# Patient Record
Sex: MALE | Race: BLACK OR AFRICAN AMERICAN | NOT HISPANIC OR LATINO | Employment: OTHER | URBAN - METROPOLITAN AREA
[De-identification: names, ages, dates, MRNs, and addresses within clinical notes are randomized per-mention and may not be internally consistent; named-entity substitution may affect disease eponyms.]

---

## 2017-05-26 ENCOUNTER — HOSPITAL ENCOUNTER (OUTPATIENT)
Dept: PREADMISSION TESTING | Facility: HOSPITAL | Age: 52
Discharge: HOME | End: 2017-05-26
Attending: SURGERY
Payer: COMMERCIAL

## 2017-05-26 VITALS
HEART RATE: 71 BPM | HEIGHT: 67 IN | RESPIRATION RATE: 16 BRPM | WEIGHT: 237 LBS | BODY MASS INDEX: 37.2 KG/M2 | SYSTOLIC BLOOD PRESSURE: 131 MMHG | OXYGEN SATURATION: 94 % | TEMPERATURE: 98 F | DIASTOLIC BLOOD PRESSURE: 74 MMHG

## 2017-05-26 DIAGNOSIS — R59.0 INGUINAL LYMPHADENOPATHY: ICD-10-CM

## 2017-05-26 DIAGNOSIS — Z01.818 PRE-OP TESTING: Primary | ICD-10-CM

## 2017-05-26 LAB
ANION GAP SERPL CALC-SCNC: 8 MMOL/L
BASOPHILS # BLD AUTO: 0.04 K/UL
BASOPHILS NFR BLD: 0.6 %
BUN SERPL-MCNC: 32 MG/DL
CALCIUM SERPL-MCNC: 10.2 MG/DL
CHLORIDE SERPL-SCNC: 101 MMOL/L
CO2 SERPL-SCNC: 29 MMOL/L
CREAT SERPL-MCNC: 2 MG/DL
DIFFERENTIAL METHOD: ABNORMAL
EOSINOPHIL # BLD AUTO: 0.2 K/UL
EOSINOPHIL NFR BLD: 3.1 %
ERYTHROCYTE [DISTWIDTH] IN BLOOD BY AUTOMATED COUNT: 12.1 %
EST. GFR  (AFRICAN AMERICAN): 43 ML/MIN/1.73 M^2
EST. GFR  (NON AFRICAN AMERICAN): 37 ML/MIN/1.73 M^2
GLUCOSE SERPL-MCNC: 261 MG/DL
HCT VFR BLD AUTO: 44.2 %
HGB BLD-MCNC: 15.2 G/DL
LYMPHOCYTES # BLD AUTO: 2.8 K/UL
LYMPHOCYTES NFR BLD: 39.9 %
MCH RBC QN AUTO: 32.1 PG
MCHC RBC AUTO-ENTMCNC: 34.4 %
MCV RBC AUTO: 93 FL
MONOCYTES # BLD AUTO: 0.6 K/UL
MONOCYTES NFR BLD: 9 %
NEUTROPHILS # BLD AUTO: 3.3 K/UL
NEUTROPHILS NFR BLD: 47.1 %
PLATELET # BLD AUTO: 214 K/UL
PMV BLD AUTO: 11.3 FL
POTASSIUM SERPL-SCNC: 4.5 MMOL/L
RBC # BLD AUTO: 4.73 M/UL
SODIUM SERPL-SCNC: 138 MMOL/L
WBC # BLD AUTO: 7.09 K/UL

## 2017-05-26 PROCEDURE — 80048 BASIC METABOLIC PNL TOTAL CA: CPT

## 2017-05-26 PROCEDURE — 93005 ELECTROCARDIOGRAM TRACING: CPT

## 2017-05-26 PROCEDURE — 85025 COMPLETE CBC W/AUTO DIFF WBC: CPT

## 2017-05-26 NOTE — DISCHARGE INSTRUCTIONS
Your surgery is scheduled for___6/2/2017______________.    Call [phone] between 2 pm and 5 pm __6/1/2017__________ to find out your arrival time for the day of surgery.    Report to SAME DAY SURGERY UNIT at _______am on the 2nd floor of the hospital.  Use the front entrance of the hospital before 6 am.  If you need wheelchair assistance, call [phone] from your cell phone,  or call 0 from the courtesy phone in the hospital lobby.    Important instructions:   Do not eat or drink after 12 midnight, including water.  It is okay to brush your teeth.  Do not have gum, candy or mints.     Take only these medications with a small swallow of water on the morning of your surgery.___albuterol, amiodarone, amlodipine, isosorbide, metoprolol__________        Return to the hospital lab on __________for additional blood test.       Please shower the night before and the morning of your surgery.       Use Hibiclens soap to your surgery site if instructed by your pre op nurse.   If your surgery is on your abdomen, be sure to wash your naval.  Be sure to rinse off Hibiclens after it is on your skin for several minutes.  Do not use Hibiclens on your face or genitals.      You may wear deodorant only.      Do not wear powder, body lotion or cologne.     Do not wear any jewelry or have any metal on your body.     Please bring any documents given to you by your doctor.     If you are going home on the same day of surgery, you must have arrangements for a ride home.  You will not be able to drive home if you were given anesthesia or sedation.     Do not take any diabetic medication on the morning of surgery unless instructed to do so by your doctor or pre op nurse.     Stop taking Aspirin, Ibuprofen, Motrin and Aleve at least 3-5 days before your surgery. You may use Tylenol.     Stop taking fish oil and vitamin E for least 5 days before surgery.     Wear loose fitting clothes allowing for bandages.     Please leave  money and valuables home.       You may bring your cell phone.     Call the doctor if fever or illness should occur before your surgery.    Call [phone] to contact us here at [facility] if needed.

## 2017-06-01 ENCOUNTER — ANESTHESIA EVENT (OUTPATIENT)
Dept: SURGERY | Facility: HOSPITAL | Age: 52
End: 2017-06-01
Payer: COMMERCIAL

## 2017-06-02 ENCOUNTER — ANESTHESIA (OUTPATIENT)
Dept: SURGERY | Facility: HOSPITAL | Age: 52
End: 2017-06-02
Payer: COMMERCIAL

## 2017-06-02 ENCOUNTER — HOSPITAL ENCOUNTER (OUTPATIENT)
Facility: HOSPITAL | Age: 52
Discharge: HOME | End: 2017-06-02
Attending: SURGERY | Admitting: SURGERY
Payer: COMMERCIAL

## 2017-06-02 ENCOUNTER — SURGERY (OUTPATIENT)
Age: 52
End: 2017-06-02

## 2017-06-02 VITALS
WEIGHT: 237 LBS | OXYGEN SATURATION: 98 % | SYSTOLIC BLOOD PRESSURE: 110 MMHG | HEIGHT: 67 IN | BODY MASS INDEX: 37.2 KG/M2 | HEART RATE: 74 BPM | DIASTOLIC BLOOD PRESSURE: 65 MMHG | TEMPERATURE: 97 F | RESPIRATION RATE: 20 BRPM

## 2017-06-02 DIAGNOSIS — R59.0 INGUINAL LYMPHADENOPATHY: ICD-10-CM

## 2017-06-02 LAB
POCT GLUCOSE: 202 MG/DL (ref 70–110)
POCT GLUCOSE: 220 MG/DL (ref 70–110)

## 2017-06-02 PROCEDURE — 88305 TISSUE EXAM BY PATHOLOGIST: CPT | Mod: 26,,, | Performed by: PATHOLOGY

## 2017-06-02 PROCEDURE — 63600175 PHARM REV CODE 636 W HCPCS: Performed by: ANESTHESIOLOGY

## 2017-06-02 PROCEDURE — 25000003 PHARM REV CODE 250: Performed by: ANESTHESIOLOGY

## 2017-06-02 PROCEDURE — 25000003 PHARM REV CODE 250: Performed by: SURGERY

## 2017-06-02 PROCEDURE — 36000706: Performed by: SURGERY

## 2017-06-02 PROCEDURE — 88185 FLOWCYTOMETRY/TC ADD-ON: CPT | Performed by: PATHOLOGY

## 2017-06-02 PROCEDURE — 37000008 HC ANESTHESIA 1ST 15 MINUTES: Performed by: SURGERY

## 2017-06-02 PROCEDURE — 01610 ANES ALL PX NRV MUSC SHO&AX: CPT | Performed by: SURGERY

## 2017-06-02 PROCEDURE — 88364 INSITU HYBRIDIZATION (FISH): CPT | Mod: 26,,, | Performed by: PATHOLOGY

## 2017-06-02 PROCEDURE — 88305 TISSUE EXAM BY PATHOLOGIST: CPT | Performed by: PATHOLOGY

## 2017-06-02 PROCEDURE — D9220A PRA ANESTHESIA: Mod: ANES,,, | Performed by: ANESTHESIOLOGY

## 2017-06-02 PROCEDURE — 88341 IMHCHEM/IMCYTCHM EA ADD ANTB: CPT | Mod: 26,59,, | Performed by: PATHOLOGY

## 2017-06-02 PROCEDURE — 71000015 HC POSTOP RECOV 1ST HR: Performed by: SURGERY

## 2017-06-02 PROCEDURE — 63600175 PHARM REV CODE 636 W HCPCS: Performed by: SURGERY

## 2017-06-02 PROCEDURE — 63600175 PHARM REV CODE 636 W HCPCS

## 2017-06-02 PROCEDURE — D9220A PRA ANESTHESIA: Mod: CRNA,,, | Performed by: NURSE ANESTHETIST, CERTIFIED REGISTERED

## 2017-06-02 PROCEDURE — 88365 INSITU HYBRIDIZATION (FISH): CPT | Mod: 26,,, | Performed by: PATHOLOGY

## 2017-06-02 PROCEDURE — 71000016 HC POSTOP RECOV ADDL HR: Performed by: SURGERY

## 2017-06-02 PROCEDURE — 63600175 PHARM REV CODE 636 W HCPCS: Performed by: NURSE ANESTHETIST, CERTIFIED REGISTERED

## 2017-06-02 PROCEDURE — 88189 FLOWCYTOMETRY/READ 16 & >: CPT | Mod: ,,, | Performed by: PATHOLOGY

## 2017-06-02 PROCEDURE — 71000033 HC RECOVERY, INTIAL HOUR: Performed by: SURGERY

## 2017-06-02 PROCEDURE — 36000707: Performed by: SURGERY

## 2017-06-02 PROCEDURE — 37000009 HC ANESTHESIA EA ADD 15 MINS: Performed by: SURGERY

## 2017-06-02 PROCEDURE — 25000003 PHARM REV CODE 250: Performed by: NURSE ANESTHETIST, CERTIFIED REGISTERED

## 2017-06-02 PROCEDURE — 88184 FLOWCYTOMETRY/ TC 1 MARKER: CPT | Performed by: PATHOLOGY

## 2017-06-02 PROCEDURE — 88342 IMHCHEM/IMCYTCHM 1ST ANTB: CPT | Mod: 26,59,, | Performed by: PATHOLOGY

## 2017-06-02 PROCEDURE — 88365 INSITU HYBRIDIZATION (FISH): CPT | Performed by: PATHOLOGY

## 2017-06-02 RX ORDER — BUPIVACAINE HYDROCHLORIDE 2.5 MG/ML
INJECTION, SOLUTION EPIDURAL; INFILTRATION; INTRACAUDAL
Status: DISCONTINUED | OUTPATIENT
Start: 2017-06-02 | End: 2017-06-02 | Stop reason: HOSPADM

## 2017-06-02 RX ORDER — SODIUM CHLORIDE 0.9 % (FLUSH) 0.9 %
3 SYRINGE (ML) INJECTION
Status: DISCONTINUED | OUTPATIENT
Start: 2017-06-02 | End: 2017-06-02 | Stop reason: HOSPADM

## 2017-06-02 RX ORDER — LIDOCAINE HCL/PF 100 MG/5ML
SYRINGE (ML) INTRAVENOUS
Status: DISCONTINUED | OUTPATIENT
Start: 2017-06-02 | End: 2017-06-02

## 2017-06-02 RX ORDER — HYDROCODONE BITARTRATE AND ACETAMINOPHEN 5; 325 MG/1; MG/1
1 TABLET ORAL EVERY 4 HOURS PRN
Qty: 30 TABLET | Refills: 0 | Status: SHIPPED | OUTPATIENT
Start: 2017-06-02 | End: 2017-06-26

## 2017-06-02 RX ORDER — HYDROMORPHONE HYDROCHLORIDE 2 MG/ML
0.2 INJECTION, SOLUTION INTRAMUSCULAR; INTRAVENOUS; SUBCUTANEOUS EVERY 5 MIN PRN
Status: DISCONTINUED | OUTPATIENT
Start: 2017-06-02 | End: 2017-06-02 | Stop reason: HOSPADM

## 2017-06-02 RX ORDER — CEFAZOLIN SODIUM 2 G/50ML
2 SOLUTION INTRAVENOUS
Status: COMPLETED | OUTPATIENT
Start: 2017-06-02 | End: 2017-06-02

## 2017-06-02 RX ORDER — SODIUM CHLORIDE 0.9 % (FLUSH) 0.9 %
3 SYRINGE (ML) INJECTION EVERY 8 HOURS
Status: DISCONTINUED | OUTPATIENT
Start: 2017-06-02 | End: 2017-06-02 | Stop reason: HOSPADM

## 2017-06-02 RX ORDER — ACETAMINOPHEN 10 MG/ML
1000 INJECTION, SOLUTION INTRAVENOUS ONCE
Status: COMPLETED | OUTPATIENT
Start: 2017-06-02 | End: 2017-06-02

## 2017-06-02 RX ORDER — LIDOCAINE HYDROCHLORIDE 10 MG/ML
1 INJECTION, SOLUTION EPIDURAL; INFILTRATION; INTRACAUDAL; PERINEURAL ONCE AS NEEDED
Status: DISCONTINUED | OUTPATIENT
Start: 2017-06-02 | End: 2017-06-02 | Stop reason: HOSPADM

## 2017-06-02 RX ORDER — FENTANYL CITRATE 50 UG/ML
INJECTION, SOLUTION INTRAMUSCULAR; INTRAVENOUS
Status: DISCONTINUED | OUTPATIENT
Start: 2017-06-02 | End: 2017-06-02

## 2017-06-02 RX ORDER — SODIUM CHLORIDE 9 MG/ML
INJECTION, SOLUTION INTRAVENOUS CONTINUOUS
Status: DISCONTINUED | OUTPATIENT
Start: 2017-06-02 | End: 2017-06-02 | Stop reason: HOSPADM

## 2017-06-02 RX ORDER — HYDROCODONE BITARTRATE AND ACETAMINOPHEN 5; 325 MG/1; MG/1
1 TABLET ORAL EVERY 4 HOURS PRN
Status: DISCONTINUED | OUTPATIENT
Start: 2017-06-02 | End: 2017-06-02 | Stop reason: HOSPADM

## 2017-06-02 RX ORDER — ETOMIDATE 2 MG/ML
INJECTION INTRAVENOUS
Status: DISCONTINUED | OUTPATIENT
Start: 2017-06-02 | End: 2017-06-02

## 2017-06-02 RX ORDER — FAMOTIDINE 20 MG/50ML
INJECTION, SOLUTION INTRAVENOUS
Status: DISCONTINUED
Start: 2017-06-02 | End: 2017-06-02 | Stop reason: HOSPADM

## 2017-06-02 RX ORDER — MIDAZOLAM HYDROCHLORIDE 1 MG/ML
INJECTION INTRAMUSCULAR; INTRAVENOUS
Status: DISCONTINUED
Start: 2017-06-02 | End: 2017-06-02 | Stop reason: HOSPADM

## 2017-06-02 RX ORDER — LIDOCAINE HYDROCHLORIDE 10 MG/ML
INJECTION, SOLUTION EPIDURAL; INFILTRATION; INTRACAUDAL; PERINEURAL
Status: DISCONTINUED | OUTPATIENT
Start: 2017-06-02 | End: 2017-06-02 | Stop reason: HOSPADM

## 2017-06-02 RX ORDER — MIDAZOLAM HYDROCHLORIDE 1 MG/ML
INJECTION, SOLUTION INTRAMUSCULAR; INTRAVENOUS
Status: DISCONTINUED | OUTPATIENT
Start: 2017-06-02 | End: 2017-06-02

## 2017-06-02 RX ORDER — FENTANYL CITRATE 50 UG/ML
25 INJECTION, SOLUTION INTRAMUSCULAR; INTRAVENOUS EVERY 5 MIN PRN
Status: DISCONTINUED | OUTPATIENT
Start: 2017-06-02 | End: 2017-06-02 | Stop reason: HOSPADM

## 2017-06-02 RX ORDER — CEFAZOLIN SODIUM 2 G/50ML
SOLUTION INTRAVENOUS
Status: DISCONTINUED
Start: 2017-06-02 | End: 2017-06-02 | Stop reason: HOSPADM

## 2017-06-02 RX ORDER — SODIUM CHLORIDE, SODIUM LACTATE, POTASSIUM CHLORIDE, CALCIUM CHLORIDE 600; 310; 30; 20 MG/100ML; MG/100ML; MG/100ML; MG/100ML
INJECTION, SOLUTION INTRAVENOUS CONTINUOUS
Status: DISCONTINUED | OUTPATIENT
Start: 2017-06-02 | End: 2017-06-02 | Stop reason: HOSPADM

## 2017-06-02 RX ORDER — MORPHINE SULFATE 10 MG/ML
3 INJECTION INTRAMUSCULAR; INTRAVENOUS; SUBCUTANEOUS
Status: DISCONTINUED | OUTPATIENT
Start: 2017-06-02 | End: 2017-06-02 | Stop reason: HOSPADM

## 2017-06-02 RX ORDER — ACETAMINOPHEN 10 MG/ML
INJECTION, SOLUTION INTRAVENOUS
Status: COMPLETED
Start: 2017-06-02 | End: 2017-06-02

## 2017-06-02 RX ADMIN — ETOMIDATE 2 MG: 2 INJECTION, SOLUTION INTRAVENOUS at 11:06

## 2017-06-02 RX ADMIN — HYDROMORPHONE HYDROCHLORIDE 0.2 MG: 2 INJECTION INTRAMUSCULAR; INTRAVENOUS; SUBCUTANEOUS at 12:06

## 2017-06-02 RX ADMIN — ETOMIDATE 6 MG: 2 INJECTION, SOLUTION INTRAVENOUS at 11:06

## 2017-06-02 RX ADMIN — MIDAZOLAM HYDROCHLORIDE 1 MG: 1 INJECTION, SOLUTION INTRAMUSCULAR; INTRAVENOUS at 11:06

## 2017-06-02 RX ADMIN — ETOMIDATE 4 MG: 2 INJECTION, SOLUTION INTRAVENOUS at 11:06

## 2017-06-02 RX ADMIN — CEFAZOLIN SODIUM 2 G: 2 SOLUTION INTRAVENOUS at 11:06

## 2017-06-02 RX ADMIN — ACETAMINOPHEN 1000 MG: 10 INJECTION, SOLUTION INTRAVENOUS at 12:06

## 2017-06-02 RX ADMIN — LIDOCAINE HYDROCHLORIDE 80 MG: 20 INJECTION, SOLUTION INTRAVENOUS at 11:06

## 2017-06-02 RX ADMIN — FENTANYL CITRATE 25 MCG: 50 INJECTION INTRAMUSCULAR; INTRAVENOUS at 11:06

## 2017-06-02 RX ADMIN — HYDROCODONE BITARTRATE AND ACETAMINOPHEN 1 TABLET: 5; 325 TABLET ORAL at 01:06

## 2017-06-02 RX ADMIN — SODIUM CHLORIDE, SODIUM LACTATE, POTASSIUM CHLORIDE, AND CALCIUM CHLORIDE: .6; .31; .03; .02 INJECTION, SOLUTION INTRAVENOUS at 10:06

## 2017-06-02 RX ADMIN — BUPIVACAINE HYDROCHLORIDE 30 ML: 2.5 INJECTION, SOLUTION EPIDURAL; INFILTRATION; INTRACAUDAL; PERINEURAL at 11:06

## 2017-06-02 RX ADMIN — LIDOCAINE HYDROCHLORIDE 30 ML: 10 INJECTION, SOLUTION EPIDURAL; INFILTRATION; INTRACAUDAL; PERINEURAL at 11:06

## 2017-06-02 NOTE — TRANSFER OF CARE
"Anesthesia Transfer of Care Note    Patient: Lianet Mcgraw    Procedure(s) Performed: Procedure(s) (LRB):  EXCISION-BIOPSY-LYMPH NODE-INGUINAL (Left)    Patient location: PACU    Anesthesia Type: general    Transport from OR: Transported from OR on room air with adequate spontaneous ventilation    Post pain: adequate analgesia    Post assessment: no apparent anesthetic complications    Post vital signs: stable    Level of consciousness: awake, alert and oriented    Nausea/Vomiting: no nausea/vomiting    Complications: none    Transfer of care protocol was followed      Last vitals:   Visit Vitals  /85 (BP Location: Right arm, Patient Position: Lying, BP Method: Automatic)   Pulse 76   Temp 36.8 °C (98.2 °F) (Tympanic)   Resp 12   Ht 5' 7" (1.702 m)   Wt 107.5 kg (237 lb)   SpO2 98%   BMI 37.12 kg/m²     "

## 2017-06-02 NOTE — OP NOTE
DATE OF PROCEDURE:  06/02/2017    PREOPERATIVE DIAGNOSIS:  Left inguinal adenopathy.    POSTOPERATIVE DIAGNOSIS:  Left inguinal adenopathy.    PROCEDURE PERFORMED:  Excision of left inguinal lymph node.    SURGEON:  Brenden Bains M.D.    ANESTHESIA:  General.    ESTIMATED BLOOD LOSS:  Minimal.    DESCRIPTION OF OPERATION:  The patient was brought to the Operating Room, placed   on operating table in supine position and under adequate anesthesia, prepped   and draped around his left groin in usual sterile fashion.  Incision was made   overlying this area, deepened to expose inguinal lymph node.  Circumferential   dissection removed and sent to Pathology for analysis.  Hemostasis obtained with   electrocautery.  The wound was then closed in layers with absorbable suture.    Some peroneal tape was used.  The patient was awakened and transported to   Recovery Room in satisfactory condition.      KARLI  dd: 06/02/2017 12:13:24 (CDT)  td: 06/02/2017 12:50:55 (CDT)  Doc ID   #[id number]  Job ID #[id number]    CC:

## 2017-06-02 NOTE — DISCHARGE INSTRUCTIONS
DRESSING:   You have Dermaflex  . covering your incision.  It is a clear liquid that dries like super glue.  Do not try to remove it.  It will flake off over time.  You may wash it with soap and water in the shower.  Do not apply any creams or lotion on it.    BATHING:    You may shower, but no tub baths until you see the doctor.    ACTIVITY LEVEL: If you have received sedation or an anesthetic, you may feel sleepy for several hours. Rest until you are more awake. Gradually resume your normal activities. No driving or alcoholic beverages for 24 hours.     No driving, alcoholic beverages or signing legal documents for next 24 hours or while taking pain medication    DIET: You may resume your home diet. If nausea is present, increase your diet gradually with fluids and bland foods.    Medications: Pain medication should be taken only if needed and as directed. If antibiotics are prescribed, the medication should be taken until completed. You will be given an updated list of you medications.    LAST DOSE OF NORCO 1:07 PM  SEEP BREATHE AND COUGH FREQUENTLY.    CALL THE DOCTOR:    For any obvious bleeding (some dried blood over the incision is normal).                          Redness, swelling, foul smell around incision or fever over     101.                    Shortness of breath.   Persistent pain or nausea not relieved by medication.  Fall Prevention  Millions of people fall every year and injure themselves. You may have had anesthesia or sedation which may increase your risk of falling. You may have health issues that put you at an increased risk of falling.   Here are ways to reduce your risk of falling.  ·   · Make your home safe by keeping walkways clear of objects you may trip over.  · Use non-slip pads under rugs. Do not use area rugs or small throw rugs.  · Use non-slip mats in bathtubs and showers.  · Install handrails and lights on staircases.  · Do not walk in poorly lit areas.  · Do not stand on chairs  or wobbly ladders.  · Use caution when reaching overhead or looking upward. This position can cause a loss of balance.  · Be sure your shoes fit properly, have non-slip bottoms and are in good condition.   · Wear shoes both inside and out. Avoid going barefoot or wearing slippers.  · Be cautious when going up and down stairs, curbs, and when walking on uneven sidewalks.  · If your balance is poor, consider using a cane or walker.  · If your fall was related to alcohol use, stop or limit alcohol intake.   · If your fall was related to use of sleeping medicines, talk to your doctor about this. You may need to reduce your dosage at bedtime if you awaken during the night to go to the bathroom.    · To reduce the need for nighttime bathroom trips:  ¨ Avoid drinking fluids for several hours before going to bed  ¨ Empty your bladder before going to bed  ¨ Men can keep a urinal at the bedside  · Stay as active as you can. Balance, flexibility, strength, and endurance all come from exercise. They all play a role in preventing falls. Ask your healthcare provider which types of activity are right for you.  · Get your vision checked on a regular basis.  · If you have pets, know where they are before you stand up or walk so you don't trip over them.  Use night lights

## 2017-06-02 NOTE — H&P
Patient ID: Lianet Mcgraw is a 52 y.o. male.     Chief Complaint: Other (lymphadenopathy)     HPI 51 yo male with inguinal lymphadenopathy in the Left  Review of Systems   Constitutional: Negative.    HENT: Negative.    Eyes: Negative.    Respiratory: Negative.    Cardiovascular: Negative.    Gastrointestinal: Negative.    Endocrine: Negative.    Musculoskeletal: Negative.    Skin: Negative.    Allergic/Immunologic: Negative.    Neurological: Negative.    Hematological: Negative.    Psychiatric/Behavioral: Negative.    All other systems reviewed and are negative.      Objective:      Physical Exam   Constitutional: He is oriented to person, place, and time. He appears well-developed and well-nourished.   HENT:   Head: Normocephalic and atraumatic.   Right Ear: External ear normal.   Left Ear: External ear normal.   Nose: Nose normal.   Mouth/Throat: Oropharynx is clear and moist.   Eyes: Conjunctivae and EOM are normal. Pupils are equal, round, and reactive to light.   Neck: Normal range of motion. Neck supple.   Cardiovascular: Normal rate, regular rhythm, normal heart sounds and intact distal pulses.    Pulmonary/Chest: Effort normal and breath sounds normal.   Abdominal: Soft. Bowel sounds are normal.   Genitourinary:         Musculoskeletal: Normal range of motion.   Lymphadenopathy: Inguinal adenopathy noted on the left side.   Neurological: He is alert and oriented to person, place, and time. He has normal reflexes.   Skin: Skin is warm and dry.   Psychiatric: He has a normal mood and affect. His behavior is normal. Thought content normal.   Vitals reviewed.      Assessment:       1. Inguinal lymphadenopathy        Plan:       I will take him to the OR for excision

## 2017-06-02 NOTE — ANESTHESIA PREPROCEDURE EVALUATION
06/02/2017  Lianet Mcgraw is a 52 y.o., male.    Anesthesia Evaluation    I have reviewed the Patient Summary Reports.    I have reviewed the Nursing Notes.      Review of Systems  Cardiovascular:   Exercise tolerance: good Pacemaker Hypertension Past MI CAD  Dysrhythmias (s/p ablation)  hyperlipidemia Pt and daughter deny sob/cp with exertion, though it is unlikely the pt sustains 4 METS in his usual activities   Pulmonary:   COPD (breathing at baseline today), moderate Sleep Apnea    Renal/:   Chronic Renal Disease, CRI BPH    Hepatic/GI:   GERD    Musculoskeletal:   Arthritis     Neurological:   Denies CVA. Denies Seizures.    Endocrine:   Diabetes, type 2 Denies Hypothyroidism. Denies Hyperthyroidism.        Physical Exam  General:  Obesity    Airway/Jaw/Neck:   M3  Partial bridge  Adequate mouth opening  Short thick neck somewhat limits neck ROM          Mental Status:  Mental Status Findings: Normal        Anesthesia Plan  Type of Anesthesia, risks & benefits discussed:  Anesthesia Type:  general  Patient's Preference:   Intra-op Monitoring Plan:   Intra-op Monitoring Plan Comments:   Post Op Pain Control Plan:   Post Op Pain Control Plan Comments:   Induction:   IV  Beta Blocker:  Patient is not currently on a Beta-Blocker (No further documentation required).       Informed Consent: Patient understands risks and agrees with Anesthesia plan.  Questions answered. Anesthesia consent signed with patient.  ASA Score: 3     Day of Surgery Review of History & Physical:    H&P update referred to the surgeon.     Anesthesia Plan Notes: Npo  Low risk procedure that is somewhat time urgent as Dr. Bains wants to rule out cancer in lymph node. Pt has   Seen his PCP who declared pt to be moderate risk and he was seen at his heart clinic yday and device interogated.  Pt and daughter deny any new symptoms of  sob/cp        Ready For Surgery From Anesthesia Perspective.

## 2017-06-05 LAB
FLOW CYTOMETRY ANTIBODIES ANALYZED - LYMPH NODE: NORMAL
FLOW CYTOMETRY COMMENT - LYMPH NODE: NORMAL
FLOW CYTOMETRY INTERPRETATION - LYMPH NODE: NORMAL

## 2017-06-06 NOTE — ANESTHESIA POSTPROCEDURE EVALUATION
"Anesthesia Post Evaluation    Patient: Lianet Mcgraw    Procedure(s) Performed: Procedure(s) (LRB):  EXCISION-BIOPSY-LYMPH NODE-INGUINAL (Left)    Final Anesthesia Type: general  Patient location during evaluation: PACU  Patient participation: Yes- Able to Participate  Level of consciousness: awake and alert and oriented  Post-procedure vital signs: reviewed and stable  Pain management: adequate  Airway patency: patent  PONV status at discharge: No PONV  Anesthetic complications: no      Cardiovascular status: blood pressure returned to baseline and hemodynamically stable  Respiratory status: unassisted and room air  Hydration status: euvolemic  Follow-up not needed.        Visit Vitals  /65   Pulse 74   Temp 36.3 °C (97.4 °F) (Oral)   Resp 20   Ht 5' 7" (1.702 m)   Wt 107.5 kg (237 lb)   SpO2 98%   BMI 37.12 kg/m²       Pain/Laurie Score: No Data Recorded      "

## 2017-06-10 ENCOUNTER — HOSPITAL ENCOUNTER (EMERGENCY)
Facility: OTHER | Age: 52
Discharge: HOME | End: 2017-06-10
Attending: EMERGENCY MEDICINE
Payer: COMMERCIAL

## 2017-06-10 VITALS
HEIGHT: 67 IN | SYSTOLIC BLOOD PRESSURE: 132 MMHG | OXYGEN SATURATION: 100 % | HEART RATE: 75 BPM | WEIGHT: 236 LBS | TEMPERATURE: 98 F | RESPIRATION RATE: 18 BRPM | DIASTOLIC BLOOD PRESSURE: 77 MMHG | BODY MASS INDEX: 37.04 KG/M2

## 2017-06-10 DIAGNOSIS — T81.30XA WOUND DEHISCENCE: ICD-10-CM

## 2017-06-10 DIAGNOSIS — G89.18 POST-OPERATIVE PAIN: Primary | ICD-10-CM

## 2017-06-10 LAB
ALBUMIN SERPL-MCNC: 4.3 G/DL (ref 3.3–5.5)
ALP SERPL-CCNC: 80 U/L (ref 42–141)
BILIRUB SERPL-MCNC: 0.9 MG/DL (ref 0.2–1.6)
BUN SERPL-MCNC: 26 MG/DL (ref 7–22)
CALCIUM SERPL-MCNC: 10.2 MG/DL (ref 8–10.3)
CHLORIDE SERPL-SCNC: 100 MMOL/L (ref 98–108)
CREAT SERPL-MCNC: 1.6 MG/DL (ref 0.6–1.2)
GLUCOSE SERPL-MCNC: 261 MG/DL (ref 73–118)
HCO3 UR-SCNC: 27.1 MMOL/L (ref 24–28)
LDH SERPL L TO P-CCNC: 1.65 MMOL/L (ref 0.5–2.2)
PCO2 BLDA: 41.2 MMHG (ref 35–45)
PH SMN: 7.43 [PH] (ref 7.35–7.45)
PO2 BLDA: 54 MMHG (ref 40–60)
POC ALT (SGPT): 30 U/L (ref 10–47)
POC AST (SGOT): 31 U/L (ref 11–38)
POC BE: 3 MMOL/L
POC SATURATED O2: 88 % (ref 95–100)
POC TCO2: 27 MMOL/L (ref 18–33)
POC TCO2: 28 MMOL/L (ref 24–29)
POCT GLUCOSE: 324 MG/DL (ref 70–110)
POTASSIUM BLD-SCNC: 4.5 MMOL/L (ref 3.6–5.1)
PROTEIN, POC: 7.9 G/DL (ref 6.4–8.1)
SAMPLE: ABNORMAL
SODIUM BLD-SCNC: 133 MMOL/L (ref 128–145)

## 2017-06-10 PROCEDURE — 85025 COMPLETE CBC W/AUTO DIFF WBC: CPT

## 2017-06-10 PROCEDURE — 63600175 PHARM REV CODE 636 W HCPCS: Performed by: EMERGENCY MEDICINE

## 2017-06-10 PROCEDURE — 82947 ASSAY GLUCOSE BLOOD QUANT: CPT

## 2017-06-10 PROCEDURE — 80053 COMPREHEN METABOLIC PANEL: CPT

## 2017-06-10 PROCEDURE — 99283 EMERGENCY DEPT VISIT LOW MDM: CPT | Mod: 25

## 2017-06-10 PROCEDURE — 96372 THER/PROPH/DIAG INJ SC/IM: CPT

## 2017-06-10 PROCEDURE — 83605 ASSAY OF LACTIC ACID: CPT

## 2017-06-10 RX ORDER — HYDROMORPHONE HYDROCHLORIDE 1 MG/ML
1 INJECTION, SOLUTION INTRAMUSCULAR; INTRAVENOUS; SUBCUTANEOUS
Status: DISCONTINUED | OUTPATIENT
Start: 2017-06-10 | End: 2017-06-10

## 2017-06-10 RX ORDER — HYDROMORPHONE HYDROCHLORIDE 1 MG/ML
INJECTION, SOLUTION INTRAMUSCULAR; INTRAVENOUS; SUBCUTANEOUS
Status: DISCONTINUED
Start: 2017-06-10 | End: 2017-06-11 | Stop reason: HOSPADM

## 2017-06-10 RX ORDER — HYDROMORPHONE HYDROCHLORIDE 1 MG/ML
1 INJECTION, SOLUTION INTRAMUSCULAR; INTRAVENOUS; SUBCUTANEOUS
Status: COMPLETED | OUTPATIENT
Start: 2017-06-10 | End: 2017-06-10

## 2017-06-10 RX ADMIN — HYDROMORPHONE HYDROCHLORIDE 1 MG: 1 INJECTION, SOLUTION INTRAMUSCULAR; INTRAVENOUS; SUBCUTANEOUS at 10:06

## 2017-06-11 NOTE — ED PROVIDER NOTES
Encounter Date: 6/10/2017       History     Chief Complaint   Patient presents with    Wound Dehiscence     Pt presents to ER with c/o having a procedure done a week ago for a lynph node removal and now the area is swollen, red, draining and opening to left groin.     Review of patient's allergies indicates:   Allergen Reactions    Darvocet a500 [propoxyphene n-acetaminophen] Itching     52 y.o. Male with past medical history of left inguinal LAD sp LN excision by Dr. Bains on 6/2/17 present to ED c/o pain and swelling to left groin. He states he ran out of pain medications and cannot get a refill due to prior prescription for same medication is currently active until  Amanda 15.  Patient states he also noticed clear fluid draining from wound since yesterday.                     Past Medical History:   Diagnosis Date    COPD (chronic obstructive pulmonary disease)     Coronary artery disease     Diabetes mellitus type II     DJD (degenerative joint disease)     Gout     Hypertension     Kidney stone     MI (myocardial infarction) 2010    Obesity     JOSE (obstructive sleep apnea)      Past Surgical History:   Procedure Laterality Date    CARDIAC DEFIBRILLATOR PLACEMENT      FOOT SURGERY      right foot surgery     kidney stones      lithotripsy    Surgery for bulging disc at C7       Family History   Problem Relation Age of Onset    Diabetes Mother     Hypertension Mother     Heart disease Father     Diabetes Brother     Hypertension Brother     Asthma Daughter     Cancer Maternal Uncle      prostate    Cancer Cousin     Kidney disease Cousin      Social History   Substance Use Topics    Smoking status: Never Smoker    Smokeless tobacco: Not on file    Alcohol use No     Review of Systems   Constitutional: Negative for chills and fever.   HENT: Negative.    Eyes: Negative.    Respiratory: Negative for cough, shortness of breath and stridor.    Cardiovascular: Positive for leg swelling  (left proximal, anterior inguinal area). Negative for chest pain and palpitations.   Gastrointestinal: Positive for constipation. Negative for diarrhea, nausea and vomiting.   Genitourinary: Negative for decreased urine volume, dysuria and hematuria.   Musculoskeletal: Negative.    Skin: Negative.    Neurological: Negative for dizziness and headaches.   Psychiatric/Behavioral: Negative.    All other systems reviewed and are negative.      Physical Exam     Initial Vitals [06/10/17 2012]   BP Pulse Resp Temp SpO2   138/70 78 18 98.1 °F (36.7 °C) --     Physical Exam    Nursing note and vitals reviewed.  Constitutional: He appears well-developed and well-nourished. He is not diaphoretic. No distress.   HENT:   Head: Normocephalic and atraumatic.   Mouth/Throat: Oropharynx is clear and moist. No oropharyngeal exudate.   Eyes: EOM are normal. Pupils are equal, round, and reactive to light. No scleral icterus.   Neck: Normal range of motion. Neck supple.   Cardiovascular: Normal rate, regular rhythm and intact distal pulses.   No murmur heard.  Pulmonary/Chest: Breath sounds normal. No stridor. No respiratory distress. He has no wheezes. He has no rhonchi. He exhibits no tenderness.   Abdominal: Soft. Bowel sounds are normal. There is no tenderness.   Musculoskeletal: Normal range of motion. He exhibits no edema.   Neurological: He is alert and oriented to person, place, and time. He has normal strength.   Skin: Skin is warm. No pallor.        Psychiatric: He has a normal mood and affect.             ED Course   Procedures  Labs Reviewed   POCT GLUCOSE - Abnormal; Notable for the following:        Result Value    POCT Glucose 324 (*)     All other components within normal limits   POCT CMP - Abnormal; Notable for the following:     POC BUN 26 (*)     POC Creatinine 1.6 (*)     POC Glucose 261 (*)     All other components within normal limits   ISTAT PROCEDURE - Abnormal; Notable for the following:     POC SATURATED O2 88  (*)     All other components within normal limits   POCT GLUCOSE MONITORING CONTINUOUS   POCT CMP             Medical Decision Making:   Clinical Tests:   Lab Tests: Ordered and Reviewed       <> Summary of Lab: White count 8 H&H 15.7 and 46.0 platelets 160 MCV 91.6 RDW 12.1  ED Management:  Creatinine of 1.6 is consistent with baseline.                   ED Course   Comment By Time   Transfer center contact to discuss case with Dr. Bains.  Carlo Mejia MD 06/10 0546   Discussed case with Dr. Kelley, on call for Dr. Bains. He recommends outpatient follow up in clinic Tuesday with him or Wednesday with Dr. Bains.  Carlo Mejia MD 06/10 4811     Labs Reviewed  Admission on 06/10/2017, Discharged on 06/10/2017   Component Date Value Ref Range Status    POCT Glucose 06/10/2017 324* 70 - 110 mg/dL Final    Albumin, POC 06/10/2017 4.3  3.3 - 5.5 g/dL Final    Alkaline Phosphatase, POC 06/10/2017 80  42 - 141 U/L Final    ALT (SGPT), POC 06/10/2017 30  10 - 47 U/L Final    AST (SGOT), POC 06/10/2017 31  11 - 38 U/L Final    POC BUN 06/10/2017 26* 7 - 22 mg/dL Final    Calcium, POC 06/10/2017 10.2  8.0 - 10.3 mg/dL Final    POC Chloride 06/10/2017 100  98 - 108 mmol/L Final    POC Creatinine 06/10/2017 1.6* 0.6 - 1.2 mg/dL Final    POC Glucose 06/10/2017 261* 73 - 118 mg/dL Final    POC Potassium 06/10/2017 4.5  3.6 - 5.1 mmol/L Final    POC Sodium 06/10/2017 133  128 - 145 mmol/L Final    Bilirubin 06/10/2017 0.9  0.2 - 1.6 mg/dL Final    POC TCO2 06/10/2017 27  18 - 33 mmol/L Final    Protein 06/10/2017 7.9  6.4 - 8.1 g/dL Final    POC PH 06/10/2017 7.426  7.35 - 7.45 Final    POC PCO2 06/10/2017 41.2  35 - 45 mmHg Final    POC PO2 06/10/2017 54  40 - 60 mmHg Final    POC HCO3 06/10/2017 27.1  24 - 28 mmol/L Final    POC BE 06/10/2017 3  -2 to 2 mmol/L Final    POC SATURATED O2 06/10/2017 88* 95 - 100 % Final    POC Lactate 06/10/2017 1.65  0.5 - 2.2 mmol/L Final    POC TCO2 06/10/2017  28  24 - 29 mmol/L Final    Sample 06/10/2017 VENOUS   Final        Imaging Reviewed    Imaging Results    None         Medications given in ED    Medications   hydromorphone (PF) injection 1 mg (1 mg Intramuscular Given 6/10/17 5318)         Clinical Impression:   The primary encounter diagnosis was Post-operative pain. Diagnoses of Seroma and Wound dehiscence were also pertinent to this visit.          Carlo Mejia MD  06/14/17 4082

## 2017-11-11 ENCOUNTER — HOSPITAL ENCOUNTER (EMERGENCY)
Facility: OTHER | Age: 52
Discharge: HOME | End: 2017-11-11
Attending: EMERGENCY MEDICINE
Payer: COMMERCIAL

## 2017-11-11 VITALS
DIASTOLIC BLOOD PRESSURE: 65 MMHG | BODY MASS INDEX: 36.57 KG/M2 | OXYGEN SATURATION: 98 % | HEART RATE: 52 BPM | SYSTOLIC BLOOD PRESSURE: 135 MMHG | WEIGHT: 233 LBS | HEIGHT: 67 IN | TEMPERATURE: 98 F | RESPIRATION RATE: 16 BRPM

## 2017-11-11 DIAGNOSIS — J40 BRONCHITIS: Primary | ICD-10-CM

## 2017-11-11 DIAGNOSIS — R05.9 COUGH: ICD-10-CM

## 2017-11-11 LAB
ALBUMIN SERPL-MCNC: 3.9 G/DL (ref 3.3–5.5)
ALP SERPL-CCNC: 65 U/L (ref 42–141)
BILIRUB SERPL-MCNC: 0.6 MG/DL (ref 0.2–1.6)
BILIRUBIN, POC UA: NEGATIVE
BLOOD, POC UA: NEGATIVE
BUN SERPL-MCNC: 18 MG/DL (ref 7–22)
CALCIUM SERPL-MCNC: 9.7 MG/DL (ref 8–10.3)
CHLORIDE SERPL-SCNC: 104 MMOL/L (ref 98–108)
CLARITY, POC UA: CLEAR
COLOR, POC UA: YELLOW
CREAT SERPL-MCNC: 1.5 MG/DL (ref 0.6–1.2)
CTP QC/QA: YES
CTP QC/QA: YES
FLUAV AG NPH QL: NEGATIVE
FLUBV AG NPH QL: NEGATIVE
GLUCOSE SERPL-MCNC: 157 MG/DL (ref 73–118)
GLUCOSE, POC UA: ABNORMAL
HCO3 UR-SCNC: 29.8 MMOL/L (ref 24–28)
KETONES, POC UA: NEGATIVE
LDH SERPL L TO P-CCNC: 1.22 MMOL/L (ref 0.5–2.2)
LEUKOCYTE EST, POC UA: NEGATIVE
NITRITE, POC UA: NEGATIVE
PCO2 BLDA: 45.1 MMHG (ref 35–45)
PH SMN: 7.43 [PH] (ref 7.35–7.45)
PH UR STRIP: 6.5 [PH]
PO2 BLDA: 52 MMHG (ref 40–60)
POC ALT (SGPT): 28 U/L (ref 10–47)
POC AST (SGOT): 40 U/L (ref 11–38)
POC B-TYPE NATRIURETIC PEPTIDE: 15.8 PG/ML (ref 0–100)
POC BE: 5 MMOL/L
POC CARDIAC TROPONIN I: 0 NG/ML
POC PTINR: 1.1 (ref 0.9–1.2)
POC PTWBT: 12.6 SEC (ref 9.7–14.3)
POC SATURATED O2: 87 % (ref 95–100)
POC TCO2: 25 MMOL/L (ref 18–33)
POC TCO2: 31 MMOL/L (ref 24–29)
POCT GLUCOSE: 156 MG/DL (ref 70–110)
POTASSIUM BLD-SCNC: 4.3 MMOL/L (ref 3.6–5.1)
PROTEIN, POC UA: NEGATIVE
PROTEIN, POC: 7.4 G/DL (ref 6.4–8.1)
S PYO RRNA THROAT QL PROBE: NEGATIVE
SAMPLE: ABNORMAL
SAMPLE: NORMAL
SAMPLE: NORMAL
SODIUM BLD-SCNC: 144 MMOL/L (ref 128–145)
SPECIFIC GRAVITY, POC UA: 1.01
UROBILINOGEN, POC UA: 4 E.U./DL

## 2017-11-11 PROCEDURE — 96361 HYDRATE IV INFUSION ADD-ON: CPT

## 2017-11-11 PROCEDURE — 93010 ELECTROCARDIOGRAM REPORT: CPT | Mod: ,,, | Performed by: INTERNAL MEDICINE

## 2017-11-11 PROCEDURE — 80053 COMPREHEN METABOLIC PANEL: CPT

## 2017-11-11 PROCEDURE — 87804 INFLUENZA ASSAY W/OPTIC: CPT

## 2017-11-11 PROCEDURE — 85025 COMPLETE CBC W/AUTO DIFF WBC: CPT

## 2017-11-11 PROCEDURE — 99284 EMERGENCY DEPT VISIT MOD MDM: CPT | Mod: 25

## 2017-11-11 PROCEDURE — 82803 BLOOD GASES ANY COMBINATION: CPT

## 2017-11-11 PROCEDURE — 87040 BLOOD CULTURE FOR BACTERIA: CPT | Mod: 59

## 2017-11-11 PROCEDURE — 93005 ELECTROCARDIOGRAM TRACING: CPT

## 2017-11-11 PROCEDURE — 94640 AIRWAY INHALATION TREATMENT: CPT

## 2017-11-11 PROCEDURE — 85610 PROTHROMBIN TIME: CPT

## 2017-11-11 PROCEDURE — 63600175 PHARM REV CODE 636 W HCPCS: Performed by: EMERGENCY MEDICINE

## 2017-11-11 PROCEDURE — 25000003 PHARM REV CODE 250: Performed by: EMERGENCY MEDICINE

## 2017-11-11 PROCEDURE — 84484 ASSAY OF TROPONIN QUANT: CPT

## 2017-11-11 PROCEDURE — 96365 THER/PROPH/DIAG IV INF INIT: CPT

## 2017-11-11 PROCEDURE — 81003 URINALYSIS AUTO W/O SCOPE: CPT

## 2017-11-11 PROCEDURE — 83880 ASSAY OF NATRIURETIC PEPTIDE: CPT

## 2017-11-11 PROCEDURE — 87880 STREP A ASSAY W/OPTIC: CPT

## 2017-11-11 PROCEDURE — 25000242 PHARM REV CODE 250 ALT 637 W/ HCPCS: Performed by: EMERGENCY MEDICINE

## 2017-11-11 RX ORDER — ASPIRIN 325 MG
325 TABLET ORAL
Status: COMPLETED | OUTPATIENT
Start: 2017-11-11 | End: 2017-11-11

## 2017-11-11 RX ORDER — ALBUTEROL SULFATE 90 UG/1
1-2 AEROSOL, METERED RESPIRATORY (INHALATION) EVERY 6 HOURS PRN
Qty: 1 INHALER | Refills: 0 | Status: SHIPPED | OUTPATIENT
Start: 2017-11-11 | End: 2018-11-11

## 2017-11-11 RX ORDER — AZITHROMYCIN 250 MG/1
TABLET, FILM COATED ORAL
Qty: 6 TABLET | Refills: 0 | Status: SHIPPED | OUTPATIENT
Start: 2017-11-11 | End: 2017-11-16

## 2017-11-11 RX ORDER — IPRATROPIUM BROMIDE AND ALBUTEROL SULFATE 2.5; .5 MG/3ML; MG/3ML
3 SOLUTION RESPIRATORY (INHALATION) ONCE
Status: COMPLETED | OUTPATIENT
Start: 2017-11-11 | End: 2017-11-11

## 2017-11-11 RX ORDER — PROMETHAZINE HYDROCHLORIDE AND DEXTROMETHORPHAN HYDROBROMIDE 6.25; 15 MG/5ML; MG/5ML
5 SYRUP ORAL 3 TIMES DAILY PRN
Qty: 100 ML | Refills: 0 | Status: SHIPPED | OUTPATIENT
Start: 2017-11-11 | End: 2017-11-21

## 2017-11-11 RX ORDER — IBUPROFEN 600 MG/1
600 TABLET ORAL EVERY 6 HOURS PRN
Qty: 20 TABLET | Refills: 0 | Status: SHIPPED | OUTPATIENT
Start: 2017-11-11 | End: 2018-07-06 | Stop reason: SDUPTHER

## 2017-11-11 RX ADMIN — CEFTRIAXONE 1 G: 1 INJECTION, SOLUTION INTRAVENOUS at 05:11

## 2017-11-11 RX ADMIN — IPRATROPIUM BROMIDE AND ALBUTEROL SULFATE 3 ML: .5; 3 SOLUTION RESPIRATORY (INHALATION) at 05:11

## 2017-11-11 RX ADMIN — SODIUM CHLORIDE 1000 ML: 0.9 INJECTION, SOLUTION INTRAVENOUS at 05:11

## 2017-11-11 RX ADMIN — ASPIRIN 325 MG ORAL TABLET 325 MG: 325 PILL ORAL at 05:11

## 2017-11-11 NOTE — ED PROVIDER NOTES
Encounter Date: 11/11/2017       History     Chief Complaint   Patient presents with    painful cough     cough x 1 week, pain with cough in right side, deneis any fever, states sputum tastes like blood     Lianet Mcgraw is a 52 y.o. male who presents to the Emergency Department with  cough for one week.  Patient presents reports a productive cough, runny nose, and congestion      The history is provided by the patient.     Review of patient's allergies indicates:   Allergen Reactions    Darvocet a500 [propoxyphene n-acetaminophen] Itching     Past Medical History:   Diagnosis Date    COPD (chronic obstructive pulmonary disease)     Coronary artery disease     Diabetes mellitus type II     DJD (degenerative joint disease)     Gout     Hypertension     Kidney stone     MI (myocardial infarction) 2010    Obesity     JOSE (obstructive sleep apnea)      Past Surgical History:   Procedure Laterality Date    CARDIAC DEFIBRILLATOR PLACEMENT      excisional biopsy left inguinal lymph node      FOOT SURGERY      right foot surgery     kidney stones      lithotripsy    Surgery for bulging disc at C7       Family History   Problem Relation Age of Onset    Diabetes Mother     Hypertension Mother     Heart disease Father     Diabetes Brother     Hypertension Brother     Asthma Daughter     Cancer Maternal Uncle      prostate    Cancer Cousin     Kidney disease Cousin      Social History   Substance Use Topics    Smoking status: Never Smoker    Smokeless tobacco: Never Used    Alcohol use No     Review of Systems    Physical Exam     Initial Vitals [11/11/17 1635]   BP Pulse Resp Temp SpO2   131/75 61 17 98.1 °F (36.7 °C) 96 %      MAP       93.67         Physical Exam    ED Course   Procedures  Labs Reviewed   POCT GLUCOSE - Abnormal; Notable for the following:        Result Value    POCT Glucose 156 (*)     All other components within normal limits   ISTAT PROCEDURE - Abnormal; Notable for the  following:     POC PCO2 45.1 (*)     POC HCO3 29.8 (*)     POC SATURATED O2 87 (*)     POC TCO2 31 (*)     All other components within normal limits   POCT CMP - Abnormal; Notable for the following:     AST (SGOT), POC 40 (*)     POC Creatinine 1.5 (*)     POC Glucose 157 (*)     All other components within normal limits   CULTURE, BLOOD   CULTURE, BLOOD   TROPONIN ISTAT   POCT CBC   POCT INFLUENZA A/B   POCT RAPID STREP A   POCT GLUCOSE   POCT CMP   POCT PROTIME-INR   POCT TROPONIN   POCT B-TYPE NATRIURETIC PEPTIDE (BNP)   ISTAT PROCEDURE   POCT B-TYPE NATRIURETIC PEPTIDE (BNP)     BNP is 15 INR is 1.1  Lactic acid is 1.2  PH is 7.429  Troponin is 0.00  CMP sodium 144, potassium 4.3, bicarbonate 25, chloride 104, glucose 157, be UN 18, and creatinine 1.5  CBC White blood cell count 6.3, hemoglobin 15, hematocrit 46, and platelets 200      EKG Readings: (Independently Interpreted)   Initial Reading: No STEMI. Previous EKG: Compared with most recent EKG Previous EKG Date: May 20 6/2/17.  When compared to prior EKG read rate has decreased by 19 bpm today. Rhythm: Sinus Bradycardia. Heart Rate: 51. Axis: Normal. Other Impression: Abnormal EKG, QTC normal at 427, nonspecific ST-T wave abnormality appreciated     Imaging Results          X-Ray Chest PA And Lateral (Final result)  Result time 11/11/17 16:46:19    Final result by Leonardo Pereira DO (11/11/17 16:46:19)                 Impression:        1.  No acute cardiopulmonary process.      Electronically signed by: LEONARDO PEREIRA D.O.  Date:     11/11/17  Time:    16:46              Narrative:    2 view chest    Comparison: 08/05/2016    Findings: The lungs are clear and free of infiltrate.  No pleural effusion or pneumothorax is identified.  The heart is enlarged.  A single lead pacing device is in place.                                                   ED Course        Medical decision making   Chief complaint:  cough for one week.  Patient presents reports a  productive cough, runny nose, and congestion    Differential diagnosis:virial illness, bronchitis, pneumonia, and COPD  Treatment in the ED Physical Exam, ceftriaxone, 1L NS and duoneb  Patient reports feeling better after medication.    Discussed labs, and imaging results.    Fill and take prescriptions as directed.  Return to the ED if symptoms worsen or do not resolve.   Answered questions and discussed discharge plan.    Patient feels much better and is ready for discharge.  Follow up with PCP in 1 days.    Clinical Impression:   The primary encounter diagnosis was Bronchitis. A diagnosis of Cough was also pertinent to this visit.                           Stephanie Calvin DO  11/19/17 5754

## 2017-11-16 LAB
BACTERIA BLD CULT: NORMAL
BACTERIA BLD CULT: NORMAL

## 2018-07-06 ENCOUNTER — HOSPITAL ENCOUNTER (EMERGENCY)
Facility: HOSPITAL | Age: 53
Discharge: HOME | End: 2018-07-06
Attending: INTERNAL MEDICINE
Payer: MEDICARE

## 2018-07-06 VITALS
HEIGHT: 67 IN | WEIGHT: 222 LBS | SYSTOLIC BLOOD PRESSURE: 129 MMHG | OXYGEN SATURATION: 97 % | BODY MASS INDEX: 34.84 KG/M2 | RESPIRATION RATE: 16 BRPM | HEART RATE: 91 BPM | DIASTOLIC BLOOD PRESSURE: 90 MMHG | TEMPERATURE: 98 F

## 2018-07-06 DIAGNOSIS — N48.22 CELLULITIS OF PENIS: Primary | ICD-10-CM

## 2018-07-06 PROCEDURE — 99283 EMERGENCY DEPT VISIT LOW MDM: CPT

## 2018-07-06 PROCEDURE — 25000003 PHARM REV CODE 250: Performed by: INTERNAL MEDICINE

## 2018-07-06 RX ORDER — IBUPROFEN 400 MG/1
800 TABLET ORAL
Status: COMPLETED | OUTPATIENT
Start: 2018-07-06 | End: 2018-07-06

## 2018-07-06 RX ORDER — DOXYCYCLINE HYCLATE 100 MG
100 TABLET ORAL
Status: COMPLETED | OUTPATIENT
Start: 2018-07-06 | End: 2018-07-06

## 2018-07-06 RX ORDER — IBUPROFEN 800 MG/1
800 TABLET ORAL EVERY 8 HOURS PRN
Qty: 30 TABLET | Refills: 0 | Status: SHIPPED | OUTPATIENT
Start: 2018-07-06 | End: 2020-09-30 | Stop reason: CLARIF

## 2018-07-06 RX ORDER — DOXYCYCLINE 100 MG/1
100 CAPSULE ORAL 2 TIMES DAILY
Qty: 20 CAPSULE | Refills: 0 | Status: SHIPPED | OUTPATIENT
Start: 2018-07-06 | End: 2018-07-16

## 2018-07-06 RX ADMIN — DOXYCYCLINE HYCLATE 100 MG: 100 TABLET, COATED ORAL at 01:07

## 2018-07-06 RX ADMIN — IBUPROFEN 800 MG: 400 TABLET ORAL at 01:07

## 2018-07-06 NOTE — ED PROVIDER NOTES
"Encounter Date: 7/6/2018       History     Chief Complaint   Patient presents with    Abscess     pt c/o "abscess to balls" approx 5 days pta, started with bloog tinged drainage tonight and with pain, denies fever.      53-year-old male presents to the emergency department complaining of infection to his "balls" ×5 days.  He denies fevers/chills, nausea and vomiting.  He also denies UTI symptoms and penile discharge.  He states he has squeezed the area but has not been able to express any pus, just scant blood.      The history is provided by the patient. No  was used.     Review of patient's allergies indicates:   Allergen Reactions    Darvocet a500 [propoxyphene n-acetaminophen] Itching     Past Medical History:   Diagnosis Date    COPD (chronic obstructive pulmonary disease)     Coronary artery disease     Diabetes mellitus type II     DJD (degenerative joint disease)     Gout     Hypertension     Kidney stone     MI (myocardial infarction) 2010    Obesity     JOSE (obstructive sleep apnea)      Past Surgical History:   Procedure Laterality Date    CARDIAC DEFIBRILLATOR PLACEMENT      excisional biopsy left inguinal lymph node      FOOT SURGERY      right foot surgery     kidney stones      lithotripsy    Surgery for bulging disc at C7       Family History   Problem Relation Age of Onset    Diabetes Mother     Hypertension Mother     Heart disease Father     Diabetes Brother     Hypertension Brother     Asthma Daughter     Cancer Maternal Uncle         prostate    Cancer Cousin     Kidney disease Cousin      Social History   Substance Use Topics    Smoking status: Never Smoker    Smokeless tobacco: Never Used    Alcohol use No     Review of Systems   Skin: Positive for color change.   All other systems reviewed and are negative.      Physical Exam     Initial Vitals [07/06/18 0103]   BP Pulse Resp Temp SpO2   (!) 129/90 91 16 97.9 °F (36.6 °C) 97 %      MAP       -- " "        Physical Exam    Nursing note and vitals reviewed.  Constitutional: He appears well-developed and well-nourished.   HENT:   Head: Normocephalic and atraumatic.   Eyes: Conjunctivae and EOM are normal.   Neck: Normal range of motion.   Cardiovascular: Normal rate, regular rhythm and normal heart sounds.   Pulmonary/Chest: Breath sounds normal. No respiratory distress. He has no wheezes.   Abdominal: Soft. Bowel sounds are normal. He exhibits no distension.   Musculoskeletal: Normal range of motion.   Neurological: He is alert.   Skin: Skin is warm and dry.   Erythematous, indurated region at the base of the penis on the left.  Approximately 1 cm diameter with tenderness to palpation and no fluctuance   Psychiatric: He has a normal mood and affect.         ED Course   Procedures  Labs Reviewed - No data to display       Imaging Results    None          Medical Decision Making:   Initial Assessment:   53-year-old male presents to the emergency department complaining of infection to his "balls" ×5 days.  He denies fevers/chills, nausea and vomiting.  He also denies UTI symptoms and penile discharge.  He states he has squeezed the area but has not been able to express any pus, just scant blood.  ED Management:  Patient was given instructions for cellulitis of the penis and a prescription for doxycycline and ibuprofen.  First doses were given in the emergency department the patient was advised to follow-up with his primary care physician tomorrow for reevaluation.                      Clinical Impression:   The encounter diagnosis was Cellulitis of penis.      Disposition:   Disposition: Discharged  Condition: Stable                        Alexey Costello MD  07/06/18 0231    "

## 2018-11-10 ENCOUNTER — OFFICE VISIT (OUTPATIENT)
Dept: URGENT CARE | Facility: CLINIC | Age: 53
End: 2018-11-10
Payer: MEDICARE

## 2018-11-10 VITALS
HEART RATE: 68 BPM | OXYGEN SATURATION: 97 % | HEIGHT: 67 IN | DIASTOLIC BLOOD PRESSURE: 74 MMHG | TEMPERATURE: 97 F | SYSTOLIC BLOOD PRESSURE: 150 MMHG | WEIGHT: 222 LBS | BODY MASS INDEX: 34.84 KG/M2

## 2018-11-10 DIAGNOSIS — K04.7 DENTAL ABSCESS: Primary | ICD-10-CM

## 2018-11-10 PROCEDURE — 99214 OFFICE O/P EST MOD 30 MIN: CPT | Mod: S$GLB,,, | Performed by: NURSE PRACTITIONER

## 2018-11-10 RX ORDER — TRAMADOL HYDROCHLORIDE 50 MG/1
50 TABLET ORAL EVERY 6 HOURS PRN
Qty: 12 TABLET | Refills: 0 | Status: SHIPPED | OUTPATIENT
Start: 2018-11-10 | End: 2019-11-10

## 2018-11-10 RX ORDER — CLINDAMYCIN HYDROCHLORIDE 300 MG/1
300 CAPSULE ORAL 3 TIMES DAILY
Qty: 30 CAPSULE | Refills: 0 | Status: SHIPPED | OUTPATIENT
Start: 2018-11-10 | End: 2018-11-20

## 2018-11-10 NOTE — PATIENT INSTRUCTIONS
"  Dental Abscess    An abscess is a pocket of pus at the tip of a tooth root in your jaw bone. It is caused by an infection at the root of the tooth. It can cause pain and swelling of the gum, cheek, or jaw. Pain may spread from the tooth to your ear or the area of your jaw on the same side. If the abscess isnt treated, it appears as a bubble or swelling on the gum near the tooth. The pressure that builds in this swelling is the source of the pain. More serious infections cause your face to swell.  An abscess can be caused by a crack in the tooth, a cavity, a gum infection, or a combination of these. Once the pulp of the tooth is exposed, bacteria can spread down the roots to the tip. If the bacteria are not stopped, they can damage the bone and soft tissue, and an abscess can form.  Home care  Follow these guidelines when caring for yourself at home:  · Avoid hot and cold foods and drinks. Your tooth may be sensitive to changes in temperature. Dont chew on the side of the infected tooth.  · If your tooth is chipped or cracked, or if there is a large open cavity, put oil of cloves directly on the tooth to relieve pain. You can buy oil of cloves at drugstores. Some pharmacies carry an over-the-counter "toothache kit." This contains a paste that you can put on the exposed tooth to make it less sensitive.  · Put a cold pack on your jaw over the sore area to help reduce pain.  · You may use over-the-counter medicine to ease pain, unless another medicine was prescribed. If you have chronic liver or kidney disease, talk with your healthcare provider before using acetaminophen or ibuprofen. Also talk with your provider if youve had a stomach ulcer or GI bleeding.  · An antibiotic will be prescribed. Take it until finished, even if you are feeling better after a few days.  Follow-up care  Follow up with your dentist or an oral surgeon, or as advised. Once an infection occurs in a tooth, it will continue to be a problem " until the infection is drained. This is done through surgery or a root canal. Or you may need to have your tooth pulled.  Call 911  Call 911 if any of these occur:  · Unusual drowsiness  · Headache or stiff neck  · Weakness or fainting  · Difficulty swallowing, breathing, or opening your mouth  · Swollen eyelids  When to seek medical advice  Call your healthcare provider right away if any of these occur:  · Your face becomes more swollen or red  · Pain gets worse or spreads to your neck  · Fever of 100.4º F (38.0º C) or higher, or as directed by your healthcare provider  · Pus drains from the tooth  Date Last Reviewed: 10/1/2016  © 6732-1176 [employer]. [address] [place] [address], [place] [address]. All rights reserved. This information is not intended as a substitute for professional medical care. Always follow your healthcare professional's instructions.

## 2018-11-10 NOTE — PROGRESS NOTES
"Subjective:       Patient ID: Lianet Mcgraw is a 53 y.o. male.    Vitals:  height is 5' 7" (1.702 m) and weight is 100.7 kg (222 lb). His temperature is 97.3 °F (36.3 °C). His blood pressure is 150/74 (abnormal) and his pulse is 68. His oxygen saturation is 97%.     Chief Complaint: Dental Pain    Pt reports he has been having right sided dental pain that has radiated to his right ear       Dental Pain    This is a new problem. The current episode started in the past 7 days. The problem has been rapidly worsening. The pain is at a severity of 9/10. The pain is moderate. Pertinent negatives include no fever. Treatments tried: advil  The treatment provided mild relief.     Review of Systems   Constitution: Negative for chills and fever.   HENT: Positive for ear pain. Negative for sore throat.    Eyes: Negative for blurred vision.   Cardiovascular: Negative for chest pain.   Respiratory: Negative for shortness of breath.    Skin: Negative for rash.   Musculoskeletal: Negative for back pain and joint pain.   Gastrointestinal: Negative for abdominal pain, diarrhea, nausea and vomiting.   Neurological: Negative for headaches.   Psychiatric/Behavioral: The patient is not nervous/anxious.        Objective:      Physical Exam   Constitutional: He is oriented to person, place, and time. He appears well-developed and well-nourished. He is cooperative.  Non-toxic appearance. He does not appear ill. No distress.   HENT:   Head: Normocephalic and atraumatic.   Right Ear: Hearing, tympanic membrane and ear canal normal.   Left Ear: Hearing, tympanic membrane and ear canal normal.   Nose: No mucosal edema, rhinorrhea or nasal deformity. No epistaxis. Right sinus exhibits no maxillary sinus tenderness and no frontal sinus tenderness. Left sinus exhibits no maxillary sinus tenderness and no frontal sinus tenderness.   Mouth/Throat: Uvula is midline and mucous membranes are normal. No trismus in the jaw. Normal dentition. Dental " abscesses present. No uvula swelling. No posterior oropharyngeal erythema.       Eyes: Lids are normal. Right eye exhibits no discharge. Left eye exhibits no discharge. No scleral icterus.   Sclera clear bilat   Neck: Trachea normal, normal range of motion, full passive range of motion without pain and phonation normal. Neck supple.   Cardiovascular: Normal rate, regular rhythm, normal heart sounds, intact distal pulses and normal pulses.   Pulmonary/Chest: Effort normal and breath sounds normal. No respiratory distress.   Abdominal: Soft. Normal appearance and bowel sounds are normal. He exhibits no distension, no pulsatile midline mass and no mass. There is no tenderness.   Musculoskeletal: Normal range of motion. He exhibits no edema or deformity.   Neurological: He is alert and oriented to person, place, and time. He exhibits normal muscle tone. Coordination normal.   Skin: Skin is warm, dry and intact. He is not diaphoretic. No pallor.   Psychiatric: He has a normal mood and affect. His speech is normal and behavior is normal. Judgment and thought content normal. Cognition and memory are normal.   Nursing note and vitals reviewed.      Assessment:       1. Dental abscess        Plan:       Patient Instructions     Dental Abscess    An abscess is a pocket of pus at the tip of a tooth root in your jaw bone. It is caused by an infection at the root of the tooth. It can cause pain and swelling of the gum, cheek, or jaw. Pain may spread from the tooth to your ear or the area of your jaw on the same side. If the abscess isnt treated, it appears as a bubble or swelling on the gum near the tooth. The pressure that builds in this swelling is the source of the pain. More serious infections cause your face to swell.  An abscess can be caused by a crack in the tooth, a cavity, a gum infection, or a combination of these. Once the pulp of the tooth is exposed, bacteria can spread down the roots to the tip. If the bacteria  "are not stopped, they can damage the bone and soft tissue, and an abscess can form.  Home care  Follow these guidelines when caring for yourself at home:  · Avoid hot and cold foods and drinks. Your tooth may be sensitive to changes in temperature. Dont chew on the side of the infected tooth.  · If your tooth is chipped or cracked, or if there is a large open cavity, put oil of cloves directly on the tooth to relieve pain. You can buy oil of cloves at drugstores. Some pharmacies carry an over-the-counter "toothache kit." This contains a paste that you can put on the exposed tooth to make it less sensitive.  · Put a cold pack on your jaw over the sore area to help reduce pain.  · You may use over-the-counter medicine to ease pain, unless another medicine was prescribed. If you have chronic liver or kidney disease, talk with your healthcare provider before using acetaminophen or ibuprofen. Also talk with your provider if youve had a stomach ulcer or GI bleeding.  · An antibiotic will be prescribed. Take it until finished, even if you are feeling better after a few days.  Follow-up care  Follow up with your dentist or an oral surgeon, or as advised. Once an infection occurs in a tooth, it will continue to be a problem until the infection is drained. This is done through surgery or a root canal. Or you may need to have your tooth pulled.  Call 911  Call 911 if any of these occur:  · Unusual drowsiness  · Headache or stiff neck  · Weakness or fainting  · Difficulty swallowing, breathing, or opening your mouth  · Swollen eyelids  When to seek medical advice  Call your healthcare provider right away if any of these occur:  · Your face becomes more swollen or red  · Pain gets worse or spreads to your neck  · Fever of 100.4º F (38.0º C) or higher, or as directed by your healthcare provider  · Pus drains from the tooth  Date Last Reviewed: 10/1/2016  © 5453-0778 [employer]. [address] [place] [address], [place], [place] " [address]. All rights reserved. This information is not intended as a substitute for professional medical care. Always follow your healthcare professional's instructions.            Dental abscess    Other orders  -     clindamycin (CLEOCIN) 300 MG capsule; Take 1 capsule (300 mg total) by mouth 3 (three) times daily. for 10 days  Dispense: 30 capsule; Refill: 0  -     traMADol (ULTRAM) 50 mg tablet; Take 1 tablet (50 mg total) by mouth every 6 (six) hours as needed for Pain.  Dispense: 12 tablet; Refill: 0

## 2019-03-09 ENCOUNTER — HOSPITAL ENCOUNTER (EMERGENCY)
Facility: HOSPITAL | Age: 54
Discharge: HOME | End: 2019-03-10
Attending: EMERGENCY MEDICINE
Payer: MEDICAID

## 2019-03-09 DIAGNOSIS — R73.9 HYPERGLYCEMIA WITHOUT KETOSIS: Primary | ICD-10-CM

## 2019-03-09 DIAGNOSIS — R30.9 PAIN WITH URINATION: ICD-10-CM

## 2019-03-09 LAB
ALBUMIN SERPL BCP-MCNC: 3.6 G/DL
ALBUMIN SERPL BCP-MCNC: 4 G/DL
ALP SERPL-CCNC: 100 U/L
ALP SERPL-CCNC: 106 U/L
ALT SERPL W/O P-5'-P-CCNC: 16 U/L
ALT SERPL W/O P-5'-P-CCNC: 19 U/L
ANION GAP SERPL CALC-SCNC: 7 MMOL/L
ANION GAP SERPL CALC-SCNC: 9 MMOL/L
AST SERPL-CCNC: 17 U/L
AST SERPL-CCNC: 18 U/L
BILIRUB SERPL-MCNC: 0.4 MG/DL
BILIRUB SERPL-MCNC: 0.5 MG/DL
BILIRUBIN, POC UA: NEGATIVE
BLOOD, POC UA: NEGATIVE
BUN SERPL-MCNC: 18 MG/DL
BUN SERPL-MCNC: 19 MG/DL
CALCIUM SERPL-MCNC: 9.2 MG/DL
CALCIUM SERPL-MCNC: 9.7 MG/DL
CHLORIDE SERPL-SCNC: 102 MMOL/L
CHLORIDE SERPL-SCNC: 96 MMOL/L
CLARITY, POC UA: CLEAR
CO2 SERPL-SCNC: 24 MMOL/L
CO2 SERPL-SCNC: 24 MMOL/L
COLOR, POC UA: YELLOW
CREAT SERPL-MCNC: 1.4 MG/DL
CREAT SERPL-MCNC: 1.9 MG/DL
EST. GFR  (AFRICAN AMERICAN): 45 ML/MIN/1.73 M^2
EST. GFR  (AFRICAN AMERICAN): >60 ML/MIN/1.73 M^2
EST. GFR  (NON AFRICAN AMERICAN): 39 ML/MIN/1.73 M^2
EST. GFR  (NON AFRICAN AMERICAN): 56 ML/MIN/1.73 M^2
GLUCOSE SERPL-MCNC: 394 MG/DL
GLUCOSE SERPL-MCNC: 636 MG/DL
GLUCOSE SERPL-MCNC: >500 MG/DL (ref 70–110)
GLUCOSE SERPL-MCNC: >500 MG/DL (ref 70–110)
GLUCOSE, POC UA: ABNORMAL
KETONES, POC UA: NEGATIVE
LEUKOCYTE EST, POC UA: NEGATIVE
NITRITE, POC UA: NEGATIVE
PH UR STRIP: 7 [PH]
POC CARDIAC TROPONIN I: 0.01 NG/ML
POCT GLUCOSE: 352 MG/DL (ref 70–110)
POCT GLUCOSE: 364 MG/DL (ref 70–110)
POCT GLUCOSE: 419 MG/DL (ref 70–110)
POTASSIUM SERPL-SCNC: 4.2 MMOL/L
POTASSIUM SERPL-SCNC: 5.2 MMOL/L
PROT SERPL-MCNC: 7.1 G/DL
PROT SERPL-MCNC: 7.8 G/DL
PROTEIN, POC UA: NEGATIVE
SAMPLE: NORMAL
SODIUM SERPL-SCNC: 129 MMOL/L
SODIUM SERPL-SCNC: 133 MMOL/L
SPECIFIC GRAVITY, POC UA: 1.01
UROBILINOGEN, POC UA: 0.2 E.U./DL

## 2019-03-09 PROCEDURE — 96376 TX/PRO/DX INJ SAME DRUG ADON: CPT | Mod: ER

## 2019-03-09 PROCEDURE — 84484 ASSAY OF TROPONIN QUANT: CPT | Mod: ER

## 2019-03-09 PROCEDURE — 25000003 PHARM REV CODE 250: Mod: ER | Performed by: EMERGENCY MEDICINE

## 2019-03-09 PROCEDURE — 63600175 PHARM REV CODE 636 W HCPCS: Mod: ER | Performed by: EMERGENCY MEDICINE

## 2019-03-09 PROCEDURE — 80053 COMPREHEN METABOLIC PANEL: CPT | Mod: ER

## 2019-03-09 PROCEDURE — 96361 HYDRATE IV INFUSION ADD-ON: CPT | Mod: ER

## 2019-03-09 PROCEDURE — 96374 THER/PROPH/DIAG INJ IV PUSH: CPT | Mod: ER

## 2019-03-09 PROCEDURE — 80053 COMPREHEN METABOLIC PANEL: CPT | Mod: 91

## 2019-03-09 PROCEDURE — 81003 URINALYSIS AUTO W/O SCOPE: CPT | Mod: ER

## 2019-03-09 PROCEDURE — 85025 COMPLETE CBC W/AUTO DIFF WBC: CPT | Mod: ER

## 2019-03-09 PROCEDURE — 99284 EMERGENCY DEPT VISIT MOD MDM: CPT | Mod: 25,ER

## 2019-03-09 PROCEDURE — 80053 COMPREHEN METABOLIC PANEL: CPT

## 2019-03-09 RX ORDER — ACETAMINOPHEN 325 MG/1
650 TABLET ORAL
Status: COMPLETED | OUTPATIENT
Start: 2019-03-09 | End: 2019-03-09

## 2019-03-09 RX ORDER — OXYCODONE AND ACETAMINOPHEN 5; 325 MG/1; MG/1
1 TABLET ORAL
Status: COMPLETED | OUTPATIENT
Start: 2019-03-09 | End: 2019-03-09

## 2019-03-09 RX ADMIN — SODIUM CHLORIDE 1000 ML: 0.9 INJECTION, SOLUTION INTRAVENOUS at 06:03

## 2019-03-09 RX ADMIN — INSULIN HUMAN 6 UNITS: 100 INJECTION, SOLUTION PARENTERAL at 07:03

## 2019-03-09 RX ADMIN — OXYCODONE AND ACETAMINOPHEN 1 TABLET: 5; 325 TABLET ORAL at 09:03

## 2019-03-09 RX ADMIN — SODIUM CHLORIDE 1000 ML: 0.9 INJECTION, SOLUTION INTRAVENOUS at 05:03

## 2019-03-09 RX ADMIN — ACETAMINOPHEN 650 MG: 325 TABLET ORAL at 05:03

## 2019-03-09 RX ADMIN — INSULIN HUMAN 5 UNITS: 100 INJECTION, SOLUTION PARENTERAL at 05:03

## 2019-03-09 RX ADMIN — SODIUM CHLORIDE 1000 ML: 0.9 INJECTION, SOLUTION INTRAVENOUS at 08:03

## 2019-03-09 RX ADMIN — INSULIN HUMAN 5 UNITS: 100 INJECTION, SOLUTION PARENTERAL at 06:03

## 2019-03-09 NOTE — ED PROVIDER NOTES
Encounter Date: 3/9/2019    SCRIBE #1 NOTE: I, Stephenie Reynolds, am scribing for, and in the presence of,  Dr. Travis. I have scribed the following portions of the note - Other sections scribed: HPI, ROS, PE.   SCRIBE #2 NOTE: I, Telly Zuniga, am scribing for, and in the presence of,  Dr. Mejia. I have scribed the following portions of the note - Other sections scribed: CBC Results.     History     Chief Complaint   Patient presents with    Groin Pain     reports pain in groin area and dysuria that started yesterday with frequency    Dysuria     CC: Groin pain  54 y.o male with DM and asthma presents with estee pain with urination since yesterday. He also reports weakness and dizziness today. . He denies dysuria, fever, N/V/D, abdominal pain, hematuria, constipation, wheezing, HA, or chest pain. He notes SOB now and he thinks it may be from his asthma. He has not taken anything for his pain. He has had this complaint years ago and cannot recall the cause. Hx of kidney stones and pacemaker. 8/10 pain rating.      The history is provided by the patient.     Review of patient's allergies indicates:   Allergen Reactions    Darvocet a500 [propoxyphene n-acetaminophen] Itching     Past Medical History:   Diagnosis Date    COPD (chronic obstructive pulmonary disease)     Coronary artery disease     Diabetes mellitus type II     DJD (degenerative joint disease)     Gout     Hypertension     Kidney stone     MI (myocardial infarction) 2010    Obesity     JOSE (obstructive sleep apnea)      Past Surgical History:   Procedure Laterality Date    CARDIAC DEFIBRILLATOR PLACEMENT      DISKECTOMY AND FUSION-ANTERIOR CERVICAL (ACDF) N/A 11/23/2015    Performed by Juancarlos Elizalde [facility] at [facility] OR 2ND FLR    EXCISION-BIOPSY-LYMPH NODE-INGUINAL Left 6/2/2017    [facility] OR    excisional biopsy left inguinal lymph node      FOOT SURGERY      right foot surgery     kidney stones       lithotripsy    MYELOGRAM N/A 9/15/2015    Performed by Cris Surgeon at [facility]    Surgery for bulging disc at C7       Family History   Problem Relation Age of Onset    Diabetes Mother     Hypertension Mother     Heart disease Father     Diabetes Brother     Hypertension Brother     Asthma Daughter     Cancer Maternal Uncle         prostate    Cancer Cousin     Kidney disease Cousin      Social History     Tobacco Use    Smoking status: Never Smoker    Smokeless tobacco: Never Used   Substance Use Topics    Alcohol use: No    Drug use: No     Review of Systems   Constitutional: Negative for fever.   Respiratory: Positive for shortness of breath. Negative for wheezing.    Cardiovascular: Negative for chest pain.   Gastrointestinal: Negative for abdominal pain, constipation, diarrhea, nausea and vomiting.   Genitourinary: Negative for decreased urine volume, dysuria, hematuria, penile pain, scrotal swelling and testicular pain.   Musculoskeletal: Negative for back pain.   Neurological: Positive for dizziness and weakness. Negative for headaches.       Physical Exam     Initial Vitals [03/09/19 1644]   BP Pulse Resp Temp SpO2   (!) 143/93 87 18 97.5 °F (36.4 °C) 96 %      MAP       --         Physical Exam    Nursing note and vitals reviewed.  Constitutional: He appears well-developed and well-nourished. He is not diaphoretic. No distress.   HENT:   Head: Normocephalic and atraumatic.   Eyes: EOM are normal.   Neck: Normal range of motion.   Cardiovascular: Normal rate, regular rhythm and normal heart sounds. Exam reveals no gallop and no friction rub.    No murmur heard.  Pulmonary/Chest: Breath sounds normal. No respiratory distress. He has no wheezes. He has no rhonchi. He has no rales.   Abdominal: Soft.   Genitourinary: Testes normal. Right testis shows no mass, no swelling and no tenderness. Left testis shows no mass, no swelling and no tenderness. No penile erythema. No discharge found.    Genitourinary Comments: No swelling, warmth or redness noted, no open lesions   Musculoskeletal: Normal range of motion.   Neurological: He is alert and oriented to person, place, and time. No cranial nerve deficit or sensory deficit.   Skin: Skin is warm and dry. Capillary refill takes less than 2 seconds.   Psychiatric: He has a normal mood and affect. His behavior is normal.         ED Course   Procedures  Labs Reviewed   COMPREHENSIVE METABOLIC PANEL - Abnormal; Notable for the following components:       Result Value    Sodium 129 (*)     Potassium 5.2 (*)     Glucose 636 (*)     Creatinine 1.9 (*)     eGFR if  45 (*)     eGFR if non  39 (*)     All other components within normal limits    Narrative:     Glucose critical result(s) called and verbal readback obtained   from:Mariana Mackay at [facility] , 03/09/2019 19:16   COMPREHENSIVE METABOLIC PANEL - Abnormal; Notable for the following components:    Sodium 133 (*)     Glucose 394 (*)     Anion Gap 7 (*)     eGFR if non  56 (*)     All other components within normal limits   POCT URINALYSIS W/O SCOPE - Abnormal; Notable for the following components:    Glucose, UA 2+ (*)     Bilirubin, UA Negative (*)     Ketones, UA Negative (*)     Blood, UA Negative (*)     Protein, UA Negative (*)     Nitrite, UA Negative (*)     Leukocytes, UA Negative (*)     All other components within normal limits   POCT GLUCOSE - Abnormal; Notable for the following components:    POCT Glucose 419 (*)     All other components within normal limits   POCT GLUCOSE - Abnormal; Notable for the following components:    POCT Glucose 352 (*)     All other components within normal limits   POCT CMP - Abnormal; Notable for the following components:    POC Glucose 369 (*)     All other components within normal limits   POCT GLUCOSE - Abnormal; Notable for the following components:    POCT Glucose 364 (*)     All other components within normal  limits   POCT GLUCOSE - Abnormal; Notable for the following components:    POCT Glucose 356 (*)     All other components within normal limits   POCT GLUCOSE - Abnormal; Notable for the following components:    POCT Glucose 324 (*)     All other components within normal limits   TROPONIN ISTAT   POCT GLUCOSE, HAND-HELD DEVICE   POCT CBC   POCT URINALYSIS W/O SCOPE   POCT CMP   POCT TROPONIN   POCT GLUCOSE MONITORING CONTINUOUS          Imaging Results    None          Medical Decision Making:   Initial Assessment:   54 y.o male presents with groin pain with urination since yesterday. Physical exam findings are significant for mild tenderness to the right groin. No swelling, redness, or warmth to the scrotum or groin. No signs of abscess. Sensation intact. Neurovascularly intact.   Clinical Tests:   Lab Tests: Ordered and Reviewed  The following lab test(s) were unremarkable: CBC       <> Summary of Lab: WBC-6.4  H&H-15.6, 46.4  MCV-89.9  RDW%-12.5  PLT-204  ED Management:  I will order POCT troponin, CBC, CMP, urinalysis, glucose, IV fluids, and insulin.  Patient's blood sugar is greater than 500.  Patient will be treated with insulin and IV fluids.  Urine shows 2+ glucose  Patient's blood sugar is still greater than 500 after 1 L of fluids and 5 units of insulin.  Will repeat this regimen and recheck.  Care will be turned over to Dr. Mejia at 7:00 p.m..            Scribe Attestation:   Scribe #1: I performed the above scribed service and the documentation accurately describes the services I performed. I attest to the accuracy of the note.           I, Dr. Matilde Travis, personally performed the services described in this documentation. All medical record entries made by the scribe were at my direction and in my presence.  I have reviewed the chart and agree that the record reflects my personal performance and is accurate and complete. Matilde Travis MD.  6:42 PM 03/10/2019       ED Course as of Mar 10 1131   Sat  Mar 09, 2019   2129 Disposition delay due to need to send out CMP x 2 to assess response to treatment.   [DL]      ED Course User Index  [DL] Carlo Mejia MD     Labs Reviewed  Admission on 03/09/2019, Discharged on 03/10/2019   Component Date Value Ref Range Status    POC Glucose 03/09/2019 >500  70 - 110 MG/DL Final    POC Cardiac Troponin I 03/09/2019 0.01  <0.09 ng/mL Final    Sample 03/09/2019 UNK   Final    Glucose, UA 03/09/2019 2+*  Final    Bilirubin, UA 03/09/2019 Negative*  Final    Ketones, UA 03/09/2019 Negative*  Final    Spec Grav UA 03/09/2019 1.015   Final    Blood, UA 03/09/2019 Negative*  Final    PH, UA 03/09/2019 7.0   Final    Protein, UA 03/09/2019 Negative*  Final    Urobilinogen, UA 03/09/2019 0.2  E.U./dL Final    Nitrite, UA 03/09/2019 Negative*  Final    Leukocytes, UA 03/09/2019 Negative*  Final    Color, UA 03/09/2019 Yellow   Final    Clarity, UA 03/09/2019 Clear   Final    Sodium 03/09/2019 129* 136 - 145 mmol/L Final    Potassium 03/09/2019 5.2* 3.5 - 5.1 mmol/L Final    Chloride 03/09/2019 96  95 - 110 mmol/L Final    CO2 03/09/2019 24  23 - 29 mmol/L Final    Glucose 03/09/2019 636* 70 - 110 mg/dL Final    Comment: Glucose critical result(s) called and verbal readback obtained   from:Mariana Mackay at [facility] , 03/09/2019 19:16      BUN, Bld 03/09/2019 19  6 - 20 mg/dL Final    Creatinine 03/09/2019 1.9* 0.5 - 1.4 mg/dL Final    Calcium 03/09/2019 9.7  8.7 - 10.5 mg/dL Final    Total Protein 03/09/2019 7.8  6.0 - 8.4 g/dL Final    Albumin 03/09/2019 4.0  3.5 - 5.2 g/dL Final    Total Bilirubin 03/09/2019 0.5  0.1 - 1.0 mg/dL Final    Comment: For infants and newborns, interpretation of results should be based  on gestational age, weight and in agreement with clinical  observations.  Premature Infant recommended reference ranges:  Up to 24 hours.............<8.0 mg/dL  Up to 48 hours............<12.0 mg/dL  3-5 days..................<15.0 mg/dL  6-29  days.................<15.0 mg/dL      Alkaline Phosphatase 03/09/2019 106  55 - 135 U/L Final    AST 03/09/2019 18  10 - 40 U/L Final    ALT 03/09/2019 19  10 - 44 U/L Final    Anion Gap 03/09/2019 9  8 - 16 mmol/L Final    eGFR if African American 03/09/2019 45* >60 mL/min/1.73 m^2 Final    eGFR if non African American 03/09/2019 39* >60 mL/min/1.73 m^2 Final    Comment: Calculation used to obtain the estimated glomerular filtration  rate (eGFR) is the CKD-EPI equation.       POC Glucose 03/09/2019 >500  70 - 110 MG/DL Final    POCT Glucose 03/09/2019 419* 70 - 110 mg/dL Final    POCT Glucose 03/09/2019 352* 70 - 110 mg/dL Final    Sodium 03/09/2019 133* 136 - 145 mmol/L Final    Potassium 03/09/2019 4.2  3.5 - 5.1 mmol/L Final    Chloride 03/09/2019 102  95 - 110 mmol/L Final    CO2 03/09/2019 24  23 - 29 mmol/L Final    Glucose 03/09/2019 394* 70 - 110 mg/dL Final    BUN, Bld 03/09/2019 18  6 - 20 mg/dL Final    Creatinine 03/09/2019 1.4  0.5 - 1.4 mg/dL Final    Calcium 03/09/2019 9.2  8.7 - 10.5 mg/dL Final    Total Protein 03/09/2019 7.1  6.0 - 8.4 g/dL Final    Albumin 03/09/2019 3.6  3.5 - 5.2 g/dL Final    Total Bilirubin 03/09/2019 0.4  0.1 - 1.0 mg/dL Final    Comment: For infants and newborns, interpretation of results should be based  on gestational age, weight and in agreement with clinical  observations.  Premature Infant recommended reference ranges:  Up to 24 hours.............<8.0 mg/dL  Up to 48 hours............<12.0 mg/dL  3-5 days..................<15.0 mg/dL  6-29 days.................<15.0 mg/dL      Alkaline Phosphatase 03/09/2019 100  55 - 135 U/L Final    AST 03/09/2019 17  10 - 40 U/L Final    ALT 03/09/2019 16  10 - 44 U/L Final    Anion Gap 03/09/2019 7* 8 - 16 mmol/L Final    eGFR if African American 03/09/2019 >60  >60 mL/min/1.73 m^2 Final    eGFR if non African American 03/09/2019 56* >60 mL/min/1.73 m^2 Final    Comment: Calculation used to obtain the  estimated glomerular filtration  rate (eGFR) is the CKD-EPI equation.       Albumin, POC 03/09/2019 3.4  3.3 - 5.5 g/dL Final    Alkaline Phosphatase, POC 03/09/2019 94  42 - 141 U/L Final    ALT (SGPT), POC 03/09/2019 19  10 - 47 U/L Final    AST (SGOT), POC 03/09/2019 25  11 - 38 U/L Final    POC BUN 03/09/2019 17  7 - 22 mg/dL Final    Calcium, POC 03/09/2019 9.2  8.0 - 10.3 mg/dL Final    POC Chloride 03/09/2019 101  98 - 108 mmol/L Final    POC Creatinine 03/09/2019 1.1  0.6 - 1.2 mg/dL Final    POC Glucose 03/09/2019 369* 73 - 118 mg/dL Final    POC Potassium 03/09/2019 4.0  3.6 - 5.1 mmol/L Final    POC Sodium 03/09/2019 137  128 - 145 mmol/L Final    Bilirubin 03/09/2019 0.8  0.2 - 1.6 mg/dL Final    POC TCO2 03/09/2019 26  18 - 33 mmol/L Final    Protein 03/09/2019 6.7  6.4 - 8.1 g/dL Final    POCT Glucose 03/09/2019 364* 70 - 110 mg/dL Final    POCT Glucose 03/10/2019 356* 70 - 110 mg/dL Final    POCT Glucose 03/10/2019 324* 70 - 110 mg/dL Final        Imaging Reviewed    Imaging Results    None         Medications given in ED    Medications   sodium chloride 0.9% bolus 1,000 mL (0 mLs Intravenous Stopped 3/9/19 1856)   insulin regular injection 5 Units (5 Units Intravenous Given 3/9/19 1705)   acetaminophen tablet 650 mg (650 mg Oral Given 3/9/19 1733)   insulin regular injection 5 Units (5 Units Intravenous Given 3/9/19 1848)   sodium chloride 0.9% bolus 1,000 mL (0 mLs Intravenous Stopped 3/9/19 2018)   insulin regular injection 6 Units (6 Units Intravenous Given 3/9/19 1943)   sodium chloride 0.9% bolus 1,000 mL (0 mLs Intravenous Stopped 3/9/19 2214)   oxyCODONE-acetaminophen 5-325 mg per tablet 1 tablet (1 tablet Oral Given 3/9/19 2114)   insulin regular injection 8 Units (8 Units Intravenous Given 3/10/19 0032)       Note was created using voice recognition software. Note may have occasional typographical errors that may not have been identified and edited despite good urban initial  review prior to signing.      Pain improved with meds given in ED.  Repeat CMP with mild hyperkalemia, hyponatremia and creatinine of 1.9 which is consistent with baseline values.  Pain improved with meds given.   exam benign.  UA with no signs of infection.  Hyperglycemia resolved with IV insulin.  Patient will follow up with his PCP and with Urology.  Carlo Mejia MD, Emergency Medicine Staff 1:44 AM 3/10/2019      Clinical Impression:     1. Hyperglycemia without ketosis    2. Pain with urination                                 Matilde Travis MD  03/10/19 1139

## 2019-03-10 VITALS
RESPIRATION RATE: 20 BRPM | BODY MASS INDEX: 34.53 KG/M2 | HEIGHT: 67 IN | TEMPERATURE: 98 F | WEIGHT: 220 LBS | SYSTOLIC BLOOD PRESSURE: 134 MMHG | HEART RATE: 69 BPM | DIASTOLIC BLOOD PRESSURE: 70 MMHG | OXYGEN SATURATION: 97 %

## 2019-03-10 LAB
POCT GLUCOSE: 324 MG/DL (ref 70–110)
POCT GLUCOSE: 356 MG/DL (ref 70–110)

## 2019-03-10 PROCEDURE — 63600175 PHARM REV CODE 636 W HCPCS: Mod: ER | Performed by: EMERGENCY MEDICINE

## 2019-03-10 RX ADMIN — INSULIN HUMAN 8 UNITS: 100 INJECTION, SOLUTION PARENTERAL at 12:03

## 2019-03-10 NOTE — ED NOTES
Went to pt's room do accu check. Pt is taking his cardiac monitor off and stating he just wants to go. Explained to him that I needed to check his sugar and I would notify the physician that he is ready to leave. Blood glucose still 324 but pt does not want to stay for further treatment. Family at bedside.

## 2019-03-11 LAB — POCT GLUCOSE: >500 MG/DL (ref 70–110)

## 2020-01-11 ENCOUNTER — OFFICE VISIT (OUTPATIENT)
Dept: URGENT CARE | Facility: CLINIC | Age: 55
End: 2020-01-11

## 2020-01-11 VITALS
OXYGEN SATURATION: 98 % | SYSTOLIC BLOOD PRESSURE: 107 MMHG | DIASTOLIC BLOOD PRESSURE: 69 MMHG | TEMPERATURE: 98 F | HEART RATE: 92 BPM

## 2020-01-11 DIAGNOSIS — J20.9 ACUTE PURULENT BRONCHITIS: ICD-10-CM

## 2020-01-11 DIAGNOSIS — R06.2 WHEEZING: ICD-10-CM

## 2020-01-11 DIAGNOSIS — J01.90 ACUTE SINUSITIS WITH SYMPTOMS GREATER THAN 10 DAYS: Primary | ICD-10-CM

## 2020-01-11 DIAGNOSIS — R05.9 COUGH: ICD-10-CM

## 2020-01-11 LAB
CTP QC/QA: YES
FLUAV AG NPH QL: NEGATIVE
FLUBV AG NPH QL: NEGATIVE

## 2020-01-11 PROCEDURE — 71046 XR CHEST PA AND LATERAL: ICD-10-PCS | Mod: S$GLB,,, | Performed by: RADIOLOGY

## 2020-01-11 PROCEDURE — 94640 PR INHAL RX, AIRWAY OBST/DX SPUTUM INDUCT: ICD-10-PCS | Mod: S$GLB,,, | Performed by: EMERGENCY MEDICINE

## 2020-01-11 PROCEDURE — 87804 INFLUENZA ASSAY W/OPTIC: CPT | Mod: QW,S$GLB,, | Performed by: PHYSICIAN ASSISTANT

## 2020-01-11 PROCEDURE — 71046 X-RAY EXAM CHEST 2 VIEWS: CPT | Mod: S$GLB,,, | Performed by: RADIOLOGY

## 2020-01-11 PROCEDURE — 94640 AIRWAY INHALATION TREATMENT: CPT | Mod: S$GLB,,, | Performed by: EMERGENCY MEDICINE

## 2020-01-11 PROCEDURE — 99214 OFFICE O/P EST MOD 30 MIN: CPT | Mod: 25,S$GLB,, | Performed by: PHYSICIAN ASSISTANT

## 2020-01-11 PROCEDURE — 87804 POCT INFLUENZA A/B: ICD-10-PCS | Mod: QW,S$GLB,, | Performed by: PHYSICIAN ASSISTANT

## 2020-01-11 PROCEDURE — 99214 PR OFFICE/OUTPT VISIT, EST, LEVL IV, 30-39 MIN: ICD-10-PCS | Mod: 25,S$GLB,, | Performed by: PHYSICIAN ASSISTANT

## 2020-01-11 RX ORDER — IPRATROPIUM BROMIDE 0.5 MG/2.5ML
0.5 SOLUTION RESPIRATORY (INHALATION)
Status: COMPLETED | OUTPATIENT
Start: 2020-01-11 | End: 2020-01-11

## 2020-01-11 RX ORDER — PREDNISONE 20 MG/1
TABLET ORAL
Qty: 10 TABLET | Refills: 0 | Status: SHIPPED | OUTPATIENT
Start: 2020-01-11 | End: 2020-09-30 | Stop reason: CLARIF

## 2020-01-11 RX ORDER — DOXYCYCLINE 100 MG/1
100 CAPSULE ORAL 2 TIMES DAILY
Qty: 20 CAPSULE | Refills: 0 | Status: SHIPPED | OUTPATIENT
Start: 2020-01-11 | End: 2020-01-21

## 2020-01-11 RX ORDER — ALBUTEROL SULFATE 0.83 MG/ML
2.5 SOLUTION RESPIRATORY (INHALATION)
Status: COMPLETED | OUTPATIENT
Start: 2020-01-11 | End: 2020-01-11

## 2020-01-11 RX ORDER — ALBUTEROL SULFATE 90 UG/1
2 AEROSOL, METERED RESPIRATORY (INHALATION) EVERY 4 HOURS PRN
Qty: 1 INHALER | Refills: 0 | Status: SHIPPED | OUTPATIENT
Start: 2020-01-11

## 2020-01-11 RX ORDER — BENZONATATE 100 MG/1
100 CAPSULE ORAL EVERY 6 HOURS PRN
Qty: 30 CAPSULE | Refills: 1 | Status: SHIPPED | OUTPATIENT
Start: 2020-01-11 | End: 2020-01-18

## 2020-01-11 RX ORDER — FLUTICASONE PROPIONATE 50 MCG
2 SPRAY, SUSPENSION (ML) NASAL DAILY
Qty: 1 BOTTLE | Refills: 0 | Status: SHIPPED | OUTPATIENT
Start: 2020-01-11

## 2020-01-11 RX ADMIN — ALBUTEROL SULFATE 2.5 MG: 0.83 SOLUTION RESPIRATORY (INHALATION) at 02:01

## 2020-01-11 RX ADMIN — IPRATROPIUM BROMIDE 0.5 MG: 0.5 SOLUTION RESPIRATORY (INHALATION) at 02:01

## 2020-01-11 NOTE — PATIENT INSTRUCTIONS
If your condition worsens or fails to improve we recommend that you receive another evaluation at the ER immediately or contact your PCP to discuss your concerns or return here. You must understand that you've received an urgent care treatment only and that you may be released before all your medical problems are known or treated. You the patient will arrange for followup care as instructed.    Rest and fluids are important  Can use honey with mario to soothe your throat    Take full course of antibiotics as directed.    You received a steroid prescription today -  this can elevate your blood pressure, elevate your blood sugar, water weight gain, nervous energy, redness to the face.    Take inhaler as prescribed and needed for wheezing  Take prescription cough meds (pills) as prescribed as needed for cough.  Take mucinex as directed to help with chest congestion    -  Flonase (fluticasone) is a nasal spray which is available over the counter and may help with symptoms of nasal congestion.     -  Tylenol or ibuprofen can also be used as directed for pain unless you have an allergy to them or medical condition such as stomach ulcers, kidney or liver disease or blood thinners etc for which you should not be taking these type of medications.     Please follow up with your primary care doctor or specialist in the next 48-72hrs as needed and if no improvement.    If you  smoke, please stop smoking.        Acute Bacterial Rhinosinusitis (ABRS)    Acute bacterial rhinosinusitis (ABRS) is an infection of your nasal cavity and sinuses. Its caused by bacteria. Acute means that youve had symptoms for less than 12 weeks.  Understanding your sinuses  The nasal cavity is the large air-filled space behind your nose. The sinuses are a group of spaces formed by the bones of your face. They connect with your nasal cavity. ABRS causes the tissue lining these spaces to become inflamed. Mucus may not drain normally. This leads to  facial pain and other symptoms.  What causes ABRS?  ABRS most often follows an upper respiratory infection caused by a virus. Bacteria then infect the lining of your nasal cavity and sinuses. But you can also get ABRS if you have:  · Nasal allergies  · Long-term nasal swelling and congestion not caused by allergies  · Blockage in the nose  Symptoms of ABRS  The symptoms of ABRS may be different for each person, and can include:  · Nasal congestion  · Runny nose  · Fluid draining from the nose down the throat (postnasal drip)  · Headache  · Cough  · Pain in the sinuses  · Thick, colored fluid from the nose (mucus)  · Fever  Diagnosing ABRS  ABRS may be diagnosed if youve had an upper respiratory infection like a cold and cough for longer than 10 to 14 days. Your health care provider will ask about your symptoms and your medical history. The provider will check your vital signs, including your temperature. Youll have a physical exam. The health care provider will check your ears, nose, and throat. You likely wont need any tests. If ABRS comes back, you may have a culture or other tests.  Treatment for ABRS  Treatment may include:  · Antibiotic medicine. This is for symptoms that last for at least 10 to 14 days.  · Nasal corticosteroid medicine. Drops or spray used in the nose can lessen swelling and congestion.  · Over-the-counter pain medicine. This is to lessen sinus pain and pressure.  · Nasal decongestant medicine. Spray or drops may help to lessen congestion. Do not use them for more than a few days.  · Salt wash (saline irrigation). This can help to loosen mucus.  Possible complications of ABRS  ABRS may come back or become long-term (chronic).  In rare cases, ABRS may cause complications such as:   · Inflamed tissue around the brain and spinal cord (meningitis)  · Inflamed tissue around the eyes (orbital cellulitis)  · Inflamed bones around the sinuses (osteitis)  These problems may need to be treated in a  hospital with intravenous (IV) antibiotic medicine or surgery.  When to call the health care provider  Call your health care provider if you have any of the following:  · Symptoms that dont get better, or get worse  · Symptoms that dont get better after 3 to 5 days on antibiotics  · Trouble seeing  · Swelling around your eyes  · Confusion or trouble staying awake   Date Last Reviewed: 3/3/2015  © 2580-9343 [employer]. [address] [place] [address], [place] [address]. All rights reserved. This information is not intended as a substitute for professional medical care. Always follow your healthcare professional's instructions.        What Is Acute Bronchitis?  Acute bronchitis is when the airways in your lungs (bronchial tubes) become red and swollen (inflamed). It is usually caused by a viral infection. But it can also occur because of a bacteria or allergen. Symptoms include a cough that produces yellow or greenish mucus and can last for days or sometimes weeks.  Inside healthy lungs    Air travels in and out of the lungs through the airways. The linings of these airways produce sticky mucus. This mucus traps particles that enter the lungs. Tiny structures called cilia then sweep the particles out of the airways.     Healthy airway: Airways are normally open. Air moves in and out easily.      Healthy cilia: Tiny, hairlike cilia sweep mucus and particles up and out of the airways.   Lungs with bronchitis  Bronchitis often occurs with a cold or the flu virus. The airways become inflamed (red and swollen). There is a deep hacking cough from the extra mucus. Other symptoms may include:  · Wheezing  · Chest discomfort  · Shortness of breath  · Mild fever  A second infection, this time due to bacteria, may then occur. And airways irritated by allergens or smoke are more likely to get infected.        Inflamed airway: Inflammation and extra mucus narrow the airway, causing shortness of breath.      Impaired cilia:  Extra mucus impairs cilia, causing congestion and wheezing. Smoking makes the problem worse.   Making a diagnosis  A physical exam, health history, and certain tests help your healthcare provider make the diagnosis.  Health history  Your healthcare provider will ask you about your symptoms.  The exam  Your provider listens to your chest for signs of congestion. He or she may also check your ears, nose, and throat.  Possible tests  · A sputum test for bacteria. This requires a sample of mucus from your lungs.  · A nasal or throat swab. This tests to see if you have a bacterial infection.  · A chest X-ray. This is done if your healthcare provider thinks you have pneumonia.  · Tests to check for an underlying condition. Other tests may be done to check for things such as allergies, asthma, or COPD (chronic obstructive pulmonary disease). You may need to see a specialist for more lung function testing.  Treating a cough  The main treatment for bronchitis is easing symptoms. Avoiding smoke, allergens, and other things that trigger coughing can often help. If the infection is bacterial, you may be given antibiotics. During the illness, it's important to get plenty of sleep. To ease symptoms:  · Dont smoke. Also avoid secondhand smoke.  · Use a humidifier. Or try breathing in steam from a hot shower. This may help loosen mucus.  · Drink a lot of water and juice. They can soothe the throat and may help thin mucus.  · Sit up or use extra pillows when in bed. This helps to lessen coughing and congestion.  · Ask your provider about using medicine. Ask about using cough medicine, pain and fever medicine, or a decongestant.  Antibiotics  Most cases of bronchitis are caused by cold or flu viruses. They dont need antibiotics to treat them, even if your mucus is thick and green or yellow. Antibiotics dont treat viral illness and antibiotics have not been shown to have any benefit in cases of acute bronchitis. Taking antibiotics  when they are not needed increases your risk of getting an infection later that is antibiotic-resistant. Antibiotics can also cause severe cases of diarrhea that require other antibiotics to treat.  It is important that you accept your healthcare provider's opinion to not use antibiotics. Your provider will prescribe antibiotics if the infection is caused by bacteria. If they are prescribed:  · Take all of the medicine. Take the medicine until it is used up, even if symptoms have improved. If you dont, the bronchitis may come back.  · Take the medicines as directed. For instance, some medicines should be taken with food.  · Ask about side effects. Ask your provider or pharmacist what side effects are common, and what to do about them.  Follow-up care  You should see your provider again in 2 to 3 weeks. By this time, symptoms should have improved. An infection that lasts longer may mean you have a more serious problem.  Prevention  · Avoid tobacco smoke. If you smoke, quit. Stay away from smoky places. Ask friends and family not to smoke around you, or in your home or car.  · Get checked for allergies.  · Ask your provider about getting a yearly flu shot. Also ask about pneumococcal or pneumonia shots.  · Wash your hands often. This helps reduce the chance of picking up viruses that cause colds and flu.  Call your healthcare provider if:  · Symptoms worsen, or you have new symptoms  · Breathing problems worsen or  become severe  · Symptoms dont get better within a week, or within 3 days of taking antibiotics   Date Last Reviewed: 2/1/2017  © 1177-4343 [employer]. [address] [place] [address], [place] [address]. All rights reserved. This information is not intended as a substitute for professional medical care. Always follow your healthcare professional's instructions.

## 2020-01-11 NOTE — PROGRESS NOTES
Subjective:       Patient ID: Lianet Mcgraw is a 54 y.o. male.    Vitals:  temperature is 97.5 °F (36.4 °C). His blood pressure is 107/69 and his pulse is 92. His oxygen saturation is 98%.     Chief Complaint: Sinus Problem    Pt c/o sinus pain/pressure, sneezing, nasal congestion with drainage, PND, body aches, and cough (sometimes productive) x 2 weeks. States he has been taking benadryl OTC, with no improvements. States he thought sym,ptoms would have resolved by now but states he cannot shake it. States his wife made him come in. Denies fever, chills, headaches, changes in vision, SOB, wheezing., CP.     Sinus Problem   This is a new problem. The current episode started 1 to 4 weeks ago. The problem is unchanged. There has been no fever. His pain is at a severity of 6/10. The pain is moderate. Associated symptoms include congestion, coughing and sinus pressure. Pertinent negatives include no chills, diaphoresis, ear pain, headaches, shortness of breath or sore throat. Treatments tried: benadryl. The treatment provided mild relief.       Constitution: Negative for chills, sweating, fatigue and fever.   HENT: Positive for congestion, postnasal drip, sinus pain and sinus pressure. Negative for ear pain, sore throat and voice change.    Neck: Negative for painful lymph nodes.   Cardiovascular: Negative for chest pain.   Eyes: Negative for eye redness.   Respiratory: Positive for cough and sputum production (sometimes). Negative for chest tightness, bloody sputum, COPD, shortness of breath, stridor, wheezing and asthma.    Gastrointestinal: Negative for abdominal pain, nausea, vomiting, constipation and diarrhea.   Musculoskeletal: Positive for muscle ache.   Skin: Negative for rash.   Allergic/Immunologic: Negative for seasonal allergies and asthma.   Neurological: Negative for dizziness, light-headedness and headaches.   Hematologic/Lymphatic: Negative for swollen lymph nodes.       Objective:      Physical Exam    Constitutional: He is oriented to person, place, and time. He appears well-developed and well-nourished. He is cooperative.  Non-toxic appearance. He does not have a sickly appearance. He does not appear ill. No distress.   Patient is sitting on exam table in no acute distress. Nontoxic appearing.    HENT:   Head: Normocephalic and atraumatic.   Right Ear: Hearing, external ear and ear canal normal. A middle ear effusion is present.   Left Ear: Hearing, external ear and ear canal normal. A middle ear effusion is present.   Nose: Mucosal edema and purulent discharge present. No rhinorrhea or nasal deformity. No epistaxis. Right sinus exhibits maxillary sinus tenderness and frontal sinus tenderness. Left sinus exhibits maxillary sinus tenderness and frontal sinus tenderness.   Mouth/Throat: Uvula is midline and mucous membranes are normal. No trismus in the jaw. Normal dentition. No uvula swelling. Posterior oropharyngeal erythema and cobblestoning present. No oropharyngeal exudate or posterior oropharyngeal edema. No tonsillar exudate.   Eyes: Pupils are equal, round, and reactive to light. Conjunctivae and lids are normal. No scleral icterus.   Neck: Trachea normal, full passive range of motion without pain and phonation normal. Neck supple. No neck rigidity. No edema and no erythema present.   Cardiovascular: Normal rate, regular rhythm, normal heart sounds, intact distal pulses and normal pulses.   Pulmonary/Chest: Effort normal. No respiratory distress. He has no decreased breath sounds. He has wheezes. He has no rhonchi.   On initial assessment, diffuse expiratory wheezing noted to all lung fields. O2 sat 94% on RA. duo neb given in clinic. On reassessment, wheezing significantly improved, O2 sat improved to 98%, and patient reports improvement in ease of breathing after treatment.    Abdominal: Normal appearance.   Musculoskeletal: Normal range of motion. He exhibits no edema or deformity.   Lymphadenopathy:      He has no cervical adenopathy.   Neurological: He is alert and oriented to person, place, and time. He exhibits normal muscle tone. Coordination normal.   Skin: Skin is warm, dry, intact, not diaphoretic and not pale.   Psychiatric: He has a normal mood and affect. His speech is normal and behavior is normal. Judgment and thought content normal. Cognition and memory are normal.   Nursing note and vitals reviewed.        X-ray Chest Pa And Lateral  Result Date: 1/11/2020  EXAMINATION: XR CHEST PA AND LATERAL CLINICAL HISTORY: COUGH; Wheezing TECHNIQUE: PA and lateral views of the chest were performed. COMPARISON: 11/11/2017 FINDINGS: The cardiomediastinal silhouette is prominent, similar to the previous examination noting calcification of the aorta.  Left chest wall pacer noted..  There is no pleural effusion.  The trachea is midline.  The lungs are symmetrically expanded bilaterally with mildly prominent central hilar interstitial attenuation.  No large focal consolidation seen.  There is no pneumothorax.  The osseous structures are remarkable for degenerative change..   1. Central hilar findings may reflect early congestive change, noting hypoventilatory exam.  No large focal consolidation. Electronically signed by: Loc Hartman MD Date:    01/11/2020 Time:    14:15    Results for orders placed or performed in visit on 01/11/20   POCT Influenza A/B   Result Value Ref Range    Rapid Influenza A Ag Negative Negative    Rapid Influenza B Ag Negative Negative     Acceptable Yes      Discussed risks of steroids with patient. Advised on return/follow-up precautions. Advised on ER precautions. Answered all patient questions. Patient verbalized understanding and voiced agreement with current treatment plan.    Assessment:       1. Acute sinusitis with symptoms greater than 10 days    2. Acute purulent bronchitis    3. Wheezing    4. Cough        Plan:         Acute sinusitis with symptoms greater than  10 days  -     doxycycline (VIBRAMYCIN) 100 MG Cap; Take 1 capsule (100 mg total) by mouth 2 (two) times daily. for 10 days  Dispense: 20 capsule; Refill: 0  -     predniSONE (DELTASONE) 20 MG tablet; Take 2 tablets daily for 5 days  Dispense: 10 tablet; Refill: 0  -     fluticasone propionate (FLONASE) 50 mcg/actuation nasal spray; 2 sprays (100 mcg total) by Each Nostril route once daily.  Dispense: 1 Bottle; Refill: 0    Acute purulent bronchitis  -     doxycycline (VIBRAMYCIN) 100 MG Cap; Take 1 capsule (100 mg total) by mouth 2 (two) times daily. for 10 days  Dispense: 20 capsule; Refill: 0  -     predniSONE (DELTASONE) 20 MG tablet; Take 2 tablets daily for 5 days  Dispense: 10 tablet; Refill: 0  -     benzonatate (TESSALON PERLES) 100 MG capsule; Take 1 capsule (100 mg total) by mouth every 6 (six) hours as needed.  Dispense: 30 capsule; Refill: 1  -     albuterol (PROVENTIL/VENTOLIN HFA) 90 mcg/actuation inhaler; Inhale 2 puffs into the lungs every 4 (four) hours as needed for Wheezing or Shortness of Breath. Rescue  Dispense: 1 Inhaler; Refill: 0    Wheezing  -     X-Ray Chest PA And Lateral; Future; Expected date: 01/11/2020  -     albuterol nebulizer solution 2.5 mg  -     ipratropium 0.02 % nebulizer solution 0.5 mg    Cough  -     POCT Influenza A/B      Patient Instructions       If your condition worsens or fails to improve we recommend that you receive another evaluation at the ER immediately or contact your PCP to discuss your concerns or return here. You must understand that you've received an urgent care treatment only and that you may be released before all your medical problems are known or treated. You the patient will arrange for followup care as instructed.    Rest and fluids are important  Can use honey with mario to soothe your throat    Take full course of antibiotics as directed.    You received a steroid prescription today -  this can elevate your blood pressure, elevate your blood  sugar, water weight gain, nervous energy, redness to the face.    Take inhaler as prescribed and needed for wheezing  Take prescription cough meds (pills) as prescribed as needed for cough.  Take mucinex as directed to help with chest congestion    -  Flonase (fluticasone) is a nasal spray which is available over the counter and may help with symptoms of nasal congestion.     -  Tylenol or ibuprofen can also be used as directed for pain unless you have an allergy to them or medical condition such as stomach ulcers, kidney or liver disease or blood thinners etc for which you should not be taking these type of medications.     Please follow up with your primary care doctor or specialist in the next 48-72hrs as needed and if no improvement.    If you  smoke, please stop smoking.        Acute Bacterial Rhinosinusitis (ABRS)    Acute bacterial rhinosinusitis (ABRS) is an infection of your nasal cavity and sinuses. Its caused by bacteria. Acute means that youve had symptoms for less than 12 weeks.  Understanding your sinuses  The nasal cavity is the large air-filled space behind your nose. The sinuses are a group of spaces formed by the bones of your face. They connect with your nasal cavity. ABRS causes the tissue lining these spaces to become inflamed. Mucus may not drain normally. This leads to facial pain and other symptoms.  What causes ABRS?  ABRS most often follows an upper respiratory infection caused by a virus. Bacteria then infect the lining of your nasal cavity and sinuses. But you can also get ABRS if you have:  · Nasal allergies  · Long-term nasal swelling and congestion not caused by allergies  · Blockage in the nose  Symptoms of ABRS  The symptoms of ABRS may be different for each person, and can include:  · Nasal congestion  · Runny nose  · Fluid draining from the nose down the throat (postnasal drip)  · Headache  · Cough  · Pain in the sinuses  · Thick, colored fluid from the nose  (mucus)  · Fever  Diagnosing ABRS  ABRS may be diagnosed if youve had an upper respiratory infection like a cold and cough for longer than 10 to 14 days. Your health care provider will ask about your symptoms and your medical history. The provider will check your vital signs, including your temperature. Youll have a physical exam. The health care provider will check your ears, nose, and throat. You likely wont need any tests. If ABRS comes back, you may have a culture or other tests.  Treatment for ABRS  Treatment may include:  · Antibiotic medicine. This is for symptoms that last for at least 10 to 14 days.  · Nasal corticosteroid medicine. Drops or spray used in the nose can lessen swelling and congestion.  · Over-the-counter pain medicine. This is to lessen sinus pain and pressure.  · Nasal decongestant medicine. Spray or drops may help to lessen congestion. Do not use them for more than a few days.  · Salt wash (saline irrigation). This can help to loosen mucus.  Possible complications of ABRS  ABRS may come back or become long-term (chronic).  In rare cases, ABRS may cause complications such as:   · Inflamed tissue around the brain and spinal cord (meningitis)  · Inflamed tissue around the eyes (orbital cellulitis)  · Inflamed bones around the sinuses (osteitis)  These problems may need to be treated in a hospital with intravenous (IV) antibiotic medicine or surgery.  When to call the health care provider  Call your health care provider if you have any of the following:  · Symptoms that dont get better, or get worse  · Symptoms that dont get better after 3 to 5 days on antibiotics  · Trouble seeing  · Swelling around your eyes  · Confusion or trouble staying awake   Date Last Reviewed: 3/3/2015  © 5287-4908 [employer]. [address] [place] [address], [place] [address]. All rights reserved. This information is not intended as a substitute for professional medical care. Always follow your healthcare  professional's instructions.        What Is Acute Bronchitis?  Acute bronchitis is when the airways in your lungs (bronchial tubes) become red and swollen (inflamed). It is usually caused by a viral infection. But it can also occur because of a bacteria or allergen. Symptoms include a cough that produces yellow or greenish mucus and can last for days or sometimes weeks.  Inside healthy lungs    Air travels in and out of the lungs through the airways. The linings of these airways produce sticky mucus. This mucus traps particles that enter the lungs. Tiny structures called cilia then sweep the particles out of the airways.     Healthy airway: Airways are normally open. Air moves in and out easily.      Healthy cilia: Tiny, hairlike cilia sweep mucus and particles up and out of the airways.   Lungs with bronchitis  Bronchitis often occurs with a cold or the flu virus. The airways become inflamed (red and swollen). There is a deep hacking cough from the extra mucus. Other symptoms may include:  · Wheezing  · Chest discomfort  · Shortness of breath  · Mild fever  A second infection, this time due to bacteria, may then occur. And airways irritated by allergens or smoke are more likely to get infected.        Inflamed airway: Inflammation and extra mucus narrow the airway, causing shortness of breath.      Impaired cilia: Extra mucus impairs cilia, causing congestion and wheezing. Smoking makes the problem worse.   Making a diagnosis  A physical exam, health history, and certain tests help your healthcare provider make the diagnosis.  Health history  Your healthcare provider will ask you about your symptoms.  The exam  Your provider listens to your chest for signs of congestion. He or she may also check your ears, nose, and throat.  Possible tests  · A sputum test for bacteria. This requires a sample of mucus from your lungs.  · A nasal or throat swab. This tests to see if you have a bacterial infection.  · A chest X-ray.  This is done if your healthcare provider thinks you have pneumonia.  · Tests to check for an underlying condition. Other tests may be done to check for things such as allergies, asthma, or COPD (chronic obstructive pulmonary disease). You may need to see a specialist for more lung function testing.  Treating a cough  The main treatment for bronchitis is easing symptoms. Avoiding smoke, allergens, and other things that trigger coughing can often help. If the infection is bacterial, you may be given antibiotics. During the illness, it's important to get plenty of sleep. To ease symptoms:  · Dont smoke. Also avoid secondhand smoke.  · Use a humidifier. Or try breathing in steam from a hot shower. This may help loosen mucus.  · Drink a lot of water and juice. They can soothe the throat and may help thin mucus.  · Sit up or use extra pillows when in bed. This helps to lessen coughing and congestion.  · Ask your provider about using medicine. Ask about using cough medicine, pain and fever medicine, or a decongestant.  Antibiotics  Most cases of bronchitis are caused by cold or flu viruses. They dont need antibiotics to treat them, even if your mucus is thick and green or yellow. Antibiotics dont treat viral illness and antibiotics have not been shown to have any benefit in cases of acute bronchitis. Taking antibiotics when they are not needed increases your risk of getting an infection later that is antibiotic-resistant. Antibiotics can also cause severe cases of diarrhea that require other antibiotics to treat.  It is important that you accept your healthcare provider's opinion to not use antibiotics. Your provider will prescribe antibiotics if the infection is caused by bacteria. If they are prescribed:  · Take all of the medicine. Take the medicine until it is used up, even if symptoms have improved. If you dont, the bronchitis may come back.  · Take the medicines as directed. For instance, some medicines should be  taken with food.  · Ask about side effects. Ask your provider or pharmacist what side effects are common, and what to do about them.  Follow-up care  You should see your provider again in 2 to 3 weeks. By this time, symptoms should have improved. An infection that lasts longer may mean you have a more serious problem.  Prevention  · Avoid tobacco smoke. If you smoke, quit. Stay away from smoky places. Ask friends and family not to smoke around you, or in your home or car.  · Get checked for allergies.  · Ask your provider about getting a yearly flu shot. Also ask about pneumococcal or pneumonia shots.  · Wash your hands often. This helps reduce the chance of picking up viruses that cause colds and flu.  Call your healthcare provider if:  · Symptoms worsen, or you have new symptoms  · Breathing problems worsen or  become severe  · Symptoms dont get better within a week, or within 3 days of taking antibiotics   Date Last Reviewed: 2/1/2017  © 1635-2517 [employer]. [address] [place] [address], [place] [address]. All rights reserved. This information is not intended as a substitute for professional medical care. Always follow your healthcare professional's instructions.

## 2020-02-07 NOTE — BRIEF OP NOTE
[facility]  Brief Operative Note     SUMMARY     Surgery Date: 6/2/2017     Surgeon(s) and Role:     * Brenden Bains MD - Primary    Assisting Surgeon: None    Pre-op Diagnosis:  Inguinal lymphadenopathy [R59.0]    Post-op Diagnosis:  Post-Op Diagnosis Codes:     * Inguinal lymphadenopathy [R59.0]    Procedure(s) (LRB):  EXCISION-BIOPSY-LYMPH NODE-INGUINAL (Left)    Anesthesia: Local MAC    Description of the findings of the procedure: Left inguinal lymph nodes    Findings/Key Components: inguinal lymph node    Estimated Blood Loss: minimal         Specimens:   Specimen (12h ago through future)    None          Discharge Note    SUMMARY     Admit Date: 6/2/2017    Discharge Date and Time:  06/02/2017 12:01 PM    Hospital Course (synopsis of major diagnoses, care, treatment, and services provided during the course of the hospital stay): uneventful postop course     Final Diagnosis: Post-Op Diagnosis Codes:     * Inguinal lymphadenopathy [R59.0]    Disposition: Home or Self Care    Follow Up/Patient Instructions:     Medications:  Reconciled Home Medications:   Current Discharge Medication List      START taking these medications    Details   hydrocodone-acetaminophen 5-325mg (NORCO) 5-325 mg per tablet Take 1 tablet by mouth every 4 (four) hours as needed for Pain.  Qty: 30 tablet, Refills: 0         CONTINUE these medications which have NOT CHANGED    Details   allopurinol (ZYLOPRIM) 100 MG tablet Refills: 5      amiodarone (PACERONE) 400 MG tablet Take 200 mg by mouth every morning.   Refills: 1      amlodipine (NORVASC) 10 MG tablet Take 10 mg by mouth every morning.   Refills: 5      cetirizine (ZYRTEC) 10 MG tablet Take 10 mg by mouth once daily.      CRESTOR 20 mg tablet Take 20 mg by mouth every evening.   Refills: 5      cyclobenzaprine (FLEXERIL) 10 MG tablet Take 10 mg by mouth every evening.   Refills: 2      dicyclomine (BENTYL) 20 mg tablet Take 20 mg by mouth 2 (two) times daily.    Refills: 2      furosemide (LASIX) 20 MG tablet Take 20 mg by mouth daily as needed. Takes every other day  Refills: 5      gabapentin (NEURONTIN) 300 MG capsule Take 300 mg by mouth every evening. Takes  Two 300 mg tabs at night      INVOKANA 300 mg Tab tablet TK 1 T PO QD  Refills: 5      isosorbide mononitrate (IMDUR) 30 MG 24 hr tablet Take 30 mg by mouth every morning.   Refills: 6      metoprolol succinate (TOPROL-XL) 50 MG 24 hr tablet Take 50 mg by mouth every evening.   Refills: 5      omeprazole (PRILOSEC) 20 MG capsule TK 1 C PO QD FOR CONTROL OF STOMACH ACID BEFORE BREAKFAST  Refills: 1      oxycodone-acetaminophen (PERCOCET)  mg per tablet Take 1 tablet by mouth every 4 (four) hours as needed for Pain.  Qty: 60 tablet, Refills: 0      potassium chloride SA (K-DUR,KLOR-CON) 20 MEQ tablet Refills: 5      VGO 30 Kathy USE 1 CARTRIDGE D PLUS 4 CLICKS WITH EACH MEAL  Refills: 4      zolpidem (AMBIEN) 10 mg Tab Take 5 mg by mouth nightly as needed.      albuterol 90 mcg/actuation inhaler Inhale 2 puffs into the lungs every 6 (six) hours as needed for Wheezing or Shortness of Breath.  Qty: 18 g, Refills: 3    Associated Diagnoses: Asthma, intermittent, uncomplicated      fluticasone (FLONASE) 50 mcg/actuation nasal spray 1 spray by Each Nare route once daily.      nitroGLYCERIN (NITROSTAT) 0.4 MG SL tablet Place 0.4 mg under the tongue every 5 (five) minutes as needed for Chest pain.      tamsulosin (FLOMAX) 0.4 mg Cp24 Take 1 capsule (0.4 mg total) by mouth once daily.  Qty: 30 capsule, Refills: 2             Discharge Procedure Orders  Diet general     Activity as tolerated     Shower on day dressing removed (No bath)     Call MD for:  temperature >100.4     Call MD for:  persistent nausea and vomiting     Call MD for:  severe uncontrolled pain     Call MD for:  difficulty breathing, headache or visual disturbances     Call MD for:  redness, tenderness, or signs of infection (pain, swelling, redness,  odor or green/yellow discharge around incision site)     Call MD for:  hives     Remove dressing in 24 hours       Follow-up Information     Brenden Bains MD.    Specialty:  General Surgery  Contact information:  [address] [place] [address]  [id number]  [place] [address] [phone]                  Ketoconazole Pregnancy And Lactation Text: This medication is Pregnancy Category C and it isn't know if it is safe during pregnancy. It is also excreted in breast milk and breast feeding isn't recommended.

## 2020-03-18 ENCOUNTER — OFFICE VISIT (OUTPATIENT)
Dept: URGENT CARE | Facility: CLINIC | Age: 55
End: 2020-03-18
Payer: MEDICARE

## 2020-03-18 VITALS
RESPIRATION RATE: 15 BRPM | HEART RATE: 88 BPM | BODY MASS INDEX: 34.53 KG/M2 | TEMPERATURE: 98 F | OXYGEN SATURATION: 98 % | HEIGHT: 67 IN | DIASTOLIC BLOOD PRESSURE: 63 MMHG | SYSTOLIC BLOOD PRESSURE: 115 MMHG | WEIGHT: 220 LBS

## 2020-03-18 DIAGNOSIS — R09.89 RALES: ICD-10-CM

## 2020-03-18 DIAGNOSIS — J06.9 VIRAL URI: Primary | ICD-10-CM

## 2020-03-18 DIAGNOSIS — R05.9 COUGH: ICD-10-CM

## 2020-03-18 LAB
CTP QC/QA: YES
FLUAV AG NPH QL: NEGATIVE
FLUBV AG NPH QL: NEGATIVE

## 2020-03-18 PROCEDURE — 99214 OFFICE O/P EST MOD 30 MIN: CPT | Mod: 25,S$GLB,, | Performed by: NURSE PRACTITIONER

## 2020-03-18 PROCEDURE — 71046 XR CHEST PA AND LATERAL: ICD-10-PCS | Mod: TIER,S$GLB,, | Performed by: RADIOLOGY

## 2020-03-18 PROCEDURE — 99214 PR OFFICE/OUTPT VISIT, EST, LEVL IV, 30-39 MIN: ICD-10-PCS | Mod: 25,S$GLB,, | Performed by: NURSE PRACTITIONER

## 2020-03-18 PROCEDURE — 87804 POCT INFLUENZA A/B: ICD-10-PCS | Mod: 59,QW,S$GLB, | Performed by: NURSE PRACTITIONER

## 2020-03-18 PROCEDURE — 71046 X-RAY EXAM CHEST 2 VIEWS: CPT | Mod: TIER,S$GLB,, | Performed by: RADIOLOGY

## 2020-03-18 PROCEDURE — 87804 INFLUENZA ASSAY W/OPTIC: CPT | Mod: QW,S$GLB,, | Performed by: NURSE PRACTITIONER

## 2020-03-18 RX ORDER — PROMETHAZINE HYDROCHLORIDE AND DEXTROMETHORPHAN HYDROBROMIDE 6.25; 15 MG/5ML; MG/5ML
5 SYRUP ORAL
Qty: 118 ML | Refills: 0 | Status: ON HOLD | OUTPATIENT
Start: 2020-03-18 | End: 2022-01-26

## 2020-03-18 NOTE — PATIENT INSTRUCTIONS
Viral Upper Respiratory Illness (Adult)  You have a viral upper respiratory illness (URI), which is another term for the common cold. This illness is contagious during the first few days. It is spread through the air by coughing and sneezing. It may also be spread by direct contact (touching the sick person and then touching your own eyes, nose, or mouth). Frequent handwashing will decrease risk of spread. Most viral illnesses go away within 7 to 10 days with rest and simple home remedies. Sometimes the illness may last for several weeks. Antibiotics will not kill a virus, and they are generally not prescribed for this condition.    Home care  · If symptoms are severe, rest at home for the first 2 to 3 days. When you resume activity, don't let yourself get too tired.  · Avoid being exposed to cigarette smoke (yours or others).  · You may use acetaminophen or ibuprofen to control pain and fever, unless another medicine was prescribed. (Note: If you have chronic liver or kidney disease, have ever had a stomach ulcer or gastrointestinal bleeding, or are taking blood-thinning medicines, talk with your healthcare provider before using these medicines.) Aspirin should never be given to anyone under 18 years of age who is ill with a viral infection or fever. It may cause severe liver or brain damage.  · Your appetite may be poor, so a light diet is fine. Avoid dehydration by drinking 6 to 8 glasses of fluids per day (water, soft drinks, juices, tea, or soup). Extra fluids will help loosen secretions in the nose and lungs.  · Over-the-counter cold medicines will not shorten the length of time youre sick, but they may be helpful for the following symptoms: cough, sore throat, and nasal and sinus congestion. (Note: Do not use decongestants if you have high blood pressure.)  Follow-up care  Follow up with your healthcare provider, or as advised.  When to seek medical advice  Call your healthcare provider right away if any  of these occur:  · Cough with lots of colored sputum (mucus)  · Severe headache; face, neck, or ear pain  · Difficulty swallowing due to throat pain  · Fever of 100.4°F (38°C)  Call 911, or get immediate medical care  Call emergency services right away if any of these occur:  · Chest pain, shortness of breath, wheezing, or difficulty breathing  · Coughing up blood  · Inability to swallow due to throat pain  Date Last Reviewed: 9/13/2015  © 7508-2506 [employer]. [address] [place] [address]. All rights reserved. This information is not intended as a substitute for professional medical care. Always follow your healthcare professional's instructions.

## 2020-03-18 NOTE — LETTER
March 18, 2020      [facility] [address], [place] [address]  Phone: [phone]  Fax: [phone]       Patient: Lianet Mcgraw   YOB: 1965  Date of Visit: 03/18/2020    To Whom It May Concern:    Rachel Mcgraw  was at [facility] on 03/18/2020. He may return to work/school on March 23rd with no restrictions. If you have any questions or concerns, or if I can be of further assistance, please do not hesitate to contact me.    Sincerely,      Maile Staton, NP

## 2020-09-15 ENCOUNTER — OFFICE VISIT (OUTPATIENT)
Dept: URGENT CARE | Facility: CLINIC | Age: 55
End: 2020-09-15
Payer: MEDICARE

## 2020-09-15 VITALS
HEART RATE: 83 BPM | DIASTOLIC BLOOD PRESSURE: 80 MMHG | TEMPERATURE: 99 F | SYSTOLIC BLOOD PRESSURE: 120 MMHG | OXYGEN SATURATION: 95 %

## 2020-09-15 DIAGNOSIS — R81 GLYCOSURIA: ICD-10-CM

## 2020-09-15 DIAGNOSIS — R10.2 PELVIC PAIN: ICD-10-CM

## 2020-09-15 DIAGNOSIS — N39.0 COMPLICATED UTI (URINARY TRACT INFECTION): Primary | ICD-10-CM

## 2020-09-15 DIAGNOSIS — R30.9 PAINFUL URINATION: ICD-10-CM

## 2020-09-15 DIAGNOSIS — R10.2 SUPRAPUBIC PAIN, ACUTE: ICD-10-CM

## 2020-09-15 LAB
BILIRUB UR QL STRIP: NEGATIVE
GLUCOSE UR QL STRIP: POSITIVE
KETONES UR QL STRIP: NEGATIVE
LEUKOCYTE ESTERASE UR QL STRIP: NEGATIVE
PH, POC UA: 5.5 (ref 5–8)
POC BLOOD, URINE: NEGATIVE
POC NITRATES, URINE: NEGATIVE
PROT UR QL STRIP: NEGATIVE
SP GR UR STRIP: 1.02 (ref 1–1.03)
UROBILINOGEN UR STRIP-ACNC: ABNORMAL (ref 0.3–2.2)

## 2020-09-15 PROCEDURE — 74019 RADEX ABDOMEN 2 VIEWS: CPT | Mod: S$GLB,,, | Performed by: RADIOLOGY

## 2020-09-15 PROCEDURE — 81003 POCT URINALYSIS, DIPSTICK, AUTOMATED, W/O SCOPE: ICD-10-PCS | Mod: QW,S$GLB,, | Performed by: PHYSICIAN ASSISTANT

## 2020-09-15 PROCEDURE — 99214 PR OFFICE/OUTPT VISIT, EST, LEVL IV, 30-39 MIN: ICD-10-PCS | Mod: 25,S$GLB,, | Performed by: PHYSICIAN ASSISTANT

## 2020-09-15 PROCEDURE — 99214 OFFICE O/P EST MOD 30 MIN: CPT | Mod: 25,S$GLB,, | Performed by: PHYSICIAN ASSISTANT

## 2020-09-15 PROCEDURE — 81003 URINALYSIS AUTO W/O SCOPE: CPT | Mod: QW,S$GLB,, | Performed by: PHYSICIAN ASSISTANT

## 2020-09-15 PROCEDURE — 74019 XR ABDOMEN FLAT AND ERECT: ICD-10-PCS | Mod: S$GLB,,, | Performed by: RADIOLOGY

## 2020-09-15 RX ORDER — METHAZOLAMIDE 25 MG/1
25 TABLET ORAL DAILY
COMMUNITY
End: 2023-04-14

## 2020-09-15 RX ORDER — DEXTROSE 4 G
TABLET,CHEWABLE ORAL
COMMUNITY
Start: 2020-01-31

## 2020-09-15 RX ORDER — SILDENAFIL 100 MG/1
100 TABLET, FILM COATED ORAL
COMMUNITY
End: 2020-09-23 | Stop reason: SINTOL

## 2020-09-15 RX ORDER — ASPIRIN 81 MG/1
81 TABLET ORAL DAILY
COMMUNITY
Start: 2019-11-15

## 2020-09-15 RX ORDER — PEN NEEDLE, DIABETIC 30 GX3/16"
1 NEEDLE, DISPOSABLE MISCELLANEOUS
COMMUNITY
Start: 2020-07-28

## 2020-09-15 RX ORDER — TIMOLOL MALEATE 5 MG/ML
1 SOLUTION/ DROPS OPHTHALMIC
COMMUNITY
Start: 2019-10-25 | End: 2021-02-22 | Stop reason: SDUPTHER

## 2020-09-15 RX ORDER — DULOXETIN HYDROCHLORIDE 60 MG/1
60 CAPSULE, DELAYED RELEASE ORAL DAILY
COMMUNITY
Start: 2020-07-27 | End: 2023-11-15

## 2020-09-15 RX ORDER — SOTALOL HYDROCHLORIDE 80 MG/1
TABLET ORAL
COMMUNITY
Start: 2020-08-13

## 2020-09-15 RX ORDER — LANCETS
EACH MISCELLANEOUS
COMMUNITY
Start: 2020-01-31

## 2020-09-15 RX ORDER — INSULIN GLULISINE 100 [IU]/ML
INJECTION, SOLUTION SUBCUTANEOUS
COMMUNITY
Start: 2019-12-22 | End: 2020-09-30 | Stop reason: CLARIF

## 2020-09-15 RX ORDER — INSULIN GLARGINE 100 [IU]/ML
60 INJECTION, SOLUTION SUBCUTANEOUS DAILY
COMMUNITY
Start: 2019-11-15 | End: 2023-04-14 | Stop reason: SDUPTHER

## 2020-09-15 RX ORDER — ECONAZOLE NITRATE 10 MG/G
CREAM TOPICAL
COMMUNITY
Start: 2020-05-04 | End: 2020-09-30 | Stop reason: CLARIF

## 2020-09-15 RX ORDER — SULFAMETHOXAZOLE AND TRIMETHOPRIM 800; 160 MG/1; MG/1
1 TABLET ORAL 2 TIMES DAILY
Qty: 20 TABLET | Refills: 0 | Status: SHIPPED | OUTPATIENT
Start: 2020-09-15 | End: 2020-09-25

## 2020-09-15 RX ORDER — PREDNISOLONE ACETATE 10 MG/ML
SUSPENSION/ DROPS OPHTHALMIC
COMMUNITY
Start: 2019-12-03 | End: 2021-02-22 | Stop reason: SDUPTHER

## 2020-09-15 RX ORDER — BRIMONIDINE TARTRATE 2 MG/ML
1 SOLUTION/ DROPS OPHTHALMIC
COMMUNITY
Start: 2019-10-25 | End: 2021-02-22 | Stop reason: SDUPTHER

## 2020-09-15 NOTE — PROGRESS NOTES
Subjective:       Patient ID: Se Virgil Mcgraw is a 55 y.o. male.    Vitals:  temperature is 98.6 °F (37 °C). His blood pressure is 120/80 and his pulse is 83. His oxygen saturation is 95%.     Chief Complaint: Abdominal Pain    55-year-old male with a history of previous MI, COPD, coronary artery disease, s/p aicd placement,  diabetes, degenerative joint disease, gout, hypertension, HLD, previous kidney stone, BPH with urinary urge incontinence, GERD, obstructive sleep apnea, and obesity (BMI 34.5) who presents to urgent care clinic for ealuation.  Patient complaining of suprapubic/pelvic pain radiating to his groin and testicle for 3 days.  Pain worsens when she is urinating.  Not taking anything for symptoms.  No other associated symptoms.  No fever or hematuria. Normal daily BM.     Patient denies any paresthesias, nuchal rigidity, vision changes, hearing loss, focal weakness or deficits, or seizure activity.    Patient denies any recent URI symptoms, earache/drainage, headaches, cough, weakness, fever, chills, sweats, body aches, palpitations,  chest pain, shortness of breath, wheezing, leg swelling, dizziness, lightheadedness with position changes, hematuria, rectal bleeding, bladder/bowel incontinence, flank pain, abdominal pain, nausea/vomiting, or diarrhea.           Constitution: Negative for activity change, appetite change, chills, sweating, fatigue, fever and generalized weakness.   HENT: Negative for ear pain, hearing loss, facial swelling, congestion, postnasal drip, sinus pain, sinus pressure, sore throat, trouble swallowing and voice change.    Neck: Negative for neck pain, neck stiffness and painful lymph nodes.   Cardiovascular: Negative for chest pain, leg swelling, palpitations, sob on exertion and passing out.   Eyes: Negative for eye discharge, eye pain, eye redness, photophobia, vision loss, double vision, blurred vision and eyelid swelling.   Respiratory: Negative for chest tightness,  cough, sputum production, COPD, shortness of breath, wheezing and asthma.    Gastrointestinal: Negative for abdominal trauma, abdominal pain, abdominal bloating, nausea, vomiting, diarrhea, bright red blood in stool, dark colored stools, rectal bleeding, heartburn and bowel incontinence.   Genitourinary: Positive for dysuria, frequency, urgency, testicular pain and pelvic pain. Negative for urine decreased, flank pain, bladder incontinence, hematuria, history of kidney stones, genital trauma, penile discharge, penile pain, penile swelling and scrotal swelling.   Musculoskeletal: Negative for trauma, joint pain, joint swelling, abnormal ROM of joint, back pain, muscle cramps and muscle ache.   Skin: Negative for color change, pale, rash and wound.   Allergic/Immunologic: Negative for seasonal allergies, asthma and immunocompromised state.   Neurological: Negative for dizziness, history of vertigo, light-headedness, passing out, facial drooping, speech difficulty, coordination disturbances, loss of balance, headaches, disorientation, altered mental status, loss of consciousness, numbness, tingling and seizures.   Hematologic/Lymphatic: Negative for swollen lymph nodes, easy bruising/bleeding and trouble clotting. Does not bruise/bleed easily.   Psychiatric/Behavioral: Negative for altered mental status and disorientation.       Objective:      Physical Exam   Constitutional: He is oriented to person, place, and time. He appears well-developed. He is cooperative.  Non-toxic appearance. He does not appear ill. No distress.   HENT:   Head: Normocephalic and atraumatic.   Ears:   Right Ear: Hearing, external ear and ear canal normal. No drainage, swelling or tenderness.   Left Ear: Hearing, external ear and ear canal normal. No drainage, swelling or tenderness.   Nose: Nose normal. No purulent discharge. Right sinus exhibits no maxillary sinus tenderness and no frontal sinus tenderness. Left sinus exhibits no maxillary  sinus tenderness and no frontal sinus tenderness.   Mouth/Throat: Uvula is midline, oropharynx is clear and moist and mucous membranes are normal. Mucous membranes are moist. No oral lesions. No trismus in the jaw. No uvula swelling. No posterior oropharyngeal edema. No tonsillar exudate. Oropharynx is clear.   Eyes: Pupils are equal, round, and reactive to light. Conjunctivae, EOM and lids are normal. Right eye exhibits no discharge. Left eye exhibits no discharge. No visual field deficit is present. Right conjunctiva is not injected. Right conjunctiva has no hemorrhage. Left conjunctiva is not injected. Left conjunctiva has no hemorrhage. extraocular movement intactvision grossly intactgaze aligned appropriately  Neck: Normal range of motion and full passive range of motion without pain. Neck supple. No neck rigidity.   Cardiovascular: Normal rate, regular rhythm, normal heart sounds and normal pulses.   Pulmonary/Chest: Effort normal and breath sounds normal. No accessory muscle usage or stridor. No respiratory distress. He has no wheezes. He exhibits no tenderness.   Abdominal: Soft. Normal appearance. He exhibits no distension and no mass. There is no splenomegaly or hepatomegaly. There is abdominal tenderness in the suprapubic area. There is no rebound, no guarding, no tenderness at McBurney's point, negative Uriostegui's sign, no left CVA tenderness, negative Rovsing's sign, negative psoas sign, no right CVA tenderness and negative obturator sign. No hernia.   Genitourinary:    Testes and penis normal.   Cremasteric reflex is present. Right testis shows no mass, no swelling and no tenderness. Left testis shows no mass, no swelling and no tenderness.   Musculoskeletal: Normal range of motion.      Right lower leg: No edema.      Left lower leg: No edema.      Comments: 5/5 strength and ROM in all extremities.  Gait normal.   Neurological: He is alert and oriented to person, place, and time. He has normal motor  skills, normal sensation and intact cranial nerves. He displays no weakness, facial symmetry and normal reflexes. No cranial nerve deficit or sensory deficit. He exhibits normal muscle tone. He has a normal Finger-Nose-Finger Test. He shows no pronator drift. He displays no seizure activity. Gait and coordination normal. Coordination normal. GCS eye subscore is 4. GCS verbal subscore is 5. GCS motor subscore is 6.   Skin: Skin is warm, dry, not diaphoretic and no rash. Capillary refill takes less than 2 seconds. Psychiatric: His speech is normal and behavior is normal. Mood and thought content normal.   Nursing note and vitals reviewed.    Results for orders placed or performed in visit on 09/15/20   POCT Urinalysis, Dipstick, Automated, W/O Scope   Result Value Ref Range    POC Blood, Urine Negative Negative    POC Bilirubin, Urine Negative Negative    POC Urobilinogen, Urine + 0.3 - 2.2    POC Ketones, Urine Negative Negative    POC Protein, Urine Negative Negative    POC Nitrates, Urine Negative Negative    POC Glucose, Urine Positive (A) Negative    pH, UA 5.5 5 - 8    POC Specific Gravity, Urine 1.020 1.003 - 1.029    POC Leukocytes, Urine Negative Negative       CMP 7/13/2020  Glucose - [facility] 65 - 99 mg/dL 261High     Comment:               Fasting reference interval     For someone without known diabetes, a glucose   value >125 mg/dL indicates that they may have   diabetes and this should be confirmed with a   follow-up test.   Urea Nitrogen (BUN) - [facility] 7 - 25 mg/dL 14    Creatinine - [facility] 0.70 - 1.33 mg/dL 1.07    Comment: For patients >49 years of age, the reference limit   for Creatinine is approximately 13% higher for people   identified as -American.   eGFR Non-Afr. American - [facility] > OR = 60 mL/min/1.73m2 77    eGFR African American - [facility] > OR = 60 mL/min/1.73m2 89    BUN/Creatinine Ratio - [facility] 6 - 22 (calc) NOT APPLICABLE    Sodium - [facility] 135 - 146 mmol/L 139    Potassium - [facility] 3.5 -  5.3 mmol/L 3.9    Chloride - [facility] 98 - 110 mmol/L 101    Carbon Dioxide - [facility] 20 - 32 mmol/L 30    Calcium - [facility] 8.6 - 10.3 mg/dL 9.3    Protein, Total - [facility] 6.1 - 8.1 g/dL 6.2    Albumin - [facility] 3.6 - 5.1 g/dL 3.9    Globulin - [facility] 1.9 - 3.7 g/dL (calc) 2.3    Albumin/Globulin Ration - [facility] 1.0 - 2.5 (calc) 1.7    Bilirubin Total-[facility] 0.2 - 1.2 mg/dL 0.6    Alkaline Phosphatase - [facility] 35 - 144 U/L 60    AST - [facility] 10 - 35 U/L 13    ALT - [facility] 9 - 46 U/L 14        Xr Abdomen Flat And Erect Result Date: 9/15/2020  EXAMINATION: XR ABDOMEN FLAT AND ERECT CLINICAL HISTORY: Lower abdominal pain, unspecified TECHNIQUE: Flat and erect AP views of the abdomen were performed. COMPARISON: None FINDINGS: Nonspecific, nonobstructive bowel gas pattern.  Moderate retained stool in the colon.  No definite free intraperitoneal air.  No suspicious calcifications.  Stable calcification right pelvis, likely phlebolith.     Constipation without obstruction. Electronically signed by: Luna Valle Date:    09/15/2020 Time:    16:15        Assessment:       1. Complicated UTI (urinary tract infection)    2. Painful urination    3. Suprapubic pain, acute    4. Pelvic pain    5. Glycosuria          On exam, patient is nontoxic appearing and vitals are stable.  Patient is essentially neurovascularly intact on exam.  No concern for sepsis, pyelonephritis,or urinary obstruction.    urinalysis done with glycosuria d/t invokana use (see above).  Urine culture sent.  We will notify patient of the results in the next 3-5 days; can receive results on [employer]. Xray abd with nonob bowel gas pattern. Diagnostic testing results were independently reviewed and interpreted, which were discussed in depth with patient. Patient was prescribed bactrim course (avoid cipro d/t increase QTC with concomitant use with sotalol) and recommended OTC treatments for their symptoms. If symptoms do not improve/worsens, patient was referred back to PCP or  Urology for continued outpatient workup and management.     Patient was instructed to return for re-evaluation for any worsening or change in current symptoms. Strict ED versus clinic precautions given in depth. Discharge and follow-up instructions given verbally/printed with the patient who expressed understanding and willingness to comply with my recommendations.  Patient verbalized understanding and agreed with the entirety of plan of care.    Note dictated with voice recognition software, please excuse any grammatical errors.    Plan:         Complicated UTI (urinary tract infection)  -     sulfamethoxazole-trimethoprim 800-160mg (BACTRIM DS) 800-160 mg Tab; Take 1 tablet by mouth 2 (two) times daily. for 10 days  Dispense: 20 tablet; Refill: 0  -     Ambulatory referral/consult to Urology    Painful urination  -     POCT Urinalysis, Dipstick, Automated, W/O Scope  -     XR ABDOMEN FLAT AND ERECT; Future; Expected date: 09/15/2020  -     Urine culture  -     sulfamethoxazole-trimethoprim 800-160mg (BACTRIM DS) 800-160 mg Tab; Take 1 tablet by mouth 2 (two) times daily. for 10 days  Dispense: 20 tablet; Refill: 0  -     Ambulatory referral/consult to Urology    Suprapubic pain, acute  -     POCT Urinalysis, Dipstick, Automated, W/O Scope  -     XR ABDOMEN FLAT AND ERECT; Future; Expected date: 09/15/2020  -     Urine culture  -     Ambulatory referral/consult to Urology    Pelvic pain  -     Ambulatory referral/consult to Urology    Glycosuria           Patient Instructions   PLEASE READ YOUR DISCHARGE INSTRUCTIONS ENTIRELY AS IT CONTAINS IMPORTANT INFORMATION.  - complete all prescribed antibiotic  - OTC Tylenol/anti-inflammatory as needed for pain.   - Radiographs and all diagnostic testing reviewed with patient  - if no improvement or worsening symptoms, recommend follow-up with *Urology for further evaluation.  Please call the number below to set up appointment; a referral has been placed.  - If you smoke,  please stop smoking.  - discussed weight loss given obesity  -You must understand that you've received an Urgent Care treatment only and that you may be released before all your medical problems are known or treated. You, the patient, will arrange for follow up care as instructed. Please arrange follow up with your primary medical clinic within 2-5 days if your signs and symptoms have not resolved or worsen.        - Follow up with your PCP or specialty clinic as directed.  You can call [phone] or [phone] to schedule an appointment with the appropriate provider.    - If your condition worsens or fails to improve we recommend that you receive another evaluation at the emergency room immediately or contact your primary medical clinic to discuss your concerns in next 2-5 days.  Strict ER versus clinic precautions given.      RED FLAGS/WARNING SYMPTOMS DISCUSSED WITH PATIENT THAT WOULD WARRANT EMERGENT MEDICAL ATTENTION. Patient aware and verbalized understanding.

## 2020-09-15 NOTE — PATIENT INSTRUCTIONS
PLEASE READ YOUR DISCHARGE INSTRUCTIONS ENTIRELY AS IT CONTAINS IMPORTANT INFORMATION.  - complete all prescribed antibiotic  - OTC Tylenol/anti-inflammatory as needed for pain.   - Radiographs and all diagnostic testing reviewed with patient  - if no improvement or worsening symptoms, recommend follow-up with *Urology for further evaluation.  Please call the number below to set up appointment; a referral has been placed.  - If you smoke, please stop smoking.  - discussed weight loss given obesity  -You must understand that you've received an Urgent Care treatment only and that you may be released before all your medical problems are known or treated. You, the patient, will arrange for follow up care as instructed. Please arrange follow up with your primary medical clinic within 2-5 days if your signs and symptoms have not resolved or worsen.        - Follow up with your PCP or specialty clinic as directed.  You can call [phone] or [phone] to schedule an appointment with the appropriate provider.    - If your condition worsens or fails to improve we recommend that you receive another evaluation at the emergency room immediately or contact your primary medical clinic to discuss your concerns in next 2-5 days.  Strict ER versus clinic precautions given.      RED FLAGS/WARNING SYMPTOMS DISCUSSED WITH PATIENT THAT WOULD WARRANT EMERGENT MEDICAL ATTENTION. Patient aware and verbalized understanding.

## 2020-09-20 LAB
BACTERIA UR CULT: NO GROWTH
BACTERIA UR CULT: NORMAL

## 2020-09-23 ENCOUNTER — TELEPHONE (OUTPATIENT)
Dept: URGENT CARE | Facility: CLINIC | Age: 55
End: 2020-09-23

## 2020-09-23 ENCOUNTER — OFFICE VISIT (OUTPATIENT)
Dept: UROLOGY | Facility: CLINIC | Age: 55
End: 2020-09-23
Payer: MEDICARE

## 2020-09-23 VITALS — WEIGHT: 221.56 LBS | BODY MASS INDEX: 34.78 KG/M2 | HEIGHT: 67 IN

## 2020-09-23 DIAGNOSIS — Z90.79 HISTORY OF TRANSURETHRAL RESECTION OF PROSTATE: ICD-10-CM

## 2020-09-23 DIAGNOSIS — M62.89 PELVIC FLOOR TENSION: ICD-10-CM

## 2020-09-23 DIAGNOSIS — R33.9 RETENTION OF URINE: ICD-10-CM

## 2020-09-23 DIAGNOSIS — E11.69 ERECTILE DYSFUNCTION ASSOCIATED WITH TYPE 2 DIABETES MELLITUS: ICD-10-CM

## 2020-09-23 DIAGNOSIS — Z98.890 HISTORY OF TRANSURETHRAL RESECTION OF PROSTATE: ICD-10-CM

## 2020-09-23 DIAGNOSIS — N39.41 URGE INCONTINENCE OF URINE: Primary | ICD-10-CM

## 2020-09-23 DIAGNOSIS — N52.1 ERECTILE DYSFUNCTION ASSOCIATED WITH TYPE 2 DIABETES MELLITUS: ICD-10-CM

## 2020-09-23 LAB
BILIRUB SERPL-MCNC: ABNORMAL MG/DL
BLOOD URINE, POC: ABNORMAL
COLOR, POC UA: YELLOW
GLUCOSE UR QL STRIP: 1000
KETONES UR QL STRIP: ABNORMAL
LEUKOCYTE ESTERASE URINE, POC: ABNORMAL
NITRITE, POC UA: ABNORMAL
PH, POC UA: 8
POC RESIDUAL URINE VOLUME: 456 ML (ref 0–100)
PROTEIN, POC: ABNORMAL
SPECIFIC GRAVITY, POC UA: 1000
UROBILINOGEN, POC UA: ABNORMAL

## 2020-09-23 PROCEDURE — 81001 POCT URINALYSIS, DIPSTICK OR TABLET REAGENT, AUTOMATED, WITH MICROSCOP: ICD-10-PCS | Mod: S$GLB,,, | Performed by: STUDENT IN AN ORGANIZED HEALTH CARE EDUCATION/TRAINING PROGRAM

## 2020-09-23 PROCEDURE — 51798 US URINE CAPACITY MEASURE: CPT | Mod: S$GLB,,, | Performed by: STUDENT IN AN ORGANIZED HEALTH CARE EDUCATION/TRAINING PROGRAM

## 2020-09-23 PROCEDURE — 99204 PR OFFICE/OUTPT VISIT, NEW, LEVL IV, 45-59 MIN: ICD-10-PCS | Mod: 25,S$GLB,, | Performed by: STUDENT IN AN ORGANIZED HEALTH CARE EDUCATION/TRAINING PROGRAM

## 2020-09-23 PROCEDURE — 81001 URINALYSIS AUTO W/SCOPE: CPT | Mod: S$GLB,,, | Performed by: STUDENT IN AN ORGANIZED HEALTH CARE EDUCATION/TRAINING PROGRAM

## 2020-09-23 PROCEDURE — 99204 OFFICE O/P NEW MOD 45 MIN: CPT | Mod: 25,S$GLB,, | Performed by: STUDENT IN AN ORGANIZED HEALTH CARE EDUCATION/TRAINING PROGRAM

## 2020-09-23 PROCEDURE — 3046F HEMOGLOBIN A1C LEVEL >9.0%: CPT | Mod: CPTII,S$GLB,, | Performed by: STUDENT IN AN ORGANIZED HEALTH CARE EDUCATION/TRAINING PROGRAM

## 2020-09-23 PROCEDURE — 3008F BODY MASS INDEX DOCD: CPT | Mod: CPTII,S$GLB,, | Performed by: STUDENT IN AN ORGANIZED HEALTH CARE EDUCATION/TRAINING PROGRAM

## 2020-09-23 PROCEDURE — 99999 PR PBB SHADOW E&M-EST. PATIENT-LVL IV: CPT | Mod: PBBFAC,,, | Performed by: STUDENT IN AN ORGANIZED HEALTH CARE EDUCATION/TRAINING PROGRAM

## 2020-09-23 PROCEDURE — 3046F PR MOST RECENT HEMOGLOBIN A1C LEVEL > 9.0%: ICD-10-PCS | Mod: CPTII,S$GLB,, | Performed by: STUDENT IN AN ORGANIZED HEALTH CARE EDUCATION/TRAINING PROGRAM

## 2020-09-23 PROCEDURE — 51798 POCT BLADDER SCAN: ICD-10-PCS | Mod: S$GLB,,, | Performed by: STUDENT IN AN ORGANIZED HEALTH CARE EDUCATION/TRAINING PROGRAM

## 2020-09-23 PROCEDURE — 3008F PR BODY MASS INDEX (BMI) DOCUMENTED: ICD-10-PCS | Mod: CPTII,S$GLB,, | Performed by: STUDENT IN AN ORGANIZED HEALTH CARE EDUCATION/TRAINING PROGRAM

## 2020-09-23 PROCEDURE — 99999 PR PBB SHADOW E&M-EST. PATIENT-LVL IV: ICD-10-PCS | Mod: PBBFAC,,, | Performed by: STUDENT IN AN ORGANIZED HEALTH CARE EDUCATION/TRAINING PROGRAM

## 2020-09-23 NOTE — LETTER
September 23, 2020      Varghese Anderson PA-C  [address]  [address]  [place] [address]           [facility]  [address] [place] [address]  [place] [address]  Phone: [phone]  Fax: [phone]          Patient: Se Virgil Mcgraw   MR Number: [id number]   YOB: 1965   Date of Visit: 9/23/2020       Dear Varghese Anderson:    Thank you for referring Se Virgil Mcgraw to me for evaluation. Attached you will find relevant portions of my assessment and plan of care.    If you have questions, please do not hesitate to call me. I look forward to following Se Virgil Mcgraw along with you.    Sincerely,    Monica Lieberman MD    Enclosure  CC:  No Recipients    If you would like to receive this communication electronically, please contact [email or web address] or [phone] to request more information on [employer] access.    For providers and/or their staff who would like to refer a patient to [facility], please contact us through our one-stop-shop provider referral line, [facility], at [phone].    If you feel you have received this communication in error or would no longer like to receive these types of communications, please e-mail [email or web address]

## 2020-09-23 NOTE — PROGRESS NOTES
" Patient ID: Se Virgil Mcgraw is a 55 y.o. male.  Referral: Varghese Anderson MD  Chief Complaint: Groin Pain (new pt/ started that he has been experiencing groin and testicular pain for a few years, the pain is off and on)    HPI  HPI  55-year-old  man with history of an enlarged prostate with lower urinary tract symptoms.  Patient  describes he underwent a procedure to "core out his prostate" because he was backed up."  Patient underwent the procedure 1 year ago at [facility].  Patient reports immediately after the procedure he experienced urinary urge incontinence that has waxed and waned up until 1 week ago when his urgency has worsened. He was prescribed bactrim for concern for possible UTI without improvement of his symptoms 9/15, urine culture with no growth of bacteria. Patient wears about 3 pull ups per day for incontinence. Denies incontinence with stress maneuvers- coughing, laughing, lifting heavy objects.  Patient denies saturating those pull-ups, endorses mild/ moderate saturation.  Patient denies history of urinary tract infections. IPSS score: [id number], mostly dissatisfied. He drinks 3 cans of soda throughout the day.  He drinks 6 bottles of water per day.  He denies constipation.  Patient takes Lasix for volume overload.      Patient denies history of gross hematuria.  Patient reports history of urolithiasis that required laser intervention in 2002.  He has been asymptomatic re: stones since.      Erectile dysfunction  Patient has tried Viagra in the past as well as, intracavernosal injections without significant improvement of erections.   Patient reports groin/inguinal pain while trying to initiate intercourse, located primarily at the area of his inguinal folds. Denies pelvic pain with bumpy card rides, bowel movements, ejaculation.     Patient's last A1C 10.5, 7/2020. Patient denies hx of  DVT, PE, MI, CVA.    Denies use of blood thinners.    ROS  Review of Systems "   Constitutional: Negative for activity change, appetite change, chills, diaphoresis, fatigue and fever.   HENT: Negative for congestion, rhinorrhea and sore throat.    Eyes: Negative for discharge and visual disturbance.   Respiratory: Negative for cough, chest tightness, shortness of breath and wheezing.    Cardiovascular: Negative for chest pain and leg swelling.   Gastrointestinal: Negative for abdominal distention, abdominal pain, blood in stool, constipation, diarrhea, nausea and vomiting.   Genitourinary: Positive for urgency. Negative for dysuria, flank pain, frequency, hematuria, penile swelling and scrotal swelling.   Musculoskeletal: Negative for back pain and gait problem.   Skin: Negative for color change, rash and wound.   Allergic/Immunologic: Negative for immunocompromised state.   Neurological: Negative for light-headedness and headaches.   Psychiatric/Behavioral: Negative for confusion. The patient is not nervous/anxious.          Past Medical History  Active Ambulatory Problems     Diagnosis Date Noted    Diabetes mellitus type 2 in obese 08/30/2015    Essential hypertension 08/30/2015    Brachial neuritis or radiculitis NOS 09/15/2015    Elevated LFTs 11/17/2015    Sleep apnea 11/17/2015    CKD (chronic kidney disease) stage 2, GFR 60-89 ml/min 11/17/2015    History of gout 11/17/2015    HLD (hyperlipidemia) 11/17/2015    GERD (gastroesophageal reflux disease) 11/17/2015    Edema 11/17/2015    AICD (automatic cardioverter/defibrillator) present 11/17/2015    Non-ischemic cardiomyopathy 11/19/2015    Restrictive lung disease 11/20/2015    Cervical disc disorder with radiculopathy 11/23/2015    Acute pancreatitis 08/05/2016    Acute kidney injury 08/06/2016    Inguinal lymphadenopathy 06/02/2017    Cellulitis of penis 07/06/2018     Resolved Ambulatory Problems     Diagnosis Date Noted    Abscess of right leg 09/30/2012    Pain in the chest 08/30/2015     Past Medical History:    Diagnosis Date    COPD (chronic obstructive pulmonary disease)     Coronary artery disease     Diabetes mellitus type II     DJD (degenerative joint disease)     Gout     Hypertension     Kidney stone     MI (myocardial infarction) 2010    Obesity     JOSE (obstructive sleep apnea)          Past Surgical History  Past Surgical History:   Procedure Laterality Date    CARDIAC DEFIBRILLATOR PLACEMENT      excisional biopsy left inguinal lymph node      FOOT SURGERY      right foot surgery     kidney stones      lithotripsy    Surgery for bulging disc at C[facility]         Social History  Relationships   Social connections    Talks on phone: Not on file    Gets together: Not on file    Attends Gnosticism service: Not on file    Active member of club or organization: Not on file    Attends meetings of clubs or organizations: Not on file    Relationship status: Not on file       Medications    Current Outpatient Medications:     albuterol (PROVENTIL/VENTOLIN HFA) 90 mcg/actuation inhaler, Inhale 2 puffs into the lungs every 4 (four) hours as needed for Wheezing or Shortness of Breath. Rescue, Disp: 1 Inhaler, Rfl: 0    allopurinol (ZYLOPRIM) 100 MG tablet, , Disp: , Rfl: 5    amiodarone (PACERONE) 400 MG tablet, Take 200 mg by mouth every morning. , Disp: , Rfl: 1    amlodipine (NORVASC) 10 MG tablet, Take 10 mg by mouth every morning. , Disp: , Rfl: 5    aspirin (ECOTRIN) 81 MG EC tablet, Take 81 mg by mouth., Disp: , Rfl:     blood sugar diagnostic Strp, ACCU CHEK KEVAN PLUS TEST STRIPS, Disp: , Rfl:     blood-glucose meter Misc, ACCU CHEK KEVAN PLUS METER: USE 4X/D, Disp: , Rfl:     brimonidine 0.2% (ALPHAGAN) 0.2 % Drop, Apply 1 drop to eye., Disp: , Rfl:     cetirizine (ZYRTEC) 10 MG tablet, Take 10 mg by mouth once daily., Disp: , Rfl:     ciclopirox (PENLAC) 8 % Soln, , Disp: , Rfl:     CRESTOR 20 mg tablet, Take 20 mg by mouth every evening. , Disp: , Rfl: 5    cyclobenzaprine (FLEXERIL)  10 MG tablet, Take 10 mg by mouth every evening. , Disp: , Rfl: 2    dicyclomine (BENTYL) 20 mg tablet, Take 20 mg by mouth 2 (two) times daily. , Disp: , Rfl: 2    DULoxetine (CYMBALTA) 60 MG capsule, Take 60 mg by mouth., Disp: , Rfl:     econazole nitrate 1 % cream, Apply to feet and groin bid, Disp: , Rfl:     fluticasone (FLONASE) 50 mcg/actuation nasal spray, 1 spray by Each Nare route once daily., Disp: , Rfl:     fluticasone propionate (FLONASE) 50 mcg/actuation nasal spray, 2 sprays (100 mcg total) by Each Nostril route once daily., Disp: 1 Bottle, Rfl: 0    furosemide (LASIX) 20 MG tablet, Take 20 mg by mouth daily as needed. Takes every other day, Disp: , Rfl: 5    gabapentin (NEURONTIN) 300 MG capsule, Take 300 mg by mouth every evening. Takes  Two 300 mg tabs at night, Disp: , Rfl:     ibuprofen (ADVIL,MOTRIN) 800 MG tablet, Take 1 tablet (800 mg total) by mouth every 8 (eight) hours as needed for Pain., Disp: 30 tablet, Rfl: 0    insulin glargine 100 units/mL (3mL) SubQ pen, Inject 30 Units into the skin., Disp: , Rfl:     insulin glulisine U-100 (APIDRA SOLOSTAR U-100 INSULIN) 100 unit/mL InPn pen, INJ 8 UNI SC TID B MEALS, Disp: , Rfl:     INVOKANA 300 mg Tab tablet, TK 1 T PO QD, Disp: , Rfl: 5    isosorbide mononitrate (IMDUR) 30 MG 24 hr tablet, Take 30 mg by mouth every morning. , Disp: , Rfl: 6    lancets Misc, ACCU CHEK SOFTCLIX LANCETS: USE 4X DAILY, Disp: , Rfl:     methazoLAMIDE (NEPTAZANE) 25 mg tablet, Take 25 mg by mouth., Disp: , Rfl:     metoprolol succinate (TOPROL-XL) 50 MG 24 hr tablet, Take 50 mg by mouth every evening. , Disp: , Rfl: 5    nitroGLYCERIN (NITROSTAT) 0.4 MG SL tablet, Place 0.4 mg under the tongue every 5 (five) minutes as needed for Chest pain., Disp: , Rfl:     NOVOLOG 100 unit/mL injection, USE  50 UNITS UNDER THE SKIN QD WITH VGO30, Disp: , Rfl: 6    omeprazole (PRILOSEC) 20 MG capsule, TK 1 C PO QD FOR CONTROL OF STOMACH ACID BEFORE BREAKFAST,  "Disp: , Rfl: 1    oxycodone-acetaminophen (PERCOCET)  mg per tablet, , Disp: , Rfl:     pen needle, diabetic (BD ULTRA-FINE SHORT PEN NEEDLE) 31 gauge x 5/16" Ndle, Inject 1 each into the skin., Disp: , Rfl:     potassium chloride SA (K-DUR,KLOR-CON) 20 MEQ tablet, , Disp: , Rfl: 5    prednisoLONE acetate (PRED FORTE) 1 % DrpS, INT 1 GTT INTO OD D FOR 10 DAYS, Disp: , Rfl:     predniSONE (DELTASONE) 20 MG tablet, Take 2 tablets daily for 5 days, Disp: 10 tablet, Rfl: 0    promethazine (PHENERGAN) 25 MG tablet, TK 1 T PO  Q 4 H PRN NV, Disp: , Rfl: 2    promethazine-dextromethorphan (PROMETHAZINE-DM) 6.25-15 mg/5 mL Syrp, Take 5 mLs by mouth every 4 to 6 hours as needed. 5 mL every 4 to 6 hours; maximum: 30 mL in 24 hours, Disp: 118 mL, Rfl: 0    sildenafiL (VIAGRA) 100 MG tablet, Take 100 mg by mouth., Disp: , Rfl:     sotaloL (BETAPACE) 80 MG tablet, TAKE 1 TABLET BY MOUTH TWICE DAILY, Disp: , Rfl:     spironolactone (ALDACTONE) 25 MG tablet, , Disp: , Rfl:     sulfamethoxazole-trimethoprim 800-160mg (BACTRIM DS) 800-160 mg Tab, , Disp: , Rfl:     sulfamethoxazole-trimethoprim 800-160mg (BACTRIM DS) 800-160 mg Tab, Take 1 tablet by mouth 2 (two) times daily. for 10 days, Disp: 20 tablet, Rfl: 0    timolol maleate 0.5% (TIMOPTIC) 0.5 % Drop, Apply 1 drop to eye., Disp: , Rfl:     VGO 30 Kathy, USE 1 CARTRIDGE D PLUS 4 CLICKS WITH EACH MEAL, Disp: , Rfl: 4    zolpidem (AMBIEN) 10 mg Tab, Take 5 mg by mouth nightly as needed., Disp: , Rfl:     tamsulosin (FLOMAX) 0.4 mg Cp24, Take 1 capsule (0.4 mg total) by mouth once daily., Disp: 30 capsule, Rfl: 2    Current Facility-Administered Medications:     lidocaine (PF) 10 mg/ml (1%) injection 10 mg, 1 mL, Intradermal, Once, Brenden Bains MD    Allergies  Review of patient's allergies indicates:   Allergen Reactions    Propoxyphene     Darvocet a500 [propoxyphene n-acetaminophen] Itching         Objective:      Physical Exam   Constitutional: He " is oriented to person, place, and time. He appears well-developed.  Non-toxic appearance. He does not appear ill. No distress.   HENT:   Head: Normocephalic and atraumatic.   Nose: No rhinorrhea or congestion.   Eyes: Conjunctivae are normal.   Neck: Normal range of motion.   Cardiovascular: Normal rate.    Pulmonary/Chest: Effort normal. No respiratory distress.   Abdominal: Soft. He exhibits no distension and no mass. There is no abdominal tenderness. There is no guarding.   Genitourinary:    Penis normal.   Right testis shows no mass, no swelling and no tenderness. Left testis shows no mass, no swelling and no tenderness. Circumcised.    Genitourinary Comments: YOLANDE  Prostatic fossa, no induration, no masses  Levator tenderness with palpation at 2oclock to 4oclock     Musculoskeletal: No swelling or deformity.   Neurological: He is alert and oriented to person, place, and time. Gait normal.   Skin: Skin is warm and dry. Capillary refill takes less than 2 seconds. No rash noted. He is not diaphoretic.     Psychiatric: His behavior is normal. Judgment normal.           Assessment:       1. Urge incontinence of urine    2. History of transurethral resection of prostate    3. Erectile dysfunction associated with type 2 diabetes mellitus    4. Retention of urine    5. Pelvic floor tension      Patient with history of resection of prostate noted to have urinary retention, elevated hemoglobin A1c of 10.9, ED, pelvic floor tenderness, erectile dysfunction  Plan:           Discussed with patient symptoms of urinary urge incontinence may be related to soda intake.  Discussed increased water intake minimize soda intake.     PVR > 456cc    With PVR demonstrating urinary retention discussed with patient need to intermittent catheterize, patient refused used to deposit discomfort.  Discussed with patient evaluation of his anatomy is important, scheduled diagnostic cystoscopy next Thursday since patient will not tolerate this  procedure in clinic.  At that time also we can evaluate his urethra, sphincter, prostatic fossa, bladder neck, and bladder.   Renal bladder ultrasound ordered due to presence of retention, patient's upper tracts are at risk.  Discussed with patient that uncontrolled diabetes may result in an acontractile bladder, and increase the patient's risk for urinary tract infection, renal disease    Discussed with patient that penile prosthesis surgery has increased risk of infection in patient with uncontrolled diabetes, aggressive control of diabetes lowering his A1c to < 8 will makes him a better surgical candidate, with better surgical outcomes and satisfaction. Discontinued viagra due to presence of nitroglyceride medication on his med list, combination can cause hypotension.    Due to pelvic floor tenderness I believe the patient will derive benefit from pelvic floor physical therapy, explained to patient that tenderness along the levator muscles may be alleviated with physical therapy and may improve his pelvic floor symptoms.

## 2020-09-23 NOTE — H&P (VIEW-ONLY)
" Patient ID: Se Virgil Mcgraw is a 55 y.o. male.  Referral: Varghese Anderson MD  Chief Complaint: Groin Pain (new pt/ started that he has been experiencing groin and testicular pain for a few years, the pain is off and on)    HPI  HPI  55-year-old  man with history of an enlarged prostate with lower urinary tract symptoms.  Patient  describes he underwent a procedure to "core out his prostate" because he was backed up."  Patient underwent the procedure 1 year ago at [facility].  Patient reports immediately after the procedure he experienced urinary urge incontinence that has waxed and waned up until 1 week ago when his urgency has worsened. He was prescribed bactrim for concern for possible UTI without improvement of his symptoms 9/15, urine culture with no growth of bacteria. Patient wears about 3 pull ups per day for incontinence. Denies incontinence with stress maneuvers- coughing, laughing, lifting heavy objects.  Patient denies saturating those pull-ups, endorses mild/ moderate saturation.  Patient denies history of urinary tract infections. IPSS score: [id number], mostly dissatisfied. He drinks 3 cans of soda throughout the day.  He drinks 6 bottles of water per day.  He denies constipation.  Patient takes Lasix for volume overload.      Patient denies history of gross hematuria.  Patient reports history of urolithiasis that required laser intervention in 2002.  He has been asymptomatic re: stones since.      Erectile dysfunction  Patient has tried Viagra in the past as well as, intracavernosal injections without significant improvement of erections.   Patient reports groin/inguinal pain while trying to initiate intercourse, located primarily at the area of his inguinal folds. Denies pelvic pain with bumpy card rides, bowel movements, ejaculation.     Patient's last A1C 10.5, 7/2020. Patient denies hx of  DVT, PE, MI, CVA.    Denies use of blood thinners.    ROS  Review of Systems "   Constitutional: Negative for activity change, appetite change, chills, diaphoresis, fatigue and fever.   HENT: Negative for congestion, rhinorrhea and sore throat.    Eyes: Negative for discharge and visual disturbance.   Respiratory: Negative for cough, chest tightness, shortness of breath and wheezing.    Cardiovascular: Negative for chest pain and leg swelling.   Gastrointestinal: Negative for abdominal distention, abdominal pain, blood in stool, constipation, diarrhea, nausea and vomiting.   Genitourinary: Positive for urgency. Negative for dysuria, flank pain, frequency, hematuria, penile swelling and scrotal swelling.   Musculoskeletal: Negative for back pain and gait problem.   Skin: Negative for color change, rash and wound.   Allergic/Immunologic: Negative for immunocompromised state.   Neurological: Negative for light-headedness and headaches.   Psychiatric/Behavioral: Negative for confusion. The patient is not nervous/anxious.          Past Medical History  Active Ambulatory Problems     Diagnosis Date Noted    Diabetes mellitus type 2 in obese 08/30/2015    Essential hypertension 08/30/2015    Brachial neuritis or radiculitis NOS 09/15/2015    Elevated LFTs 11/17/2015    Sleep apnea 11/17/2015    CKD (chronic kidney disease) stage 2, GFR 60-89 ml/min 11/17/2015    History of gout 11/17/2015    HLD (hyperlipidemia) 11/17/2015    GERD (gastroesophageal reflux disease) 11/17/2015    Edema 11/17/2015    AICD (automatic cardioverter/defibrillator) present 11/17/2015    Non-ischemic cardiomyopathy 11/19/2015    Restrictive lung disease 11/20/2015    Cervical disc disorder with radiculopathy 11/23/2015    Acute pancreatitis 08/05/2016    Acute kidney injury 08/06/2016    Inguinal lymphadenopathy 06/02/2017    Cellulitis of penis 07/06/2018     Resolved Ambulatory Problems     Diagnosis Date Noted    Abscess of right leg 09/30/2012    Pain in the chest 08/30/2015     Past Medical History:    Diagnosis Date    COPD (chronic obstructive pulmonary disease)     Coronary artery disease     Diabetes mellitus type II     DJD (degenerative joint disease)     Gout     Hypertension     Kidney stone     MI (myocardial infarction) 2010    Obesity     JOSE (obstructive sleep apnea)          Past Surgical History  Past Surgical History:   Procedure Laterality Date    CARDIAC DEFIBRILLATOR PLACEMENT      excisional biopsy left inguinal lymph node      FOOT SURGERY      right foot surgery     kidney stones      lithotripsy    Surgery for bulging disc at C7         Social History  Relationships   Social connections    Talks on phone: Not on file    Gets together: Not on file    Attends [employer] service: Not on file    Active member of club or organization: Not on file    Attends meetings of clubs or organizations: Not on file    Relationship status: Not on file       Medications    Current Outpatient Medications:     albuterol (PROVENTIL/VENTOLIN HFA) 90 mcg/actuation inhaler, Inhale 2 puffs into the lungs every 4 (four) hours as needed for Wheezing or Shortness of Breath. Rescue, Disp: 1 Inhaler, Rfl: 0    allopurinol (ZYLOPRIM) 100 MG tablet, , Disp: , Rfl: 5    amiodarone (PACERONE) 400 MG tablet, Take 200 mg by mouth every morning. , Disp: , Rfl: 1    amlodipine (NORVASC) 10 MG tablet, Take 10 mg by mouth every morning. , Disp: , Rfl: 5    aspirin (ECOTRIN) 81 MG EC tablet, Take 81 mg by mouth., Disp: , Rfl:     blood sugar diagnostic Strp, ACCU CHEK KEVAN PLUS TEST STRIPS, Disp: , Rfl:     blood-glucose meter Misc, ACCU CHEK KEVAN PLUS METER: USE 4X/D, Disp: , Rfl:     brimonidine 0.2% (ALPHAGAN) 0.2 % Drop, Apply 1 drop to eye., Disp: , Rfl:     cetirizine (ZYRTEC) 10 MG tablet, Take 10 mg by mouth once daily., Disp: , Rfl:     ciclopirox (PENLAC) 8 % Soln, , Disp: , Rfl:     CRESTOR 20 mg tablet, Take 20 mg by mouth every evening. , Disp: , Rfl: 5    cyclobenzaprine (FLEXERIL)  10 MG tablet, Take 10 mg by mouth every evening. , Disp: , Rfl: 2    dicyclomine (BENTYL) 20 mg tablet, Take 20 mg by mouth 2 (two) times daily. , Disp: , Rfl: 2    DULoxetine (CYMBALTA) 60 MG capsule, Take 60 mg by mouth., Disp: , Rfl:     econazole nitrate 1 % cream, Apply to feet and groin bid, Disp: , Rfl:     fluticasone (FLONASE) 50 mcg/actuation nasal spray, 1 spray by Each Nare route once daily., Disp: , Rfl:     fluticasone propionate (FLONASE) 50 mcg/actuation nasal spray, 2 sprays (100 mcg total) by Each Nostril route once daily., Disp: 1 Bottle, Rfl: 0    furosemide (LASIX) 20 MG tablet, Take 20 mg by mouth daily as needed. Takes every other day, Disp: , Rfl: 5    gabapentin (NEURONTIN) 300 MG capsule, Take 300 mg by mouth every evening. Takes  Two 300 mg tabs at night, Disp: , Rfl:     ibuprofen (ADVIL,MOTRIN) 800 MG tablet, Take 1 tablet (800 mg total) by mouth every 8 (eight) hours as needed for Pain., Disp: 30 tablet, Rfl: 0    insulin glargine 100 units/mL (3mL) SubQ pen, Inject 30 Units into the skin., Disp: , Rfl:     insulin glulisine U-100 (APIDRA SOLOSTAR U-100 INSULIN) 100 unit/mL InPn pen, INJ 8 UNI SC TID B MEALS, Disp: , Rfl:     INVOKANA 300 mg Tab tablet, TK 1 T PO QD, Disp: , Rfl: 5    isosorbide mononitrate (IMDUR) 30 MG 24 hr tablet, Take 30 mg by mouth every morning. , Disp: , Rfl: 6    lancets Misc, ACCU CHEK SOFTCLIX LANCETS: USE 4X DAILY, Disp: , Rfl:     methazoLAMIDE (NEPTAZANE) 25 mg tablet, Take 25 mg by mouth., Disp: , Rfl:     metoprolol succinate (TOPROL-XL) 50 MG 24 hr tablet, Take 50 mg by mouth every evening. , Disp: , Rfl: 5    nitroGLYCERIN (NITROSTAT) 0.4 MG SL tablet, Place 0.4 mg under the tongue every 5 (five) minutes as needed for Chest pain., Disp: , Rfl:     NOVOLOG 100 unit/mL injection, USE  50 UNITS UNDER THE SKIN QD WITH VGO30, Disp: , Rfl: 6    omeprazole (PRILOSEC) 20 MG capsule, TK 1 C PO QD FOR CONTROL OF STOMACH ACID BEFORE BREAKFAST,  "Disp: , Rfl: 1    oxycodone-acetaminophen (PERCOCET)  mg per tablet, , Disp: , Rfl:     pen needle, diabetic (BD ULTRA-FINE SHORT PEN NEEDLE) 31 gauge x 5/16" Ndle, Inject 1 each into the skin., Disp: , Rfl:     potassium chloride SA (K-DUR,KLOR-CON) 20 MEQ tablet, , Disp: , Rfl: 5    prednisoLONE acetate (PRED FORTE) 1 % DrpS, INT 1 GTT INTO OD D FOR 10 DAYS, Disp: , Rfl:     predniSONE (DELTASONE) 20 MG tablet, Take 2 tablets daily for 5 days, Disp: 10 tablet, Rfl: 0    promethazine (PHENERGAN) 25 MG tablet, TK 1 T PO  Q 4 H PRN NV, Disp: , Rfl: 2    promethazine-dextromethorphan (PROMETHAZINE-DM) 6.25-15 mg/5 mL Syrp, Take 5 mLs by mouth every 4 to 6 hours as needed. 5 mL every 4 to 6 hours; maximum: 30 mL in 24 hours, Disp: 118 mL, Rfl: 0    sildenafiL (VIAGRA) 100 MG tablet, Take 100 mg by mouth., Disp: , Rfl:     sotaloL (BETAPACE) 80 MG tablet, TAKE 1 TABLET BY MOUTH TWICE DAILY, Disp: , Rfl:     spironolactone (ALDACTONE) 25 MG tablet, , Disp: , Rfl:     sulfamethoxazole-trimethoprim 800-160mg (BACTRIM DS) 800-160 mg Tab, , Disp: , Rfl:     sulfamethoxazole-trimethoprim 800-160mg (BACTRIM DS) 800-160 mg Tab, Take 1 tablet by mouth 2 (two) times daily. for 10 days, Disp: 20 tablet, Rfl: 0    timolol maleate 0.5% (TIMOPTIC) 0.5 % Drop, Apply 1 drop to eye., Disp: , Rfl:     VGO 30 Kathy, USE 1 CARTRIDGE D PLUS 4 CLICKS WITH EACH MEAL, Disp: , Rfl: 4    zolpidem (AMBIEN) 10 mg Tab, Take 5 mg by mouth nightly as needed., Disp: , Rfl:     tamsulosin (FLOMAX) 0.4 mg Cp24, Take 1 capsule (0.4 mg total) by mouth once daily., Disp: 30 capsule, Rfl: 2    Current Facility-Administered Medications:     lidocaine (PF) 10 mg/ml (1%) injection 10 mg, 1 mL, Intradermal, Once, Brenden Bains MD    Allergies  Review of patient's allergies indicates:   Allergen Reactions    Propoxyphene     Darvocet a500 [propoxyphene n-acetaminophen] Itching         Objective:      Physical Exam   Constitutional: He " is oriented to person, place, and time. He appears well-developed.  Non-toxic appearance. He does not appear ill. No distress.   HENT:   Head: Normocephalic and atraumatic.   Nose: No rhinorrhea or congestion.   Eyes: Conjunctivae are normal.   Neck: Normal range of motion.   Cardiovascular: Normal rate.    Pulmonary/Chest: Effort normal. No respiratory distress.   Abdominal: Soft. He exhibits no distension and no mass. There is no abdominal tenderness. There is no guarding.   Genitourinary:    Penis normal.   Right testis shows no mass, no swelling and no tenderness. Left testis shows no mass, no swelling and no tenderness. Circumcised.    Genitourinary Comments: YOLANDE  Prostatic fossa, no induration, no masses  Levator tenderness with palpation at 2oclock to 4oclock     Musculoskeletal: No swelling or deformity.   Neurological: He is alert and oriented to person, place, and time. Gait normal.   Skin: Skin is warm and dry. Capillary refill takes less than 2 seconds. No rash noted. He is not diaphoretic.     Psychiatric: His behavior is normal. Judgment normal.           Assessment:       1. Urge incontinence of urine    2. History of transurethral resection of prostate    3. Erectile dysfunction associated with type 2 diabetes mellitus    4. Retention of urine    5. Pelvic floor tension      Patient with history of resection of prostate noted to have urinary retention, elevated hemoglobin A1c of 10.9, ED, pelvic floor tenderness, erectile dysfunction  Plan:           Discussed with patient symptoms of urinary urge incontinence may be related to soda intake.  Discussed increased water intake minimize soda intake.     PVR > 456cc    With PVR demonstrating urinary retention discussed with patient need to intermittent catheterize, patient refused used to deposit discomfort.  Discussed with patient evaluation of his anatomy is important, scheduled diagnostic cystoscopy next Thursday since patient will not tolerate this  procedure in clinic.  At that time also we can evaluate his urethra, sphincter, prostatic fossa, bladder neck, and bladder.   Renal bladder ultrasound ordered due to presence of retention, patient's upper tracts are at risk.  Discussed with patient that uncontrolled diabetes may result in an acontractile bladder, and increase the patient's risk for urinary tract infection, renal disease    Discussed with patient that penile prosthesis surgery has increased risk of infection in patient with uncontrolled diabetes, aggressive control of diabetes lowering his A1c to < 8 will makes him a better surgical candidate, with better surgical outcomes and satisfaction. Discontinued viagra due to presence of nitroglyceride medication on his med list, combination can cause hypotension.    Due to pelvic floor tenderness I believe the patient will derive benefit from pelvic floor physical therapy, explained to patient that tenderness along the levator muscles may be alleviated with physical therapy and may improve his pelvic floor symptoms.

## 2020-09-23 NOTE — TELEPHONE ENCOUNTER
----- Message from Varghese Anderson PA-C sent at 9/20/2020  9:05 AM CDT -----  Please call the patient regarding neg ucx result.

## 2020-09-28 ENCOUNTER — TELEPHONE (OUTPATIENT)
Dept: UROLOGY | Facility: CLINIC | Age: 55
End: 2020-09-28

## 2020-09-28 ENCOUNTER — ANESTHESIA EVENT (OUTPATIENT)
Dept: SURGERY | Facility: HOSPITAL | Age: 55
End: 2020-09-28
Payer: MEDICARE

## 2020-09-30 ENCOUNTER — HOSPITAL ENCOUNTER (OUTPATIENT)
Dept: PREADMISSION TESTING | Facility: HOSPITAL | Age: 55
Discharge: HOME | End: 2020-09-30
Attending: STUDENT IN AN ORGANIZED HEALTH CARE EDUCATION/TRAINING PROGRAM
Payer: MEDICARE

## 2020-09-30 VITALS
HEART RATE: 99 BPM | WEIGHT: 210.75 LBS | OXYGEN SATURATION: 98 % | DIASTOLIC BLOOD PRESSURE: 90 MMHG | RESPIRATION RATE: 18 BRPM | HEIGHT: 67 IN | SYSTOLIC BLOOD PRESSURE: 135 MMHG | BODY MASS INDEX: 33.08 KG/M2 | TEMPERATURE: 97 F

## 2020-09-30 DIAGNOSIS — R39.9 LOWER URINARY TRACT SYMPTOMS (LUTS): Primary | ICD-10-CM

## 2020-09-30 DIAGNOSIS — Z01.818 PRE-OP TESTING: Primary | ICD-10-CM

## 2020-09-30 LAB
ANION GAP SERPL CALC-SCNC: 9 MMOL/L (ref 8–16)
BASOPHILS # BLD AUTO: 0.04 K/UL (ref 0–0.2)
BASOPHILS NFR BLD: 0.6 % (ref 0–1.9)
BUN SERPL-MCNC: 26 MG/DL (ref 6–20)
CALCIUM SERPL-MCNC: 10.1 MG/DL (ref 8.7–10.5)
CHLORIDE SERPL-SCNC: 96 MMOL/L (ref 95–110)
CO2 SERPL-SCNC: 29 MMOL/L (ref 23–29)
CREAT SERPL-MCNC: 1.6 MG/DL (ref 0.5–1.4)
DIFFERENTIAL METHOD: ABNORMAL
EOSINOPHIL # BLD AUTO: 0.1 K/UL (ref 0–0.5)
EOSINOPHIL NFR BLD: 1.5 % (ref 0–8)
ERYTHROCYTE [DISTWIDTH] IN BLOOD BY AUTOMATED COUNT: 11.9 % (ref 11.5–14.5)
EST. GFR  (AFRICAN AMERICAN): 55 ML/MIN/1.73 M^2
EST. GFR  (NON AFRICAN AMERICAN): 47 ML/MIN/1.73 M^2
GLUCOSE SERPL-MCNC: 400 MG/DL (ref 70–110)
HCT VFR BLD AUTO: 40 % (ref 40–54)
HGB BLD-MCNC: 13.7 G/DL (ref 14–18)
IMM GRANULOCYTES # BLD AUTO: 0.04 K/UL (ref 0–0.04)
IMM GRANULOCYTES NFR BLD AUTO: 0.6 % (ref 0–0.5)
LYMPHOCYTES # BLD AUTO: 2.4 K/UL (ref 1–4.8)
LYMPHOCYTES NFR BLD: 33.3 % (ref 18–48)
MCH RBC QN AUTO: 32.5 PG (ref 27–31)
MCHC RBC AUTO-ENTMCNC: 34.3 G/DL (ref 32–36)
MCV RBC AUTO: 95 FL (ref 82–98)
MONOCYTES # BLD AUTO: 0.6 K/UL (ref 0.3–1)
MONOCYTES NFR BLD: 7.7 % (ref 4–15)
NEUTROPHILS # BLD AUTO: 4 K/UL (ref 1.8–7.7)
NEUTROPHILS NFR BLD: 56.3 % (ref 38–73)
NRBC BLD-RTO: 0 /100 WBC
PLATELET # BLD AUTO: 221 K/UL (ref 150–350)
PMV BLD AUTO: 11.8 FL (ref 9.2–12.9)
POTASSIUM SERPL-SCNC: 4.6 MMOL/L (ref 3.5–5.1)
RBC # BLD AUTO: 4.21 M/UL (ref 4.6–6.2)
SARS-COV-2 RDRP RESP QL NAA+PROBE: NEGATIVE
SODIUM SERPL-SCNC: 134 MMOL/L (ref 136–145)
WBC # BLD AUTO: 7.15 K/UL (ref 3.9–12.7)

## 2020-09-30 PROCEDURE — 85025 COMPLETE CBC W/AUTO DIFF WBC: CPT

## 2020-09-30 PROCEDURE — 80048 BASIC METABOLIC PNL TOTAL CA: CPT

## 2020-09-30 PROCEDURE — U0002 COVID-19 LAB TEST NON-CDC: HCPCS

## 2020-09-30 NOTE — DISCHARGE INSTRUCTIONS
Your surgery is scheduled for _Thursday Oct. 1, 2020 10:00am__.        Please report to SAME DAY SURGERY UNIT on the 2nd FLOOR at _10:00______ a.m.  Use front door entrance. The doors open at 0530 am.          INSTRUCTIONS IMPORTANT!!!  ¨ Do not eat or drink after 12 midnight-including water. OK to brush teeth, no   gum, candy or mints!    ¨ Take only these medicines with a small swallow of water-morning of surgery.  Take med's checked on MED LIST        __x__  Prep instructions:    SHOWER     __x__  Please shower using Hibiclens soap the night before AND  the morning of  your surgery/procedure. Do not use Hibiclens on your face or genitals               If your surgery is around your belly button (Navel) be sure to wash inside your belly button also. Rinse hibiclens off completely.  __x__  No shaving of procedural area at least 4-5 days before surgery due to   increased risk of skin irritation and/or possible infection.    _x___  No powder, lotions or creams to your body.  _x___  You may wear only deodorant on the day of surgery.  _x___  Please remove all jewelry, including piercings and leave at home.  _x___  No money or valuables needed. Please leave at home.  You may bring your cell phone.  ____  Please bring any documents given by your doctor.  __x__  If going home the same day, arrange for a ride home. You will not be able to drive if Anesthesia was used.  __x__  Wear loose fitting clothing. Allow for dressings, bandages.  __x__  Stop Aspirin, Ibuprofen, Motrin and Aleve at least 3-5 days before  surgery, unless otherwise instructed by your doctor, or the nurse.        x      You MAY use Tylenol/acetaminophen until day of surgery.  __x__  If you take diabetic medication, do not take am of surgery     __x__  Call MD for temperature above 101 degrees.        __x__ Stop taking any Fish Oil supplement or any Vitamins that contain Vitamin  E at least 5 days prior to surgery.          I have read or had read and  explained to me, and understand the above information.  Additional comments or instructions:Please call   [phone] if you have any questions regarding the instructions above.

## 2020-09-30 NOTE — ANESTHESIA PREPROCEDURE EVALUATION
09/30/2020  Se Virgil Mcgraw is a 55 y.o., male scheduled for CYSTOSCOPY on 10/1/2020.        [employer] AICD  Implant date 11/2/2012  Serial # [id number]    Model # [id number]  Implant date 11/2/2012  Serial # [id number]    Model # 8336I15    [employer]'s recommendation for an AICD during a procedure is EKG monitoring. If cautery is used then a magnet is recommended.        Past Medical History:   Diagnosis Date    COPD (chronic obstructive pulmonary disease)     Coronary artery disease     Diabetes mellitus type II     DJD (degenerative joint disease)     Gout     Hypertension     Kidney stone     MI (myocardial infarction) 2010    Obesity     JOSE (obstructive sleep apnea)      Past Surgical History:   Procedure Laterality Date    CARDIAC DEFIBRILLATOR PLACEMENT      excisional biopsy left inguinal lymph node      FOOT SURGERY      right foot surgery     kidney stones      lithotripsy    Surgery for bulging disc at C7         TTE 9/2/2020 (Care Everywhere):  CONCLUSIONS     Mild left ventricular hypertrophy.     Mildly decreased left ventricular systolic function.     Calculated left ventricular ejection fraction by 2D tracking is 40 %.     Grade I diastolic dysfunction (abnormal relaxation filling pattern, LVH), normal to mildly elevated filling pressures.     Catheter/pacemaker wire visualized in the right ventricle.     Normal right ventricular systolic function.     Mild mitral valve regurgitation.     Mild prolapse of the anterior mitral valve leaflet.       Anesthesia Evaluation    I have reviewed the Patient Summary Reports.    I have reviewed the Nursing Notes. I have reviewed the NPO Status.   I have reviewed the Medications.     Review of Systems  Anesthesia Hx:  No problems with previous Anesthesia  Denies Family Hx of Anesthesia complications.   Denies Personal Hx of Anesthesia  complications.   Social:  Non-Smoker    Hematology/Oncology:  Hematology Normal   Oncology Normal     EENT/Dental:EENT/Dental Normal   Cardiovascular:   Hypertension Past MI (x2) CAD    Functional Capacity good / => 4 METS    Pulmonary:   COPD Sleep Apnea, CPAP    Hepatic/GI:   GERD    Musculoskeletal:  Musculoskeletal Normal    Neurological:  Neurology Normal    Endocrine:   Diabetes, type 2    Dermatological:  Skin Normal    Psych:  Psychiatric Normal           Physical Exam  General:  Well nourished    Airway/Jaw/Neck:  Airway Findings: Mouth Opening: Normal General Airway Assessment: Adult  Mallampati: II  TM Distance: Normal, at least 6 cm         Dental:  DENTAL FINDINGS: Normal   Chest/Lungs:  Chest/Lungs Clear    Heart/Vascular:  Heart Findings: Normal Heart murmur: negative       Mental Status:  Mental Status Findings:  Cooperative, Alert and Oriented         Anesthesia Plan  Type of Anesthesia, risks & benefits discussed:  Anesthesia Type:  general  Patient's Preference:   Intra-op Monitoring Plan: standard ASA monitors  Intra-op Monitoring Plan Comments:   Post Op Pain Control Plan:   Post Op Pain Control Plan Comments:   Induction:   IV  Beta Blocker:  Patient is not currently on a Beta-Blocker (No further documentation required).       Informed Consent: Patient understands risks and agrees with Anesthesia plan.  Questions answered. Anesthesia consent signed with patient.  ASA Score: 3     Day of Surgery Review of History & Physical:        Anesthesia Plan Notes: He has a hx of nonsustained V-tach which is managed with sotolol, AICD in situ with most recent interrogation on 8/10/2020 revealed normal device function with 1703 short nonsustained episodes ov V-tach all less than 4 seconds requiring no ICD therapy and 9 SVT episodes. He denies any palpitations, dizziness, presyncope or syncope.  Patient had follow up with Cardiology and has been cleared from a cardiac standpoint with a mild to moderate  increase in risk for cystoscopy. Current EKG is uploaded in media.        Ready For Surgery From Anesthesia Perspective.

## 2020-10-01 ENCOUNTER — ANESTHESIA (OUTPATIENT)
Dept: SURGERY | Facility: HOSPITAL | Age: 55
End: 2020-10-01
Payer: MEDICARE

## 2020-10-01 ENCOUNTER — HOSPITAL ENCOUNTER (OUTPATIENT)
Facility: HOSPITAL | Age: 55
Discharge: HOME | End: 2020-10-01
Attending: STUDENT IN AN ORGANIZED HEALTH CARE EDUCATION/TRAINING PROGRAM | Admitting: STUDENT IN AN ORGANIZED HEALTH CARE EDUCATION/TRAINING PROGRAM
Payer: MEDICARE

## 2020-10-01 VITALS
HEIGHT: 67 IN | SYSTOLIC BLOOD PRESSURE: 134 MMHG | WEIGHT: 210 LBS | RESPIRATION RATE: 17 BRPM | DIASTOLIC BLOOD PRESSURE: 82 MMHG | TEMPERATURE: 98 F | HEART RATE: 74 BPM | BODY MASS INDEX: 32.96 KG/M2 | OXYGEN SATURATION: 100 %

## 2020-10-01 DIAGNOSIS — E11.69 DIABETES MELLITUS TYPE 2 IN OBESE: ICD-10-CM

## 2020-10-01 DIAGNOSIS — E66.9 DIABETES MELLITUS TYPE 2 IN OBESE: ICD-10-CM

## 2020-10-01 DIAGNOSIS — Z01.818 PRE-OP TESTING: ICD-10-CM

## 2020-10-01 DIAGNOSIS — R39.9 LOWER URINARY TRACT SYMPTOMS (LUTS): Primary | ICD-10-CM

## 2020-10-01 LAB
POCT GLUCOSE: 218 MG/DL (ref 70–110)
POCT GLUCOSE: 328 MG/DL (ref 70–110)

## 2020-10-01 PROCEDURE — 52000 CYSTOURETHROSCOPY: CPT | Mod: ,,, | Performed by: STUDENT IN AN ORGANIZED HEALTH CARE EDUCATION/TRAINING PROGRAM

## 2020-10-01 PROCEDURE — 36000707: Performed by: STUDENT IN AN ORGANIZED HEALTH CARE EDUCATION/TRAINING PROGRAM

## 2020-10-01 PROCEDURE — 36000706: Performed by: STUDENT IN AN ORGANIZED HEALTH CARE EDUCATION/TRAINING PROGRAM

## 2020-10-01 PROCEDURE — D9220A PRA ANESTHESIA: ICD-10-PCS | Mod: CRNA,,, | Performed by: NURSE ANESTHETIST, CERTIFIED REGISTERED

## 2020-10-01 PROCEDURE — 63600175 PHARM REV CODE 636 W HCPCS: Performed by: ANESTHESIOLOGY

## 2020-10-01 PROCEDURE — 37000008 HC ANESTHESIA 1ST 15 MINUTES: Performed by: STUDENT IN AN ORGANIZED HEALTH CARE EDUCATION/TRAINING PROGRAM

## 2020-10-01 PROCEDURE — 82962 GLUCOSE BLOOD TEST: CPT | Performed by: STUDENT IN AN ORGANIZED HEALTH CARE EDUCATION/TRAINING PROGRAM

## 2020-10-01 PROCEDURE — 63600175 PHARM REV CODE 636 W HCPCS: Performed by: NURSE ANESTHETIST, CERTIFIED REGISTERED

## 2020-10-01 PROCEDURE — 71000015 HC POSTOP RECOV 1ST HR: Performed by: STUDENT IN AN ORGANIZED HEALTH CARE EDUCATION/TRAINING PROGRAM

## 2020-10-01 PROCEDURE — 25000003 PHARM REV CODE 250: Performed by: STUDENT IN AN ORGANIZED HEALTH CARE EDUCATION/TRAINING PROGRAM

## 2020-10-01 PROCEDURE — D9220A PRA ANESTHESIA: Mod: CRNA,,, | Performed by: NURSE ANESTHETIST, CERTIFIED REGISTERED

## 2020-10-01 PROCEDURE — 37000009 HC ANESTHESIA EA ADD 15 MINS: Performed by: STUDENT IN AN ORGANIZED HEALTH CARE EDUCATION/TRAINING PROGRAM

## 2020-10-01 PROCEDURE — 52000 PR CYSTOURETHROSCOPY: ICD-10-PCS | Mod: ,,, | Performed by: STUDENT IN AN ORGANIZED HEALTH CARE EDUCATION/TRAINING PROGRAM

## 2020-10-01 PROCEDURE — 71000016 HC POSTOP RECOV ADDL HR: Performed by: STUDENT IN AN ORGANIZED HEALTH CARE EDUCATION/TRAINING PROGRAM

## 2020-10-01 PROCEDURE — D9220A PRA ANESTHESIA: ICD-10-PCS | Mod: ANES,,, | Performed by: ANESTHESIOLOGY

## 2020-10-01 PROCEDURE — D9220A PRA ANESTHESIA: Mod: ANES,,, | Performed by: ANESTHESIOLOGY

## 2020-10-01 PROCEDURE — 63600175 PHARM REV CODE 636 W HCPCS: Performed by: STUDENT IN AN ORGANIZED HEALTH CARE EDUCATION/TRAINING PROGRAM

## 2020-10-01 RX ORDER — ONDANSETRON 4 MG/1
8 TABLET, ORALLY DISINTEGRATING ORAL EVERY 8 HOURS PRN
Status: DISCONTINUED | OUTPATIENT
Start: 2020-10-01 | End: 2020-10-01 | Stop reason: HOSPADM

## 2020-10-01 RX ORDER — LIDOCAINE HYDROCHLORIDE 20 MG/ML
JELLY TOPICAL
Status: DISCONTINUED | OUTPATIENT
Start: 2020-10-01 | End: 2020-10-01 | Stop reason: HOSPADM

## 2020-10-01 RX ORDER — MIDAZOLAM HYDROCHLORIDE 1 MG/ML
INJECTION, SOLUTION INTRAMUSCULAR; INTRAVENOUS
Status: DISCONTINUED | OUTPATIENT
Start: 2020-10-01 | End: 2020-10-01

## 2020-10-01 RX ORDER — LIDOCAINE HYDROCHLORIDE 20 MG/ML
INJECTION INTRAVENOUS
Status: DISCONTINUED | OUTPATIENT
Start: 2020-10-01 | End: 2020-10-01

## 2020-10-01 RX ORDER — SODIUM CHLORIDE 0.9 G/100ML
IRRIGANT IRRIGATION
Status: DISCONTINUED | OUTPATIENT
Start: 2020-10-01 | End: 2020-10-01 | Stop reason: HOSPADM

## 2020-10-01 RX ORDER — FENTANYL CITRATE 50 UG/ML
INJECTION, SOLUTION INTRAMUSCULAR; INTRAVENOUS
Status: DISCONTINUED | OUTPATIENT
Start: 2020-10-01 | End: 2020-10-01

## 2020-10-01 RX ORDER — CEFAZOLIN SODIUM 2 G/50ML
2 SOLUTION INTRAVENOUS
Status: DISCONTINUED | OUTPATIENT
Start: 2020-10-01 | End: 2020-10-01 | Stop reason: HOSPADM

## 2020-10-01 RX ORDER — PROPOFOL 10 MG/ML
VIAL (ML) INTRAVENOUS
Status: DISCONTINUED | OUTPATIENT
Start: 2020-10-01 | End: 2020-10-01

## 2020-10-01 RX ORDER — LIDOCAINE HYDROCHLORIDE 10 MG/ML
1 INJECTION, SOLUTION EPIDURAL; INFILTRATION; INTRACAUDAL; PERINEURAL ONCE
Status: DISCONTINUED | OUTPATIENT
Start: 2020-10-01 | End: 2020-10-01 | Stop reason: HOSPADM

## 2020-10-01 RX ORDER — SODIUM CHLORIDE, SODIUM LACTATE, POTASSIUM CHLORIDE, CALCIUM CHLORIDE 600; 310; 30; 20 MG/100ML; MG/100ML; MG/100ML; MG/100ML
INJECTION, SOLUTION INTRAVENOUS CONTINUOUS
Status: DISCONTINUED | OUTPATIENT
Start: 2020-10-01 | End: 2020-10-01 | Stop reason: HOSPADM

## 2020-10-01 RX ADMIN — Medication 100 MG: at 12:10

## 2020-10-01 RX ADMIN — PROPOFOL 20 MG: 10 INJECTION, EMULSION INTRAVENOUS at 12:10

## 2020-10-01 RX ADMIN — INSULIN HUMAN 10 UNITS: 100 INJECTION, SOLUTION PARENTERAL at 11:10

## 2020-10-01 RX ADMIN — PROPOFOL 30 MG: 10 INJECTION, EMULSION INTRAVENOUS at 12:10

## 2020-10-01 RX ADMIN — CEFAZOLIN SODIUM 2 G: 2 SOLUTION INTRAVENOUS at 12:10

## 2020-10-01 RX ADMIN — SODIUM CHLORIDE, SODIUM LACTATE, POTASSIUM CHLORIDE, AND CALCIUM CHLORIDE: .6; .31; .03; .02 INJECTION, SOLUTION INTRAVENOUS at 10:10

## 2020-10-01 RX ADMIN — FENTANYL CITRATE 50 MCG: 50 INJECTION INTRAMUSCULAR; INTRAVENOUS at 12:10

## 2020-10-01 RX ADMIN — FENTANYL CITRATE 25 MCG: 50 INJECTION INTRAMUSCULAR; INTRAVENOUS at 12:10

## 2020-10-01 RX ADMIN — MIDAZOLAM HYDROCHLORIDE 2 MG: 1 INJECTION, SOLUTION INTRAMUSCULAR; INTRAVENOUS at 12:10

## 2020-10-01 NOTE — OP NOTE
Urology Operative Note    10/1/2020     Procedure: Anesthetic diagnostic cystoscopy    Pre Procedure Diagnosis: Benign prostatic hyperplasia with lower urinary tract symptoms, history of transurethral resection of prostate    Post Procedure Diagnosis: Same    Anesthesia: 10 cc 2% lidocaine jelly inserted into the urethra and MAC    FINDINGS: Regrowth of prostatic tissue    Specimen:  none    Procedure details:  After informed consent was obtained the patient was taken to the cystoscopy suite and placed in supine position.  The genitalia was prepped and draped  in the usual sterile fashion.  Patient was positioned in the dorsal lithotomy position. After time out was performed procedure commenced.  A 22 fr cystourethroscope was placed into the urethra and passed into the bladder visualizing the urethra along its entire course. There was no evidence of anterior or posterior urethral stricture. At the level of the prostatic fossa there was asymmetric hypertrophy of benign prostatic tissue. The verumontanum was visualized.  The dome, anterior, posterior and lateral walls of the bladder were examined systematically with a 30 and 70 degree lens.  The ureteral orifices were in their usual position, albeit  close to the bladder neck, the ureters were noted to be effluxing clear yellow urine.   Pictures we taken for patient benefit. At the end of the case 10 cc 2% lidocaine jelly inserted into the urethra    Patient's anesthesia reversed and he was taken to the recovery room post operatively    Complications: None  Blood Loss: < 2 cc

## 2020-10-01 NOTE — DISCHARGE INSTRUCTIONS
ACTIVITY LEVEL: If you have received sedation or an anesthetic, you may feel sleepy for several hours. Rest until you are more awake. Gradually resume your normal activities.       DIET: You may resume your home diet. If nausea is present, increase your diet gradually with fluids and bland foods.      Medications: Pain medication should be taken only if needed and as directed. If antibiotics are prescribed, the medication should be taken until completed. You will be given an updated list of you medications.  ? No driving, alcoholic beverages or signing legal documents for next 24 hours or while taking pain medication        CALL THE DOCTOR:       · Fever over 101°F  · Severe pain that doesnt go away with medication.  · Upset stomach and vomiting that is persistent.  · Problems urinating-unable to urinate or heavy bleeding (with or without clots)        Cystoscopy    Cystoscopy is a procedure that lets your doctor look directly inside your urethra and bladder. It can be used to:  · Help diagnose a problem with your urethra, bladder, or kidneys.  · Take a sample (biopsy) of bladder or urethral tissue.  · Treat certain problems (such as removing kidney stones).  · Place a stent to bypass an obstruction.  · Take special X-rays of the kidneys.  Based on the findings, your doctor may recommend other tests or treatments.  What is a cystoscope?  A cystoscope is a telescope-like instrument that contains lenses and fiberoptics (small glass wires that make bright light). The cystoscope may be straight and rigid, or flexible to bend around curves in the urethra. The doctor may look directly into the cystoscope, or project the image onto a monitor.  Getting ready  · Ask your doctor if you should stop taking any medicines before the procedure.  · Ask whether you should avoid eating or drinking anything after midnight before the procedure.  · Follow any other instructions your doctor gives you.  Tell your doctor before the exam  if you:  · Take any medicines, such as aspirin or blood thinners  · Have allergies to any medicines  · Are pregnant   The procedure  Cystoscopy is done in the doctors office, surgery center, or hospital. The doctor and a nurse are present during the procedure. It takes only a few minutes, longer if a biopsy, X-ray, or treatment needs to be done.  During the procedure:  · You lie on an exam table on your back, knees bent and legs apart. You are covered with a drape.  · Your urethra and the area around it are washed. Anesthetic jelly may be applied to numb the urethra. Other pain medicine is usually not needed. In some cases, you may be offered a mild sedative to help you relax. If a more extensive procedure is to be done, such as a biopsy or kidney stone removal, general anesthesia may be needed.  · The cystoscope is inserted. A sterile fluid is put into the bladder to expand it. You may feel pressure from this fluid.  · When the procedure is done, the cystoscope is removed.  After the procedure  If you had a sedative, general anesthesia, or spinal anesthesia, you must have someone drive you home. Once youre home:  · Drink plenty of fluids.  · You may have burning or light bleeding when you urinate--this is normal.  · Medicines may be prescribed to ease any discomfort or prevent infection. Take these as directed.  · Call your doctor if you have heavy bleeding or blood clots, burning that lasts more than a day, a fever over 100°F  (38° C), or trouble urinating.  Date Last Reviewed: 1/1/2017 © 2000-2017 [employer] [place]. [address] [place] [address], [place] [address]. All rights reserved. This information is not intended as a substitute for professional medical care. Always follow your healthcare professional's instructions.      Fall Prevention  Millions of people fall every year and injure themselves. You may have had anesthesia or sedation which may increase your risk of falling. You may have health issues  that put you at an increased risk of falling.     Here are ways to reduce your risk of falling.  ·   · Make your home safe by keeping walkways clear of objects you may trip over.  · Use non-slip pads under rugs. Do not use area rugs or small throw rugs.  · Use non-slip mats in bathtubs and showers.  · Install handrails and lights on staircases.  · Do not walk in poorly lit areas.  · Do not stand on chairs or wobbly ladders.  · Use caution when reaching overhead or looking upward. This position can cause a loss of balance.  · Be sure your shoes fit properly, have non-slip bottoms and are in good condition.   · Wear shoes both inside and out. Avoid going barefoot or wearing slippers.  · Be cautious when going up and down stairs, curbs, and when walking on uneven sidewalks.  · If your balance is poor, consider using a cane or walker.  · If your fall was related to alcohol use, stop or limit alcohol intake.   · If your fall was related to use of sleeping medicines, talk to your doctor about this. You may need to reduce your dosage at bedtime if you awaken during the night to go to the bathroom.    · To reduce the need for nighttime bathroom trips:  ¨ Avoid drinking fluids for several hours before going to bed  ¨ Empty your bladder before going to bed  ¨ Men can keep a urinal at the bedside  · Stay as active as you can. Balance, flexibility, strength, and endurance all come from exercise. They all play a role in preventing falls. Ask your healthcare provider which types of activity are right for you.  · Get your vision checked on a regular basis.  · If you have pets, know where they are before you stand up or walk so you don't trip over them.  · Use night lights.

## 2020-10-01 NOTE — INTERVAL H&P NOTE
The patient has been examined and the H&P has been reviewed:    I concur with the findings and no changes have occurred since H&P was written.    Surgery risks, benefits and alternative options discussed and understood by patient/family.          Active Hospital Problems    Diagnosis  POA    Lower urinary tract symptoms (LUTS) [R39.9]  Yes      Resolved Hospital Problems   No resolved problems to display.

## 2020-10-01 NOTE — TRANSFER OF CARE
"Anesthesia Transfer of Care Note    Patient: Se Virgil Mcgraw    Procedure(s) Performed: Procedure(s) (LRB):  CYSTOSCOPY (N/A)    Patient location: [facility]    Anesthesia Type: general    Transport from OR: Transported from OR on room air with adequate spontaneous ventilation    Post pain: adequate analgesia    Post assessment: no apparent anesthetic complications and tolerated procedure well    Post vital signs: stable    Level of consciousness: awake and alert    Nausea/Vomiting: no nausea/vomiting    Complications: none    Transfer of care protocol was followed      Last vitals:   Visit Vitals  /73 (BP Location: Right arm, Patient Position: Lying)   Pulse 78   Temp 36.5 °C (97.7 °F) (Oral)   Resp 17   Ht 5' 7" (1.702 m)   Wt 95.3 kg (210 lb)   SpO2 (!) 94%   BMI 32.89 kg/m²     "

## 2020-10-01 NOTE — BRIEF OP NOTE
[facility]  Brief Operative Note    Surgery Date: 10/1/2020     Surgeon(s) and Role:     * Monica Lieberman MD - Primary    Assisting Surgeon: None    Pre-op Diagnosis:  Lower urinary tract symptoms (LUTS) [R39.9]    Post-op Diagnosis:  Post-Op Diagnosis Codes:     * Lower urinary tract symptoms (LUTS) [R39.9]    Procedure(s) (LRB):  CYSTOSCOPY (N/A)    Anesthesia: General    Description of the findings of the procedure(s): see op note    Estimated Blood Loss: < 2cc         Specimens:   Specimen (12h ago, onward)    None            Discharge Note    OUTCOME: Patient tolerated treatment/procedure well without complication and is now ready for discharge.    DISPOSITION: Home or Self Care    FINAL DIAGNOSIS: BPH with LUTS    FOLLOWUP: In clinic    DISCHARGE INSTRUCTIONS:    Discharge Procedure Orders   Diet general     No dressing needed     Call MD for:  persistent nausea and vomiting     Call MD for:  temperature >100.4     Call MD for:  severe uncontrolled pain     Call MD for:  difficulty breathing, headache or visual disturbances     Activity as tolerated

## 2020-10-02 NOTE — ANESTHESIA POSTPROCEDURE EVALUATION
Anesthesia Post Evaluation    Patient: Se Virgil Mcgraw    Procedure(s) Performed: Procedure(s) (LRB):  CYSTOSCOPY (N/A)    Final Anesthesia Type: general    Patient location during evaluation: PACU  Patient participation: Yes- Able to Participate  Level of consciousness: awake and alert, oriented and awake  Post-procedure vital signs: reviewed and stable  Airway patency: patent    PONV status at discharge: No PONV  Anesthetic complications: no      Cardiovascular status: blood pressure returned to baseline  Respiratory status: unassisted, spontaneous ventilation and room air  Hydration status: euvolemic  Follow-up not needed.          Vitals Value Taken Time   /82 10/01/20 1454   Temp 36.4 °C (97.6 °F) 10/01/20 1454   Pulse 74 10/01/20 1454   Resp 17 10/01/20 1454   SpO2 100 % 10/01/20 1454         No case tracking events are documented in the log.      Pain/Laurie Score: Laurie Score: 10 (10/1/2020  3:00 PM)  Modified Laurie Score: 20 (10/1/2020  3:00 PM)

## 2020-10-07 ENCOUNTER — HOSPITAL ENCOUNTER (OUTPATIENT)
Dept: RADIOLOGY | Facility: HOSPITAL | Age: 55
Discharge: HOME | End: 2020-10-07
Attending: STUDENT IN AN ORGANIZED HEALTH CARE EDUCATION/TRAINING PROGRAM
Payer: MEDICARE

## 2020-10-07 DIAGNOSIS — R33.9 RETENTION OF URINE: ICD-10-CM

## 2020-10-07 PROCEDURE — 76770 US EXAM ABDO BACK WALL COMP: CPT | Mod: 26,,, | Performed by: RADIOLOGY

## 2020-10-07 PROCEDURE — 76770 US EXAM ABDO BACK WALL COMP: CPT | Mod: TC

## 2020-10-07 PROCEDURE — 76770 US RETROPERITONEAL COMPLETE: ICD-10-PCS | Mod: 26,,, | Performed by: RADIOLOGY

## 2020-10-13 NOTE — PROGRESS NOTES
Patient ID: Se Virgil Mcgraw is a 55 y.o. male.    Chief Complaint: follow up from cystoscopy/ Renal US review      HPI  56 yo man w/ hx of BPH, underwent TURP 1 year ago, experienced UUI which has worsened over the last several weeks for which patient uses pull up pads. Patient was noted to have elevated PVR at last visit. He was counseled re: CIC patient not interested. Patient underwent anesthetic cystoscopy which revealed some regrowth of prostatic tissue in the prostatic fossa. He underwent Renal US which did not show any hydro, renal masses or cysts. Patient's PVR at that time was negligible. Today patient reports complete resolution of his urinary urgency and frequency. Patient was offered PFPT, but he is not comfortable crossing the bridge for treatment.     He is still interested in treatment for his Erectile dysfunction. He asked for an oral PDE5i in case he may need it. He is currently prescribed nitroglycerin PRN.   Patient needs battery of pacemaker changed, coming up soon.       ROS  Review of Systems   Constitutional: Negative for activity change, appetite change, chills, diaphoresis, fatigue and fever.   HENT: Negative for congestion, rhinorrhea and sore throat.    Eyes: Negative for discharge and visual disturbance.   Respiratory: Negative for cough, chest tightness, shortness of breath and wheezing.    Cardiovascular: Negative for chest pain and leg swelling.   Gastrointestinal: Negative for abdominal distention, abdominal pain, blood in stool, constipation, diarrhea, nausea and vomiting.   Genitourinary: Negative for dysuria, frequency and urgency.   Musculoskeletal: Negative for back pain and gait problem.   Skin: Negative for color change, rash and wound.   Allergic/Immunologic: Negative for immunocompromised state.   Neurological: Negative for light-headedness and headaches.   Psychiatric/Behavioral: Negative for confusion. The patient is not nervous/anxious.          Past Medical  History  Active Ambulatory Problems     Diagnosis Date Noted    Diabetes mellitus type 2 in obese 08/30/2015    Essential hypertension 08/30/2015    Brachial neuritis or radiculitis NOS 09/15/2015    Elevated LFTs 11/17/2015    Sleep apnea 11/17/2015    CKD (chronic kidney disease) stage 2, GFR 60-89 ml/min 11/17/2015    History of gout 11/17/2015    HLD (hyperlipidemia) 11/17/2015    GERD (gastroesophageal reflux disease) 11/17/2015    Edema 11/17/2015    AICD (automatic cardioverter/defibrillator) present 11/17/2015    Non-ischemic cardiomyopathy 11/19/2015    Restrictive lung disease 11/20/2015    Cervical disc disorder with radiculopathy 11/23/2015    Acute pancreatitis 08/05/2016    Acute kidney injury 08/06/2016    Inguinal lymphadenopathy 06/02/2017    Cellulitis of penis 07/06/2018    Lower urinary tract symptoms (LUTS) 10/01/2020     Resolved Ambulatory Problems     Diagnosis Date Noted    Abscess of right leg 09/30/2012    Pain in the chest 08/30/2015     Past Medical History:   Diagnosis Date    COPD (chronic obstructive pulmonary disease)     Coronary artery disease     Diabetes mellitus type II     DJD (degenerative joint disease)     Gout     Hypertension     Kidney stone     MI (myocardial infarction) 2010    Obesity     JOSE (obstructive sleep apnea)          Past Surgical History  Past Surgical History:   Procedure Laterality Date    CARDIAC DEFIBRILLATOR PLACEMENT      CYSTOSCOPY N/A 10/1/2020    Procedure: CYSTOSCOPY;  Surgeon: Monica Lieberman MD;  Location: [facility];  Service: Urology;  Laterality: N/A;  RN PRE OP Covid screen 9-  C A    excisional biopsy left inguinal lymph node      FOOT SURGERY      right foot surgery     kidney stones      lithotripsy    Surgery for bulging disc at C7         Social History  Relationships   Social connections    Talks on phone: Not on file    Gets together: Not on file    Attends Religion service: Not on file     Active member of club or organization: Not on file    Attends meetings of clubs or organizations: Not on file    Relationship status: Not on file       Medications    Current Outpatient Medications:     albuterol (PROVENTIL/VENTOLIN HFA) 90 mcg/actuation inhaler, Inhale 2 puffs into the lungs every 4 (four) hours as needed for Wheezing or Shortness of Breath. Rescue, Disp: 1 Inhaler, Rfl: 0    allopurinol (ZYLOPRIM) 100 MG tablet, , Disp: , Rfl: 5    amiodarone (PACERONE) 400 MG tablet, Take 200 mg by mouth every morning. , Disp: , Rfl: 1    amlodipine (NORVASC) 10 MG tablet, Take 10 mg by mouth every morning. , Disp: , Rfl: 5    aspirin (ECOTRIN) 81 MG EC tablet, Take 81 mg by mouth., Disp: , Rfl:     blood sugar diagnostic Strp, ACCU CHEK KEVAN PLUS TEST STRIPS, Disp: , Rfl:     blood-glucose meter Misc, ACCU CHEK KEVAN PLUS METER: USE 4X/D, Disp: , Rfl:     brimonidine 0.2% (ALPHAGAN) 0.2 % Drop, Apply 1 drop to eye., Disp: , Rfl:     cetirizine (ZYRTEC) 10 MG tablet, Take 10 mg by mouth once daily., Disp: , Rfl:     CRESTOR 20 mg tablet, Take 20 mg by mouth every evening. , Disp: , Rfl: 5    cyclobenzaprine (FLEXERIL) 10 MG tablet, Take 10 mg by mouth every evening. , Disp: , Rfl: 2    dicyclomine (BENTYL) 20 mg tablet, Take 20 mg by mouth 2 (two) times daily. , Disp: , Rfl: 2    DULoxetine (CYMBALTA) 60 MG capsule, Take 60 mg by mouth., Disp: , Rfl:     fluticasone (FLONASE) 50 mcg/actuation nasal spray, 1 spray by Each Nare route once daily., Disp: , Rfl:     fluticasone propionate (FLONASE) 50 mcg/actuation nasal spray, 2 sprays (100 mcg total) by Each Nostril route once daily., Disp: 1 Bottle, Rfl: 0    furosemide (LASIX) 20 MG tablet, Take 20 mg by mouth daily as needed. Takes every other day, Disp: , Rfl: 5    gabapentin (NEURONTIN) 300 MG capsule, Take 300 mg by mouth every evening. Takes  Two 300 mg tabs at night, Disp: , Rfl:     insulin glargine 100 units/mL (3mL) SubQ pen,  "Inject 50 Units into the skin once daily. , Disp: , Rfl:     INVOKANA 300 mg Tab tablet, TK 1 T PO QD, Disp: , Rfl: 5    isosorbide mononitrate (IMDUR) 30 MG 24 hr tablet, Take 30 mg by mouth every morning. , Disp: , Rfl: 6    lancets Misc, ACCU CHEK SOFTCLIX LANCETS: USE 4X DAILY, Disp: , Rfl:     methazoLAMIDE (NEPTAZANE) 25 mg tablet, Take 25 mg by mouth., Disp: , Rfl:     metoprolol succinate (TOPROL-XL) 50 MG 24 hr tablet, Take 50 mg by mouth every evening. , Disp: , Rfl: 5    nitroGLYCERIN (NITROSTAT) 0.4 MG SL tablet, Place 0.4 mg under the tongue every 5 (five) minutes as needed for Chest pain., Disp: , Rfl:     omeprazole (PRILOSEC) 20 MG capsule, TK 1 C PO QD FOR CONTROL OF STOMACH ACID BEFORE BREAKFAST, Disp: , Rfl: 1    oxycodone-acetaminophen (PERCOCET)  mg per tablet, , Disp: , Rfl:     pen needle, diabetic (BD ULTRA-FINE SHORT PEN NEEDLE) 31 gauge x 5/16" Ndle, Inject 1 each into the skin., Disp: , Rfl:     potassium chloride SA (K-DUR,KLOR-CON) 20 MEQ tablet, , Disp: , Rfl: 5    prednisoLONE acetate (PRED FORTE) 1 % DrpS, INT 1 GTT INTO OD D FOR 10 DAYS, Disp: , Rfl:     promethazine (PHENERGAN) 25 MG tablet, TK 1 T PO  Q 4 H PRN NV, Disp: , Rfl: 2    promethazine-dextromethorphan (PROMETHAZINE-DM) 6.25-15 mg/5 mL Syrp, Take 5 mLs by mouth every 4 to 6 hours as needed. 5 mL every 4 to 6 hours; maximum: 30 mL in 24 hours, Disp: 118 mL, Rfl: 0    sotaloL (BETAPACE) 80 MG tablet, TAKE 1 TABLET BY MOUTH TWICE DAILY, Disp: , Rfl:     tamsulosin (FLOMAX) 0.4 mg Cp24, Take 1 capsule (0.4 mg total) by mouth once daily., Disp: 30 capsule, Rfl: 2    timolol maleate 0.5% (TIMOPTIC) 0.5 % Drop, Apply 1 drop to eye., Disp: , Rfl:     zolpidem (AMBIEN) 10 mg Tab, Take 5 mg by mouth nightly as needed., Disp: , Rfl:     Current Facility-Administered Medications:     lidocaine (PF) 10 mg/ml (1%) injection 10 mg, 1 mL, Intradermal, Once, Brenden Bains MD    Allergies  Review of patient's " allergies indicates:   Allergen Reactions    Propoxyphene     Darvocet a500 [propoxyphene n-acetaminophen] Itching         Objective:      Physical Exam   Constitutional: He is oriented to person, place, and time. He appears well-developed.  Non-toxic appearance. He does not appear ill. No distress.   HENT:   Head: Normocephalic and atraumatic.   Mouth/Throat: Mucous membranes are moist.   Eyes: Conjunctivae are normal.   Neck: Neck supple.   Cardiovascular: Normal rate and regular rhythm.    Pulmonary/Chest: Effort normal. No respiratory distress.   Abdominal: Soft. He exhibits no distension and no mass. There is no abdominal tenderness. There is no guarding.   Musculoskeletal: No swelling or deformity.   Neurological: He is alert and oriented to person, place, and time. Gait normal.   Skin: Skin is warm. Capillary refill takes less than 2 seconds. No rash noted. He is not diaphoretic.     Psychiatric: Mood, judgment and thought content normal.           Lab Results   Component Value Date    PSADIAG 1.4 08/06/2016        Assessment:       1. BPH with obstruction/lower urinary tract symptoms    2. Erectile dysfunction associated with type 2 diabetes mellitus        Plan:       Continued to recommend pelvic floor physical therapy to assist with voiding symptoms in addition to the lifestyle modifications discussed at his last visit. A repeat TURP at this time may leave him incontinent. Continue flomax. Patient currently doing well, okay to monitor symptoms at this time    Erectile dysfunction  Discussed with patient and reiterated that the combination of [id number] and nitroglycerin are a deadly combination and can lead to life threatening hypotension  Recommend patient getting blood sugars under control prior to considering IPP due to elevated risk of infection

## 2020-10-14 ENCOUNTER — OFFICE VISIT (OUTPATIENT)
Dept: UROLOGY | Facility: CLINIC | Age: 55
End: 2020-10-14
Payer: MEDICARE

## 2020-10-14 VITALS — HEIGHT: 67 IN | BODY MASS INDEX: 33.53 KG/M2 | WEIGHT: 213.63 LBS

## 2020-10-14 DIAGNOSIS — N52.1 ERECTILE DYSFUNCTION ASSOCIATED WITH TYPE 2 DIABETES MELLITUS: ICD-10-CM

## 2020-10-14 DIAGNOSIS — E11.69 ERECTILE DYSFUNCTION ASSOCIATED WITH TYPE 2 DIABETES MELLITUS: ICD-10-CM

## 2020-10-14 DIAGNOSIS — N13.8 BPH WITH OBSTRUCTION/LOWER URINARY TRACT SYMPTOMS: Primary | ICD-10-CM

## 2020-10-14 DIAGNOSIS — N40.1 BPH WITH OBSTRUCTION/LOWER URINARY TRACT SYMPTOMS: Primary | ICD-10-CM

## 2020-10-14 PROCEDURE — 99999 PR PBB SHADOW E&M-EST. PATIENT-LVL V: CPT | Mod: PBBFAC,,, | Performed by: STUDENT IN AN ORGANIZED HEALTH CARE EDUCATION/TRAINING PROGRAM

## 2020-10-14 PROCEDURE — 3008F PR BODY MASS INDEX (BMI) DOCUMENTED: ICD-10-PCS | Mod: CPTII,S$GLB,, | Performed by: STUDENT IN AN ORGANIZED HEALTH CARE EDUCATION/TRAINING PROGRAM

## 2020-10-14 PROCEDURE — 3008F BODY MASS INDEX DOCD: CPT | Mod: CPTII,S$GLB,, | Performed by: STUDENT IN AN ORGANIZED HEALTH CARE EDUCATION/TRAINING PROGRAM

## 2020-10-14 PROCEDURE — 99999 PR PBB SHADOW E&M-EST. PATIENT-LVL V: ICD-10-PCS | Mod: PBBFAC,,, | Performed by: STUDENT IN AN ORGANIZED HEALTH CARE EDUCATION/TRAINING PROGRAM

## 2020-10-14 PROCEDURE — 3046F HEMOGLOBIN A1C LEVEL >9.0%: CPT | Mod: CPTII,S$GLB,, | Performed by: STUDENT IN AN ORGANIZED HEALTH CARE EDUCATION/TRAINING PROGRAM

## 2020-10-14 PROCEDURE — 99213 PR OFFICE/OUTPT VISIT, EST, LEVL III, 20-29 MIN: ICD-10-PCS | Mod: S$GLB,,, | Performed by: STUDENT IN AN ORGANIZED HEALTH CARE EDUCATION/TRAINING PROGRAM

## 2020-10-14 PROCEDURE — 3046F PR MOST RECENT HEMOGLOBIN A1C LEVEL > 9.0%: ICD-10-PCS | Mod: CPTII,S$GLB,, | Performed by: STUDENT IN AN ORGANIZED HEALTH CARE EDUCATION/TRAINING PROGRAM

## 2020-10-14 PROCEDURE — 99213 OFFICE O/P EST LOW 20 MIN: CPT | Mod: S$GLB,,, | Performed by: STUDENT IN AN ORGANIZED HEALTH CARE EDUCATION/TRAINING PROGRAM

## 2020-10-23 ENCOUNTER — HOSPITAL ENCOUNTER (EMERGENCY)
Facility: HOSPITAL | Age: 55
Discharge: HOME | End: 2020-10-24
Attending: EMERGENCY MEDICINE
Payer: MEDICARE

## 2020-10-23 DIAGNOSIS — H20.9 IRIDOCYCLITIS: ICD-10-CM

## 2020-10-23 DIAGNOSIS — H44.009 ENDOPHTHALMITIS, UNSPECIFIED LATERALITY: Primary | ICD-10-CM

## 2020-10-23 DIAGNOSIS — H54.61 VISUAL LOSS, RIGHT EYE: ICD-10-CM

## 2020-10-23 DIAGNOSIS — H44.001 ENDOPHTHALMITIS, ACUTE, RIGHT: ICD-10-CM

## 2020-10-23 DIAGNOSIS — H49.41: ICD-10-CM

## 2020-10-23 PROCEDURE — 63600175 PHARM REV CODE 636 W HCPCS: Performed by: EMERGENCY MEDICINE

## 2020-10-23 PROCEDURE — 99284 EMERGENCY DEPT VISIT MOD MDM: CPT | Mod: 25

## 2020-10-23 PROCEDURE — 99285 PR EMERGENCY DEPT VISIT,LEVEL V: ICD-10-PCS | Mod: ,,, | Performed by: EMERGENCY MEDICINE

## 2020-10-23 PROCEDURE — 25000003 PHARM REV CODE 250: Performed by: EMERGENCY MEDICINE

## 2020-10-23 PROCEDURE — 96374 THER/PROPH/DIAG INJ IV PUSH: CPT

## 2020-10-23 PROCEDURE — 99285 EMERGENCY DEPT VISIT HI MDM: CPT | Mod: ,,, | Performed by: EMERGENCY MEDICINE

## 2020-10-23 RX ORDER — TETRACAINE HYDROCHLORIDE 5 MG/ML
2 SOLUTION OPHTHALMIC
Status: COMPLETED | OUTPATIENT
Start: 2020-10-23 | End: 2020-10-23

## 2020-10-23 RX ORDER — MORPHINE SULFATE 4 MG/ML
4 INJECTION, SOLUTION INTRAMUSCULAR; INTRAVENOUS
Status: COMPLETED | OUTPATIENT
Start: 2020-10-23 | End: 2020-10-23

## 2020-10-23 RX ADMIN — MORPHINE SULFATE 4 MG: 4 INJECTION INTRAVENOUS at 10:10

## 2020-10-23 RX ADMIN — TETRACAINE HYDROCHLORIDE 2 DROP: 5 SOLUTION OPHTHALMIC at 09:10

## 2020-10-23 RX ADMIN — FLUORESCEIN SODIUM 1 EACH: 1 STRIP OPHTHALMIC at 09:10

## 2020-10-24 VITALS
DIASTOLIC BLOOD PRESSURE: 70 MMHG | BODY MASS INDEX: 33.27 KG/M2 | WEIGHT: 212 LBS | OXYGEN SATURATION: 95 % | HEART RATE: 97 BPM | TEMPERATURE: 98 F | HEIGHT: 67 IN | SYSTOLIC BLOOD PRESSURE: 124 MMHG | RESPIRATION RATE: 18 BRPM

## 2020-10-24 LAB
GRAM STN SPEC: NORMAL
GRAM STN SPEC: NORMAL
KOH PREP SPEC: NORMAL
RPR SER QL: NORMAL

## 2020-10-24 PROCEDURE — 85549 MURAMIDASE: CPT

## 2020-10-24 PROCEDURE — 87075 CULTR BACTERIA EXCEPT BLOOD: CPT

## 2020-10-24 PROCEDURE — 86038 ANTINUCLEAR ANTIBODIES: CPT

## 2020-10-24 PROCEDURE — 81374 HLA I TYPING 1 ANTIGEN LR: CPT

## 2020-10-24 PROCEDURE — 82164 ANGIOTENSIN I ENZYME TEST: CPT

## 2020-10-24 PROCEDURE — 87210 SMEAR WET MOUNT SALINE/INK: CPT

## 2020-10-24 PROCEDURE — A4216 STERILE WATER/SALINE, 10 ML: HCPCS | Performed by: OPHTHALMOLOGY

## 2020-10-24 PROCEDURE — 67028 INJECTION EYE DRUG: CPT | Mod: RT,,, | Performed by: OPHTHALMOLOGY

## 2020-10-24 PROCEDURE — 87070 CULTURE OTHR SPECIMN AEROBIC: CPT

## 2020-10-24 PROCEDURE — 25000003 PHARM REV CODE 250: Performed by: OPHTHALMOLOGY

## 2020-10-24 PROCEDURE — 87529 HSV DNA AMP PROBE: CPT

## 2020-10-24 PROCEDURE — 87205 SMEAR GRAM STAIN: CPT

## 2020-10-24 PROCEDURE — 86592 SYPHILIS TEST NON-TREP QUAL: CPT

## 2020-10-24 PROCEDURE — 63600175 PHARM REV CODE 636 W HCPCS: Performed by: OPHTHALMOLOGY

## 2020-10-24 PROCEDURE — 67028 PR INJECT INTRAVITREAL PHARMCOLOGIC: ICD-10-PCS | Mod: RT,,, | Performed by: OPHTHALMOLOGY

## 2020-10-24 PROCEDURE — 86780 TREPONEMA PALLIDUM: CPT

## 2020-10-24 RX ORDER — DEXAMETHASONE SODIUM PHOSPHATE 4 MG/ML
4 INJECTION, SOLUTION INTRA-ARTICULAR; INTRALESIONAL; INTRAMUSCULAR; INTRAVENOUS; SOFT TISSUE ONCE
Status: COMPLETED | OUTPATIENT
Start: 2020-10-24 | End: 2020-10-24

## 2020-10-24 RX ORDER — ATROPINE SULFATE 10 MG/ML
1 SOLUTION/ DROPS OPHTHALMIC 2 TIMES DAILY
Qty: 5 ML | Refills: 0 | OUTPATIENT
Start: 2020-10-24 | End: 2020-10-24 | Stop reason: SDUPTHER

## 2020-10-24 RX ORDER — ATROPINE SULFATE 10 MG/ML
1 SOLUTION/ DROPS OPHTHALMIC 2 TIMES DAILY
Qty: 5 ML | Refills: 0 | OUTPATIENT
Start: 2020-10-24 | End: 2021-09-26

## 2020-10-24 RX ORDER — DEXAMETHASONE SODIUM PHOSPHATE 4 MG/ML
4 INJECTION, SOLUTION INTRA-ARTICULAR; INTRALESIONAL; INTRAMUSCULAR; INTRAVENOUS; SOFT TISSUE ONCE
Status: DISCONTINUED | OUTPATIENT
Start: 2020-10-24 | End: 2020-10-24

## 2020-10-24 RX ORDER — DEXAMETHASONE SODIUM PHOSPHATE 4 MG/ML
4 INJECTION, SOLUTION INTRA-ARTICULAR; INTRALESIONAL; INTRAMUSCULAR; INTRAVENOUS; SOFT TISSUE
Status: DISCONTINUED | OUTPATIENT
Start: 2020-10-24 | End: 2022-01-26 | Stop reason: ALTCHOICE

## 2020-10-24 RX ADMIN — CEFTAZIDIME 6.75 MG: 1 INJECTION, POWDER, FOR SOLUTION INTRAMUSCULAR; INTRAVENOUS at 03:10

## 2020-10-24 RX ADMIN — DEXAMETHASONE SODIUM PHOSPHATE 4 MG: 4 INJECTION INTRA-ARTICULAR; INTRALESIONAL; INTRAMUSCULAR; INTRAVENOUS; SOFT TISSUE at 04:10

## 2020-10-24 RX ADMIN — VANCOMYCIN HYDROCHLORIDE 5 MG: 500 INJECTION, POWDER, LYOPHILIZED, FOR SOLUTION INTRAVENOUS at 03:10

## 2020-10-24 NOTE — ED PROVIDER NOTES
Encounter Date: 10/23/2020    SCRIBE #1 NOTE: I, Lyudmila Reynolds, am scribing for, and in the presence of,  Dr. Lugo. I have scribed the entire note.       History     Chief Complaint   Patient presents with    Eye Problem     sent by oph for R eye infection, c/o R eye pain x couple days     The patient is a 55 year old male with a PMHx of COPD, CAD, DM2, DJD, HTN, MI, cataracts, floppy iris syndrome, and central corneal scar, who presents to the ED with a chief complaint of right eye pain. Patient reports that 5 days ago, he woke up with right eye pain and could not see out of the eye. He went to his ophthalmologist, Dr. Mcgraw, the same day, who sent him home with BID antibiotics for an eye infection. Told to come to the ED today by his ophthalmologist who is concerned about the treatment not working. Endorses right eye photophobia, redness, pain, and swelling. States that he can only see light and movement from his right eye, cannot differentiate digits. Wears glasses at baseline.     Patient is a poor historian.     The history is provided by the patient, medical records and a friend.     Review of patient's allergies indicates:   Allergen Reactions    Propoxyphene     Darvocet a500 [propoxyphene n-acetaminophen] Itching     Past Medical History:   Diagnosis Date    COPD (chronic obstructive pulmonary disease)     Coronary artery disease     Diabetes mellitus type II     DJD (degenerative joint disease)     Gout     Hypertension     Kidney stone     MI (myocardial infarction) 2010    Obesity     JOSE (obstructive sleep apnea)      Past Surgical History:   Procedure Laterality Date    CARDIAC DEFIBRILLATOR PLACEMENT      CATARACT EXTRACTION Right     CYSTOSCOPY N/A 10/1/2020    Procedure: CYSTOSCOPY;  Surgeon: Monica Lieberman MD;  Location: [facility];  Service: Urology;  Laterality: N/A;  RN PRE OP Covid screen 9-  C A    excisional biopsy left inguinal lymph node      FOOT SURGERY       right foot surgery     kidney stones      lithotripsy    Surgery for bulging disc at C7       Family History   Problem Relation Age of Onset    Diabetes Mother     Hypertension Mother     Heart disease Father     Diabetes Brother     Hypertension Brother     Asthma Daughter     Cancer Maternal Uncle         prostate    Cancer Cousin     Kidney disease Cousin      Social History     Tobacco Use    Smoking status: Never Smoker    Smokeless tobacco: Never Used   Substance Use Topics    Alcohol use: No    Drug use: No     Review of Systems   Constitutional: Negative for fever.   HENT: Positive for congestion (unrelated to current complaint) and rhinorrhea (unrelated to current complaint).    Eyes: Positive for photophobia, pain, redness and visual disturbance (can only see light and movement in the right eye).   Respiratory: Negative for shortness of breath.    Cardiovascular: Negative for chest pain.   Gastrointestinal: Negative for abdominal pain.   Genitourinary: Negative for dysuria.   Musculoskeletal: Negative for back pain.   Skin: Negative for pallor.   Neurological: Negative for dizziness.       Physical Exam     Initial Vitals [10/23/20 2036]   BP Pulse Resp Temp SpO2   132/87 (!) 115 18 97.9 °F (36.6 °C) 96 %      MAP       --         Physical Exam    Nursing note and vitals reviewed.  Constitutional: He appears well-developed and well-nourished. No distress.   HENT:   Head: Normocephalic and atraumatic.   Right Ear: Tympanic membrane normal.   Left Ear: Tympanic membrane normal.   Mouth/Throat: Oropharynx is clear and moist.   Edematous turbinates   Eyes: Right eye exhibits discharge ( clear lacrimation). Right eye exhibits no chemosis and no hordeolum. No foreign body present in the right eye. Right conjunctiva is injected. Right conjunctiva has no hemorrhage. Left conjunctiva is not injected. Left conjunctiva has no hemorrhage. No scleral icterus. Right eye exhibits normal extraocular  motion and no nystagmus. Left eye exhibits normal extraocular motion and no nystagmus. Right pupil is not reactive (Sluggish and minimally reactive with direct and consensual photophobia). Right pupil is round. Left pupil is round and reactive. Pupils are unequal.       Neck: Normal range of motion. Neck supple.   Cardiovascular: Regular rhythm and normal heart sounds. Tachycardia present.    Pulmonary/Chest: Breath sounds normal. No stridor. No respiratory distress.   Abdominal: He exhibits no distension.   Musculoskeletal: Normal range of motion. No edema.   Neurological: He is alert and oriented to person, place, and time.   Skin: Skin is warm and dry.         ED Course   Procedures  Labs Reviewed   ANGIOTENSIN CONVERTING ENZYME - Abnormal; Notable for the following components:       Result Value    Angio Convert Enzyme <5 (*)     All other components within normal limits   CULTURE, ANAEROBIC    Narrative:     Right eye vitreous tap   CULTURE, AEROBIC  (SPECIFY SOURCE)    Narrative:     Vitreous tap, right eye   KOH PREP    Narrative:     Vitreous tap right eye   GRAM STAIN    Narrative:     RIGHT EYE VITREOUS TAP   BLEU PROFILE I (SCREEN) W/ REFLEX   RPR   TREPONEMA PALLIDUM (SYPHILLIS) ANTIBODY   HERPES SIMPLEX (HSV) BY RAPID PCR, NON-BLOOD   HLA B27 ANTIGEN   LYSOZYME (MURAMIDASE), SERUM          Imaging Results          X-Ray Chest PA And Lateral (Final result)  Result time 10/24/20 02:15:38    Final result by Villa Moncada MD (10/24/20 02:15:38)                 Impression:      No acute cardiopulmonary process.      Electronically signed by: Villa Moncada MD  Date:    10/24/2020  Time:    02:15             Narrative:    EXAMINATION:  XR CHEST PA AND LATERAL    CLINICAL HISTORY:  uveitis, concern for sarcoid; Unspecified iridocyclitis    TECHNIQUE:  PA and lateral views of the chest were performed.    COMPARISON:  03/18/2020.    FINDINGS:  AICD lead appears unchanged.  Calcified granuloma right base and  calcified nodes right hilum appear unchanged.    There is no consolidation, effusion, or pneumothorax.    Cardiomediastinal silhouette is unremarkable.    Regional osseous structures are unchanged.  Partially visualized fixation hardware overlies the cervical spine.                                 Medical Decision Making:   History:   Old Medical Records: I decided to obtain old medical records.  Initial Assessment:   Afebrile, atraumatic, hemodynamically stable male presents with painful right visual loss x4 days warranting emergent ophthalmological evaluation.    ____________________  Sacha Lugo MD, [facility]  Emergency Medicine Staff  11:44 PM 10/23/2020    Other:   I have discussed this case with another health care provider.       <> Summary of the Discussion: Ophthalmology-21:36            Scribe Attestation:   Scribe #1: I performed the above scribed service and the documentation accurately describes the services I performed. I attest to the accuracy of the note.    STAFF ATTENDING PHYSICIAN NOTE:  I provided and agree with the documentation provided by LINDSEY on Se Virgil Mcgraw.  ____________________  Sacha Lugo MD, [facility]  Emergency Medicine Staff  11:45 PM 10/23/2020                      Clinical Impression:     ICD-10-CM ICD-9-CM   1. Endophthalmitis, unspecified laterality  H44.009 360.00   2. Visual loss, right eye  H54.61 369.8   3. Painful ophthalmoplegia of right eye  H49.41 378.55   4. Iridocyclitis  H20.9 364.3   5. Endophthalmitis, acute, right  H44.001 360.01                 F/U: UPON COMPLETION OF MY SHIFT AT 12AM, TRANSFER OF CARE TO DR. POTTER TO F/U OPHTHALMOLOGY RECOMMENDATIONS, SX RESOLUTION & ULTIMATE DISPOSITION. I ANTICIPATE DISCHARGE WITH OUTPT F/U.  ____________________  Sacha Lugo MD, [facility]  Emergency Medicine Staff  1:22 AM 10/24/2020     Disposition:   Disposition: Discharged  Condition: Stable     ED Disposition Condition    Discharge Stable        ED Prescriptions      Medication Sig Dispense Start Date End Date Auth. Provider    atropine 1% (ISOPTO ATROPINE) 1 % Drop  (Status: Discontinued) Place 1 drop into the right eye 2 (two) times daily. 5 mL 10/24/2020 10/24/2020 Cristiane Pablo MD    atropine 1% (ISOPTO ATROPINE) 1 % Drop Place 1 drop into the right eye 2 (two) times daily. 5 mL 10/24/2020  Cristiane Pablo MD        Follow-up Information     Follow up With Specialties Details Why Contact Info Additional Information    Coreen Anderson MD Internal Medicine Schedule an appointment as soon as possible for a visit in 1 week  [id number]  [facility] [address]  [phone]       [facility] [address] Ophthalmology Schedule an appointment as soon as possible for a visit in 1 day  [facility] [address] [phone] Please arrive on the 1st floor near financial services for check-in                                       Rusty Lugo MD  10/27/20 5808

## 2020-10-24 NOTE — DISCHARGE INSTRUCTIONS
Diagnosis: endophthalmitis    Plan:   - Star Atropine ophth drop BID right eye - you received a paper prescription for this  - Start Vigamox ophth drop QID right eye - you were given this drop  - Increase Durezol to four times daily  - Continue Combigan, timolol     Tests today showed:   Labs Reviewed   CULTURE, ANAEROBIC   CULTURE, AEROBIC  (SPECIFY SOURCE)   KOH PREP   GRAM STAIN   BLUE PROFILE I (SCREEN) W/ REFLEX   HLA B27 ANTIGEN   ANGIOTENSIN CONVERTING ENZYME   LYSOZYME (MURAMIDASE), SERUM   RPR   TREPONEMA PALLIDUM (SYPHILLIS) ANTIBODY     X-Ray Chest PA And Lateral   Final Result      No acute cardiopulmonary process.         Electronically signed by: Villa Moncada MD   Date:    10/24/2020   Time:    02:15          Treatments you had today:   Medications   dexamethasone injection 4 mg (has no administration in time range)   tetracaine HCl (PF) 0.5 % Drop 2 drop (2 drops Left Eye Given by Other 10/23/20 2115)   fluorescein ophthalmic strip 1 each (1 each Left Eye Given by Other 10/23/20 2115)   morphine injection 4 mg (4 mg Intravenous Given 10/23/20 2256)   vancomycin (VANCOCIN) 5 mg in sterile water for injection 0.5 mL intravitreal soln (conc: 1 mg/0.1 mL) (5 mg Right Eye Given by Other 10/24/20 0300)   ceftAZIDime (FORTAZ) 6.75 mg in sterile water for injection 0.3 mL intravitreal soln (conc: 2.25 mg/0.1 mL) (6.75 mg Right Eye Given by Other 10/24/20 0300)   dexamethasone injection 4 mg (4 mg Other Given by Other 10/24/20 2669)       Follow-Up Plan:  - Follow-up with the ophthalmologist on Sunday as they previously directed you    Return to the Emergency Department for symptoms including but not limited to: worsening symptoms, shortness of breath or chest pain, vomiting with inability to hold down fluids, fevers greater than 100.4°F, passing out/fainting/unconsciousness, or other concerning symptoms.

## 2020-10-24 NOTE — ED TRIAGE NOTES
Pt sent to the ER by his Ophthalmologist for an Ophthalmology consult. Pt states he had cataract surgery on the right eye on 9/22. Pt states he started experiencing pain in the right eye along with trouble opening the right eye on Monday. Pt states he was seen by his ophthalmologist on Tuesday and was diagnosed with glaucoma and started on medications. Pt reporting no relief in symptoms; went to see his doctor again today who diagnosed him with an infection to the right eye and directed him to come to the ER.

## 2020-10-24 NOTE — PROVIDER PROGRESS NOTES - EMERGENCY DEPT.
I received sign-out on this patient.  This individual presented to ER with complaint of acute vision loss and pain in the right eye.  He senses light and movement.  No trauma.  He has a hazy cornea with a sluggish pupil in intra-ocular pressure of 23.  There was concern for endophthalmitis.  At time of sign-out,  ophthalmology evaluation is pending for final disposition.    Ophthalmology did perform intra vitreal injection of antibiotics.  They provided him with the drops that he requires aside from an atropine drop which they have asked me to prescribe for him.  They plan to follow him up in clinic in 1 day's time.  They have discussed return precautions and use of the medications.  He was discharged home in stable condition.

## 2020-10-24 NOTE — CONSULTS
Chief complaint/Reason for Consult: endophthalmitis versus iritis and corneal ulceration     History of Present Illness: Se Virgil Mcgarw is a 55 y.o. male who presents to the ED following ophthalmology appt with outside provider due to concern for endophthalmitis R eye. Patient underwent CEIOL in R eye 9/27. Patient notes vision was improving/doing well following surgery until earlier this week. Patient notes Monday he was experiencing swelling around the eye and was prescribed abx for possible preseptal cellulitis. Over the next several days, patient endorses gradually decreasing vision, and eye pain. At appt today, his ophthalmologist was concerned with infection and advised patient to come to [facility] for further eval with retina specialist.     Past Ocular Hx: no past ocular surgeries, does not use glasses or contacts     Current eye gtts: none      PMHx:  has a past medical history of COPD (chronic obstructive pulmonary disease), Coronary artery disease, Diabetes mellitus type II, DJD (degenerative joint disease), Gout, Hypertension, Kidney stone, MI (myocardial infarction) (2010), Obesity, and JOSE (obstructive sleep apnea).     PSurgHx:  has a past surgical history that includes Foot surgery; kidney stones; Cardiac defibrillator placement; Surgery for bulging disc at C7; excisional biopsy left inguinal lymph node; Cystoscopy (N/A, 10/1/2020); and Cataract extraction (Right).     Home Medications:   Prior to Admission medications    Medication Sig Start Date End Date Taking? Authorizing Provider   albuterol (PROVENTIL/VENTOLIN HFA) 90 mcg/actuation inhaler Inhale 2 puffs into the lungs every 4 (four) hours as needed for Wheezing or Shortness of Breath. Rescue 1/11/20  Yes Rea Almodovar PA-C   allopurinol (ZYLOPRIM) 100 MG tablet  11/27/15  Yes Historical Provider   amiodarone (PACERONE) 400 MG tablet Take 200 mg by mouth every morning.  6/8/15  Yes Historical Provider   amlodipine (NORVASC) 10 MG tablet  Take 10 mg by mouth every morning.  9/24/15  Yes Historical Provider   aspirin (ECOTRIN) 81 MG EC tablet Take 81 mg by mouth. 11/15/19  Yes Historical Provider   blood sugar diagnostic Strp ACCU CHEK KEVAN PLUS TEST STRIPS 1/31/20  Yes Historical Provider   blood-glucose meter [facility] ACCU CHEK KEVAN PLUS METER: USE 4X/D 1/31/20  Yes Historical Provider   brimonidine 0.2% (ALPHAGAN) 0.2 % Drop Apply 1 drop to eye. 10/25/19  Yes Historical Provider   cetirizine (ZYRTEC) 10 MG tablet Take 10 mg by mouth once daily.   Yes Historical Provider   CRESTOR 20 mg tablet Take 20 mg by mouth every evening.  8/11/15  Yes Historical Provider   cyclobenzaprine (FLEXERIL) 10 MG tablet Take 10 mg by mouth every evening.  8/4/15  Yes Historical Provider   dicyclomine (BENTYL) 20 mg tablet Take 20 mg by mouth 2 (two) times daily.  8/11/15  Yes Historical Provider   DULoxetine (CYMBALTA) 60 MG capsule Take 60 mg by mouth. 7/27/20 10/25/20 Yes Historical Provider   fluticasone (FLONASE) 50 mcg/actuation nasal spray 1 spray by Each Nare route once daily.   Yes Historical Provider   fluticasone propionate (FLONASE) 50 mcg/actuation nasal spray 2 sprays (100 mcg total) by Each Nostril route once daily. 1/11/20  Yes Rea Almodovar PA-C   furosemide (LASIX) 20 MG tablet Take 20 mg by mouth daily as needed. Takes every other day 6/15/15  Yes Historical Provider   gabapentin (NEURONTIN) 300 MG capsule Take 300 mg by mouth every evening. Takes  Two 300 mg tabs at night   Yes Historical Provider   insulin glargine 100 units/mL (3mL) SubQ pen Inject 50 Units into the skin once daily.  11/15/19  Yes Historical Provider   INVOKANA 300 mg Tab tablet TK 1 T PO QD 3/26/17  Yes Historical Provider   isosorbide mononitrate (IMDUR) 30 MG 24 hr tablet Take 30 mg by mouth every morning.  8/11/15  Yes Historical Provider   lancets [facility] ACCU CHEK SOFTCLIX LANCETS: USE 4X DAILY 1/31/20  Yes Historical Provider   methazoLAMIDE (NEPTAZANE) 25 mg tablet Take  "25 mg by mouth.   Yes Historical Provider   metoprolol succinate (TOPROL-XL) 50 MG 24 hr tablet Take 50 mg by mouth every evening.  8/11/15  Yes Historical Provider   nitroGLYCERIN (NITROSTAT) 0.4 MG SL tablet Place 0.4 mg under the tongue every 5 (five) minutes as needed for Chest pain.   Yes Historical Provider   omeprazole (PRILOSEC) 20 MG capsule TK 1 C PO QD FOR CONTROL OF STOMACH ACID BEFORE BREAKFAST 3/7/17  Yes Historical Provider   oxycodone-acetaminophen (PERCOCET)  mg per tablet  6/20/17  Yes Historical Provider   pen needle, diabetic (BD ULTRA-FINE SHORT PEN NEEDLE) 31 gauge x 5/16" Ndle Inject 1 each into the skin. 7/28/20  Yes Historical Provider   potassium chloride SA (K-DUR,KLOR-CON) 20 MEQ tablet  11/27/15  Yes Historical Provider   prednisoLONE acetate (PRED FORTE) 1 % DrpS INT 1 GTT INTO OD D FOR 10 DAYS 12/3/19  Yes Historical Provider   promethazine (PHENERGAN) 25 MG tablet TK 1 T PO  Q 4 H PRN NV 7/5/17  Yes Historical Provider   sotaloL (BETAPACE) 80 MG tablet TAKE 1 TABLET BY MOUTH TWICE DAILY 8/13/20  Yes Historical Provider   timolol maleate 0.5% (TIMOPTIC) 0.5 % Drop Apply 1 drop to eye. 10/25/19  Yes Historical Provider   zolpidem (AMBIEN) 10 mg Tab Take 5 mg by mouth nightly as needed.   Yes Historical Provider   promethazine-dextromethorphan (PROMETHAZINE-DM) 6.25-15 mg/5 mL Syrp Take 5 mLs by mouth every 4 to 6 hours as needed. 5 mL every 4 to 6 hours; maximum: 30 mL in 24 hours 3/18/20   Maile Staton NP   tamsulosin (FLOMAX) 0.4 mg Cp24 Take 1 capsule (0.4 mg total) by mouth once daily. 8/8/16 9/30/20  Naseem Mayer MD        Medications this encounter:    lidocaine (PF) 10 mg/ml (1%)  1 mL Intradermal Once       Allergies: is allergic to propoxyphene and darvocet a500 [propoxyphene n-acetaminophen].     Social Hx:  reports that he has never smoked. He has never used smokeless tobacco. He reports that he does not drink alcohol or use drugs.     Family Hx: No family " history of glaucoma. family history includes Asthma in his daughter; Cancer in his cousin and maternal uncle; Diabetes in his brother and mother; Heart disease in his father; Hypertension in his brother and mother; Kidney disease in his cousin.     ROS: Negative x 10 except for complaints as described in HPI; negative for fever, chills, weight loss, nausea, vomiting, diarrhea, shortness of breath, nasal discharge, cough, abdominal pain, dyspnea, difficulty moving arms and legs, confusion, dysuria, palpitations, or chest pain     Ocular examination/Dilated fundus examination:  Base Eye Exam     Visual Acuity (Snellen - Linear)       Right Left    Dist sc CFx1 20/25          Tonometry (Tonopen, 12:02 AM)       Right Left    Pressure 26 19          Pupils       Dark Light Shape React APD    Right 5 5 Round Minimal None    Left 4 3 Round Brisk None          Visual Fields       Right Left      Full    Restrictions Partial inner superior temporal, inferior temporal, superior nasal, inferior nasal deficiencies           Extraocular Movement       Right Left     Full, Ortho Full, Ortho          Neuro/Psych     Oriented x3: Yes    Mood/Affect: Normal          Dilation     Both eyes: 2.5% Phenylephrine, 1% Mydriacyl @ 11:48 PM            Slit Lamp and Fundus Exam     External Exam       Right Left    External Normal Normal          Slit Lamp Exam       Right Left    Lids/Lashes lid edema,  Normal    Conjunctiva/Sclera 1+ Injection, Limbal flush White and quiet    Cornea 2h6rjoye defect central defect, central dense pigmented KP diffuse throughout, Clear    Anterior Chamber 4+ Cell, no hypopyon  Deep and quiet    Iris minimally reactive Round and reactive    Lens Pigment deposits Clear    Vitreous hazy view, peripheral vitreous clear Normal          Fundus Exam       Right Left    Disc margins visible  Pink and Sharp    Macula grossly attached, hazy view Flat, attached     Vessels hazy view, peripherally sharp Normal     Periphery grossly attached No Diabetic Retinopathy, no holes/tears/detachments     Limited view centrally however good view periphery                Assessment/Plan:     1. 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 Mariano Manrique (referring MD)  - uveitic workup ordered, however unable to obtain TB test due to weekend hours  - plan for intravitreal injections with vanc and ceftazidime with subconj dexamethasone     Recommend  Starting  Atropine ophth drop BID right eye  Starting Vigamox ophth drop QID right eye, first dose in ED  Increasing Durezol to QID  Continue Combigan, timolol   Patient's best contact number: [phone]           Patient Instructions:  Stop Teal top drop  Continue blue, yellow top drop    Discussed patient's history, physical, assessment and plan with the attending.    Seen with Dr. Hirsch and Dr. Nate Collins MD  [employer] Ophthalmology PGY-2

## 2020-10-24 NOTE — CONSULTS
Chief complaint/Reason for Consult: endophthalmitis versus iritis and corneal ulceration     History of Present Illness: Se Virgil Mcgraw is a 55 y.o. male who presents to the ED following ophthalmology appt with outside provider due to concern for endophthalmitis R eye. Patient underwent CEIOL in R eye 9/27. Patient notes vision was improving/doing well following surgery until earlier this week. Patient notes Monday he was experiencing swelling around the eye and was prescribed abx for possible preseptal cellulitis. Over the next several days, patient endorses gradually decreasing vision, and eye pain. At appt today, his ophthalmologist was concerned with infection and advised patient to come to [facility] for further eval with retina specialist.     Past Ocular Hx: history of cataract surgery 1 month ago, hx of glaucoma, does not use glasses or contacts     Current eye gtts: patient unsure of drop names however discussed with Dr. Manrique (Started durezol QID and diamox 10/19/20, previously on combigan, lumigan, and ilevro    PMHx:  has a past medical history of COPD (chronic obstructive pulmonary disease), Coronary artery disease, Diabetes mellitus type II, DJD (degenerative joint disease), Gout, Hypertension, Kidney stone, MI (myocardial infarction) (2010), Obesity, and JOSE (obstructive sleep apnea).     PSurgHx:  has a past surgical history that includes Foot surgery; kidney stones; Cardiac defibrillator placement; Surgery for bulging disc at C7; excisional biopsy left inguinal lymph node; Cystoscopy (N/A, 10/1/2020); and Cataract extraction (Right).     Home Medications:   Prior to Admission medications    Medication Sig Start Date End Date Taking? Authorizing Provider   albuterol (PROVENTIL/VENTOLIN HFA) 90 mcg/actuation inhaler Inhale 2 puffs into the lungs every 4 (four) hours as needed for Wheezing or Shortness of Breath. Rescue 1/11/20  Yes Rea Almodovar PA-C   allopurinol (ZYLOPRIM) 100 MG tablet   11/27/15  Yes Historical Provider   amiodarone (PACERONE) 400 MG tablet Take 200 mg by mouth every morning.  6/8/15  Yes Historical Provider   amlodipine (NORVASC) 10 MG tablet Take 10 mg by mouth every morning.  9/24/15  Yes Historical Provider   aspirin (ECOTRIN) 81 MG EC tablet Take 81 mg by mouth. 11/15/19  Yes Historical Provider   blood sugar diagnostic Strp ACCU CHEK KEVAN PLUS TEST STRIPS 1/31/20  Yes Historical Provider   blood-glucose meter Misc ACCU CHEK KEVAN PLUS METER: USE 4X/D 1/31/20  Yes Historical Provider   brimonidine 0.2% (ALPHAGAN) 0.2 % Drop Apply 1 drop to eye. 10/25/19  Yes Historical Provider   cetirizine (ZYRTEC) 10 MG tablet Take 10 mg by mouth once daily.   Yes Historical Provider   CRESTOR 20 mg tablet Take 20 mg by mouth every evening.  8/11/15  Yes Historical Provider   cyclobenzaprine (FLEXERIL) 10 MG tablet Take 10 mg by mouth every evening.  8/4/15  Yes Historical Provider   dicyclomine (BENTYL) 20 mg tablet Take 20 mg by mouth 2 (two) times daily.  8/11/15  Yes Historical Provider   DULoxetine (CYMBALTA) 60 MG capsule Take 60 mg by mouth. 7/27/20 10/25/20 Yes Historical Provider   fluticasone (FLONASE) 50 mcg/actuation nasal spray 1 spray by Each Nare route once daily.   Yes Historical Provider   fluticasone propionate (FLONASE) 50 mcg/actuation nasal spray 2 sprays (100 mcg total) by Each Nostril route once daily. 1/11/20  Yes Rea Almodovar PA-C   furosemide (LASIX) 20 MG tablet Take 20 mg by mouth daily as needed. Takes every other day 6/15/15  Yes Historical Provider   gabapentin (NEURONTIN) 300 MG capsule Take 300 mg by mouth every evening. Takes  Two 300 mg tabs at night   Yes Historical Provider   insulin glargine 100 units/mL (3mL) SubQ pen Inject 50 Units into the skin once daily.  11/15/19  Yes Historical Provider   INVOKANA 300 mg Tab tablet TK 1 T PO QD 3/26/17  Yes Historical Provider   isosorbide mononitrate (IMDUR) 30 MG 24 hr tablet Take 30 mg by mouth every  "morning.  8/11/15  Yes Historical Provider   lancets [facility] ACC[place] CHEK SOFTCLIX LANCETS: USE 4X DAILY 1/31/20  Yes Historical Provider   methazoLAMIDE (NEPTAZANE) 25 mg tablet Take 25 mg by mouth.   Yes Historical Provider   metoprolol succinate (TOPROL-XL) 50 MG 24 hr tablet Take 50 mg by mouth every evening.  8/11/15  Yes Historical Provider   nitroGLYCERIN (NITROSTAT) 0.4 MG SL tablet Place 0.4 mg under the tongue every 5 (five) minutes as needed for Chest pain.   Yes Historical Provider   omeprazole (PRILOSEC) 20 MG capsule TK 1 C PO QD FOR CONTROL OF STOMACH ACID BEFORE BREAKFAST 3/7/17  Yes Historical Provider   oxycodone-acetaminophen (PERCOCET)  mg per tablet  6/20/17  Yes Historical Provider   pen needle, diabetic (BD ULTRA-FINE SHORT PEN NEEDLE) 31 gauge x 5/16" Ndle Inject 1 each into the skin. 7/28/20  Yes Historical Provider   potassium chloride SA (K-DUR,KLOR-CON) 20 MEQ tablet  11/27/15  Yes Historical Provider   prednisoLONE acetate (PRED FORTE) 1 % DrpS INT 1 GTT INTO OD D FOR 10 DAYS 12/3/19  Yes Historical Provider   promethazine (PHENERGAN) 25 MG tablet TK 1 T PO  Q 4 H PRN NV 7/5/17  Yes Historical Provider   sotaloL (BETAPACE) 80 MG tablet TAKE 1 TABLET BY MOUTH TWICE DAILY 8/13/20  Yes Historical Provider   timolol maleate 0.5% (TIMOPTIC) 0.5 % Drop Apply 1 drop to eye. 10/25/19  Yes Historical Provider   zolpidem (AMBIEN) 10 mg Tab Take 5 mg by mouth nightly as needed.   Yes Historical Provider   promethazine-dextromethorphan (PROMETHAZINE-DM) 6.25-15 mg/5 mL Syrp Take 5 mLs by mouth every 4 to 6 hours as needed. 5 mL every 4 to 6 hours; maximum: 30 mL in 24 hours 3/18/20   Maile Staton NP   tamsulosin (FLOMAX) 0.4 mg Cp24 Take 1 capsule (0.4 mg total) by mouth once daily. 8/8/16 9/30/20  Naseem Mayer MD        Medications this encounter:    lidocaine (PF) 10 mg/ml (1%)  1 mL Intradermal Once       Allergies: is allergic to propoxyphene and darvocet a500 [propoxyphene " n-acetaminophen].     Social Hx:  reports that he has never smoked. He has never used smokeless tobacco. He reports that he does not drink alcohol or use drugs.     Family Hx: No family history of glaucoma. family history includes Asthma in his daughter; Cancer in his cousin and maternal uncle; Diabetes in his brother and mother; Heart disease in his father; Hypertension in his brother and mother; Kidney disease in his cousin.     ROS: Negative x 10 except for complaints as described in HPI; negative for fever, chills, weight loss, nausea, vomiting, diarrhea, shortness of breath, nasal discharge, cough, abdominal pain, dyspnea, difficulty moving arms and legs, confusion, dysuria, palpitations, or chest pain     Ocular examination/Dilated fundus examination:  Base Eye Exam     Visual Acuity (Snellen - Linear)       Right Left    Dist sc CFx1 20/25          Tonometry (Tonopen, 12:02 AM)       Right Left    Pressure 26 19          Pupils       Dark Light Shape React APD    Right 5 5 Round Minimal None    Left 4 3 Round Brisk None          Visual Fields       Right Left      Full    Restrictions Partial inner superior temporal, inferior temporal, superior nasal, inferior nasal deficiencies           Extraocular Movement       Right Left     Full, Ortho Full, Ortho          Neuro/Psych     Oriented x3: Yes    Mood/Affect: Normal          Dilation     Both eyes: 2.5% Phenylephrine, 1% Mydriacyl @ 11:48 PM            Slit Lamp and Fundus Exam     External Exam       Right Left    External Normal Normal          Slit Lamp Exam       Right Left    Lids/Lashes lid edema,  Normal    Conjunctiva/Sclera 1+ Injection, Limbal flush White and quiet    Cornea [id number] defect central defect over stromal scar, central dense pigmented KP diffuse sparing superior surface Clear    Anterior Chamber 4+ Cell, no hypopyon  Deep and quiet    Iris minimally reactive Round and reactive    Lens Pigment deposits Clear    Vitreous hazy view,  peripheral vitreous clear Normal          Fundus Exam       Right Left    Disc margins visible  Pink and Sharp    Macula grossly attached, hazy view Flat, attached     Vessels hazy view, peripherally sharp Normal    Periphery grossly attached No Diabetic Retinopathy, no holes/tears/detachments     Limited view centrally however good view periphery                Assessment/Plan:     1. 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 Mariano Manrique (referring MD)  - uveitic workup ordered, however unable to obtain TB test due to weekend hours, will order upon follow.  - r/b/a discussed with pt, plan for intravitreal injections with vanc 1mg and ceftazidime 2.25mg with subconj dexamethasone   - stromal scar, elevated IOP, and AC inflammation - ddx include HSV keratouveitis    Recommend  Starting Atropine ophth drop BID right eye  Starting Vigamox ophth drop QID right eye  Increasing Durezol to QID  Continue Combigan, timolol, stop lumigan     Patient's best contact number: [phone]     Patient seen and discussed with Dr. Awan, retina fellow     Proc Note:   Endophthalmitis, Right eye  Topical proparacaine and 0.2ml of 2% lidocaine injected into subconjunctival space were used for anesthesia.  Topical Betadine was used for antisepsis.  25G 1/2 needle on 10cc syringe was used to aspirate 0.7cc of clear fluid from vitreous cavity.  0.1cc of vancomycin and ceftazidime were injected 3.5-4.0 mm from corneal limbus @ 6:00 position.  0.1cc of dexamethasone was injected into subconjunctival space.  Following injection the IOP was less than thirty (<30) by tonopen.  The eye was then thoroughly irrigated with BSS.  Patient tolerated procedure well.  No complications were observed.  The Patient was educated that mild irritation tonight was normal secondary to topical Betadine  use.  The Patient was further educated that if significant pain or vision loss in occurred within 2-10 days, they should return immediately.

## 2020-10-25 ENCOUNTER — DOCUMENTATION ONLY (OUTPATIENT)
Dept: OPHTHALMOLOGY | Facility: CLINIC | Age: 55
End: 2020-10-25

## 2020-10-25 RX ORDER — VALACYCLOVIR HYDROCHLORIDE 1 G/1
1000 TABLET, FILM COATED ORAL 3 TIMES DAILY
Qty: 90 TABLET | Refills: 0 | Status: SHIPPED | OUTPATIENT
Start: 2020-10-25 | End: 2020-10-25

## 2020-10-25 RX ORDER — VALACYCLOVIR HYDROCHLORIDE 1 G/1
1000 TABLET, FILM COATED ORAL 3 TIMES DAILY
Qty: 90 TABLET | Refills: 0 | Status: SHIPPED | OUTPATIENT
Start: 2020-10-25 | End: 2021-02-22 | Stop reason: SDUPTHER

## 2020-10-25 NOTE — LETTER
"Se Virgil Bowden" Lucien was seen and treated in our eye clinic on 10/25/2020.  He may return to work on 10/27/2020    If you have any questions or concerns, please don't hesitate to call.    Nate Collins MD  [facility]  "

## 2020-10-25 NOTE — PROGRESS NOTES
Assessment /Plan     Ophthalmology progress note:     HPI: Patient doing okay since last being seen Friday. Notes symptoms are about the same, unchanged from prior. No new ocular or visual complaints. Notes compliance with current drop regimen. Uncertain how he got corneal scar, states he may have gotten it sometime prior to surgery after his brother was trying to put drops into his eye during a car ride. Denies past eye/corneal infections.    Past Ocular Hx: history of cataract surgery 1 month ago, hx of glaucoma, does not use glasses or contacts     Current eye gtts:   Atropine ophth drop BID right eye  Vigamox ophth drop QID right eye  Durezol to QID  Combigan, timolol    PMHx:  has a past medical history of COPD (chronic obstructive pulmonary disease), Coronary artery disease, Diabetes mellitus type II, DJD (degenerative joint disease), Gout, Hypertension, Kidney stone, MI (myocardial infarction) (2010), Obesity, and JOSE (obstructive sleep apnea).     PSurgHx:  has a past surgical history that includes Foot surgery; kidney stones; Cardiac defibrillator placement; Surgery for bulging disc at C7; excisional biopsy left inguinal lymph node; Cystoscopy (N/A, 10/1/2020); and Cataract extraction (Right).     Home Medications:   Prior to Admission medications    Medication Sig Start Date End Date Taking? Authorizing Provider   albuterol (PROVENTIL/VENTOLIN HFA) 90 mcg/actuation inhaler Inhale 2 puffs into the lungs every 4 (four) hours as needed for Wheezing or Shortness of Breath. Rescue 1/11/20  Yes Rea Almodovar PA-C   allopurinol (ZYLOPRIM) 100 MG tablet  11/27/15  Yes Historical Provider   amiodarone (PACERONE) 400 MG tablet Take 200 mg by mouth every morning.  6/8/15  Yes Historical Provider   amlodipine (NORVASC) 10 MG tablet Take 10 mg by mouth every morning.  9/24/15  Yes Historical Provider   aspirin (ECOTRIN) 81 MG EC tablet Take 81 mg by mouth. 11/15/19  Yes Historical Provider   blood sugar  diagnostic Strp ACCU CHEK KEVAN PLUS TEST STRIPS 1/31/20  Yes Historical Provider   blood-glucose meter [facility] ACCU CHEK KEVAN PLUS METER: USE 4X/D 1/31/20  Yes Historical Provider   brimonidine 0.2% (ALPHAGAN) 0.2 % Drop Apply 1 drop to eye. 10/25/19  Yes Historical Provider   cetirizine (ZYRTEC) 10 MG tablet Take 10 mg by mouth once daily.   Yes Historical Provider   CRESTOR 20 mg tablet Take 20 mg by mouth every evening.  8/11/15  Yes Historical Provider   cyclobenzaprine (FLEXERIL) 10 MG tablet Take 10 mg by mouth every evening.  8/4/15  Yes Historical Provider   dicyclomine (BENTYL) 20 mg tablet Take 20 mg by mouth 2 (two) times daily.  8/11/15  Yes Historical Provider   DULoxetine (CYMBALTA) 60 MG capsule Take 60 mg by mouth. 7/27/20 10/25/20 Yes Historical Provider   fluticasone (FLONASE) 50 mcg/actuation nasal spray 1 spray by Each Nare route once daily.   Yes Historical Provider   fluticasone propionate (FLONASE) 50 mcg/actuation nasal spray 2 sprays (100 mcg total) by Each Nostril route once daily. 1/11/20  Yes Rea Almodovar PA-C   furosemide (LASIX) 20 MG tablet Take 20 mg by mouth daily as needed. Takes every other day 6/15/15  Yes Historical Provider   gabapentin (NEURONTIN) 300 MG capsule Take 300 mg by mouth every evening. Takes  Two 300 mg tabs at night   Yes Historical Provider   insulin glargine 100 units/mL (3mL) SubQ pen Inject 50 Units into the skin once daily.  11/15/19  Yes Historical Provider   INVOKANA 300 mg Tab tablet TK 1 T PO QD 3/26/17  Yes Historical Provider   isosorbide mononitrate (IMDUR) 30 MG 24 hr tablet Take 30 mg by mouth every morning.  8/11/15  Yes Historical Provider   lancets [facility] ACCU CHEK SOFTCLIX LANCETS: USE 4X DAILY 1/31/20  Yes Historical Provider   methazoLAMIDE (NEPTAZANE) 25 mg tablet Take 25 mg by mouth.   Yes Historical Provider   metoprolol succinate (TOPROL-XL) 50 MG 24 hr tablet Take 50 mg by mouth every evening.  8/11/15  Yes Historical Provider  "  nitroGLYCERIN (NITROSTAT) 0.4 MG SL tablet Place 0.4 mg under the tongue every 5 (five) minutes as needed for Chest pain.   Yes Historical Provider   omeprazole (PRILOSEC) 20 MG capsule TK 1 C PO QD FOR CONTROL OF STOMACH ACID BEFORE BREAKFAST 3/7/17  Yes Historical Provider   oxycodone-acetaminophen (PERCOCET)  mg per tablet  6/20/17  Yes Historical Provider   pen needle, diabetic (BD ULTRA-FINE SHORT PEN NEEDLE) 31 gauge x 5/16" Ndle Inject 1 each into the skin. 7/28/20  Yes Historical Provider   potassium chloride SA (K-DUR,KLOR-CON) 20 MEQ tablet  11/27/15  Yes Historical Provider   prednisoLONE acetate (PRED FORTE) 1 % DrpS INT 1 GTT INTO OD D FOR 10 DAYS 12/3/19  Yes Historical Provider   promethazine (PHENERGAN) 25 MG tablet TK 1 T PO  Q 4 H PRN NV 7/5/17  Yes Historical Provider   sotaloL (BETAPACE) 80 MG tablet TAKE 1 TABLET BY MOUTH TWICE DAILY 8/13/20  Yes Historical Provider   timolol maleate 0.5% (TIMOPTIC) 0.5 % Drop Apply 1 drop to eye. 10/25/19  Yes Historical Provider   zolpidem (AMBIEN) 10 mg Tab Take 5 mg by mouth nightly as needed.   Yes Historical Provider   promethazine-dextromethorphan (PROMETHAZINE-DM) 6.25-15 mg/5 mL Syrp Take 5 mLs by mouth every 4 to 6 hours as needed. 5 mL every 4 to 6 hours; maximum: 30 mL in 24 hours 3/18/20   Maile Staton NP   tamsulosin (FLOMAX) 0.4 mg Cp24 Take 1 capsule (0.4 mg total) by mouth once daily. 8/8/16 9/30/20  Naseem Mayer MD        Medications this encounter:    lidocaine (PF) 10 mg/ml (1%)  1 mL Intradermal Once       Allergies: is allergic to propoxyphene and darvocet a500 [propoxyphene n-acetaminophen].     Social Hx:  reports that he has never smoked. He has never used smokeless tobacco. He reports that he does not drink alcohol or use drugs.     Family Hx: No family history of glaucoma. family history includes Asthma in his daughter; Cancer in his cousin and maternal uncle; Diabetes in his brother and mother; Heart disease in " his father; Hypertension in his brother and mother; Kidney disease in his cousin.     ROS: Negative x 10 except for complaints as described in HPI; negative for fever, chills, weight loss, nausea, vomiting, diarrhea, shortness of breath, nasal discharge, cough, abdominal pain, dyspnea, difficulty moving arms and legs, confusion, dysuria, palpitations, or chest pain     Ocular examination/Dilated fundus examination:    VA near card: 20/400 // 20/25  IOP: 23//20  Pupils: 5-->5, minimally reactive, no APD // 4-->3 round, brisk, no APD  CF: full  EOM: full    Ext: normal ou   L/L: lid edema // normal  C/S: 1+ injection, limbal flush // white and quiet   K:  3x3 mm stromal scar centrally, diffuse inferocenteral dense pigment KP // clear  AC: 3+ cell, flare // normal  I: minimally reactive // round and reactive   Lens: pigment deposits // clear   Vit: hazy view, peripheral clear // clear    DFE OD only   Nerve: margins visible   Mac: grossly attached, flat; hazy view  Ves: normal, hazy view  Abigail: grossly attached     Assessment/Plan:     1. Anterior uveitis, OD  - patient sent to ED Friday by regular ophthalmologist for concern of endophthalmitis   - hx of CEIOL OD 9/27/20, presented with decrease in vision with mod pain x1 week  - started durezol QID, no improvement after 5 days per Dr. 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 Awan, retina fellow   For exam results, see Encounter Report.    There are no diagnoses linked to this encounter.

## 2020-10-26 ENCOUNTER — DOCUMENTATION ONLY (OUTPATIENT)
Dept: OPHTHALMOLOGY | Facility: CLINIC | Age: 55
End: 2020-10-26

## 2020-10-26 LAB — ANA SER QL IF: NORMAL

## 2020-10-26 NOTE — PROGRESS NOTES
Spoke with microbiology laboratory about sending remaining vitreous sample from vitreous tap for HSV PCR. Micro confirmed enough sample present for send out, specimen sent to [place] for processing.     Nate Collins MD  [facility] PGY2    Assessment /Plan     For exam results, see Encounter Report.    There are no diagnoses linked to this encounter.

## 2020-10-27 ENCOUNTER — OFFICE VISIT (OUTPATIENT)
Dept: OPHTHALMOLOGY | Facility: CLINIC | Age: 55
End: 2020-10-27
Attending: OPHTHALMOLOGY
Payer: MEDICARE

## 2020-10-27 DIAGNOSIS — H40.9 GLAUCOMA OF RIGHT EYE, UNSPECIFIED GLAUCOMA TYPE: ICD-10-CM

## 2020-10-27 DIAGNOSIS — H18.20 CORNEAL EDEMA: ICD-10-CM

## 2020-10-27 DIAGNOSIS — H20.9 UVEITIS: Primary | ICD-10-CM

## 2020-10-27 LAB
ACE SERPL-CCNC: <5 U/L (ref 16–85)
BACTERIA SPEC AEROBE CULT: NO GROWTH
TREPONEMA PALLIDUM IGG+IGM AB [PRESENCE] IN SERUM OR PLASMA BY IMMUNOASSAY: NONREACTIVE

## 2020-10-27 PROCEDURE — 99999 PR PBB SHADOW E&M-EST. PATIENT-LVL IV: ICD-10-PCS | Mod: PBBFAC,,, | Performed by: OPHTHALMOLOGY

## 2020-10-27 PROCEDURE — 92014 PR EYE EXAM, EST PATIENT,COMPREHESV: ICD-10-PCS | Mod: S$GLB,,, | Performed by: OPHTHALMOLOGY

## 2020-10-27 PROCEDURE — 99999 PR PBB SHADOW E&M-EST. PATIENT-LVL IV: CPT | Mod: PBBFAC,,, | Performed by: OPHTHALMOLOGY

## 2020-10-27 PROCEDURE — 92014 COMPRE OPH EXAM EST PT 1/>: CPT | Mod: S$GLB,,, | Performed by: OPHTHALMOLOGY

## 2020-10-27 RX ORDER — ACETAZOLAMIDE 500 MG/1
500 CAPSULE, EXTENDED RELEASE ORAL 2 TIMES DAILY
Qty: 60 CAPSULE | Refills: 0 | Status: SHIPPED | OUTPATIENT
Start: 2020-10-27 | End: 2020-11-26

## 2020-10-27 NOTE — PROGRESS NOTES
HPI     DLS 10/25/2020 [facility]     Pt reports eye pain OD # 8 on pain scale  Light sensitive OD    gtts   Atropine BID OD  Durezol QID OD  Vigamox QID OD  Combigan/tavon. BID OD     Last edited by Batsheva Iqbal on 10/27/2020  3:33 PM. (History)            Assessment /Plan     For exam results, see Encounter Report.    Uveitis    Glaucoma of right eye, unspecified glaucoma type    Corneal edema      Hx Cataract surgery with Dr Manrique 1 month ago with recent inflammation and high pressure.  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  Durezol hourly, Diamox bid, cont Valtrex 1gm tid  FU Fri (with retina if unable to come in AM)

## 2020-10-28 LAB
HSV1 DNA SPEC QL NAA+PROBE: POSITIVE
HSV2 DNA SPEC QL NAA+PROBE: NEGATIVE
SPECIMEN SOURCE: ABNORMAL

## 2020-10-29 LAB — LYSOZYME SERPL-MCNC: 1.59 UG/ML

## 2020-10-30 LAB
HLA B27 INTERPRETATION: NORMAL
HLA-B27 RELATED AG QL: NEGATIVE
HLA-B27 RELATED AG QL: NORMAL

## 2020-11-02 LAB — BACTERIA SPEC ANAEROBE CULT: NORMAL

## 2020-11-28 ENCOUNTER — HOSPITAL ENCOUNTER (EMERGENCY)
Facility: HOSPITAL | Age: 55
Discharge: HOME | End: 2020-11-28
Attending: EMERGENCY MEDICINE
Payer: MEDICARE

## 2020-11-28 VITALS
TEMPERATURE: 99 F | BODY MASS INDEX: 32.89 KG/M2 | RESPIRATION RATE: 18 BRPM | SYSTOLIC BLOOD PRESSURE: 137 MMHG | OXYGEN SATURATION: 98 % | DIASTOLIC BLOOD PRESSURE: 67 MMHG | WEIGHT: 210 LBS | HEART RATE: 80 BPM

## 2020-11-28 DIAGNOSIS — K04.01 ACUTE PULPITIS: ICD-10-CM

## 2020-11-28 DIAGNOSIS — K08.89 PAIN, DENTAL: Primary | ICD-10-CM

## 2020-11-28 PROCEDURE — 99283 EMERGENCY DEPT VISIT LOW MDM: CPT | Mod: ER

## 2020-11-28 RX ORDER — PENICILLIN V POTASSIUM 500 MG/1
500 TABLET, FILM COATED ORAL 4 TIMES DAILY
Qty: 28 TABLET | Refills: 0 | Status: SHIPPED | OUTPATIENT
Start: 2020-11-28 | End: 2020-12-05

## 2020-11-28 NOTE — DISCHARGE INSTRUCTIONS
Continue to take your percocet 7.5mg as previously prescribed by your orthopedic pain management doctor.

## 2020-11-28 NOTE — ED PROVIDER NOTES
"Encounter Date: 11/28/2020       History     Chief Complaint   Patient presents with    Dental Pain     Pt states," I have a bad tooth that has been bothering me for two days."     55-year-old male with history of coronary disease, COPD, diabetes, hypertension, recent treatment for endophthalmitis presents complaining of dental pain.  Right upper dental pain x2 days.  No facial swelling.  No fever.  No difficulty opening closing mouth.  Tolerating p.o..  No change in voice.  States his eye has healed and is not related to present complaint.        Review of patient's allergies indicates:   Allergen Reactions    Propoxyphene     Darvocet a500 [propoxyphene n-acetaminophen] Itching     Past Medical History:   Diagnosis Date    COPD (chronic obstructive pulmonary disease)     Coronary artery disease     Diabetes mellitus type II     DJD (degenerative joint disease)     Gout     Hypertension     Kidney stone     MI (myocardial infarction) 2010    Obesity     JOSE (obstructive sleep apnea)      Past Surgical History:   Procedure Laterality Date    CARDIAC DEFIBRILLATOR PLACEMENT      CATARACT EXTRACTION Right     CYSTOSCOPY N/A 10/1/2020    Procedure: CYSTOSCOPY;  Surgeon: Monica Lieberman MD;  Location: [facility];  Service: Urology;  Laterality: N/A;  RN PRE OP Covid screen 9-  C A    excisional biopsy left inguinal lymph node      FOOT SURGERY      right foot surgery     kidney stones      lithotripsy    Surgery for bulging disc at C7       Family History   Problem Relation Age of Onset    Diabetes Mother     Hypertension Mother     Heart disease Father     Diabetes Brother     Hypertension Brother     Asthma Daughter     Cancer Maternal Uncle         prostate    Cancer Cousin     Kidney disease Cousin      Social History     Tobacco Use    Smoking status: Never Smoker    Smokeless tobacco: Never Used   Substance Use Topics    Alcohol use: No    Drug use: No     Review of Systems "   Constitutional: Negative for fever.   HENT: Positive for dental problem. Negative for congestion, drooling, ear discharge, ear pain, facial swelling, rhinorrhea, sinus pressure, sinus pain, sore throat, tinnitus, trouble swallowing and voice change.    Respiratory: Negative for shortness of breath.    Cardiovascular: Negative for chest pain.   Gastrointestinal: Negative for nausea.   Genitourinary: Negative for dysuria.   Musculoskeletal: Negative for back pain.   Skin: Negative for rash.   Neurological: Negative for weakness.   Hematological: Does not bruise/bleed easily.       Physical Exam     Initial Vitals [11/28/20 1550]   BP Pulse Resp Temp SpO2   134/69 80 16 97.8 °F (36.6 °C) 98 %      MAP       --         Physical Exam    Nursing note and vitals reviewed.  Constitutional: He appears well-developed and well-nourished.   HENT:   Head: Atraumatic.   No trismus.  Oropharyngeal exam is normal.  No facial swelling.  There is tenderness to the right upper 3rd and 4th teeth.  Patient has gingivitis and general poor dental hygiene.   Eyes: EOM are normal. Pupils are equal, round, and reactive to light.   Neck: Normal range of motion. Neck supple. No JVD present.   Cardiovascular: Normal rate, regular rhythm, normal heart sounds and intact distal pulses. Exam reveals no gallop and no friction rub.    No murmur heard.  Pulmonary/Chest: Breath sounds normal. No respiratory distress. He has no wheezes. He has no rales.   Abdominal: Soft. Bowel sounds are normal. There is no abdominal tenderness. There is no rebound and no guarding.   Musculoskeletal: Normal range of motion.   Lymphadenopathy:     He has no cervical adenopathy.   Neurological: He is alert and oriented to person, place, and time. He has normal strength and normal reflexes. GCS score is 15. GCS eye subscore is 4. GCS verbal subscore is 5. GCS motor subscore is 6.   Skin: Skin is warm and dry.   Psychiatric: He has a normal mood and affect. Thought  content normal.         ED Course   Procedures  Labs Reviewed - No data to display       Imaging Results    None      I specifically asked that the patient was on any chronic pain medication for any reason.  And he replied no.  Review of the  shows that an orthopedic prescribes him # 70 Percocet 7.5/325 mg monthly.                                  Clinical Impression:       ICD-10-CM ICD-9-CM   1. Pain, dental  K08.89 525.9   2. Acute pulpitis  K04.01 522.0                          ED Disposition Condition    Discharge Stable        ED Prescriptions     Medication Sig Dispense Start Date End Date Auth. Provider    penicillin v potassium (VEETID) 500 MG tablet Take 1 tablet (500 mg total) by mouth 4 (four) times daily. for 7 days 28 tablet 11/28/2020 12/5/2020 Jesu Huynh MD        Follow-up Information     Follow up With Specialties Details Why Contact Info        follow up with dentist. call monday.                                       Jesu Huynh MD  11/28/20 4430

## 2020-12-20 ENCOUNTER — HOSPITAL ENCOUNTER (EMERGENCY)
Facility: HOSPITAL | Age: 55
Discharge: HOME | End: 2020-12-20
Attending: EMERGENCY MEDICINE
Payer: MEDICARE

## 2020-12-20 VITALS
TEMPERATURE: 98 F | SYSTOLIC BLOOD PRESSURE: 138 MMHG | HEIGHT: 68 IN | DIASTOLIC BLOOD PRESSURE: 90 MMHG | BODY MASS INDEX: 32.13 KG/M2 | HEART RATE: 91 BPM | WEIGHT: 212 LBS | RESPIRATION RATE: 18 BRPM | OXYGEN SATURATION: 97 %

## 2020-12-20 DIAGNOSIS — K02.9 DENTAL CARIES: Primary | ICD-10-CM

## 2020-12-20 PROCEDURE — 99283 EMERGENCY DEPT VISIT LOW MDM: CPT | Mod: ER

## 2020-12-20 RX ORDER — PENICILLIN V POTASSIUM 500 MG/1
500 TABLET, FILM COATED ORAL 4 TIMES DAILY
Qty: 28 TABLET | Refills: 0 | Status: SHIPPED | OUTPATIENT
Start: 2020-12-20 | End: 2020-12-27

## 2020-12-21 NOTE — ED PROVIDER NOTES
"Encounter Date: 12/20/2020    SCRIBE #1 NOTE: I, Melodie Mustafa, am scribing for, and in the presence of,  Frances Monaco NP. I have scribed the following portions of the note - Other sections scribed: HPI, ROS, PE.       History     Chief Complaint   Patient presents with    Oral Swelling     right upper jaw dental pain, worse with biting, denies difficulty swallowing or breathing     Se Virgil Mcgraw is a 55 y.o. male with NIDDM, COPD, HTN, CAD, JOSE, gout and others who presents to the ED complaining of acute, intermittent, right upper dental pain with associated right sided jaw pain x "for awhile", worsening and constant x 3 days ago. Pain worsens with biting down/chewing food. Denies HA. Denies fever and chills. Denies facial swelling. Denies difficulty swallowing and voice change. Denies ear pain/discharge. Denies CP and SOB. Denies congestion, runny nose, sore throat and cough. Denies relief s/p OTC Advil and Tylenol.      The history is provided by the patient. No  was used.   Dental Pain  Primary symptoms do not include mouth pain, oral bleeding, headaches, fever, shortness of breath, sore throat, angioedema or cough. The symptoms are worsening. Episode frequency: intermittent x "for awhile", constant x 3 days ago.   Additional symptoms include: jaw pain. Additional symptoms do not include: gum swelling, gum tenderness, purulent gums, trismus, facial swelling, trouble swallowing, pain with swallowing, excessive salivation, dry mouth, taste disturbance, smell disturbance, drooling, ear pain and swollen glands. Associated symptoms comments: Pain with chewing/biting down.     Review of patient's allergies indicates:   Allergen Reactions    Propoxyphene     Darvocet a500 [propoxyphene n-acetaminophen] Itching     Past Medical History:   Diagnosis Date    COPD (chronic obstructive pulmonary disease)     Coronary artery disease     Diabetes mellitus type II     DJD (degenerative joint " disease)     Gout     Hypertension     Kidney stone     MI (myocardial infarction) 2010    Obesity     JOSE (obstructive sleep apnea)      Past Surgical History:   Procedure Laterality Date    CARDIAC DEFIBRILLATOR PLACEMENT      CATARACT EXTRACTION Right     CYSTOSCOPY N/A 10/1/2020    Procedure: CYSTOSCOPY;  Surgeon: Monica Lieberman MD;  Location: [facility];  Service: Urology;  Laterality: N/A;  RN PRE OP Covid screen 9-  C A    excisional biopsy left inguinal lymph node      FOOT SURGERY      right foot surgery     kidney stones      lithotripsy    Surgery for bulging disc at C7       Family History   Problem Relation Age of Onset    Diabetes Mother     Hypertension Mother     Heart disease Father     Diabetes Brother     Hypertension Brother     Asthma Daughter     Cancer Maternal Uncle         prostate    Cancer Cousin     Kidney disease Cousin      Social History     Tobacco Use    Smoking status: Never Smoker    Smokeless tobacco: Never Used   Substance Use Topics    Alcohol use: No    Drug use: No     Review of Systems   Constitutional: Negative.  Negative for chills and fever.   HENT: Positive for dental problem. Negative for congestion, drooling, ear discharge, ear pain, facial swelling, rhinorrhea, sore throat, trouble swallowing and voice change.    Eyes: Negative.    Respiratory: Negative.  Negative for cough and shortness of breath.    Cardiovascular: Negative.  Negative for chest pain.   Gastrointestinal: Negative.  Negative for abdominal pain, nausea and vomiting.   Endocrine: Negative.    Genitourinary: Negative.    Musculoskeletal: Negative.  Negative for myalgias.   Skin: Negative.  Negative for rash.   Allergic/Immunologic: Negative.    Neurological: Negative.  Negative for weakness, numbness and headaches.   Hematological: Negative.    Psychiatric/Behavioral: Negative.    All other systems reviewed and are negative.      Physical Exam     Initial Vitals [12/20/20  "1644]   BP Pulse Resp Temp SpO2   133/79 84 16 98 °F (36.7 °C) (!) 94 %      MAP       --         Physical Exam    Nursing note and vitals reviewed.  Constitutional: He appears well-developed.   HENT:   Head: Normocephalic and atraumatic.   Right Ear: External ear normal.   Left Ear: External ear normal.   Nose: Nose normal.   Mouth/Throat: Oropharynx is clear and moist and mucous membranes are normal. He does not have dentures. No oral lesions. Dental caries present. No dental abscesses, uvula swelling or lacerations. No oropharyngeal exudate, posterior oropharyngeal edema or posterior oropharyngeal erythema.       Eyes: Conjunctivae are normal.   Neck: Normal range of motion. Neck supple.   Cardiovascular: Normal rate, regular rhythm, S1 normal, S2 normal, normal heart sounds and intact distal pulses. Exam reveals no gallop and no friction rub.    No murmur heard.  Pulmonary/Chest: Effort normal and breath sounds normal. No respiratory distress. He has no wheezes. He has no rhonchi. He has no rales.   Abdominal: Soft. There is no abdominal tenderness.   Musculoskeletal: Normal range of motion. No tenderness or edema.   Neurological: He is alert and oriented to person, place, and time.   Skin: Skin is warm and dry. Capillary refill takes less than 2 seconds. No rash noted.   Psychiatric: He has a normal mood and affect. His behavior is normal.         ED Course   Procedures  Labs Reviewed - No data to display       Imaging Results    None          Medical Decision Making:   History:   Old Medical Records: I decided to obtain old medical records.  Initial Assessment:   Se Virgil Mcgraw is a 55 y.o. male with NIDDM, COPD, HTN, CAD, JOSE, gout and others who presents to the ED complaining of acute, intermittent, right upper dental pain with associated right sided jaw pain x "for awhile", worsening and constant x 3 days ago. Pain worsens with biting down/chewing food. Denies HA. Denies fever and chills. Denies facial " swelling. Denies difficulty swallowing and voice change. Denies ear pain/discharge. Denies CP and SOB. Denies congestion, runny nose, sore throat and cough. Denies relief s/p OTC medications.       Differential Diagnosis:   Differential Dx includes, but is not limited to dental caries, dental abscess, melissa's angina  ED Management:  Physical exam.  Discharged with Pen VK and tylenol.   Prescription monitoring assessed. Pt had oxycodone #70 tablets on 12/10/2020.  Follow-up with dentist in 2 days.               Scribe Attestation:   Scribe #1: I performed the above scribed service and the documentation accurately describes the services I performed. I attest to the accuracy of the note.               This document was produced by a scribe under my direction and in my presence. I agree with the content of the note and have made any necessary edits.     KOJO Renner    12/20/2020 6:38 PM       Clinical Impression:       ICD-10-CM ICD-9-CM   1. Dental caries  K02.9 521.00                          ED Disposition Condition    Discharge Stable        ED Prescriptions     Medication Sig Dispense Start Date End Date Auth. Provider    penicillin v potassium (VEETID) 500 MG tablet Take 1 tablet (500 mg total) by mouth 4 (four) times daily. for 7 days 28 tablet 12/20/2020 12/27/2020 KOJO Parr        Follow-up Information     Follow up With Specialties Details Why Contact Info    Sanket Hassan DDS [facility] In 2 days  [address] [place] [id number] [phone]                                         KOJO Parr  12/20/20 7055

## 2021-01-15 ENCOUNTER — OFFICE VISIT (OUTPATIENT)
Dept: UROLOGY | Facility: CLINIC | Age: 56
End: 2021-01-15
Payer: MEDICARE

## 2021-01-15 VITALS — BODY MASS INDEX: 32.13 KG/M2 | WEIGHT: 212 LBS | HEIGHT: 68 IN

## 2021-01-15 DIAGNOSIS — E11.69 TYPE 2 DIABETES MELLITUS WITH OTHER SPECIFIED COMPLICATION, UNSPECIFIED WHETHER LONG TERM INSULIN USE: Primary | ICD-10-CM

## 2021-01-15 DIAGNOSIS — N40.1 BPH WITH OBSTRUCTION/LOWER URINARY TRACT SYMPTOMS: ICD-10-CM

## 2021-01-15 DIAGNOSIS — N13.8 BPH WITH OBSTRUCTION/LOWER URINARY TRACT SYMPTOMS: ICD-10-CM

## 2021-01-15 PROCEDURE — 99214 PR OFFICE/OUTPT VISIT, EST, LEVL IV, 30-39 MIN: ICD-10-PCS | Mod: S$PBB,,, | Performed by: STUDENT IN AN ORGANIZED HEALTH CARE EDUCATION/TRAINING PROGRAM

## 2021-01-15 PROCEDURE — 99999 PR PBB SHADOW E&M-EST. PATIENT-LVL IV: CPT | Mod: PBBFAC,,, | Performed by: STUDENT IN AN ORGANIZED HEALTH CARE EDUCATION/TRAINING PROGRAM

## 2021-01-15 PROCEDURE — 99214 OFFICE O/P EST MOD 30 MIN: CPT | Mod: PBBFAC | Performed by: STUDENT IN AN ORGANIZED HEALTH CARE EDUCATION/TRAINING PROGRAM

## 2021-01-15 PROCEDURE — 99999 PR PBB SHADOW E&M-EST. PATIENT-LVL IV: ICD-10-PCS | Mod: PBBFAC,,, | Performed by: STUDENT IN AN ORGANIZED HEALTH CARE EDUCATION/TRAINING PROGRAM

## 2021-01-15 PROCEDURE — 99214 OFFICE O/P EST MOD 30 MIN: CPT | Mod: S$PBB,,, | Performed by: STUDENT IN AN ORGANIZED HEALTH CARE EDUCATION/TRAINING PROGRAM

## 2021-01-19 ENCOUNTER — LAB VISIT (OUTPATIENT)
Dept: LAB | Facility: HOSPITAL | Age: 56
End: 2021-01-19
Attending: STUDENT IN AN ORGANIZED HEALTH CARE EDUCATION/TRAINING PROGRAM
Payer: MEDICARE

## 2021-01-19 DIAGNOSIS — N13.8 BPH WITH OBSTRUCTION/LOWER URINARY TRACT SYMPTOMS: ICD-10-CM

## 2021-01-19 DIAGNOSIS — E11.69 TYPE 2 DIABETES MELLITUS WITH OTHER SPECIFIED COMPLICATION, UNSPECIFIED WHETHER LONG TERM INSULIN USE: ICD-10-CM

## 2021-01-19 DIAGNOSIS — N40.1 BPH WITH OBSTRUCTION/LOWER URINARY TRACT SYMPTOMS: ICD-10-CM

## 2021-01-19 PROCEDURE — 36415 COLL VENOUS BLD VENIPUNCTURE: CPT | Mod: PO

## 2021-01-19 PROCEDURE — 83036 HEMOGLOBIN GLYCOSYLATED A1C: CPT

## 2021-01-19 PROCEDURE — 84153 ASSAY OF PSA TOTAL: CPT

## 2021-01-20 LAB
COMPLEXED PSA SERPL-MCNC: 0.67 NG/ML (ref 0–4)
ESTIMATED AVG GLUCOSE: 169 MG/DL (ref 68–131)
HBA1C MFR BLD HPLC: 7.5 % (ref 4–5.6)

## 2021-01-21 ENCOUNTER — TELEPHONE (OUTPATIENT)
Dept: UROLOGY | Facility: CLINIC | Age: 56
End: 2021-01-21

## 2021-01-25 ENCOUNTER — TELEPHONE (OUTPATIENT)
Dept: UROLOGY | Facility: CLINIC | Age: 56
End: 2021-01-25

## 2021-02-22 ENCOUNTER — OFFICE VISIT (OUTPATIENT)
Dept: OPHTHALMOLOGY | Facility: CLINIC | Age: 56
End: 2021-02-22
Attending: OPHTHALMOLOGY
Payer: MEDICARE

## 2021-02-22 DIAGNOSIS — H40.41X0 GLAUCOMA OF RIGHT EYE SECONDARY TO EYE INFLAMMATION, UNSPECIFIED GLAUCOMA STAGE: ICD-10-CM

## 2021-02-22 DIAGNOSIS — B00.52 HERPES KERATITIS: ICD-10-CM

## 2021-02-22 DIAGNOSIS — H18.20 CORNEAL EDEMA: ICD-10-CM

## 2021-02-22 DIAGNOSIS — H20.9 UVEITIS: Primary | ICD-10-CM

## 2021-02-22 PROCEDURE — 1126F AMNT PAIN NOTED NONE PRSNT: CPT | Mod: S$GLB,,, | Performed by: OPHTHALMOLOGY

## 2021-02-22 PROCEDURE — 99214 PR OFFICE/OUTPT VISIT, EST, LEVL IV, 30-39 MIN: ICD-10-PCS | Mod: S$GLB,,, | Performed by: OPHTHALMOLOGY

## 2021-02-22 PROCEDURE — 99999 PR PBB SHADOW E&M-EST. PATIENT-LVL IV: CPT | Mod: PBBFAC,,, | Performed by: OPHTHALMOLOGY

## 2021-02-22 PROCEDURE — 1126F PR PAIN SEVERITY QUANTIFIED, NO PAIN PRESENT: ICD-10-PCS | Mod: S$GLB,,, | Performed by: OPHTHALMOLOGY

## 2021-02-22 PROCEDURE — 99214 OFFICE O/P EST MOD 30 MIN: CPT | Mod: S$GLB,,, | Performed by: OPHTHALMOLOGY

## 2021-02-22 PROCEDURE — 99999 PR PBB SHADOW E&M-EST. PATIENT-LVL IV: ICD-10-PCS | Mod: PBBFAC,,, | Performed by: OPHTHALMOLOGY

## 2021-02-22 RX ORDER — AMLODIPINE BESYLATE 5 MG/1
5 TABLET ORAL
COMMUNITY
Start: 2021-01-21 | End: 2021-04-21

## 2021-02-22 RX ORDER — SPIRONOLACTONE 25 MG/1
25 TABLET ORAL DAILY PRN
COMMUNITY
Start: 2021-02-21 | End: 2023-04-14

## 2021-02-22 RX ORDER — BRIMONIDINE TARTRATE 2 MG/ML
1 SOLUTION/ DROPS OPHTHALMIC 2 TIMES DAILY
Qty: 15 ML | Refills: 1 | Status: ON HOLD | OUTPATIENT
Start: 2021-02-22 | End: 2021-10-07 | Stop reason: HOSPADM

## 2021-02-22 RX ORDER — VALACYCLOVIR HYDROCHLORIDE 1 G/1
1000 TABLET, FILM COATED ORAL 3 TIMES DAILY
Qty: 90 TABLET | Refills: 0 | OUTPATIENT
Start: 2021-02-22 | End: 2021-09-26

## 2021-02-22 RX ORDER — PILOCARPINE HYDROCHLORIDE 10 MG/ML
1 SOLUTION/ DROPS OPHTHALMIC
COMMUNITY
End: 2021-02-22 | Stop reason: DRUGHIGH

## 2021-02-22 RX ORDER — TIMOLOL MALEATE 5 MG/ML
1 SOLUTION/ DROPS OPHTHALMIC 2 TIMES DAILY
Qty: 15 ML | Refills: 1 | Status: ON HOLD | OUTPATIENT
Start: 2021-02-22 | End: 2021-10-07 | Stop reason: HOSPADM

## 2021-02-22 RX ORDER — PREDNISOLONE ACETATE 10 MG/ML
1 SUSPENSION/ DROPS OPHTHALMIC 4 TIMES DAILY
Qty: 15 ML | Refills: 0 | Status: SHIPPED | OUTPATIENT
Start: 2021-02-22 | End: 2022-06-24 | Stop reason: ALTCHOICE

## 2021-02-22 RX ORDER — VALACYCLOVIR HYDROCHLORIDE 1 G/1
1000 TABLET, FILM COATED ORAL 3 TIMES DAILY
Qty: 90 TABLET | Refills: 0 | Status: SHIPPED | OUTPATIENT
Start: 2021-02-22 | End: 2021-02-22 | Stop reason: SDUPTHER

## 2021-02-22 RX ORDER — DORZOLAMIDE HCL 20 MG/ML
1 SOLUTION/ DROPS OPHTHALMIC 2 TIMES DAILY
Qty: 10 ML | Refills: 6 | Status: ON HOLD | OUTPATIENT
Start: 2021-02-22 | End: 2021-03-24

## 2021-02-23 ENCOUNTER — TELEPHONE (OUTPATIENT)
Dept: OPHTHALMOLOGY | Facility: CLINIC | Age: 56
End: 2021-02-23

## 2021-04-27 ENCOUNTER — OFFICE VISIT (OUTPATIENT)
Dept: URGENT CARE | Facility: CLINIC | Age: 56
End: 2021-04-27
Payer: MEDICARE

## 2021-04-27 VITALS
HEIGHT: 68 IN | SYSTOLIC BLOOD PRESSURE: 109 MMHG | WEIGHT: 212 LBS | HEART RATE: 108 BPM | TEMPERATURE: 98 F | DIASTOLIC BLOOD PRESSURE: 76 MMHG | OXYGEN SATURATION: 96 % | BODY MASS INDEX: 32.13 KG/M2 | RESPIRATION RATE: 18 BRPM

## 2021-04-27 DIAGNOSIS — M79.604 RIGHT LEG PAIN: Primary | ICD-10-CM

## 2021-04-27 PROCEDURE — 1125F AMNT PAIN NOTED PAIN PRSNT: CPT | Mod: S$GLB,,, | Performed by: PHYSICIAN ASSISTANT

## 2021-04-27 PROCEDURE — 3008F PR BODY MASS INDEX (BMI) DOCUMENTED: ICD-10-PCS | Mod: CPTII,S$GLB,, | Performed by: PHYSICIAN ASSISTANT

## 2021-04-27 PROCEDURE — 3008F BODY MASS INDEX DOCD: CPT | Mod: CPTII,S$GLB,, | Performed by: PHYSICIAN ASSISTANT

## 2021-04-27 PROCEDURE — 99213 OFFICE O/P EST LOW 20 MIN: CPT | Mod: S$GLB,,, | Performed by: PHYSICIAN ASSISTANT

## 2021-04-27 PROCEDURE — 99213 PR OFFICE/OUTPT VISIT, EST, LEVL III, 20-29 MIN: ICD-10-PCS | Mod: S$GLB,,, | Performed by: PHYSICIAN ASSISTANT

## 2021-04-27 PROCEDURE — 1125F PR PAIN SEVERITY QUANTIFIED, PAIN PRESENT: ICD-10-PCS | Mod: S$GLB,,, | Performed by: PHYSICIAN ASSISTANT

## 2021-07-23 ENCOUNTER — HOSPITAL ENCOUNTER (EMERGENCY)
Facility: HOSPITAL | Age: 56
Discharge: HOME | End: 2021-07-23
Attending: EMERGENCY MEDICINE
Payer: MEDICARE

## 2021-07-23 VITALS
RESPIRATION RATE: 18 BRPM | TEMPERATURE: 98 F | DIASTOLIC BLOOD PRESSURE: 76 MMHG | BODY MASS INDEX: 34.69 KG/M2 | SYSTOLIC BLOOD PRESSURE: 131 MMHG | HEART RATE: 76 BPM | HEIGHT: 67 IN | WEIGHT: 221 LBS | OXYGEN SATURATION: 96 %

## 2021-07-23 DIAGNOSIS — G89.29 CHRONIC PERIPHERAL NEUROPATHIC PAIN: Primary | ICD-10-CM

## 2021-07-23 DIAGNOSIS — M79.2 CHRONIC PERIPHERAL NEUROPATHIC PAIN: Primary | ICD-10-CM

## 2021-07-23 PROCEDURE — 99284 EMERGENCY DEPT VISIT MOD MDM: CPT | Mod: 25

## 2021-07-23 PROCEDURE — 96372 THER/PROPH/DIAG INJ SC/IM: CPT

## 2021-07-23 PROCEDURE — 63600175 PHARM REV CODE 636 W HCPCS: Performed by: PHYSICIAN ASSISTANT

## 2021-07-23 RX ORDER — HYDROMORPHONE HYDROCHLORIDE 2 MG/ML
1 INJECTION, SOLUTION INTRAMUSCULAR; INTRAVENOUS; SUBCUTANEOUS
Status: COMPLETED | OUTPATIENT
Start: 2021-07-23 | End: 2021-07-23

## 2021-07-23 RX ORDER — ORPHENADRINE CITRATE 100 MG/1
100 TABLET, EXTENDED RELEASE ORAL 2 TIMES DAILY
Qty: 8 TABLET | Refills: 0 | Status: SHIPPED | OUTPATIENT
Start: 2021-07-23 | End: 2021-07-27

## 2021-07-23 RX ADMIN — HYDROMORPHONE HYDROCHLORIDE 1 MG: 2 INJECTION INTRAMUSCULAR; INTRAVENOUS; SUBCUTANEOUS at 06:07

## 2021-08-03 ENCOUNTER — HOSPITAL ENCOUNTER (EMERGENCY)
Facility: HOSPITAL | Age: 56
Discharge: HOME | End: 2021-08-03
Attending: EMERGENCY MEDICINE
Payer: MEDICARE

## 2021-08-03 VITALS
HEIGHT: 67 IN | DIASTOLIC BLOOD PRESSURE: 78 MMHG | SYSTOLIC BLOOD PRESSURE: 145 MMHG | BODY MASS INDEX: 34.69 KG/M2 | HEART RATE: 77 BPM | OXYGEN SATURATION: 95 % | TEMPERATURE: 98 F | WEIGHT: 221 LBS | RESPIRATION RATE: 16 BRPM

## 2021-08-03 DIAGNOSIS — M25.559 HIP PAIN: Primary | ICD-10-CM

## 2021-08-03 DIAGNOSIS — G89.29 OTHER CHRONIC PAIN: ICD-10-CM

## 2021-08-03 LAB — POCT GLUCOSE: 147 MG/DL (ref 70–110)

## 2021-08-03 PROCEDURE — 99284 EMERGENCY DEPT VISIT MOD MDM: CPT | Mod: 25,ER

## 2021-08-03 PROCEDURE — 82962 GLUCOSE BLOOD TEST: CPT | Mod: ER

## 2021-08-03 RX ORDER — MELOXICAM 15 MG/1
15 TABLET ORAL DAILY
Qty: 30 TABLET | Refills: 2 | Status: SHIPPED | OUTPATIENT
Start: 2021-08-03 | End: 2022-05-19

## 2021-08-03 RX ORDER — BACLOFEN 10 MG/1
10 TABLET ORAL 2 TIMES DAILY PRN
Qty: 30 TABLET | Refills: 0 | Status: SHIPPED | OUTPATIENT
Start: 2021-08-03 | End: 2022-05-19

## 2021-08-03 RX ORDER — FAMOTIDINE 20 MG/1
20 TABLET, FILM COATED ORAL 2 TIMES DAILY
Qty: 60 TABLET | Refills: 2 | Status: SHIPPED | OUTPATIENT
Start: 2021-08-03 | End: 2022-08-03

## 2021-09-26 ENCOUNTER — HOSPITAL ENCOUNTER (EMERGENCY)
Facility: HOSPITAL | Age: 56
Discharge: HOME | End: 2021-09-26
Attending: EMERGENCY MEDICINE | Admitting: OPHTHALMOLOGY
Payer: MEDICARE

## 2021-09-26 VITALS
HEIGHT: 67 IN | HEART RATE: 73 BPM | TEMPERATURE: 99 F | BODY MASS INDEX: 34.53 KG/M2 | SYSTOLIC BLOOD PRESSURE: 161 MMHG | WEIGHT: 220 LBS | RESPIRATION RATE: 18 BRPM | OXYGEN SATURATION: 100 % | DIASTOLIC BLOOD PRESSURE: 87 MMHG

## 2021-09-26 DIAGNOSIS — H16.001 CORNEAL ULCERATION, RIGHT: Primary | ICD-10-CM

## 2021-09-26 DIAGNOSIS — H20.9 IRITIS OF RIGHT EYE: ICD-10-CM

## 2021-09-26 DIAGNOSIS — H20.051 HYPOPYON OF RIGHT EYE: ICD-10-CM

## 2021-09-26 LAB
CTP QC/QA: YES
GRAM STN SPEC: NORMAL
GRAM STN SPEC: NORMAL
POCT GLUCOSE: 204 MG/DL (ref 70–110)
SARS-COV-2 RDRP RESP QL NAA+PROBE: NEGATIVE

## 2021-09-26 PROCEDURE — 87186 SC STD MICRODIL/AGAR DIL: CPT | Performed by: STUDENT IN AN ORGANIZED HEALTH CARE EDUCATION/TRAINING PROGRAM

## 2021-09-26 PROCEDURE — 87205 SMEAR GRAM STAIN: CPT | Performed by: STUDENT IN AN ORGANIZED HEALTH CARE EDUCATION/TRAINING PROGRAM

## 2021-09-26 PROCEDURE — 99284 EMERGENCY DEPT VISIT MOD MDM: CPT | Mod: 25

## 2021-09-26 PROCEDURE — 25000003 PHARM REV CODE 250: Performed by: STUDENT IN AN ORGANIZED HEALTH CARE EDUCATION/TRAINING PROGRAM

## 2021-09-26 PROCEDURE — 96372 THER/PROPH/DIAG INJ SC/IM: CPT

## 2021-09-26 PROCEDURE — 87075 CULTR BACTERIA EXCEPT BLOOD: CPT | Performed by: STUDENT IN AN ORGANIZED HEALTH CARE EDUCATION/TRAINING PROGRAM

## 2021-09-26 PROCEDURE — 87070 CULTURE OTHR SPECIMN AEROBIC: CPT | Performed by: STUDENT IN AN ORGANIZED HEALTH CARE EDUCATION/TRAINING PROGRAM

## 2021-09-26 PROCEDURE — 25000003 PHARM REV CODE 250: Performed by: NURSE PRACTITIONER

## 2021-09-26 PROCEDURE — 87102 FUNGUS ISOLATION CULTURE: CPT | Performed by: STUDENT IN AN ORGANIZED HEALTH CARE EDUCATION/TRAINING PROGRAM

## 2021-09-26 PROCEDURE — 63600175 PHARM REV CODE 636 W HCPCS: Performed by: NURSE PRACTITIONER

## 2021-09-26 PROCEDURE — U0002 COVID-19 LAB TEST NON-CDC: HCPCS | Performed by: NURSE PRACTITIONER

## 2021-09-26 PROCEDURE — 87077 CULTURE AEROBIC IDENTIFY: CPT | Performed by: STUDENT IN AN ORGANIZED HEALTH CARE EDUCATION/TRAINING PROGRAM

## 2021-09-26 RX ORDER — TETRACAINE HYDROCHLORIDE 5 MG/ML
2 SOLUTION OPHTHALMIC
Status: COMPLETED | OUTPATIENT
Start: 2021-09-26 | End: 2021-09-26

## 2021-09-26 RX ORDER — ATROPINE SULFATE 10 MG/ML
1 SOLUTION/ DROPS OPHTHALMIC 2 TIMES DAILY
Status: DISCONTINUED | OUTPATIENT
Start: 2021-09-26 | End: 2021-09-26 | Stop reason: HOSPADM

## 2021-09-26 RX ORDER — ATROPINE SULFATE 10 MG/ML
1 SOLUTION/ DROPS OPHTHALMIC 2 TIMES DAILY
Status: ON HOLD
Start: 2021-09-26 | End: 2021-10-07 | Stop reason: HOSPADM

## 2021-09-26 RX ORDER — HYDROCODONE BITARTRATE AND ACETAMINOPHEN 5; 325 MG/1; MG/1
1 TABLET ORAL EVERY 6 HOURS PRN
Status: DISCONTINUED | OUTPATIENT
Start: 2021-09-26 | End: 2021-09-26 | Stop reason: HOSPADM

## 2021-09-26 RX ORDER — HYDROMORPHONE HYDROCHLORIDE 2 MG/ML
0.5 INJECTION, SOLUTION INTRAMUSCULAR; INTRAVENOUS; SUBCUTANEOUS
Status: COMPLETED | OUTPATIENT
Start: 2021-09-26 | End: 2021-09-26

## 2021-09-26 RX ORDER — VALACYCLOVIR HYDROCHLORIDE 1 G/1
1000 TABLET, FILM COATED ORAL 3 TIMES DAILY
Qty: 21 TABLET | Refills: 0 | Status: SHIPPED | OUTPATIENT
Start: 2021-09-26 | End: 2021-12-28 | Stop reason: CLARIF

## 2021-09-26 RX ORDER — VALACYCLOVIR HYDROCHLORIDE 500 MG/1
1000 TABLET, FILM COATED ORAL 3 TIMES DAILY
Status: DISCONTINUED | OUTPATIENT
Start: 2021-09-26 | End: 2021-09-26 | Stop reason: HOSPADM

## 2021-09-26 RX ADMIN — VALACYCLOVIR HYDROCHLORIDE 1000 MG: 500 TABLET, FILM COATED ORAL at 02:09

## 2021-09-26 RX ADMIN — FLUORESCEIN SODIUM 1 EACH: 1 STRIP OPHTHALMIC at 08:09

## 2021-09-26 RX ADMIN — ATROPINE SULFATE 1 DROP: 10 SOLUTION OPHTHALMIC at 02:09

## 2021-09-26 RX ADMIN — HYDROCODONE BITARTRATE AND ACETAMINOPHEN 1 TABLET: 5; 325 TABLET ORAL at 02:09

## 2021-09-26 RX ADMIN — HYDROMORPHONE HYDROCHLORIDE 0.5 MG: 2 INJECTION, SOLUTION INTRAMUSCULAR; INTRAVENOUS; SUBCUTANEOUS at 09:09

## 2021-09-26 RX ADMIN — TETRACAINE HYDROCHLORIDE 2 DROP: 5 SOLUTION OPHTHALMIC at 08:09

## 2021-09-27 ENCOUNTER — TELEPHONE (OUTPATIENT)
Dept: OPHTHALMOLOGY | Facility: CLINIC | Age: 56
End: 2021-09-27

## 2021-09-28 LAB — BACTERIA SPEC AEROBE CULT: ABNORMAL

## 2021-09-29 ENCOUNTER — OFFICE VISIT (OUTPATIENT)
Dept: OPHTHALMOLOGY | Facility: CLINIC | Age: 56
End: 2021-09-29
Payer: MEDICARE

## 2021-09-29 DIAGNOSIS — H16.031 CORNEAL ULCER OF RIGHT EYE WITH HYPOPYON: Primary | ICD-10-CM

## 2021-09-29 PROCEDURE — 1159F MED LIST DOCD IN RCRD: CPT | Mod: CPTII,S$GLB,, | Performed by: OPHTHALMOLOGY

## 2021-09-29 PROCEDURE — 99999 PR PBB SHADOW E&M-EST. PATIENT-LVL III: CPT | Mod: PBBFAC,,, | Performed by: OPHTHALMOLOGY

## 2021-09-29 PROCEDURE — 1160F PR REVIEW ALL MEDS BY PRESCRIBER/CLIN PHARMACIST DOCUMENTED: ICD-10-PCS | Mod: CPTII,S$GLB,, | Performed by: OPHTHALMOLOGY

## 2021-09-29 PROCEDURE — 3051F PR MOST RECENT HEMOGLOBIN A1C LEVEL 7.0 - < 8.0%: ICD-10-PCS | Mod: CPTII,S$GLB,, | Performed by: OPHTHALMOLOGY

## 2021-09-29 PROCEDURE — 4010F ACE/ARB THERAPY RXD/TAKEN: CPT | Mod: CPTII,S$GLB,, | Performed by: OPHTHALMOLOGY

## 2021-09-29 PROCEDURE — 92012 PR EYE EXAM, EST PATIENT,INTERMED: ICD-10-PCS | Mod: S$GLB,,, | Performed by: OPHTHALMOLOGY

## 2021-09-29 PROCEDURE — 4010F PR ACE/ARB THEARPY RXD/TAKEN: ICD-10-PCS | Mod: CPTII,S$GLB,, | Performed by: OPHTHALMOLOGY

## 2021-09-29 PROCEDURE — 1160F RVW MEDS BY RX/DR IN RCRD: CPT | Mod: CPTII,S$GLB,, | Performed by: OPHTHALMOLOGY

## 2021-09-29 PROCEDURE — 3051F HG A1C>EQUAL 7.0%<8.0%: CPT | Mod: CPTII,S$GLB,, | Performed by: OPHTHALMOLOGY

## 2021-09-29 PROCEDURE — 92012 INTRM OPH EXAM EST PATIENT: CPT | Mod: S$GLB,,, | Performed by: OPHTHALMOLOGY

## 2021-09-29 PROCEDURE — 1159F PR MEDICATION LIST DOCUMENTED IN MEDICAL RECORD: ICD-10-PCS | Mod: CPTII,S$GLB,, | Performed by: OPHTHALMOLOGY

## 2021-09-29 PROCEDURE — 99999 PR PBB SHADOW E&M-EST. PATIENT-LVL III: ICD-10-PCS | Mod: PBBFAC,,, | Performed by: OPHTHALMOLOGY

## 2021-09-30 ENCOUNTER — NURSE TRIAGE (OUTPATIENT)
Dept: ADMINISTRATIVE | Facility: CLINIC | Age: 56
End: 2021-09-30

## 2021-09-30 ENCOUNTER — HOSPITAL ENCOUNTER (INPATIENT)
Facility: HOSPITAL | Age: 56
LOS: 4 days | Discharge: HOME HEALTH | DRG: 116 | End: 2021-10-07
Attending: EMERGENCY MEDICINE | Admitting: INTERNAL MEDICINE
Payer: MEDICARE

## 2021-09-30 DIAGNOSIS — R07.9 CHEST PAIN: ICD-10-CM

## 2021-09-30 DIAGNOSIS — H44.001 ENDOPHTHALMITIS, ACUTE, RIGHT: Primary | ICD-10-CM

## 2021-09-30 DIAGNOSIS — H16.001 CORNEAL ULCER OF RIGHT EYE: ICD-10-CM

## 2021-09-30 DIAGNOSIS — H16.009 CORNEAL ULCER: ICD-10-CM

## 2021-09-30 PROBLEM — I50.42 CHRONIC COMBINED SYSTOLIC AND DIASTOLIC HEART FAILURE: Status: ACTIVE | Noted: 2021-09-30

## 2021-09-30 PROBLEM — I47.20 VENTRICULAR TACHYCARDIA: Status: ACTIVE | Noted: 2021-09-30

## 2021-09-30 PROBLEM — H40.9 GLAUCOMA, RIGHT EYE: Status: ACTIVE | Noted: 2021-09-30

## 2021-09-30 PROBLEM — G89.29 CHRONIC HIP PAIN: Status: ACTIVE | Noted: 2021-09-30

## 2021-09-30 PROBLEM — M25.559 CHRONIC HIP PAIN: Status: ACTIVE | Noted: 2021-09-30

## 2021-09-30 PROBLEM — G47.00 INSOMNIA: Status: ACTIVE | Noted: 2021-09-30

## 2021-09-30 LAB
ANION GAP SERPL CALC-SCNC: 12 MMOL/L (ref 8–16)
BACTERIA SPEC ANAEROBE CULT: NORMAL
BASOPHILS # BLD AUTO: 0.08 K/UL (ref 0–0.2)
BASOPHILS NFR BLD: 0.9 % (ref 0–1.9)
BILIRUB UR QL STRIP: NEGATIVE
BUN SERPL-MCNC: 26 MG/DL (ref 6–20)
BUN SERPL-MCNC: 29 MG/DL (ref 6–30)
BUN SERPL-MCNC: 29 MG/DL (ref 6–30)
CALCIUM SERPL-MCNC: 9.9 MG/DL (ref 8.7–10.5)
CHLORIDE SERPL-SCNC: 107 MMOL/L (ref 95–110)
CHLORIDE SERPL-SCNC: 111 MMOL/L (ref 95–110)
CHLORIDE SERPL-SCNC: 115 MMOL/L (ref 95–110)
CLARITY UR REFRACT.AUTO: CLEAR
CO2 SERPL-SCNC: 15 MMOL/L (ref 23–29)
COLOR UR AUTO: YELLOW
CREAT SERPL-MCNC: 1.4 MG/DL (ref 0.5–1.4)
CREAT SERPL-MCNC: 1.4 MG/DL (ref 0.5–1.4)
CREAT SERPL-MCNC: 1.5 MG/DL (ref 0.5–1.4)
CTP QC/QA: YES
DIFFERENTIAL METHOD: ABNORMAL
EOSINOPHIL # BLD AUTO: 0.8 K/UL (ref 0–0.5)
EOSINOPHIL NFR BLD: 9.1 % (ref 0–8)
ERYTHROCYTE [DISTWIDTH] IN BLOOD BY AUTOMATED COUNT: 12.4 % (ref 11.5–14.5)
EST. GFR  (AFRICAN AMERICAN): >60 ML/MIN/1.73 M^2
EST. GFR  (NON AFRICAN AMERICAN): 55.4 ML/MIN/1.73 M^2
GLUCOSE SERPL-MCNC: 152 MG/DL (ref 70–110)
GLUCOSE SERPL-MCNC: 174 MG/DL (ref 70–110)
GLUCOSE SERPL-MCNC: 181 MG/DL (ref 70–110)
GLUCOSE UR QL STRIP: NEGATIVE
HCT VFR BLD AUTO: 42.5 % (ref 40–54)
HCT VFR BLD CALC: 37 %PCV (ref 36–54)
HCT VFR BLD CALC: 44 %PCV (ref 36–54)
HCV AB SERPL QL IA: NEGATIVE
HGB BLD-MCNC: 14.6 G/DL (ref 14–18)
HGB UR QL STRIP: NEGATIVE
HIV 1+2 AB+HIV1 P24 AG SERPL QL IA: NEGATIVE
IMM GRANULOCYTES # BLD AUTO: 0.03 K/UL (ref 0–0.04)
IMM GRANULOCYTES NFR BLD AUTO: 0.4 % (ref 0–0.5)
KETONES UR QL STRIP: NEGATIVE
LEUKOCYTE ESTERASE UR QL STRIP: NEGATIVE
LYMPHOCYTES # BLD AUTO: 3.1 K/UL (ref 1–4.8)
LYMPHOCYTES NFR BLD: 36.8 % (ref 18–48)
MCH RBC QN AUTO: 32.3 PG (ref 27–31)
MCHC RBC AUTO-ENTMCNC: 34.4 G/DL (ref 32–36)
MCV RBC AUTO: 94 FL (ref 82–98)
MONOCYTES # BLD AUTO: 0.6 K/UL (ref 0.3–1)
MONOCYTES NFR BLD: 6.5 % (ref 4–15)
NEUTROPHILS # BLD AUTO: 3.9 K/UL (ref 1.8–7.7)
NEUTROPHILS NFR BLD: 46.3 % (ref 38–73)
NITRITE UR QL STRIP: NEGATIVE
NRBC BLD-RTO: 0 /100 WBC
PH UR STRIP: 5 [PH] (ref 5–8)
PLATELET # BLD AUTO: 238 K/UL (ref 150–450)
PMV BLD AUTO: 11.1 FL (ref 9.2–12.9)
POC IONIZED CALCIUM: 1.26 MMOL/L (ref 1.06–1.42)
POC IONIZED CALCIUM: 1.26 MMOL/L (ref 1.06–1.42)
POC TCO2 (MEASURED): 17 MMOL/L (ref 23–29)
POC TCO2 (MEASURED): 18 MMOL/L (ref 23–29)
POCT GLUCOSE: 197 MG/DL (ref 70–110)
POCT GLUCOSE: 201 MG/DL (ref 70–110)
POCT GLUCOSE: 261 MG/DL (ref 70–110)
POCT GLUCOSE: 289 MG/DL (ref 70–110)
POTASSIUM BLD-SCNC: 3.8 MMOL/L (ref 3.5–5.1)
POTASSIUM BLD-SCNC: 4.3 MMOL/L (ref 3.5–5.1)
POTASSIUM SERPL-SCNC: 4.1 MMOL/L (ref 3.5–5.1)
PROT UR QL STRIP: NEGATIVE
RBC # BLD AUTO: 4.52 M/UL (ref 4.6–6.2)
SAMPLE: ABNORMAL
SAMPLE: ABNORMAL
SARS-COV-2 RDRP RESP QL NAA+PROBE: NEGATIVE
SODIUM BLD-SCNC: 140 MMOL/L (ref 136–145)
SODIUM BLD-SCNC: 141 MMOL/L (ref 136–145)
SODIUM SERPL-SCNC: 134 MMOL/L (ref 136–145)
SP GR UR STRIP: 1.03 (ref 1–1.03)
URN SPEC COLLECT METH UR: NORMAL
WBC # BLD AUTO: 8.45 K/UL (ref 3.9–12.7)

## 2021-09-30 PROCEDURE — 25000003 PHARM REV CODE 250: Performed by: PHYSICIAN ASSISTANT

## 2021-09-30 PROCEDURE — 86803 HEPATITIS C AB TEST: CPT | Performed by: EMERGENCY MEDICINE

## 2021-09-30 PROCEDURE — 82962 GLUCOSE BLOOD TEST: CPT

## 2021-09-30 PROCEDURE — 87389 HIV-1 AG W/HIV-1&-2 AB AG IA: CPT | Performed by: EMERGENCY MEDICINE

## 2021-09-30 PROCEDURE — 99285 EMERGENCY DEPT VISIT HI MDM: CPT | Mod: GC,CS,, | Performed by: EMERGENCY MEDICINE

## 2021-09-30 PROCEDURE — 25000003 PHARM REV CODE 250: Performed by: STUDENT IN AN ORGANIZED HEALTH CARE EDUCATION/TRAINING PROGRAM

## 2021-09-30 PROCEDURE — 99220 PR INITIAL OBSERVATION CARE,LEVL III: CPT | Mod: ,,, | Performed by: PHYSICIAN ASSISTANT

## 2021-09-30 PROCEDURE — 80048 BASIC METABOLIC PNL TOTAL CA: CPT | Performed by: STUDENT IN AN ORGANIZED HEALTH CARE EDUCATION/TRAINING PROGRAM

## 2021-09-30 PROCEDURE — G0378 HOSPITAL OBSERVATION PER HR: HCPCS

## 2021-09-30 PROCEDURE — 80047 BASIC METABLC PNL IONIZED CA: CPT

## 2021-09-30 PROCEDURE — 85025 COMPLETE CBC W/AUTO DIFF WBC: CPT | Performed by: STUDENT IN AN ORGANIZED HEALTH CARE EDUCATION/TRAINING PROGRAM

## 2021-09-30 PROCEDURE — U0002 COVID-19 LAB TEST NON-CDC: HCPCS | Performed by: STUDENT IN AN ORGANIZED HEALTH CARE EDUCATION/TRAINING PROGRAM

## 2021-09-30 PROCEDURE — 81003 URINALYSIS AUTO W/O SCOPE: CPT | Performed by: PHYSICIAN ASSISTANT

## 2021-09-30 PROCEDURE — 99285 EMERGENCY DEPT VISIT HI MDM: CPT | Mod: 25

## 2021-09-30 PROCEDURE — 99285 PR EMERGENCY DEPT VISIT,LEVEL V: ICD-10-PCS | Mod: GC,CS,, | Performed by: EMERGENCY MEDICINE

## 2021-09-30 PROCEDURE — 99220 PR INITIAL OBSERVATION CARE,LEVL III: ICD-10-PCS | Mod: ,,, | Performed by: PHYSICIAN ASSISTANT

## 2021-09-30 PROCEDURE — 63600175 PHARM REV CODE 636 W HCPCS: Performed by: PHYSICIAN ASSISTANT

## 2021-09-30 PROCEDURE — 63600175 PHARM REV CODE 636 W HCPCS: Performed by: STUDENT IN AN ORGANIZED HEALTH CARE EDUCATION/TRAINING PROGRAM

## 2021-09-30 RX ORDER — ISOSORBIDE MONONITRATE 30 MG/1
30 TABLET, EXTENDED RELEASE ORAL EVERY MORNING
Status: DISCONTINUED | OUTPATIENT
Start: 2021-09-30 | End: 2021-10-07 | Stop reason: HOSPADM

## 2021-09-30 RX ORDER — DICYCLOMINE HYDROCHLORIDE 20 MG/1
20 TABLET ORAL 2 TIMES DAILY
Status: DISCONTINUED | OUTPATIENT
Start: 2021-09-30 | End: 2021-10-07 | Stop reason: HOSPADM

## 2021-09-30 RX ORDER — PROMETHAZINE HYDROCHLORIDE 12.5 MG/1
25 TABLET ORAL EVERY 6 HOURS PRN
Status: DISCONTINUED | OUTPATIENT
Start: 2021-09-30 | End: 2021-10-07 | Stop reason: HOSPADM

## 2021-09-30 RX ORDER — NALOXONE HCL 0.4 MG/ML
0.02 VIAL (ML) INJECTION
Status: DISCONTINUED | OUTPATIENT
Start: 2021-09-30 | End: 2021-10-07 | Stop reason: HOSPADM

## 2021-09-30 RX ORDER — AMIODARONE HYDROCHLORIDE 200 MG/1
200 TABLET ORAL EVERY MORNING
Status: DISCONTINUED | OUTPATIENT
Start: 2021-09-30 | End: 2021-10-07 | Stop reason: HOSPADM

## 2021-09-30 RX ORDER — GABAPENTIN 300 MG/1
300 CAPSULE ORAL NIGHTLY
Status: DISCONTINUED | OUTPATIENT
Start: 2021-09-30 | End: 2021-10-07 | Stop reason: HOSPADM

## 2021-09-30 RX ORDER — FUROSEMIDE 20 MG/1
20 TABLET ORAL NIGHTLY
Status: DISCONTINUED | OUTPATIENT
Start: 2021-09-30 | End: 2021-10-07 | Stop reason: HOSPADM

## 2021-09-30 RX ORDER — ASPIRIN 81 MG/1
81 TABLET ORAL DAILY
Status: DISCONTINUED | OUTPATIENT
Start: 2021-09-30 | End: 2021-10-07 | Stop reason: HOSPADM

## 2021-09-30 RX ORDER — ONDANSETRON 4 MG/1
8 TABLET, ORALLY DISINTEGRATING ORAL EVERY 8 HOURS PRN
Status: DISCONTINUED | OUTPATIENT
Start: 2021-09-30 | End: 2021-10-07 | Stop reason: HOSPADM

## 2021-09-30 RX ORDER — AMLODIPINE BESYLATE 10 MG/1
10 TABLET ORAL EVERY MORNING
Status: DISCONTINUED | OUTPATIENT
Start: 2021-09-30 | End: 2021-10-07 | Stop reason: HOSPADM

## 2021-09-30 RX ORDER — HYDROMORPHONE HYDROCHLORIDE 1 MG/ML
0.5 INJECTION, SOLUTION INTRAMUSCULAR; INTRAVENOUS; SUBCUTANEOUS ONCE AS NEEDED
Status: COMPLETED | OUTPATIENT
Start: 2021-09-30 | End: 2021-09-30

## 2021-09-30 RX ORDER — ALLOPURINOL 100 MG/1
100 TABLET ORAL NIGHTLY
Status: DISCONTINUED | OUTPATIENT
Start: 2021-09-30 | End: 2021-10-07 | Stop reason: HOSPADM

## 2021-09-30 RX ORDER — IBUPROFEN 200 MG
24 TABLET ORAL
Status: DISCONTINUED | OUTPATIENT
Start: 2021-09-30 | End: 2021-10-07 | Stop reason: HOSPADM

## 2021-09-30 RX ORDER — KETOROLAC TROMETHAMINE 30 MG/ML
15 INJECTION, SOLUTION INTRAMUSCULAR; INTRAVENOUS
Status: COMPLETED | OUTPATIENT
Start: 2021-09-30 | End: 2021-09-30

## 2021-09-30 RX ORDER — LANOLIN ALCOHOL/MO/W.PET/CERES
800 CREAM (GRAM) TOPICAL
Status: DISCONTINUED | OUTPATIENT
Start: 2021-09-30 | End: 2021-10-07 | Stop reason: HOSPADM

## 2021-09-30 RX ORDER — INSULIN ASPART 100 [IU]/ML
1-10 INJECTION, SOLUTION INTRAVENOUS; SUBCUTANEOUS
Status: DISCONTINUED | OUTPATIENT
Start: 2021-09-30 | End: 2021-10-01

## 2021-09-30 RX ORDER — TIMOLOL MALEATE 5 MG/ML
1 SOLUTION/ DROPS OPHTHALMIC 2 TIMES DAILY
Status: DISCONTINUED | OUTPATIENT
Start: 2021-09-30 | End: 2021-10-07 | Stop reason: HOSPADM

## 2021-09-30 RX ORDER — DULOXETIN HYDROCHLORIDE 60 MG/1
60 CAPSULE, DELAYED RELEASE ORAL DAILY
Status: DISCONTINUED | OUTPATIENT
Start: 2021-09-30 | End: 2021-10-07 | Stop reason: HOSPADM

## 2021-09-30 RX ORDER — HYDROMORPHONE HYDROCHLORIDE 1 MG/ML
1 INJECTION, SOLUTION INTRAMUSCULAR; INTRAVENOUS; SUBCUTANEOUS
Status: DISCONTINUED | OUTPATIENT
Start: 2021-09-30 | End: 2021-09-30

## 2021-09-30 RX ORDER — ACETAMINOPHEN 325 MG/1
650 TABLET ORAL EVERY 4 HOURS PRN
Status: DISCONTINUED | OUTPATIENT
Start: 2021-09-30 | End: 2021-10-02

## 2021-09-30 RX ORDER — IPRATROPIUM BROMIDE AND ALBUTEROL SULFATE 2.5; .5 MG/3ML; MG/3ML
3 SOLUTION RESPIRATORY (INHALATION) EVERY 4 HOURS PRN
Status: DISCONTINUED | OUTPATIENT
Start: 2021-09-30 | End: 2021-10-07 | Stop reason: HOSPADM

## 2021-09-30 RX ORDER — BISACODYL 10 MG
10 SUPPOSITORY, RECTAL RECTAL DAILY PRN
Status: DISCONTINUED | OUTPATIENT
Start: 2021-09-30 | End: 2021-10-07 | Stop reason: HOSPADM

## 2021-09-30 RX ORDER — CETIRIZINE HYDROCHLORIDE 10 MG/1
10 TABLET ORAL DAILY
Status: DISCONTINUED | OUTPATIENT
Start: 2021-09-30 | End: 2021-10-07 | Stop reason: HOSPADM

## 2021-09-30 RX ORDER — METOPROLOL SUCCINATE 50 MG/1
50 TABLET, EXTENDED RELEASE ORAL NIGHTLY
Status: DISCONTINUED | OUTPATIENT
Start: 2021-09-30 | End: 2021-10-07 | Stop reason: HOSPADM

## 2021-09-30 RX ORDER — METHAZOLAMIDE 25 MG/1
25 TABLET ORAL NIGHTLY
Status: DISCONTINUED | OUTPATIENT
Start: 2021-09-30 | End: 2021-10-01

## 2021-09-30 RX ORDER — TAMSULOSIN HYDROCHLORIDE 0.4 MG/1
0.4 CAPSULE ORAL DAILY
Status: DISCONTINUED | OUTPATIENT
Start: 2021-09-30 | End: 2021-10-07 | Stop reason: HOSPADM

## 2021-09-30 RX ORDER — GLUCAGON 1 MG
1 KIT INJECTION
Status: DISCONTINUED | OUTPATIENT
Start: 2021-09-30 | End: 2021-10-07 | Stop reason: HOSPADM

## 2021-09-30 RX ORDER — ATORVASTATIN CALCIUM 20 MG/1
80 TABLET, FILM COATED ORAL DAILY
Status: DISCONTINUED | OUTPATIENT
Start: 2021-09-30 | End: 2021-10-07 | Stop reason: HOSPADM

## 2021-09-30 RX ORDER — ATROPINE SULFATE 10 MG/ML
1 SOLUTION/ DROPS OPHTHALMIC 2 TIMES DAILY
Status: DISCONTINUED | OUTPATIENT
Start: 2021-09-30 | End: 2021-10-07

## 2021-09-30 RX ORDER — VALACYCLOVIR HYDROCHLORIDE 500 MG/1
1000 TABLET, FILM COATED ORAL 3 TIMES DAILY
Status: DISCONTINUED | OUTPATIENT
Start: 2021-09-30 | End: 2021-10-07 | Stop reason: HOSPADM

## 2021-09-30 RX ORDER — TALC
6 POWDER (GRAM) TOPICAL NIGHTLY PRN
Status: DISCONTINUED | OUTPATIENT
Start: 2021-09-30 | End: 2021-10-07 | Stop reason: HOSPADM

## 2021-09-30 RX ORDER — SPIRONOLACTONE 25 MG/1
25 TABLET ORAL NIGHTLY
Status: DISCONTINUED | OUTPATIENT
Start: 2021-09-30 | End: 2021-10-07 | Stop reason: HOSPADM

## 2021-09-30 RX ORDER — MELOXICAM 15 MG/1
15 TABLET ORAL DAILY
Status: DISCONTINUED | OUTPATIENT
Start: 2021-09-30 | End: 2021-09-30

## 2021-09-30 RX ORDER — POLYETHYLENE GLYCOL 3350 17 G/17G
17 POWDER, FOR SOLUTION ORAL DAILY PRN
Status: DISCONTINUED | OUTPATIENT
Start: 2021-09-30 | End: 2021-10-02

## 2021-09-30 RX ORDER — ZOLPIDEM TARTRATE 5 MG/1
5 TABLET ORAL NIGHTLY
Status: DISCONTINUED | OUTPATIENT
Start: 2021-09-30 | End: 2021-10-07 | Stop reason: HOSPADM

## 2021-09-30 RX ORDER — BACLOFEN 10 MG/1
10 TABLET ORAL 2 TIMES DAILY PRN
Status: DISCONTINUED | OUTPATIENT
Start: 2021-09-30 | End: 2021-10-07 | Stop reason: HOSPADM

## 2021-09-30 RX ORDER — PANTOPRAZOLE SODIUM 40 MG/1
40 TABLET, DELAYED RELEASE ORAL DAILY
Refills: 1 | Status: DISCONTINUED | OUTPATIENT
Start: 2021-09-30 | End: 2021-10-07 | Stop reason: HOSPADM

## 2021-09-30 RX ORDER — FAMOTIDINE 20 MG/1
20 TABLET, FILM COATED ORAL 2 TIMES DAILY
Status: DISCONTINUED | OUTPATIENT
Start: 2021-09-30 | End: 2021-09-30

## 2021-09-30 RX ORDER — SODIUM CHLORIDE 0.9 % (FLUSH) 0.9 %
10 SYRINGE (ML) INJECTION EVERY 8 HOURS PRN
Status: DISCONTINUED | OUTPATIENT
Start: 2021-09-30 | End: 2021-10-07 | Stop reason: HOSPADM

## 2021-09-30 RX ORDER — OXYCODONE HYDROCHLORIDE 5 MG/1
10 TABLET ORAL ONCE
Status: COMPLETED | OUTPATIENT
Start: 2021-09-30 | End: 2021-09-30

## 2021-09-30 RX ORDER — BRIMONIDINE TARTRATE 1.5 MG/ML
1 SOLUTION/ DROPS OPHTHALMIC 3 TIMES DAILY
Status: DISCONTINUED | OUTPATIENT
Start: 2021-09-30 | End: 2021-10-07

## 2021-09-30 RX ORDER — ENOXAPARIN SODIUM 100 MG/ML
40 INJECTION SUBCUTANEOUS EVERY 24 HOURS
Status: DISCONTINUED | OUTPATIENT
Start: 2021-09-30 | End: 2021-10-07 | Stop reason: HOSPADM

## 2021-09-30 RX ORDER — FLUTICASONE PROPIONATE 50 MCG
1 SPRAY, SUSPENSION (ML) NASAL DAILY PRN
Status: DISCONTINUED | OUTPATIENT
Start: 2021-09-30 | End: 2021-10-07 | Stop reason: HOSPADM

## 2021-09-30 RX ORDER — MELOXICAM 15 MG/1
15 TABLET ORAL DAILY
Status: DISCONTINUED | OUTPATIENT
Start: 2021-10-01 | End: 2021-10-07 | Stop reason: HOSPADM

## 2021-09-30 RX ORDER — IBUPROFEN 200 MG
16 TABLET ORAL
Status: DISCONTINUED | OUTPATIENT
Start: 2021-09-30 | End: 2021-10-07 | Stop reason: HOSPADM

## 2021-09-30 RX ORDER — SOTALOL HYDROCHLORIDE 80 MG/1
80 TABLET ORAL 2 TIMES DAILY
Status: DISCONTINUED | OUTPATIENT
Start: 2021-09-30 | End: 2021-10-07 | Stop reason: HOSPADM

## 2021-09-30 RX ADMIN — MELOXICAM 15 MG: 15 TABLET ORAL at 11:09

## 2021-09-30 RX ADMIN — ISOSORBIDE MONONITRATE 30 MG: 30 TABLET, EXTENDED RELEASE ORAL at 11:09

## 2021-09-30 RX ADMIN — HYPROMELLOSE 2910 1 DROP: 5 SOLUTION OPHTHALMIC at 11:09

## 2021-09-30 RX ADMIN — ZOLPIDEM TARTRATE 5 MG: 5 TABLET ORAL at 08:09

## 2021-09-30 RX ADMIN — SOTALOL HYDROCHLORIDE 80 MG: 80 TABLET ORAL at 11:09

## 2021-09-30 RX ADMIN — HYPROMELLOSE 2910 1 DROP: 5 SOLUTION OPHTHALMIC at 04:09

## 2021-09-30 RX ADMIN — HYPROMELLOSE 2910 1 DROP: 5 SOLUTION OPHTHALMIC at 07:09

## 2021-09-30 RX ADMIN — GABAPENTIN 300 MG: 300 CAPSULE ORAL at 08:09

## 2021-09-30 RX ADMIN — SPIRONOLACTONE 25 MG: 25 TABLET ORAL at 08:09

## 2021-09-30 RX ADMIN — PROMETHAZINE HYDROCHLORIDE 25 MG: 25 TABLET ORAL at 05:09

## 2021-09-30 RX ADMIN — HYDROMORPHONE HYDROCHLORIDE 0.5 MG: 1 INJECTION, SOLUTION INTRAMUSCULAR; INTRAVENOUS; SUBCUTANEOUS at 03:09

## 2021-09-30 RX ADMIN — VALACYCLOVIR HYDROCHLORIDE 1000 MG: 500 TABLET, FILM COATED ORAL at 05:09

## 2021-09-30 RX ADMIN — FUROSEMIDE 20 MG: 20 TABLET ORAL at 08:09

## 2021-09-30 RX ADMIN — ONDANSETRON 8 MG: 8 TABLET, ORALLY DISINTEGRATING ORAL at 02:09

## 2021-09-30 RX ADMIN — HYPROMELLOSE 2910 1 DROP: 5 SOLUTION OPHTHALMIC at 01:09

## 2021-09-30 RX ADMIN — VALACYCLOVIR HYDROCHLORIDE 1000 MG: 500 TABLET, FILM COATED ORAL at 08:09

## 2021-09-30 RX ADMIN — ATORVASTATIN CALCIUM 80 MG: 20 TABLET, FILM COATED ORAL at 11:09

## 2021-09-30 RX ADMIN — HYPROMELLOSE 2910 1 DROP: 5 SOLUTION OPHTHALMIC at 09:09

## 2021-09-30 RX ADMIN — HYPROMELLOSE 2910 1 DROP: 5 SOLUTION OPHTHALMIC at 10:09

## 2021-09-30 RX ADMIN — SOTALOL HYDROCHLORIDE 80 MG: 80 TABLET ORAL at 08:09

## 2021-09-30 RX ADMIN — OXYCODONE 10 MG: 5 TABLET ORAL at 06:09

## 2021-09-30 RX ADMIN — HYPROMELLOSE 2910 1 DROP: 5 SOLUTION OPHTHALMIC at 05:09

## 2021-09-30 RX ADMIN — TIMOLOL MALEATE 1 DROP: 5 SOLUTION OPHTHALMIC at 08:09

## 2021-09-30 RX ADMIN — HYPROMELLOSE 2910 1 DROP: 5 SOLUTION OPHTHALMIC at 02:09

## 2021-09-30 RX ADMIN — METOPROLOL SUCCINATE 50 MG: 50 TABLET, EXTENDED RELEASE ORAL at 08:09

## 2021-09-30 RX ADMIN — HYPROMELLOSE 2910 1 DROP: 5 SOLUTION OPHTHALMIC at 12:09

## 2021-09-30 RX ADMIN — HYPROMELLOSE 2910 1 DROP: 5 SOLUTION OPHTHALMIC at 06:09

## 2021-09-30 RX ADMIN — ATROPINE SULFATE 1 DROP: 10 SOLUTION OPHTHALMIC at 08:09

## 2021-09-30 RX ADMIN — PANTOPRAZOLE SODIUM 40 MG: 40 TABLET, DELAYED RELEASE ORAL at 11:09

## 2021-09-30 RX ADMIN — INSULIN ASPART 2 UNITS: 100 INJECTION, SOLUTION INTRAVENOUS; SUBCUTANEOUS at 12:09

## 2021-09-30 RX ADMIN — TIMOLOL MALEATE 1 DROP: 5 SOLUTION OPHTHALMIC at 09:09

## 2021-09-30 RX ADMIN — HYPROMELLOSE 2910 1 DROP: 5 SOLUTION OPHTHALMIC at 08:09

## 2021-09-30 RX ADMIN — CETIRIZINE HYDROCHLORIDE 10 MG: 10 TABLET, FILM COATED ORAL at 11:09

## 2021-09-30 RX ADMIN — DICYCLOMINE HYDROCHLORIDE 20 MG: 20 TABLET ORAL at 08:09

## 2021-09-30 RX ADMIN — BRIMONIDINE TARTRATE 1 DROP: 1.5 SOLUTION OPHTHALMIC at 08:09

## 2021-09-30 RX ADMIN — DICYCLOMINE HYDROCHLORIDE 20 MG: 20 TABLET ORAL at 11:09

## 2021-09-30 RX ADMIN — FAMOTIDINE 20 MG: 20 TABLET, FILM COATED ORAL at 11:09

## 2021-09-30 RX ADMIN — INSULIN ASPART 4 UNITS: 100 INJECTION, SOLUTION INTRAVENOUS; SUBCUTANEOUS at 04:09

## 2021-09-30 RX ADMIN — ENOXAPARIN SODIUM 40 MG: 100 INJECTION SUBCUTANEOUS at 06:09

## 2021-09-30 RX ADMIN — TAMSULOSIN HYDROCHLORIDE 0.4 MG: 0.4 CAPSULE ORAL at 11:09

## 2021-09-30 RX ADMIN — BRIMONIDINE TARTRATE 1 DROP: 1.5 SOLUTION OPHTHALMIC at 03:09

## 2021-09-30 RX ADMIN — ALLOPURINOL 100 MG: 100 TABLET ORAL at 08:09

## 2021-09-30 RX ADMIN — DULOXETINE 60 MG: 60 CAPSULE, DELAYED RELEASE ORAL at 11:09

## 2021-09-30 RX ADMIN — AMLODIPINE BESYLATE 10 MG: 10 TABLET ORAL at 11:09

## 2021-09-30 RX ADMIN — KETOROLAC TROMETHAMINE 15 MG: 30 INJECTION, SOLUTION INTRAMUSCULAR; INTRAVENOUS at 06:09

## 2021-09-30 RX ADMIN — HYPROMELLOSE 2910 1 DROP: 5 SOLUTION OPHTHALMIC at 03:09

## 2021-09-30 RX ADMIN — AMIODARONE HYDROCHLORIDE 200 MG: 200 TABLET ORAL at 11:09

## 2021-09-30 RX ADMIN — ASPIRIN 81 MG: 81 TABLET, COATED ORAL at 11:09

## 2021-10-01 ENCOUNTER — OPHTH EXAM (OUTPATIENT)
Dept: OPHTHALMOLOGY | Facility: CLINIC | Age: 56
DRG: 116 | End: 2021-10-01
Payer: MEDICARE

## 2021-10-01 DIAGNOSIS — H44.001 ENDOPHTHALMITIS, RIGHT: Primary | ICD-10-CM

## 2021-10-01 LAB
ANION GAP SERPL CALC-SCNC: 11 MMOL/L (ref 8–16)
BASOPHILS # BLD AUTO: 0.06 K/UL (ref 0–0.2)
BASOPHILS NFR BLD: 0.8 % (ref 0–1.9)
BUN SERPL-MCNC: 32 MG/DL (ref 6–20)
CALCIUM SERPL-MCNC: 9 MG/DL (ref 8.7–10.5)
CHLORIDE SERPL-SCNC: 110 MMOL/L (ref 95–110)
CO2 SERPL-SCNC: 15 MMOL/L (ref 23–29)
CREAT SERPL-MCNC: 1.5 MG/DL (ref 0.5–1.4)
DIFFERENTIAL METHOD: ABNORMAL
EOSINOPHIL # BLD AUTO: 0.3 K/UL (ref 0–0.5)
EOSINOPHIL NFR BLD: 3.3 % (ref 0–8)
ERYTHROCYTE [DISTWIDTH] IN BLOOD BY AUTOMATED COUNT: 12 % (ref 11.5–14.5)
EST. GFR  (AFRICAN AMERICAN): 58.9 ML/MIN/1.73 M^2
EST. GFR  (NON AFRICAN AMERICAN): 50.9 ML/MIN/1.73 M^2
ESTIMATED AVG GLUCOSE: 169 MG/DL (ref 68–131)
GLUCOSE SERPL-MCNC: 215 MG/DL (ref 70–110)
GLUCOSE SERPL-MCNC: 353 MG/DL (ref 70–110)
GRAM STN SPEC: NORMAL
GRAM STN SPEC: NORMAL
HBA1C MFR BLD: 7.5 % (ref 4–5.6)
HCT VFR BLD AUTO: 38.9 % (ref 40–54)
HGB BLD-MCNC: 13.2 G/DL (ref 14–18)
IMM GRANULOCYTES # BLD AUTO: 0.02 K/UL (ref 0–0.04)
IMM GRANULOCYTES NFR BLD AUTO: 0.3 % (ref 0–0.5)
KOH PREP SPEC: NORMAL
LYMPHOCYTES # BLD AUTO: 2.2 K/UL (ref 1–4.8)
LYMPHOCYTES NFR BLD: 29.2 % (ref 18–48)
MCH RBC QN AUTO: 31.8 PG (ref 27–31)
MCHC RBC AUTO-ENTMCNC: 33.9 G/DL (ref 32–36)
MCV RBC AUTO: 94 FL (ref 82–98)
MONOCYTES # BLD AUTO: 0.5 K/UL (ref 0.3–1)
MONOCYTES NFR BLD: 6.6 % (ref 4–15)
NEUTROPHILS # BLD AUTO: 4.6 K/UL (ref 1.8–7.7)
NEUTROPHILS NFR BLD: 59.8 % (ref 38–73)
NRBC BLD-RTO: 0 /100 WBC
PLATELET # BLD AUTO: 195 K/UL (ref 150–450)
PMV BLD AUTO: 11.2 FL (ref 9.2–12.9)
POCT GLUCOSE: 168 MG/DL (ref 70–110)
POCT GLUCOSE: 206 MG/DL (ref 70–110)
POCT GLUCOSE: 215 MG/DL (ref 70–110)
POCT GLUCOSE: 252 MG/DL (ref 70–110)
POTASSIUM SERPL-SCNC: 4.5 MMOL/L (ref 3.5–5.1)
RBC # BLD AUTO: 4.15 M/UL (ref 4.6–6.2)
SODIUM SERPL-SCNC: 136 MMOL/L (ref 136–145)
WBC # BLD AUTO: 7.6 K/UL (ref 3.9–12.7)

## 2021-10-01 PROCEDURE — 87205 SMEAR GRAM STAIN: CPT | Performed by: INTERNAL MEDICINE

## 2021-10-01 PROCEDURE — 99226 PR SUBSEQUENT OBSERVATION CARE,LEVEL III: ICD-10-PCS | Mod: ,,, | Performed by: PHYSICIAN ASSISTANT

## 2021-10-01 PROCEDURE — C9399 UNCLASSIFIED DRUGS OR BIOLOG: HCPCS | Performed by: PHYSICIAN ASSISTANT

## 2021-10-01 PROCEDURE — G0378 HOSPITAL OBSERVATION PER HR: HCPCS

## 2021-10-01 PROCEDURE — 85025 COMPLETE CBC W/AUTO DIFF WBC: CPT | Performed by: PHYSICIAN ASSISTANT

## 2021-10-01 PROCEDURE — 63600175 PHARM REV CODE 636 W HCPCS: Performed by: PHYSICIAN ASSISTANT

## 2021-10-01 PROCEDURE — 67028 PR INJECT INTRAVITREAL PHARMCOLOGIC: ICD-10-PCS | Mod: RT,S$GLB,, | Performed by: OPHTHALMOLOGY

## 2021-10-01 PROCEDURE — 99226 PR SUBSEQUENT OBSERVATION CARE,LEVEL III: CPT | Mod: ,,, | Performed by: PHYSICIAN ASSISTANT

## 2021-10-01 PROCEDURE — 87070 CULTURE OTHR SPECIMN AEROBIC: CPT | Performed by: INTERNAL MEDICINE

## 2021-10-01 PROCEDURE — 83036 HEMOGLOBIN GLYCOSYLATED A1C: CPT | Performed by: PHYSICIAN ASSISTANT

## 2021-10-01 PROCEDURE — 80048 BASIC METABOLIC PNL TOTAL CA: CPT | Performed by: PHYSICIAN ASSISTANT

## 2021-10-01 PROCEDURE — 25000003 PHARM REV CODE 250: Performed by: PHYSICIAN ASSISTANT

## 2021-10-01 PROCEDURE — 76512 PR  US, EYE B-SCAN: ICD-10-PCS | Mod: 26,RT,, | Performed by: OPHTHALMOLOGY

## 2021-10-01 PROCEDURE — 87102 FUNGUS ISOLATION CULTURE: CPT | Performed by: INTERNAL MEDICINE

## 2021-10-01 PROCEDURE — 99214 OFFICE O/P EST MOD 30 MIN: CPT | Mod: 24,,, | Performed by: OPHTHALMOLOGY

## 2021-10-01 PROCEDURE — 99214 PR OFFICE/OUTPT VISIT, EST, LEVL IV, 30-39 MIN: ICD-10-PCS | Mod: 24,,, | Performed by: OPHTHALMOLOGY

## 2021-10-01 PROCEDURE — 76512 OPH US DX B-SCAN: CPT | Mod: 26,RT,, | Performed by: OPHTHALMOLOGY

## 2021-10-01 PROCEDURE — 87210 SMEAR WET MOUNT SALINE/INK: CPT | Performed by: INTERNAL MEDICINE

## 2021-10-01 PROCEDURE — 67028 INJECTION EYE DRUG: CPT | Mod: RT,S$GLB,, | Performed by: OPHTHALMOLOGY

## 2021-10-01 PROCEDURE — 87075 CULTR BACTERIA EXCEPT BLOOD: CPT | Performed by: INTERNAL MEDICINE

## 2021-10-01 PROCEDURE — 25000003 PHARM REV CODE 250: Performed by: STUDENT IN AN ORGANIZED HEALTH CARE EDUCATION/TRAINING PROGRAM

## 2021-10-01 RX ORDER — INSULIN ASPART 100 [IU]/ML
0-5 INJECTION, SOLUTION INTRAVENOUS; SUBCUTANEOUS
Status: DISCONTINUED | OUTPATIENT
Start: 2021-10-01 | End: 2021-10-07 | Stop reason: HOSPADM

## 2021-10-01 RX ORDER — OXYCODONE HYDROCHLORIDE 5 MG/1
5 TABLET ORAL ONCE
Status: COMPLETED | OUTPATIENT
Start: 2021-10-01 | End: 2021-10-01

## 2021-10-01 RX ORDER — INSULIN ASPART 100 [IU]/ML
5 INJECTION, SOLUTION INTRAVENOUS; SUBCUTANEOUS
Status: DISCONTINUED | OUTPATIENT
Start: 2021-10-01 | End: 2021-10-07 | Stop reason: HOSPADM

## 2021-10-01 RX ORDER — HYDROMORPHONE HYDROCHLORIDE 1 MG/ML
0.2 INJECTION, SOLUTION INTRAMUSCULAR; INTRAVENOUS; SUBCUTANEOUS ONCE
Status: COMPLETED | OUTPATIENT
Start: 2021-10-01 | End: 2021-10-01

## 2021-10-01 RX ORDER — DIPHENHYDRAMINE HCL 25 MG
25 CAPSULE ORAL EVERY 6 HOURS PRN
Status: DISCONTINUED | OUTPATIENT
Start: 2021-10-01 | End: 2021-10-07 | Stop reason: HOSPADM

## 2021-10-01 RX ORDER — OXYCODONE HYDROCHLORIDE 10 MG/1
10 TABLET ORAL EVERY 6 HOURS PRN
Status: DISCONTINUED | OUTPATIENT
Start: 2021-10-01 | End: 2021-10-02

## 2021-10-01 RX ADMIN — AMLODIPINE BESYLATE 10 MG: 10 TABLET ORAL at 07:10

## 2021-10-01 RX ADMIN — ATROPINE SULFATE 1 DROP: 10 SOLUTION OPHTHALMIC at 08:10

## 2021-10-01 RX ADMIN — VALACYCLOVIR HYDROCHLORIDE 1000 MG: 500 TABLET, FILM COATED ORAL at 08:10

## 2021-10-01 RX ADMIN — ALLOPURINOL 100 MG: 100 TABLET ORAL at 08:10

## 2021-10-01 RX ADMIN — HYPROMELLOSE 2910 1 DROP: 5 SOLUTION OPHTHALMIC at 04:10

## 2021-10-01 RX ADMIN — ATORVASTATIN CALCIUM 80 MG: 20 TABLET, FILM COATED ORAL at 09:10

## 2021-10-01 RX ADMIN — HYPROMELLOSE 2910 1 DROP: 5 SOLUTION OPHTHALMIC at 12:10

## 2021-10-01 RX ADMIN — HYPROMELLOSE 2910 1 DROP: 5 SOLUTION OPHTHALMIC at 05:10

## 2021-10-01 RX ADMIN — METOPROLOL SUCCINATE 50 MG: 50 TABLET, EXTENDED RELEASE ORAL at 09:10

## 2021-10-01 RX ADMIN — CETIRIZINE HYDROCHLORIDE 10 MG: 10 TABLET, FILM COATED ORAL at 09:10

## 2021-10-01 RX ADMIN — HYPROMELLOSE 2910 1 DROP: 5 SOLUTION OPHTHALMIC at 10:10

## 2021-10-01 RX ADMIN — HYPROMELLOSE 2910 1 DROP: 5 SOLUTION OPHTHALMIC at 02:10

## 2021-10-01 RX ADMIN — SPIRONOLACTONE 25 MG: 25 TABLET ORAL at 08:10

## 2021-10-01 RX ADMIN — BRIMONIDINE TARTRATE 1 DROP: 1.5 SOLUTION OPHTHALMIC at 09:10

## 2021-10-01 RX ADMIN — HYPROMELLOSE 2910 1 DROP: 5 SOLUTION OPHTHALMIC at 07:10

## 2021-10-01 RX ADMIN — FUROSEMIDE 20 MG: 20 TABLET ORAL at 09:10

## 2021-10-01 RX ADMIN — AMIODARONE HYDROCHLORIDE 200 MG: 200 TABLET ORAL at 07:10

## 2021-10-01 RX ADMIN — HYPROMELLOSE 2910 1 DROP: 5 SOLUTION OPHTHALMIC at 01:10

## 2021-10-01 RX ADMIN — HYPROMELLOSE 2910 1 DROP: 5 SOLUTION OPHTHALMIC at 08:10

## 2021-10-01 RX ADMIN — ASPIRIN 81 MG: 81 TABLET, COATED ORAL at 09:10

## 2021-10-01 RX ADMIN — BRIMONIDINE TARTRATE 1 DROP: 1.5 SOLUTION OPHTHALMIC at 08:10

## 2021-10-01 RX ADMIN — HYDROMORPHONE HYDROCHLORIDE 0.2 MG: 1 INJECTION, SOLUTION INTRAMUSCULAR; INTRAVENOUS; SUBCUTANEOUS at 10:10

## 2021-10-01 RX ADMIN — GABAPENTIN 300 MG: 300 CAPSULE ORAL at 09:10

## 2021-10-01 RX ADMIN — DICYCLOMINE HYDROCHLORIDE 20 MG: 20 TABLET ORAL at 08:10

## 2021-10-01 RX ADMIN — ATROPINE SULFATE 1 DROP: 10 SOLUTION OPHTHALMIC at 09:10

## 2021-10-01 RX ADMIN — ISOSORBIDE MONONITRATE 30 MG: 30 TABLET, EXTENDED RELEASE ORAL at 07:10

## 2021-10-01 RX ADMIN — INSULIN DETEMIR 14 UNITS: 100 INJECTION, SOLUTION SUBCUTANEOUS at 10:10

## 2021-10-01 RX ADMIN — HYPROMELLOSE 2910 1 DROP: 5 SOLUTION OPHTHALMIC at 06:10

## 2021-10-01 RX ADMIN — TAMSULOSIN HYDROCHLORIDE 0.4 MG: 0.4 CAPSULE ORAL at 09:10

## 2021-10-01 RX ADMIN — TIMOLOL MALEATE 1 DROP: 5 SOLUTION OPHTHALMIC at 09:10

## 2021-10-01 RX ADMIN — ZOLPIDEM TARTRATE 5 MG: 5 TABLET ORAL at 09:10

## 2021-10-01 RX ADMIN — TIMOLOL MALEATE 1 DROP: 5 SOLUTION OPHTHALMIC at 08:10

## 2021-10-01 RX ADMIN — DULOXETINE 60 MG: 60 CAPSULE, DELAYED RELEASE ORAL at 09:10

## 2021-10-01 RX ADMIN — HYPROMELLOSE 2910 1 DROP: 5 SOLUTION OPHTHALMIC at 11:10

## 2021-10-01 RX ADMIN — OXYCODONE 5 MG: 5 TABLET ORAL at 01:10

## 2021-10-01 RX ADMIN — ACETAMINOPHEN 650 MG: 325 TABLET ORAL at 01:10

## 2021-10-01 RX ADMIN — INSULIN ASPART 8 UNITS: 100 INJECTION, SOLUTION INTRAVENOUS; SUBCUTANEOUS at 05:10

## 2021-10-01 RX ADMIN — HYPROMELLOSE 2910 1 DROP: 5 SOLUTION OPHTHALMIC at 03:10

## 2021-10-01 RX ADMIN — OXYCODONE HYDROCHLORIDE 10 MG: 10 TABLET ORAL at 06:10

## 2021-10-01 RX ADMIN — SOTALOL HYDROCHLORIDE 80 MG: 80 TABLET ORAL at 08:10

## 2021-10-01 RX ADMIN — PANTOPRAZOLE SODIUM 40 MG: 40 TABLET, DELAYED RELEASE ORAL at 09:10

## 2021-10-01 RX ADMIN — HYPROMELLOSE 2910 1 DROP: 5 SOLUTION OPHTHALMIC at 09:10

## 2021-10-02 PROBLEM — H44.001 ENDOPHTHALMITIS, ACUTE, RIGHT: Status: ACTIVE | Noted: 2021-10-02

## 2021-10-02 LAB
ANION GAP SERPL CALC-SCNC: 10 MMOL/L (ref 8–16)
BASOPHILS # BLD AUTO: 0.07 K/UL (ref 0–0.2)
BASOPHILS NFR BLD: 0.9 % (ref 0–1.9)
BUN SERPL-MCNC: 26 MG/DL (ref 6–20)
CALCIUM SERPL-MCNC: 9.3 MG/DL (ref 8.7–10.5)
CHLORIDE SERPL-SCNC: 107 MMOL/L (ref 95–110)
CO2 SERPL-SCNC: 19 MMOL/L (ref 23–29)
CREAT SERPL-MCNC: 1.3 MG/DL (ref 0.5–1.4)
DIFFERENTIAL METHOD: ABNORMAL
EOSINOPHIL # BLD AUTO: 0.3 K/UL (ref 0–0.5)
EOSINOPHIL NFR BLD: 4.1 % (ref 0–8)
ERYTHROCYTE [DISTWIDTH] IN BLOOD BY AUTOMATED COUNT: 12 % (ref 11.5–14.5)
EST. GFR  (AFRICAN AMERICAN): >60 ML/MIN/1.73 M^2
EST. GFR  (NON AFRICAN AMERICAN): >60 ML/MIN/1.73 M^2
GLUCOSE SERPL-MCNC: 205 MG/DL (ref 70–110)
HCT VFR BLD AUTO: 38 % (ref 40–54)
HGB BLD-MCNC: 12.5 G/DL (ref 14–18)
IMM GRANULOCYTES # BLD AUTO: 0.02 K/UL (ref 0–0.04)
IMM GRANULOCYTES NFR BLD AUTO: 0.3 % (ref 0–0.5)
LYMPHOCYTES # BLD AUTO: 2.6 K/UL (ref 1–4.8)
LYMPHOCYTES NFR BLD: 33.5 % (ref 18–48)
MCH RBC QN AUTO: 31.2 PG (ref 27–31)
MCHC RBC AUTO-ENTMCNC: 32.9 G/DL (ref 32–36)
MCV RBC AUTO: 95 FL (ref 82–98)
MONOCYTES # BLD AUTO: 0.5 K/UL (ref 0.3–1)
MONOCYTES NFR BLD: 7 % (ref 4–15)
NEUTROPHILS # BLD AUTO: 4.1 K/UL (ref 1.8–7.7)
NEUTROPHILS NFR BLD: 54.2 % (ref 38–73)
NRBC BLD-RTO: 0 /100 WBC
PLATELET # BLD AUTO: 224 K/UL (ref 150–450)
PMV BLD AUTO: 11.1 FL (ref 9.2–12.9)
POCT GLUCOSE: 159 MG/DL (ref 70–110)
POCT GLUCOSE: 178 MG/DL (ref 70–110)
POCT GLUCOSE: 227 MG/DL (ref 70–110)
POTASSIUM SERPL-SCNC: 4 MMOL/L (ref 3.5–5.1)
RBC # BLD AUTO: 4.01 M/UL (ref 4.6–6.2)
SODIUM SERPL-SCNC: 136 MMOL/L (ref 136–145)
WBC # BLD AUTO: 7.62 K/UL (ref 3.9–12.7)

## 2021-10-02 PROCEDURE — 85025 COMPLETE CBC W/AUTO DIFF WBC: CPT | Performed by: PHYSICIAN ASSISTANT

## 2021-10-02 PROCEDURE — 63600175 PHARM REV CODE 636 W HCPCS: Performed by: PHYSICIAN ASSISTANT

## 2021-10-02 PROCEDURE — 25000003 PHARM REV CODE 250: Performed by: PHYSICIAN ASSISTANT

## 2021-10-02 PROCEDURE — 25000003 PHARM REV CODE 250: Performed by: STUDENT IN AN ORGANIZED HEALTH CARE EDUCATION/TRAINING PROGRAM

## 2021-10-02 PROCEDURE — 99226 PR SUBSEQUENT OBSERVATION CARE,LEVEL III: ICD-10-PCS | Mod: ,,, | Performed by: PHYSICIAN ASSISTANT

## 2021-10-02 PROCEDURE — 36415 COLL VENOUS BLD VENIPUNCTURE: CPT | Performed by: PHYSICIAN ASSISTANT

## 2021-10-02 PROCEDURE — 80048 BASIC METABOLIC PNL TOTAL CA: CPT | Performed by: PHYSICIAN ASSISTANT

## 2021-10-02 PROCEDURE — G0378 HOSPITAL OBSERVATION PER HR: HCPCS

## 2021-10-02 PROCEDURE — 99226 PR SUBSEQUENT OBSERVATION CARE,LEVEL III: CPT | Mod: ,,, | Performed by: PHYSICIAN ASSISTANT

## 2021-10-02 RX ORDER — OXYCODONE HYDROCHLORIDE 5 MG/1
5 TABLET ORAL EVERY 6 HOURS PRN
Status: DISCONTINUED | OUTPATIENT
Start: 2021-10-02 | End: 2021-10-07 | Stop reason: HOSPADM

## 2021-10-02 RX ORDER — AMOXICILLIN 250 MG
1 CAPSULE ORAL 2 TIMES DAILY
Status: DISCONTINUED | OUTPATIENT
Start: 2021-10-02 | End: 2021-10-07 | Stop reason: HOSPADM

## 2021-10-02 RX ORDER — ACETAMINOPHEN 500 MG
1000 TABLET ORAL EVERY 8 HOURS
Status: DISCONTINUED | OUTPATIENT
Start: 2021-10-02 | End: 2021-10-07 | Stop reason: HOSPADM

## 2021-10-02 RX ORDER — POLYETHYLENE GLYCOL 3350 17 G/17G
17 POWDER, FOR SOLUTION ORAL DAILY
Status: DISCONTINUED | OUTPATIENT
Start: 2021-10-03 | End: 2021-10-07 | Stop reason: HOSPADM

## 2021-10-02 RX ORDER — HYDROMORPHONE HYDROCHLORIDE 1 MG/ML
0.5 INJECTION, SOLUTION INTRAMUSCULAR; INTRAVENOUS; SUBCUTANEOUS EVERY 6 HOURS PRN
Status: DISCONTINUED | OUTPATIENT
Start: 2021-10-02 | End: 2021-10-03

## 2021-10-02 RX ORDER — ACETAMINOPHEN 500 MG
1000 TABLET ORAL EVERY 6 HOURS
Status: DISCONTINUED | OUTPATIENT
Start: 2021-10-02 | End: 2021-10-02

## 2021-10-02 RX ADMIN — HYPROMELLOSE 2910 1 DROP: 5 SOLUTION OPHTHALMIC at 03:10

## 2021-10-02 RX ADMIN — INSULIN ASPART 5 UNITS: 100 INJECTION, SOLUTION INTRAVENOUS; SUBCUTANEOUS at 12:10

## 2021-10-02 RX ADMIN — ZOLPIDEM TARTRATE 5 MG: 5 TABLET ORAL at 09:10

## 2021-10-02 RX ADMIN — HYPROMELLOSE 2910 1 DROP: 5 SOLUTION OPHTHALMIC at 05:10

## 2021-10-02 RX ADMIN — HYPROMELLOSE 2910 1 DROP: 5 SOLUTION OPHTHALMIC at 01:10

## 2021-10-02 RX ADMIN — AMLODIPINE BESYLATE 10 MG: 10 TABLET ORAL at 06:10

## 2021-10-02 RX ADMIN — HYPROMELLOSE 2910 1 DROP: 5 SOLUTION OPHTHALMIC at 11:10

## 2021-10-02 RX ADMIN — HYDROMORPHONE HYDROCHLORIDE 0.5 MG: 1 INJECTION, SOLUTION INTRAMUSCULAR; INTRAVENOUS; SUBCUTANEOUS at 04:10

## 2021-10-02 RX ADMIN — ATORVASTATIN CALCIUM 80 MG: 20 TABLET, FILM COATED ORAL at 09:10

## 2021-10-02 RX ADMIN — HYPROMELLOSE 2910 1 DROP: 5 SOLUTION OPHTHALMIC at 08:10

## 2021-10-02 RX ADMIN — VALACYCLOVIR HYDROCHLORIDE 1000 MG: 500 TABLET, FILM COATED ORAL at 09:10

## 2021-10-02 RX ADMIN — HYPROMELLOSE 2910 1 DROP: 5 SOLUTION OPHTHALMIC at 04:10

## 2021-10-02 RX ADMIN — BRIMONIDINE TARTRATE 1 DROP: 1.5 SOLUTION OPHTHALMIC at 03:10

## 2021-10-02 RX ADMIN — HYPROMELLOSE 2910 1 DROP: 5 SOLUTION OPHTHALMIC at 09:10

## 2021-10-02 RX ADMIN — TAMSULOSIN HYDROCHLORIDE 0.4 MG: 0.4 CAPSULE ORAL at 09:10

## 2021-10-02 RX ADMIN — BRIMONIDINE TARTRATE 1 DROP: 1.5 SOLUTION OPHTHALMIC at 09:10

## 2021-10-02 RX ADMIN — INSULIN ASPART 5 UNITS: 100 INJECTION, SOLUTION INTRAVENOUS; SUBCUTANEOUS at 09:10

## 2021-10-02 RX ADMIN — HYPROMELLOSE 2910 1 DROP: 5 SOLUTION OPHTHALMIC at 12:10

## 2021-10-02 RX ADMIN — ISOSORBIDE MONONITRATE 30 MG: 30 TABLET, EXTENDED RELEASE ORAL at 06:10

## 2021-10-02 RX ADMIN — ATROPINE SULFATE 1 DROP: 10 SOLUTION OPHTHALMIC at 10:10

## 2021-10-02 RX ADMIN — HYPROMELLOSE 2910 1 DROP: 5 SOLUTION OPHTHALMIC at 10:10

## 2021-10-02 RX ADMIN — AMIODARONE HYDROCHLORIDE 200 MG: 200 TABLET ORAL at 06:10

## 2021-10-02 RX ADMIN — OXYCODONE 5 MG: 5 TABLET ORAL at 10:10

## 2021-10-02 RX ADMIN — DULOXETINE 60 MG: 60 CAPSULE, DELAYED RELEASE ORAL at 09:10

## 2021-10-02 RX ADMIN — SPIRONOLACTONE 25 MG: 25 TABLET ORAL at 09:10

## 2021-10-02 RX ADMIN — HYPROMELLOSE 2910 1 DROP: 5 SOLUTION OPHTHALMIC at 07:10

## 2021-10-02 RX ADMIN — ATROPINE SULFATE 1 DROP: 10 SOLUTION OPHTHALMIC at 09:10

## 2021-10-02 RX ADMIN — ALLOPURINOL 100 MG: 100 TABLET ORAL at 09:10

## 2021-10-02 RX ADMIN — PANTOPRAZOLE SODIUM 40 MG: 40 TABLET, DELAYED RELEASE ORAL at 09:10

## 2021-10-02 RX ADMIN — ENOXAPARIN SODIUM 40 MG: 100 INJECTION SUBCUTANEOUS at 05:10

## 2021-10-02 RX ADMIN — DICYCLOMINE HYDROCHLORIDE 20 MG: 20 TABLET ORAL at 09:10

## 2021-10-02 RX ADMIN — SOTALOL HYDROCHLORIDE 80 MG: 80 TABLET ORAL at 09:10

## 2021-10-02 RX ADMIN — FUROSEMIDE 20 MG: 20 TABLET ORAL at 09:10

## 2021-10-02 RX ADMIN — DOCUSATE SODIUM 50MG AND SENNOSIDES 8.6MG 1 TABLET: 8.6; 5 TABLET, FILM COATED ORAL at 09:10

## 2021-10-02 RX ADMIN — MELOXICAM 15 MG: 15 TABLET ORAL at 09:10

## 2021-10-02 RX ADMIN — BRIMONIDINE TARTRATE 1 DROP: 1.5 SOLUTION OPHTHALMIC at 10:10

## 2021-10-02 RX ADMIN — OXYCODONE HYDROCHLORIDE 10 MG: 10 TABLET ORAL at 02:10

## 2021-10-02 RX ADMIN — HYPROMELLOSE 2910 1 DROP: 5 SOLUTION OPHTHALMIC at 02:10

## 2021-10-02 RX ADMIN — METOPROLOL SUCCINATE 50 MG: 50 TABLET, EXTENDED RELEASE ORAL at 09:10

## 2021-10-02 RX ADMIN — TIMOLOL MALEATE 1 DROP: 5 SOLUTION OPHTHALMIC at 09:10

## 2021-10-02 RX ADMIN — INSULIN DETEMIR 14 UNITS: 100 INJECTION, SOLUTION SUBCUTANEOUS at 09:10

## 2021-10-02 RX ADMIN — DICYCLOMINE HYDROCHLORIDE 20 MG: 20 TABLET ORAL at 10:10

## 2021-10-02 RX ADMIN — VALACYCLOVIR HYDROCHLORIDE 1000 MG: 500 TABLET, FILM COATED ORAL at 03:10

## 2021-10-02 RX ADMIN — ASPIRIN 81 MG: 81 TABLET, COATED ORAL at 09:10

## 2021-10-02 RX ADMIN — GABAPENTIN 300 MG: 300 CAPSULE ORAL at 09:10

## 2021-10-02 RX ADMIN — HYPROMELLOSE 2910 1 DROP: 5 SOLUTION OPHTHALMIC at 06:10

## 2021-10-02 RX ADMIN — ACETAMINOPHEN 1000 MG: 500 TABLET ORAL at 10:10

## 2021-10-02 RX ADMIN — INSULIN ASPART 5 UNITS: 100 INJECTION, SOLUTION INTRAVENOUS; SUBCUTANEOUS at 05:10

## 2021-10-02 RX ADMIN — CETIRIZINE HYDROCHLORIDE 10 MG: 10 TABLET, FILM COATED ORAL at 09:10

## 2021-10-02 RX ADMIN — OXYCODONE HYDROCHLORIDE 10 MG: 10 TABLET ORAL at 09:10

## 2021-10-03 LAB
ANION GAP SERPL CALC-SCNC: 9 MMOL/L (ref 8–16)
BASOPHILS # BLD AUTO: 0.08 K/UL (ref 0–0.2)
BASOPHILS NFR BLD: 1.3 % (ref 0–1.9)
BUN SERPL-MCNC: 29 MG/DL (ref 6–20)
CALCIUM SERPL-MCNC: 8.9 MG/DL (ref 8.7–10.5)
CHLORIDE SERPL-SCNC: 104 MMOL/L (ref 95–110)
CO2 SERPL-SCNC: 21 MMOL/L (ref 23–29)
CREAT SERPL-MCNC: 1.5 MG/DL (ref 0.5–1.4)
DIFFERENTIAL METHOD: ABNORMAL
EOSINOPHIL # BLD AUTO: 0.4 K/UL (ref 0–0.5)
EOSINOPHIL NFR BLD: 6.1 % (ref 0–8)
ERYTHROCYTE [DISTWIDTH] IN BLOOD BY AUTOMATED COUNT: 11.7 % (ref 11.5–14.5)
EST. GFR  (AFRICAN AMERICAN): 58.9 ML/MIN/1.73 M^2
EST. GFR  (NON AFRICAN AMERICAN): 50.9 ML/MIN/1.73 M^2
GLUCOSE SERPL-MCNC: 187 MG/DL (ref 70–110)
HCT VFR BLD AUTO: 37.9 % (ref 40–54)
HGB BLD-MCNC: 12.9 G/DL (ref 14–18)
IMM GRANULOCYTES # BLD AUTO: 0.01 K/UL (ref 0–0.04)
IMM GRANULOCYTES NFR BLD AUTO: 0.2 % (ref 0–0.5)
LYMPHOCYTES # BLD AUTO: 2.8 K/UL (ref 1–4.8)
LYMPHOCYTES NFR BLD: 45.8 % (ref 18–48)
MCH RBC QN AUTO: 32.1 PG (ref 27–31)
MCHC RBC AUTO-ENTMCNC: 34 G/DL (ref 32–36)
MCV RBC AUTO: 94 FL (ref 82–98)
MONOCYTES # BLD AUTO: 0.5 K/UL (ref 0.3–1)
MONOCYTES NFR BLD: 7.9 % (ref 4–15)
NEUTROPHILS # BLD AUTO: 2.4 K/UL (ref 1.8–7.7)
NEUTROPHILS NFR BLD: 38.7 % (ref 38–73)
NRBC BLD-RTO: 0 /100 WBC
PLATELET # BLD AUTO: 220 K/UL (ref 150–450)
PMV BLD AUTO: 11.5 FL (ref 9.2–12.9)
POCT GLUCOSE: 176 MG/DL (ref 70–110)
POCT GLUCOSE: 184 MG/DL (ref 70–110)
POCT GLUCOSE: 188 MG/DL (ref 70–110)
POCT GLUCOSE: 203 MG/DL (ref 70–110)
POCT GLUCOSE: 204 MG/DL (ref 70–110)
POCT GLUCOSE: 308 MG/DL (ref 70–110)
POTASSIUM SERPL-SCNC: 3.6 MMOL/L (ref 3.5–5.1)
RBC # BLD AUTO: 4.02 M/UL (ref 4.6–6.2)
SODIUM SERPL-SCNC: 134 MMOL/L (ref 136–145)
WBC # BLD AUTO: 6.18 K/UL (ref 3.9–12.7)

## 2021-10-03 PROCEDURE — 36415 COLL VENOUS BLD VENIPUNCTURE: CPT | Performed by: PHYSICIAN ASSISTANT

## 2021-10-03 PROCEDURE — 20600001 HC STEP DOWN PRIVATE ROOM

## 2021-10-03 PROCEDURE — 25000003 PHARM REV CODE 250: Performed by: STUDENT IN AN ORGANIZED HEALTH CARE EDUCATION/TRAINING PROGRAM

## 2021-10-03 PROCEDURE — 63600175 PHARM REV CODE 636 W HCPCS: Performed by: PHYSICIAN ASSISTANT

## 2021-10-03 PROCEDURE — 99233 SBSQ HOSP IP/OBS HIGH 50: CPT | Mod: ,,, | Performed by: PHYSICIAN ASSISTANT

## 2021-10-03 PROCEDURE — 85025 COMPLETE CBC W/AUTO DIFF WBC: CPT | Performed by: PHYSICIAN ASSISTANT

## 2021-10-03 PROCEDURE — 80048 BASIC METABOLIC PNL TOTAL CA: CPT | Performed by: PHYSICIAN ASSISTANT

## 2021-10-03 PROCEDURE — 99233 PR SUBSEQUENT HOSPITAL CARE,LEVL III: ICD-10-PCS | Mod: ,,, | Performed by: PHYSICIAN ASSISTANT

## 2021-10-03 PROCEDURE — 25000003 PHARM REV CODE 250: Performed by: PHYSICIAN ASSISTANT

## 2021-10-03 RX ORDER — HYDROMORPHONE HYDROCHLORIDE 1 MG/ML
0.5 INJECTION, SOLUTION INTRAMUSCULAR; INTRAVENOUS; SUBCUTANEOUS EVERY 4 HOURS
Status: DISCONTINUED | OUTPATIENT
Start: 2021-10-03 | End: 2021-10-03

## 2021-10-03 RX ORDER — HYDROMORPHONE HYDROCHLORIDE 1 MG/ML
0.5 INJECTION, SOLUTION INTRAMUSCULAR; INTRAVENOUS; SUBCUTANEOUS EVERY 4 HOURS
Status: DISCONTINUED | OUTPATIENT
Start: 2021-10-03 | End: 2021-10-07 | Stop reason: HOSPADM

## 2021-10-03 RX ADMIN — INSULIN ASPART 5 UNITS: 100 INJECTION, SOLUTION INTRAVENOUS; SUBCUTANEOUS at 11:10

## 2021-10-03 RX ADMIN — SOTALOL HYDROCHLORIDE 80 MG: 80 TABLET ORAL at 09:10

## 2021-10-03 RX ADMIN — FUROSEMIDE 20 MG: 20 TABLET ORAL at 09:10

## 2021-10-03 RX ADMIN — HYPROMELLOSE 2910 1 DROP: 5 SOLUTION OPHTHALMIC at 12:10

## 2021-10-03 RX ADMIN — CETIRIZINE HYDROCHLORIDE 10 MG: 10 TABLET, FILM COATED ORAL at 08:10

## 2021-10-03 RX ADMIN — HYPROMELLOSE 2910 1 DROP: 5 SOLUTION OPHTHALMIC at 05:10

## 2021-10-03 RX ADMIN — ATROPINE SULFATE 1 DROP: 10 SOLUTION OPHTHALMIC at 10:10

## 2021-10-03 RX ADMIN — DULOXETINE 60 MG: 60 CAPSULE, DELAYED RELEASE ORAL at 08:10

## 2021-10-03 RX ADMIN — DICYCLOMINE HYDROCHLORIDE 20 MG: 20 TABLET ORAL at 08:10

## 2021-10-03 RX ADMIN — HYDROMORPHONE HYDROCHLORIDE 0.5 MG: 1 INJECTION, SOLUTION INTRAMUSCULAR; INTRAVENOUS; SUBCUTANEOUS at 06:10

## 2021-10-03 RX ADMIN — INSULIN ASPART 1 UNITS: 100 INJECTION, SOLUTION INTRAVENOUS; SUBCUTANEOUS at 10:10

## 2021-10-03 RX ADMIN — INSULIN DETEMIR 14 UNITS: 100 INJECTION, SOLUTION SUBCUTANEOUS at 08:10

## 2021-10-03 RX ADMIN — AMLODIPINE BESYLATE 10 MG: 10 TABLET ORAL at 06:10

## 2021-10-03 RX ADMIN — HYPROMELLOSE 2910 1 DROP: 5 SOLUTION OPHTHALMIC at 11:10

## 2021-10-03 RX ADMIN — HYPROMELLOSE 2910 1 DROP: 5 SOLUTION OPHTHALMIC at 01:10

## 2021-10-03 RX ADMIN — VALACYCLOVIR HYDROCHLORIDE 1000 MG: 500 TABLET, FILM COATED ORAL at 09:10

## 2021-10-03 RX ADMIN — ASPIRIN 81 MG: 81 TABLET, COATED ORAL at 08:10

## 2021-10-03 RX ADMIN — OXYCODONE 5 MG: 5 TABLET ORAL at 08:10

## 2021-10-03 RX ADMIN — AMIODARONE HYDROCHLORIDE 200 MG: 200 TABLET ORAL at 06:10

## 2021-10-03 RX ADMIN — HYPROMELLOSE 2910 1 DROP: 5 SOLUTION OPHTHALMIC at 03:10

## 2021-10-03 RX ADMIN — HYPROMELLOSE 2910 1 DROP: 5 SOLUTION OPHTHALMIC at 06:10

## 2021-10-03 RX ADMIN — ACETAMINOPHEN 1000 MG: 500 TABLET ORAL at 09:10

## 2021-10-03 RX ADMIN — ENOXAPARIN SODIUM 40 MG: 100 INJECTION SUBCUTANEOUS at 04:10

## 2021-10-03 RX ADMIN — BRIMONIDINE TARTRATE 1 DROP: 1.5 SOLUTION OPHTHALMIC at 08:10

## 2021-10-03 RX ADMIN — BRIMONIDINE TARTRATE 1 DROP: 1.5 SOLUTION OPHTHALMIC at 10:10

## 2021-10-03 RX ADMIN — INSULIN ASPART 5 UNITS: 100 INJECTION, SOLUTION INTRAVENOUS; SUBCUTANEOUS at 04:10

## 2021-10-03 RX ADMIN — HYDROMORPHONE HYDROCHLORIDE 0.5 MG: 1 INJECTION, SOLUTION INTRAMUSCULAR; INTRAVENOUS; SUBCUTANEOUS at 10:10

## 2021-10-03 RX ADMIN — MELOXICAM 15 MG: 15 TABLET ORAL at 08:10

## 2021-10-03 RX ADMIN — VALACYCLOVIR HYDROCHLORIDE 1000 MG: 500 TABLET, FILM COATED ORAL at 08:10

## 2021-10-03 RX ADMIN — POLYETHYLENE GLYCOL 3350 17 G: 17 POWDER, FOR SOLUTION ORAL at 08:10

## 2021-10-03 RX ADMIN — INSULIN ASPART 5 UNITS: 100 INJECTION, SOLUTION INTRAVENOUS; SUBCUTANEOUS at 08:10

## 2021-10-03 RX ADMIN — DICYCLOMINE HYDROCHLORIDE 20 MG: 20 TABLET ORAL at 09:10

## 2021-10-03 RX ADMIN — ISOSORBIDE MONONITRATE 30 MG: 30 TABLET, EXTENDED RELEASE ORAL at 06:10

## 2021-10-03 RX ADMIN — DOCUSATE SODIUM 50MG AND SENNOSIDES 8.6MG 1 TABLET: 8.6; 5 TABLET, FILM COATED ORAL at 08:10

## 2021-10-03 RX ADMIN — HYPROMELLOSE 2910 1 DROP: 5 SOLUTION OPHTHALMIC at 07:10

## 2021-10-03 RX ADMIN — PANTOPRAZOLE SODIUM 40 MG: 40 TABLET, DELAYED RELEASE ORAL at 08:10

## 2021-10-03 RX ADMIN — ALLOPURINOL 100 MG: 100 TABLET ORAL at 09:10

## 2021-10-03 RX ADMIN — HYDROMORPHONE HYDROCHLORIDE 0.5 MG: 1 INJECTION, SOLUTION INTRAMUSCULAR; INTRAVENOUS; SUBCUTANEOUS at 02:10

## 2021-10-03 RX ADMIN — HYPROMELLOSE 2910 1 DROP: 5 SOLUTION OPHTHALMIC at 08:10

## 2021-10-03 RX ADMIN — HYPROMELLOSE 2910 1 DROP: 5 SOLUTION OPHTHALMIC at 02:10

## 2021-10-03 RX ADMIN — DOCUSATE SODIUM 50MG AND SENNOSIDES 8.6MG 1 TABLET: 8.6; 5 TABLET, FILM COATED ORAL at 09:10

## 2021-10-03 RX ADMIN — TAMSULOSIN HYDROCHLORIDE 0.4 MG: 0.4 CAPSULE ORAL at 08:10

## 2021-10-03 RX ADMIN — HYPROMELLOSE 2910 1 DROP: 5 SOLUTION OPHTHALMIC at 04:10

## 2021-10-03 RX ADMIN — HYPROMELLOSE 2910 1 DROP: 5 SOLUTION OPHTHALMIC at 10:10

## 2021-10-03 RX ADMIN — BRIMONIDINE TARTRATE 1 DROP: 1.5 SOLUTION OPHTHALMIC at 02:10

## 2021-10-03 RX ADMIN — HYDROMORPHONE HYDROCHLORIDE 0.5 MG: 1 INJECTION, SOLUTION INTRAMUSCULAR; INTRAVENOUS; SUBCUTANEOUS at 11:10

## 2021-10-03 RX ADMIN — ZOLPIDEM TARTRATE 5 MG: 5 TABLET ORAL at 09:10

## 2021-10-03 RX ADMIN — TIMOLOL MALEATE 1 DROP: 5 SOLUTION OPHTHALMIC at 10:10

## 2021-10-03 RX ADMIN — ACETAMINOPHEN 1000 MG: 500 TABLET ORAL at 05:10

## 2021-10-03 RX ADMIN — HYPROMELLOSE 2910 1 DROP: 5 SOLUTION OPHTHALMIC at 09:10

## 2021-10-03 RX ADMIN — SPIRONOLACTONE 25 MG: 25 TABLET ORAL at 09:10

## 2021-10-03 RX ADMIN — INSULIN ASPART 2 UNITS: 100 INJECTION, SOLUTION INTRAVENOUS; SUBCUTANEOUS at 11:10

## 2021-10-03 RX ADMIN — ATORVASTATIN CALCIUM 80 MG: 20 TABLET, FILM COATED ORAL at 08:10

## 2021-10-03 RX ADMIN — TIMOLOL MALEATE 1 DROP: 5 SOLUTION OPHTHALMIC at 08:10

## 2021-10-03 RX ADMIN — HYDROMORPHONE HYDROCHLORIDE 0.5 MG: 1 INJECTION, SOLUTION INTRAMUSCULAR; INTRAVENOUS; SUBCUTANEOUS at 05:10

## 2021-10-03 RX ADMIN — INSULIN ASPART 4 UNITS: 100 INJECTION, SOLUTION INTRAVENOUS; SUBCUTANEOUS at 04:10

## 2021-10-03 RX ADMIN — GABAPENTIN 300 MG: 300 CAPSULE ORAL at 09:10

## 2021-10-03 RX ADMIN — ACETAMINOPHEN 1000 MG: 500 TABLET ORAL at 02:10

## 2021-10-03 RX ADMIN — ATROPINE SULFATE 1 DROP: 10 SOLUTION OPHTHALMIC at 08:10

## 2021-10-03 RX ADMIN — METOPROLOL SUCCINATE 50 MG: 50 TABLET, EXTENDED RELEASE ORAL at 09:10

## 2021-10-03 RX ADMIN — VALACYCLOVIR HYDROCHLORIDE 1000 MG: 500 TABLET, FILM COATED ORAL at 02:10

## 2021-10-04 ENCOUNTER — TELEPHONE (OUTPATIENT)
Dept: OPHTHALMOLOGY | Facility: CLINIC | Age: 56
End: 2021-10-04

## 2021-10-04 ENCOUNTER — ANESTHESIA EVENT (OUTPATIENT)
Dept: SURGERY | Facility: HOSPITAL | Age: 56
DRG: 116 | End: 2021-10-04
Payer: MEDICARE

## 2021-10-04 ENCOUNTER — OPHTH EXAM (OUTPATIENT)
Dept: OPHTHALMOLOGY | Facility: CLINIC | Age: 56
DRG: 116 | End: 2021-10-04
Payer: MEDICARE

## 2021-10-04 DIAGNOSIS — B00.52 HERPES KERATITIS: ICD-10-CM

## 2021-10-04 DIAGNOSIS — H20.9 UVEITIS: ICD-10-CM

## 2021-10-04 DIAGNOSIS — H40.41X0 GLAUCOMA OF RIGHT EYE SECONDARY TO EYE INFLAMMATION, UNSPECIFIED GLAUCOMA STAGE: ICD-10-CM

## 2021-10-04 DIAGNOSIS — H44.001 ENDOPHTHALMITIS, RIGHT: Primary | ICD-10-CM

## 2021-10-04 DIAGNOSIS — H16.031 CORNEAL ULCER OF RIGHT EYE WITH HYPOPYON: ICD-10-CM

## 2021-10-04 LAB
ANION GAP SERPL CALC-SCNC: 13 MMOL/L (ref 8–16)
BASOPHILS # BLD AUTO: 0.08 K/UL (ref 0–0.2)
BASOPHILS NFR BLD: 1.2 % (ref 0–1.9)
BUN SERPL-MCNC: 30 MG/DL (ref 6–20)
CALCIUM SERPL-MCNC: 9.4 MG/DL (ref 8.7–10.5)
CHLORIDE SERPL-SCNC: 106 MMOL/L (ref 95–110)
CO2 SERPL-SCNC: 17 MMOL/L (ref 23–29)
CREAT SERPL-MCNC: 1.2 MG/DL (ref 0.5–1.4)
DIFFERENTIAL METHOD: ABNORMAL
EOSINOPHIL # BLD AUTO: 0.6 K/UL (ref 0–0.5)
EOSINOPHIL NFR BLD: 8.9 % (ref 0–8)
ERYTHROCYTE [DISTWIDTH] IN BLOOD BY AUTOMATED COUNT: 11.7 % (ref 11.5–14.5)
EST. GFR  (AFRICAN AMERICAN): >60 ML/MIN/1.73 M^2
EST. GFR  (NON AFRICAN AMERICAN): >60 ML/MIN/1.73 M^2
GLUCOSE SERPL-MCNC: 209 MG/DL (ref 70–110)
HCT VFR BLD AUTO: 39 % (ref 40–54)
HGB BLD-MCNC: 13.3 G/DL (ref 14–18)
IMM GRANULOCYTES # BLD AUTO: 0.03 K/UL (ref 0–0.04)
IMM GRANULOCYTES NFR BLD AUTO: 0.4 % (ref 0–0.5)
LYMPHOCYTES # BLD AUTO: 3.1 K/UL (ref 1–4.8)
LYMPHOCYTES NFR BLD: 45.8 % (ref 18–48)
MCH RBC QN AUTO: 31.8 PG (ref 27–31)
MCHC RBC AUTO-ENTMCNC: 34.1 G/DL (ref 32–36)
MCV RBC AUTO: 93 FL (ref 82–98)
MONOCYTES # BLD AUTO: 0.5 K/UL (ref 0.3–1)
MONOCYTES NFR BLD: 6.6 % (ref 4–15)
NEUTROPHILS # BLD AUTO: 2.5 K/UL (ref 1.8–7.7)
NEUTROPHILS NFR BLD: 37.1 % (ref 38–73)
NRBC BLD-RTO: 0 /100 WBC
PLATELET # BLD AUTO: 236 K/UL (ref 150–450)
PMV BLD AUTO: 11.5 FL (ref 9.2–12.9)
POCT GLUCOSE: 192 MG/DL (ref 70–110)
POCT GLUCOSE: 207 MG/DL (ref 70–110)
POCT GLUCOSE: 259 MG/DL (ref 70–110)
POCT GLUCOSE: 303 MG/DL (ref 70–110)
POTASSIUM SERPL-SCNC: 3.7 MMOL/L (ref 3.5–5.1)
RBC # BLD AUTO: 4.18 M/UL (ref 4.6–6.2)
SODIUM SERPL-SCNC: 136 MMOL/L (ref 136–145)
WBC # BLD AUTO: 6.84 K/UL (ref 3.9–12.7)

## 2021-10-04 PROCEDURE — 63600175 PHARM REV CODE 636 W HCPCS: Performed by: PHYSICIAN ASSISTANT

## 2021-10-04 PROCEDURE — 36415 COLL VENOUS BLD VENIPUNCTURE: CPT | Performed by: PHYSICIAN ASSISTANT

## 2021-10-04 PROCEDURE — 80048 BASIC METABOLIC PNL TOTAL CA: CPT | Performed by: PHYSICIAN ASSISTANT

## 2021-10-04 PROCEDURE — 25000003 PHARM REV CODE 250: Performed by: STUDENT IN AN ORGANIZED HEALTH CARE EDUCATION/TRAINING PROGRAM

## 2021-10-04 PROCEDURE — 99233 SBSQ HOSP IP/OBS HIGH 50: CPT | Mod: ,,, | Performed by: OPHTHALMOLOGY

## 2021-10-04 PROCEDURE — 20600001 HC STEP DOWN PRIVATE ROOM

## 2021-10-04 PROCEDURE — 99233 PR SUBSEQUENT HOSPITAL CARE,LEVL III: ICD-10-PCS | Mod: ,,, | Performed by: OPHTHALMOLOGY

## 2021-10-04 PROCEDURE — 25000003 PHARM REV CODE 250: Performed by: INTERNAL MEDICINE

## 2021-10-04 PROCEDURE — 85025 COMPLETE CBC W/AUTO DIFF WBC: CPT | Performed by: PHYSICIAN ASSISTANT

## 2021-10-04 PROCEDURE — 99233 PR SUBSEQUENT HOSPITAL CARE,LEVL III: ICD-10-PCS | Mod: ,,, | Performed by: PHYSICIAN ASSISTANT

## 2021-10-04 PROCEDURE — 99233 SBSQ HOSP IP/OBS HIGH 50: CPT | Mod: ,,, | Performed by: PHYSICIAN ASSISTANT

## 2021-10-04 PROCEDURE — 25000003 PHARM REV CODE 250: Performed by: PHYSICIAN ASSISTANT

## 2021-10-04 RX ORDER — DORZOLAMIDE HCL 20 MG/ML
1 SOLUTION/ DROPS OPHTHALMIC 3 TIMES DAILY
Status: DISCONTINUED | OUTPATIENT
Start: 2021-10-04 | End: 2021-10-07

## 2021-10-04 RX ADMIN — HYPROMELLOSE 2910 1 DROP: 5 SOLUTION OPHTHALMIC at 09:10

## 2021-10-04 RX ADMIN — BRIMONIDINE TARTRATE 1 DROP: 1.5 SOLUTION OPHTHALMIC at 09:10

## 2021-10-04 RX ADMIN — ISOSORBIDE MONONITRATE 30 MG: 30 TABLET, EXTENDED RELEASE ORAL at 06:10

## 2021-10-04 RX ADMIN — MELOXICAM 15 MG: 15 TABLET ORAL at 08:10

## 2021-10-04 RX ADMIN — HYDROMORPHONE HYDROCHLORIDE 0.5 MG: 1 INJECTION, SOLUTION INTRAMUSCULAR; INTRAVENOUS; SUBCUTANEOUS at 06:10

## 2021-10-04 RX ADMIN — FUROSEMIDE 20 MG: 20 TABLET ORAL at 09:10

## 2021-10-04 RX ADMIN — SPIRONOLACTONE 25 MG: 25 TABLET ORAL at 09:10

## 2021-10-04 RX ADMIN — PANTOPRAZOLE SODIUM 40 MG: 40 TABLET, DELAYED RELEASE ORAL at 08:10

## 2021-10-04 RX ADMIN — INSULIN DETEMIR 14 UNITS: 100 INJECTION, SOLUTION SUBCUTANEOUS at 08:10

## 2021-10-04 RX ADMIN — HYPROMELLOSE 2910 1 DROP: 5 SOLUTION OPHTHALMIC at 04:10

## 2021-10-04 RX ADMIN — HYDROMORPHONE HYDROCHLORIDE 0.5 MG: 1 INJECTION, SOLUTION INTRAMUSCULAR; INTRAVENOUS; SUBCUTANEOUS at 09:10

## 2021-10-04 RX ADMIN — TAMSULOSIN HYDROCHLORIDE 0.4 MG: 0.4 CAPSULE ORAL at 08:10

## 2021-10-04 RX ADMIN — HYDROMORPHONE HYDROCHLORIDE 0.5 MG: 1 INJECTION, SOLUTION INTRAMUSCULAR; INTRAVENOUS; SUBCUTANEOUS at 02:10

## 2021-10-04 RX ADMIN — HYPROMELLOSE 2910 1 DROP: 5 SOLUTION OPHTHALMIC at 02:10

## 2021-10-04 RX ADMIN — HYPROMELLOSE 2910 1 DROP: 5 SOLUTION OPHTHALMIC at 12:10

## 2021-10-04 RX ADMIN — SOTALOL HYDROCHLORIDE 80 MG: 80 TABLET ORAL at 08:10

## 2021-10-04 RX ADMIN — POLYETHYLENE GLYCOL 3350 17 G: 17 POWDER, FOR SOLUTION ORAL at 08:10

## 2021-10-04 RX ADMIN — HYPROMELLOSE 2910 1 DROP: 5 SOLUTION OPHTHALMIC at 03:10

## 2021-10-04 RX ADMIN — HYPROMELLOSE 2910 1 DROP: 5 SOLUTION OPHTHALMIC at 10:10

## 2021-10-04 RX ADMIN — HYDROMORPHONE HYDROCHLORIDE 0.5 MG: 1 INJECTION, SOLUTION INTRAMUSCULAR; INTRAVENOUS; SUBCUTANEOUS at 10:10

## 2021-10-04 RX ADMIN — HYDROMORPHONE HYDROCHLORIDE 0.5 MG: 1 INJECTION, SOLUTION INTRAMUSCULAR; INTRAVENOUS; SUBCUTANEOUS at 03:10

## 2021-10-04 RX ADMIN — ATROPINE SULFATE 1 DROP: 10 SOLUTION OPHTHALMIC at 08:10

## 2021-10-04 RX ADMIN — DOCUSATE SODIUM 50MG AND SENNOSIDES 8.6MG 1 TABLET: 8.6; 5 TABLET, FILM COATED ORAL at 08:10

## 2021-10-04 RX ADMIN — ACETAMINOPHEN 1000 MG: 500 TABLET ORAL at 02:10

## 2021-10-04 RX ADMIN — VALACYCLOVIR HYDROCHLORIDE 1000 MG: 500 TABLET, FILM COATED ORAL at 09:10

## 2021-10-04 RX ADMIN — ALLOPURINOL 100 MG: 100 TABLET ORAL at 09:10

## 2021-10-04 RX ADMIN — INSULIN ASPART 5 UNITS: 100 INJECTION, SOLUTION INTRAVENOUS; SUBCUTANEOUS at 11:10

## 2021-10-04 RX ADMIN — HYPROMELLOSE 2910 1 DROP: 5 SOLUTION OPHTHALMIC at 08:10

## 2021-10-04 RX ADMIN — ENOXAPARIN SODIUM 40 MG: 100 INJECTION SUBCUTANEOUS at 04:10

## 2021-10-04 RX ADMIN — INSULIN ASPART 2 UNITS: 100 INJECTION, SOLUTION INTRAVENOUS; SUBCUTANEOUS at 08:10

## 2021-10-04 RX ADMIN — GABAPENTIN 300 MG: 300 CAPSULE ORAL at 09:10

## 2021-10-04 RX ADMIN — DORZOLAMIDE HYDROCHLORIDE 1 DROP: 20 SOLUTION/ DROPS OPHTHALMIC at 09:10

## 2021-10-04 RX ADMIN — HYPROMELLOSE 2910 1 DROP: 5 SOLUTION OPHTHALMIC at 11:10

## 2021-10-04 RX ADMIN — DOCUSATE SODIUM 50MG AND SENNOSIDES 8.6MG 1 TABLET: 8.6; 5 TABLET, FILM COATED ORAL at 09:10

## 2021-10-04 RX ADMIN — AMLODIPINE BESYLATE 10 MG: 10 TABLET ORAL at 06:10

## 2021-10-04 RX ADMIN — ZOLPIDEM TARTRATE 5 MG: 5 TABLET ORAL at 09:10

## 2021-10-04 RX ADMIN — HYPROMELLOSE 2910 1 DROP: 5 SOLUTION OPHTHALMIC at 01:10

## 2021-10-04 RX ADMIN — INSULIN ASPART 5 UNITS: 100 INJECTION, SOLUTION INTRAVENOUS; SUBCUTANEOUS at 04:10

## 2021-10-04 RX ADMIN — DICYCLOMINE HYDROCHLORIDE 20 MG: 20 TABLET ORAL at 08:10

## 2021-10-04 RX ADMIN — ACETAMINOPHEN 1000 MG: 500 TABLET ORAL at 09:10

## 2021-10-04 RX ADMIN — HYPROMELLOSE 2910 1 DROP: 5 SOLUTION OPHTHALMIC at 06:10

## 2021-10-04 RX ADMIN — METOPROLOL SUCCINATE 50 MG: 50 TABLET, EXTENDED RELEASE ORAL at 09:10

## 2021-10-04 RX ADMIN — ASPIRIN 81 MG: 81 TABLET, COATED ORAL at 08:10

## 2021-10-04 RX ADMIN — OXYCODONE 5 MG: 5 TABLET ORAL at 08:10

## 2021-10-04 RX ADMIN — INSULIN ASPART 5 UNITS: 100 INJECTION, SOLUTION INTRAVENOUS; SUBCUTANEOUS at 08:10

## 2021-10-04 RX ADMIN — AMIODARONE HYDROCHLORIDE 200 MG: 200 TABLET ORAL at 06:10

## 2021-10-04 RX ADMIN — ATORVASTATIN CALCIUM 80 MG: 20 TABLET, FILM COATED ORAL at 08:10

## 2021-10-04 RX ADMIN — VALACYCLOVIR HYDROCHLORIDE 1000 MG: 500 TABLET, FILM COATED ORAL at 03:10

## 2021-10-04 RX ADMIN — CETIRIZINE HYDROCHLORIDE 10 MG: 10 TABLET, FILM COATED ORAL at 08:10

## 2021-10-04 RX ADMIN — HYPROMELLOSE 2910 1 DROP: 5 SOLUTION OPHTHALMIC at 07:10

## 2021-10-04 RX ADMIN — BRIMONIDINE TARTRATE 1 DROP: 1.5 SOLUTION OPHTHALMIC at 08:10

## 2021-10-04 RX ADMIN — HYPROMELLOSE 2910 1 DROP: 5 SOLUTION OPHTHALMIC at 05:10

## 2021-10-04 RX ADMIN — SOTALOL HYDROCHLORIDE 80 MG: 80 TABLET ORAL at 09:10

## 2021-10-04 RX ADMIN — VALACYCLOVIR HYDROCHLORIDE 1000 MG: 500 TABLET, FILM COATED ORAL at 08:10

## 2021-10-04 RX ADMIN — DICYCLOMINE HYDROCHLORIDE 20 MG: 20 TABLET ORAL at 09:10

## 2021-10-04 RX ADMIN — TIMOLOL MALEATE 1 DROP: 5 SOLUTION OPHTHALMIC at 08:10

## 2021-10-04 RX ADMIN — ACETAMINOPHEN 1000 MG: 500 TABLET ORAL at 06:10

## 2021-10-04 RX ADMIN — TIMOLOL MALEATE 1 DROP: 5 SOLUTION OPHTHALMIC at 09:10

## 2021-10-04 RX ADMIN — DULOXETINE 60 MG: 60 CAPSULE, DELAYED RELEASE ORAL at 08:10

## 2021-10-04 RX ADMIN — INSULIN ASPART 4 UNITS: 100 INJECTION, SOLUTION INTRAVENOUS; SUBCUTANEOUS at 11:10

## 2021-10-04 RX ADMIN — ATROPINE SULFATE 1 DROP: 10 SOLUTION OPHTHALMIC at 09:10

## 2021-10-04 RX ADMIN — INSULIN ASPART 3 UNITS: 100 INJECTION, SOLUTION INTRAVENOUS; SUBCUTANEOUS at 04:10

## 2021-10-05 ENCOUNTER — TELEPHONE (OUTPATIENT)
Dept: OPHTHALMOLOGY | Facility: CLINIC | Age: 56
End: 2021-10-05

## 2021-10-05 ENCOUNTER — DOCUMENTATION ONLY (OUTPATIENT)
Dept: CARDIOLOGY | Facility: HOSPITAL | Age: 56
End: 2021-10-05

## 2021-10-05 ENCOUNTER — ANESTHESIA (OUTPATIENT)
Dept: SURGERY | Facility: HOSPITAL | Age: 56
DRG: 116 | End: 2021-10-05
Payer: MEDICARE

## 2021-10-05 DIAGNOSIS — Z13.9 SCREENING PROCEDURE: Primary | ICD-10-CM

## 2021-10-05 LAB
ANION GAP SERPL CALC-SCNC: 11 MMOL/L (ref 8–16)
BACTERIA SPEC AEROBE CULT: NO GROWTH
BASOPHILS # BLD AUTO: 0.06 K/UL (ref 0–0.2)
BASOPHILS NFR BLD: 0.9 % (ref 0–1.9)
BUN SERPL-MCNC: 30 MG/DL (ref 6–20)
CALCIUM SERPL-MCNC: 9.3 MG/DL (ref 8.7–10.5)
CHLORIDE SERPL-SCNC: 101 MMOL/L (ref 95–110)
CO2 SERPL-SCNC: 20 MMOL/L (ref 23–29)
CREAT SERPL-MCNC: 1.3 MG/DL (ref 0.5–1.4)
DIFFERENTIAL METHOD: ABNORMAL
EOSINOPHIL # BLD AUTO: 0.7 K/UL (ref 0–0.5)
EOSINOPHIL NFR BLD: 10.5 % (ref 0–8)
ERYTHROCYTE [DISTWIDTH] IN BLOOD BY AUTOMATED COUNT: 11.7 % (ref 11.5–14.5)
EST. GFR  (AFRICAN AMERICAN): >60 ML/MIN/1.73 M^2
EST. GFR  (NON AFRICAN AMERICAN): >60 ML/MIN/1.73 M^2
GLUCOSE SERPL-MCNC: 202 MG/DL (ref 70–110)
HCT VFR BLD AUTO: 37 % (ref 40–54)
HGB BLD-MCNC: 12.7 G/DL (ref 14–18)
IMM GRANULOCYTES # BLD AUTO: 0.01 K/UL (ref 0–0.04)
IMM GRANULOCYTES NFR BLD AUTO: 0.1 % (ref 0–0.5)
LYMPHOCYTES # BLD AUTO: 2.7 K/UL (ref 1–4.8)
LYMPHOCYTES NFR BLD: 40.9 % (ref 18–48)
MCH RBC QN AUTO: 32.1 PG (ref 27–31)
MCHC RBC AUTO-ENTMCNC: 34.3 G/DL (ref 32–36)
MCV RBC AUTO: 93 FL (ref 82–98)
MONOCYTES # BLD AUTO: 0.5 K/UL (ref 0.3–1)
MONOCYTES NFR BLD: 7.9 % (ref 4–15)
NEUTROPHILS # BLD AUTO: 2.6 K/UL (ref 1.8–7.7)
NEUTROPHILS NFR BLD: 39.7 % (ref 38–73)
NRBC BLD-RTO: 0 /100 WBC
PLATELET # BLD AUTO: 234 K/UL (ref 150–450)
PMV BLD AUTO: 11 FL (ref 9.2–12.9)
POCT GLUCOSE: 171 MG/DL (ref 70–110)
POCT GLUCOSE: 220 MG/DL (ref 70–110)
POCT GLUCOSE: 220 MG/DL (ref 70–110)
POCT GLUCOSE: 233 MG/DL (ref 70–110)
POTASSIUM SERPL-SCNC: 3.9 MMOL/L (ref 3.5–5.1)
RBC # BLD AUTO: 3.96 M/UL (ref 4.6–6.2)
SARS-COV-2 RDRP RESP QL NAA+PROBE: NEGATIVE
SODIUM SERPL-SCNC: 132 MMOL/L (ref 136–145)
WBC # BLD AUTO: 6.67 K/UL (ref 3.9–12.7)

## 2021-10-05 PROCEDURE — 25000003 PHARM REV CODE 250: Performed by: NURSE ANESTHETIST, CERTIFIED REGISTERED

## 2021-10-05 PROCEDURE — 36415 COLL VENOUS BLD VENIPUNCTURE: CPT | Performed by: PHYSICIAN ASSISTANT

## 2021-10-05 PROCEDURE — 65800 DRAINAGE OF EYE: CPT | Mod: RT,,, | Performed by: OPHTHALMOLOGY

## 2021-10-05 PROCEDURE — 37000008 HC ANESTHESIA 1ST 15 MINUTES: Performed by: OPHTHALMOLOGY

## 2021-10-05 PROCEDURE — D9220A PRA ANESTHESIA: ICD-10-PCS | Mod: ANES,,, | Performed by: ANESTHESIOLOGY

## 2021-10-05 PROCEDURE — 71000044 HC DOSC ROUTINE RECOVERY FIRST HOUR: Performed by: OPHTHALMOLOGY

## 2021-10-05 PROCEDURE — 63600175 PHARM REV CODE 636 W HCPCS: Performed by: PHYSICIAN ASSISTANT

## 2021-10-05 PROCEDURE — 36000706: Performed by: OPHTHALMOLOGY

## 2021-10-05 PROCEDURE — D9220A PRA ANESTHESIA: Mod: CRNA,,, | Performed by: NURSE ANESTHETIST, CERTIFIED REGISTERED

## 2021-10-05 PROCEDURE — D9220A PRA ANESTHESIA: ICD-10-PCS | Mod: CRNA,,, | Performed by: NURSE ANESTHETIST, CERTIFIED REGISTERED

## 2021-10-05 PROCEDURE — 87070 CULTURE OTHR SPECIMN AEROBIC: CPT | Performed by: OPHTHALMOLOGY

## 2021-10-05 PROCEDURE — 63600175 PHARM REV CODE 636 W HCPCS: Performed by: NURSE ANESTHETIST, CERTIFIED REGISTERED

## 2021-10-05 PROCEDURE — 87102 FUNGUS ISOLATION CULTURE: CPT | Performed by: OPHTHALMOLOGY

## 2021-10-05 PROCEDURE — 25000003 PHARM REV CODE 250: Performed by: PHYSICIAN ASSISTANT

## 2021-10-05 PROCEDURE — 25000003 PHARM REV CODE 250

## 2021-10-05 PROCEDURE — 99233 SBSQ HOSP IP/OBS HIGH 50: CPT | Mod: ,,, | Performed by: PHYSICIAN ASSISTANT

## 2021-10-05 PROCEDURE — 37000009 HC ANESTHESIA EA ADD 15 MINS: Performed by: OPHTHALMOLOGY

## 2021-10-05 PROCEDURE — 71000015 HC POSTOP RECOV 1ST HR: Performed by: OPHTHALMOLOGY

## 2021-10-05 PROCEDURE — U0002 COVID-19 LAB TEST NON-CDC: HCPCS | Performed by: INTERNAL MEDICINE

## 2021-10-05 PROCEDURE — D9220A PRA ANESTHESIA: Mod: ANES,,, | Performed by: ANESTHESIOLOGY

## 2021-10-05 PROCEDURE — 99233 PR SUBSEQUENT HOSPITAL CARE,LEVL III: ICD-10-PCS | Mod: ,,, | Performed by: PHYSICIAN ASSISTANT

## 2021-10-05 PROCEDURE — 82962 GLUCOSE BLOOD TEST: CPT | Performed by: OPHTHALMOLOGY

## 2021-10-05 PROCEDURE — 63600175 PHARM REV CODE 636 W HCPCS: Performed by: OPHTHALMOLOGY

## 2021-10-05 PROCEDURE — 85025 COMPLETE CBC W/AUTO DIFF WBC: CPT | Performed by: PHYSICIAN ASSISTANT

## 2021-10-05 PROCEDURE — 65800 PR DRAINAGE ANTER CHAMBR OF EYE: ICD-10-PCS | Mod: RT,,, | Performed by: OPHTHALMOLOGY

## 2021-10-05 PROCEDURE — 63600175 PHARM REV CODE 636 W HCPCS: Performed by: ANESTHESIOLOGY

## 2021-10-05 PROCEDURE — 20600001 HC STEP DOWN PRIVATE ROOM

## 2021-10-05 PROCEDURE — 36000707: Performed by: OPHTHALMOLOGY

## 2021-10-05 PROCEDURE — 25000003 PHARM REV CODE 250: Performed by: OPHTHALMOLOGY

## 2021-10-05 PROCEDURE — 80048 BASIC METABOLIC PNL TOTAL CA: CPT | Performed by: PHYSICIAN ASSISTANT

## 2021-10-05 PROCEDURE — 25000003 PHARM REV CODE 250: Performed by: STUDENT IN AN ORGANIZED HEALTH CARE EDUCATION/TRAINING PROGRAM

## 2021-10-05 RX ORDER — PROPARACAINE HYDROCHLORIDE 5 MG/ML
1 SOLUTION/ DROPS OPHTHALMIC
Status: DISCONTINUED | OUTPATIENT
Start: 2021-10-05 | End: 2021-10-05 | Stop reason: HOSPADM

## 2021-10-05 RX ORDER — LIDOCAINE HYDROCHLORIDE 20 MG/ML
INJECTION INTRAVENOUS
Status: DISCONTINUED | OUTPATIENT
Start: 2021-10-05 | End: 2021-10-05

## 2021-10-05 RX ORDER — DEXAMETHASONE SODIUM PHOSPHATE 4 MG/ML
INJECTION, SOLUTION INTRA-ARTICULAR; INTRALESIONAL; INTRAMUSCULAR; INTRAVENOUS; SOFT TISSUE
Status: DISCONTINUED | OUTPATIENT
Start: 2021-10-05 | End: 2021-10-05

## 2021-10-05 RX ORDER — DEXMEDETOMIDINE HYDROCHLORIDE 100 UG/ML
INJECTION, SOLUTION INTRAVENOUS
Status: DISCONTINUED | OUTPATIENT
Start: 2021-10-05 | End: 2021-10-05

## 2021-10-05 RX ORDER — EPINEPHRINE 1 MG/ML
INJECTION, SOLUTION INTRACARDIAC; INTRAMUSCULAR; INTRAVENOUS; SUBCUTANEOUS
Status: DISCONTINUED
Start: 2021-10-05 | End: 2021-10-05 | Stop reason: WASHOUT

## 2021-10-05 RX ORDER — SODIUM CHLORIDE 0.9 % (FLUSH) 0.9 %
3 SYRINGE (ML) INJECTION EVERY 30 MIN PRN
Status: DISCONTINUED | OUTPATIENT
Start: 2021-10-05 | End: 2021-10-05 | Stop reason: HOSPADM

## 2021-10-05 RX ORDER — LIDOCAINE HYDROCHLORIDE 40 MG/ML
INJECTION, SOLUTION RETROBULBAR
Status: DISPENSED
Start: 2021-10-05 | End: 2021-10-06

## 2021-10-05 RX ORDER — FENTANYL CITRATE 50 UG/ML
25 INJECTION, SOLUTION INTRAMUSCULAR; INTRAVENOUS EVERY 5 MIN PRN
Status: DISCONTINUED | OUTPATIENT
Start: 2021-10-05 | End: 2021-10-05 | Stop reason: HOSPADM

## 2021-10-05 RX ORDER — ONDANSETRON 2 MG/ML
INJECTION INTRAMUSCULAR; INTRAVENOUS
Status: DISCONTINUED | OUTPATIENT
Start: 2021-10-05 | End: 2021-10-05

## 2021-10-05 RX ORDER — PROPOFOL 10 MG/ML
VIAL (ML) INTRAVENOUS
Status: DISCONTINUED | OUTPATIENT
Start: 2021-10-05 | End: 2021-10-05

## 2021-10-05 RX ORDER — ONDANSETRON 2 MG/ML
4 INJECTION INTRAMUSCULAR; INTRAVENOUS DAILY PRN
Status: DISCONTINUED | OUTPATIENT
Start: 2021-10-05 | End: 2021-10-05 | Stop reason: HOSPADM

## 2021-10-05 RX ORDER — CEFAZOLIN SODIUM 1 G/3ML
INJECTION, POWDER, FOR SOLUTION INTRAMUSCULAR; INTRAVENOUS
Status: DISCONTINUED | OUTPATIENT
Start: 2021-10-05 | End: 2021-10-05 | Stop reason: HOSPADM

## 2021-10-05 RX ORDER — CEFAZOLIN SODIUM 1 G/3ML
INJECTION, POWDER, FOR SOLUTION INTRAMUSCULAR; INTRAVENOUS
Status: DISPENSED
Start: 2021-10-05 | End: 2021-10-06

## 2021-10-05 RX ORDER — SODIUM CHLORIDE 0.9 % (FLUSH) 0.9 %
10 SYRINGE (ML) INJECTION
Status: DISCONTINUED | OUTPATIENT
Start: 2021-10-05 | End: 2021-10-05 | Stop reason: HOSPADM

## 2021-10-05 RX ORDER — MOXIFLOXACIN 5 MG/ML
1 SOLUTION/ DROPS OPHTHALMIC
Status: COMPLETED | OUTPATIENT
Start: 2021-10-05 | End: 2021-10-05

## 2021-10-05 RX ORDER — FENTANYL CITRATE 50 UG/ML
INJECTION, SOLUTION INTRAMUSCULAR; INTRAVENOUS
Status: DISCONTINUED | OUTPATIENT
Start: 2021-10-05 | End: 2021-10-05

## 2021-10-05 RX ORDER — LIDOCAINE HYDROCHLORIDE 40 MG/ML
INJECTION, SOLUTION RETROBULBAR
Status: DISCONTINUED | OUTPATIENT
Start: 2021-10-05 | End: 2021-10-05 | Stop reason: HOSPADM

## 2021-10-05 RX ORDER — PREDNISOLONE ACETATE 10 MG/ML
SUSPENSION/ DROPS OPHTHALMIC
Status: DISCONTINUED
Start: 2021-10-05 | End: 2021-10-05 | Stop reason: WASHOUT

## 2021-10-05 RX ORDER — LIDOCAINE HYDROCHLORIDE 10 MG/ML
INJECTION, SOLUTION EPIDURAL; INFILTRATION; INTRACAUDAL; PERINEURAL
Status: DISPENSED
Start: 2021-10-05 | End: 2021-10-06

## 2021-10-05 RX ORDER — DEXAMETHASONE SODIUM PHOSPHATE 4 MG/ML
INJECTION, SOLUTION INTRA-ARTICULAR; INTRALESIONAL; INTRAMUSCULAR; INTRAVENOUS; SOFT TISSUE
Status: DISCONTINUED
Start: 2021-10-05 | End: 2021-10-05 | Stop reason: WASHOUT

## 2021-10-05 RX ORDER — MIDAZOLAM HYDROCHLORIDE 1 MG/ML
INJECTION, SOLUTION INTRAMUSCULAR; INTRAVENOUS
Status: DISCONTINUED | OUTPATIENT
Start: 2021-10-05 | End: 2021-10-05

## 2021-10-05 RX ADMIN — DEXAMETHASONE SODIUM PHOSPHATE 4 MG: 4 INJECTION, SOLUTION INTRAMUSCULAR; INTRAVENOUS at 06:10

## 2021-10-05 RX ADMIN — MOXIFLOXACIN 1 DROP: 5 SOLUTION/ DROPS OPHTHALMIC at 02:10

## 2021-10-05 RX ADMIN — GABAPENTIN 300 MG: 300 CAPSULE ORAL at 08:10

## 2021-10-05 RX ADMIN — AMIODARONE HYDROCHLORIDE 200 MG: 200 TABLET ORAL at 06:10

## 2021-10-05 RX ADMIN — OXYCODONE 5 MG: 5 TABLET ORAL at 08:10

## 2021-10-05 RX ADMIN — TIMOLOL MALEATE 1 DROP: 5 SOLUTION OPHTHALMIC at 08:10

## 2021-10-05 RX ADMIN — DICYCLOMINE HYDROCHLORIDE 20 MG: 20 TABLET ORAL at 08:10

## 2021-10-05 RX ADMIN — FENTANYL CITRATE 25 MCG: 50 INJECTION INTRAMUSCULAR; INTRAVENOUS at 06:10

## 2021-10-05 RX ADMIN — MIDAZOLAM HYDROCHLORIDE 1 MG: 1 INJECTION, SOLUTION INTRAMUSCULAR; INTRAVENOUS at 05:10

## 2021-10-05 RX ADMIN — FENTANYL CITRATE 25 MCG: 50 INJECTION, SOLUTION INTRAMUSCULAR; INTRAVENOUS at 05:10

## 2021-10-05 RX ADMIN — ALLOPURINOL 100 MG: 100 TABLET ORAL at 08:10

## 2021-10-05 RX ADMIN — HYDROMORPHONE HYDROCHLORIDE 0.5 MG: 1 INJECTION, SOLUTION INTRAMUSCULAR; INTRAVENOUS; SUBCUTANEOUS at 02:10

## 2021-10-05 RX ADMIN — HYPROMELLOSE 2910 1 DROP: 5 SOLUTION OPHTHALMIC at 06:10

## 2021-10-05 RX ADMIN — PANTOPRAZOLE SODIUM 40 MG: 40 TABLET, DELAYED RELEASE ORAL at 08:10

## 2021-10-05 RX ADMIN — ACETAMINOPHEN 1000 MG: 500 TABLET ORAL at 10:10

## 2021-10-05 RX ADMIN — HYPROMELLOSE 2910 1 DROP: 5 SOLUTION OPHTHALMIC at 08:10

## 2021-10-05 RX ADMIN — INSULIN DETEMIR 14 UNITS: 100 INJECTION, SOLUTION SUBCUTANEOUS at 08:10

## 2021-10-05 RX ADMIN — HYDROMORPHONE HYDROCHLORIDE 0.5 MG: 1 INJECTION, SOLUTION INTRAMUSCULAR; INTRAVENOUS; SUBCUTANEOUS at 10:10

## 2021-10-05 RX ADMIN — ONDANSETRON 4 MG: 2 INJECTION INTRAMUSCULAR; INTRAVENOUS at 06:10

## 2021-10-05 RX ADMIN — ATROPINE SULFATE 1 DROP: 10 SOLUTION OPHTHALMIC at 08:10

## 2021-10-05 RX ADMIN — TAMSULOSIN HYDROCHLORIDE 0.4 MG: 0.4 CAPSULE ORAL at 08:10

## 2021-10-05 RX ADMIN — BRIMONIDINE TARTRATE 1 DROP: 1.5 SOLUTION OPHTHALMIC at 08:10

## 2021-10-05 RX ADMIN — FENTANYL CITRATE 50 MCG: 50 INJECTION, SOLUTION INTRAMUSCULAR; INTRAVENOUS at 05:10

## 2021-10-05 RX ADMIN — LIDOCAINE HYDROCHLORIDE 50 MG: 20 INJECTION, SOLUTION INTRAVENOUS at 05:10

## 2021-10-05 RX ADMIN — FUROSEMIDE 20 MG: 20 TABLET ORAL at 08:10

## 2021-10-05 RX ADMIN — AMLODIPINE BESYLATE 10 MG: 10 TABLET ORAL at 06:10

## 2021-10-05 RX ADMIN — ISOSORBIDE MONONITRATE 30 MG: 30 TABLET, EXTENDED RELEASE ORAL at 06:10

## 2021-10-05 RX ADMIN — HYDROMORPHONE HYDROCHLORIDE 0.5 MG: 1 INJECTION, SOLUTION INTRAMUSCULAR; INTRAVENOUS; SUBCUTANEOUS at 06:10

## 2021-10-05 RX ADMIN — ATORVASTATIN CALCIUM 80 MG: 20 TABLET, FILM COATED ORAL at 08:10

## 2021-10-05 RX ADMIN — ACETAMINOPHEN 1000 MG: 500 TABLET ORAL at 06:10

## 2021-10-05 RX ADMIN — SODIUM CHLORIDE: 0.9 INJECTION, SOLUTION INTRAVENOUS at 05:10

## 2021-10-05 RX ADMIN — HYPROMELLOSE 2910 1 DROP: 5 SOLUTION OPHTHALMIC at 07:10

## 2021-10-05 RX ADMIN — SOTALOL HYDROCHLORIDE 80 MG: 80 TABLET ORAL at 08:10

## 2021-10-05 RX ADMIN — Medication 150 MCG/KG/MIN: at 05:10

## 2021-10-05 RX ADMIN — HYPROMELLOSE 2910 1 DROP: 5 SOLUTION OPHTHALMIC at 12:10

## 2021-10-05 RX ADMIN — DORZOLAMIDE HYDROCHLORIDE 1 DROP: 20 SOLUTION/ DROPS OPHTHALMIC at 08:10

## 2021-10-05 RX ADMIN — PROPARACAINE HYDROCHLORIDE 1 DROP: 5 SOLUTION/ DROPS OPHTHALMIC at 02:10

## 2021-10-05 RX ADMIN — VALACYCLOVIR HYDROCHLORIDE 1000 MG: 500 TABLET, FILM COATED ORAL at 08:10

## 2021-10-05 RX ADMIN — HYPROMELLOSE 2910 1 DROP: 5 SOLUTION OPHTHALMIC at 04:10

## 2021-10-05 RX ADMIN — PROPOFOL 30 MG: 10 INJECTION, EMULSION INTRAVENOUS at 05:10

## 2021-10-05 RX ADMIN — HYPROMELLOSE 2910 1 DROP: 5 SOLUTION OPHTHALMIC at 02:10

## 2021-10-05 RX ADMIN — HYPROMELLOSE 2910 1 DROP: 5 SOLUTION OPHTHALMIC at 01:10

## 2021-10-05 RX ADMIN — HYPROMELLOSE 2910 1 DROP: 5 SOLUTION OPHTHALMIC at 05:10

## 2021-10-05 RX ADMIN — ZOLPIDEM TARTRATE 5 MG: 5 TABLET ORAL at 08:10

## 2021-10-05 RX ADMIN — CETIRIZINE HYDROCHLORIDE 10 MG: 10 TABLET, FILM COATED ORAL at 08:10

## 2021-10-05 RX ADMIN — DULOXETINE 60 MG: 60 CAPSULE, DELAYED RELEASE ORAL at 08:10

## 2021-10-05 RX ADMIN — SPIRONOLACTONE 25 MG: 25 TABLET ORAL at 08:10

## 2021-10-05 RX ADMIN — MELOXICAM 15 MG: 15 TABLET ORAL at 09:10

## 2021-10-05 RX ADMIN — METOPROLOL SUCCINATE 50 MG: 50 TABLET, EXTENDED RELEASE ORAL at 08:10

## 2021-10-05 RX ADMIN — HYPROMELLOSE 2910 1 DROP: 5 SOLUTION OPHTHALMIC at 03:10

## 2021-10-05 RX ADMIN — DEXMEDETOMIDINE HYDROCHLORIDE 8 MCG: 100 INJECTION, SOLUTION, CONCENTRATE INTRAVENOUS at 05:10

## 2021-10-05 RX ADMIN — HYDROMORPHONE HYDROCHLORIDE 0.5 MG: 1 INJECTION, SOLUTION INTRAMUSCULAR; INTRAVENOUS; SUBCUTANEOUS at 11:10

## 2021-10-06 LAB
ANION GAP SERPL CALC-SCNC: 12 MMOL/L (ref 8–16)
BASOPHILS # BLD AUTO: 0.02 K/UL (ref 0–0.2)
BASOPHILS NFR BLD: 0.3 % (ref 0–1.9)
BUN SERPL-MCNC: 29 MG/DL (ref 6–20)
CALCIUM SERPL-MCNC: 9.5 MG/DL (ref 8.7–10.5)
CHLORIDE SERPL-SCNC: 106 MMOL/L (ref 95–110)
CO2 SERPL-SCNC: 19 MMOL/L (ref 23–29)
CREAT SERPL-MCNC: 1.6 MG/DL (ref 0.5–1.4)
DIFFERENTIAL METHOD: ABNORMAL
EOSINOPHIL # BLD AUTO: 0 K/UL (ref 0–0.5)
EOSINOPHIL NFR BLD: 0 % (ref 0–8)
ERYTHROCYTE [DISTWIDTH] IN BLOOD BY AUTOMATED COUNT: 11.6 % (ref 11.5–14.5)
EST. GFR  (AFRICAN AMERICAN): 54.5 ML/MIN/1.73 M^2
EST. GFR  (NON AFRICAN AMERICAN): 47.1 ML/MIN/1.73 M^2
GLUCOSE SERPL-MCNC: 358 MG/DL (ref 70–110)
HCT VFR BLD AUTO: 37.8 % (ref 40–54)
HGB BLD-MCNC: 13.2 G/DL (ref 14–18)
IMM GRANULOCYTES # BLD AUTO: 0.01 K/UL (ref 0–0.04)
IMM GRANULOCYTES NFR BLD AUTO: 0.2 % (ref 0–0.5)
LYMPHOCYTES # BLD AUTO: 1 K/UL (ref 1–4.8)
LYMPHOCYTES NFR BLD: 14.9 % (ref 18–48)
MCH RBC QN AUTO: 32 PG (ref 27–31)
MCHC RBC AUTO-ENTMCNC: 34.9 G/DL (ref 32–36)
MCV RBC AUTO: 92 FL (ref 82–98)
MONOCYTES # BLD AUTO: 0.1 K/UL (ref 0.3–1)
MONOCYTES NFR BLD: 2.2 % (ref 4–15)
NEUTROPHILS # BLD AUTO: 5.3 K/UL (ref 1.8–7.7)
NEUTROPHILS NFR BLD: 82.4 % (ref 38–73)
NRBC BLD-RTO: 0 /100 WBC
PLATELET # BLD AUTO: 248 K/UL (ref 150–450)
PMV BLD AUTO: 11 FL (ref 9.2–12.9)
POCT GLUCOSE: 297 MG/DL (ref 70–110)
POCT GLUCOSE: 345 MG/DL (ref 70–110)
POTASSIUM SERPL-SCNC: 4.7 MMOL/L (ref 3.5–5.1)
RBC # BLD AUTO: 4.12 M/UL (ref 4.6–6.2)
SODIUM SERPL-SCNC: 137 MMOL/L (ref 136–145)
WBC # BLD AUTO: 6.46 K/UL (ref 3.9–12.7)

## 2021-10-06 PROCEDURE — 63600175 PHARM REV CODE 636 W HCPCS: Performed by: PHYSICIAN ASSISTANT

## 2021-10-06 PROCEDURE — 80048 BASIC METABOLIC PNL TOTAL CA: CPT | Performed by: PHYSICIAN ASSISTANT

## 2021-10-06 PROCEDURE — 20600001 HC STEP DOWN PRIVATE ROOM

## 2021-10-06 PROCEDURE — 25000003 PHARM REV CODE 250: Performed by: PHYSICIAN ASSISTANT

## 2021-10-06 PROCEDURE — 99233 SBSQ HOSP IP/OBS HIGH 50: CPT | Mod: ,,, | Performed by: PHYSICIAN ASSISTANT

## 2021-10-06 PROCEDURE — 36415 COLL VENOUS BLD VENIPUNCTURE: CPT | Performed by: PHYSICIAN ASSISTANT

## 2021-10-06 PROCEDURE — 99233 PR SUBSEQUENT HOSPITAL CARE,LEVL III: ICD-10-PCS | Mod: ,,, | Performed by: PHYSICIAN ASSISTANT

## 2021-10-06 PROCEDURE — 85025 COMPLETE CBC W/AUTO DIFF WBC: CPT | Performed by: PHYSICIAN ASSISTANT

## 2021-10-06 PROCEDURE — 25000003 PHARM REV CODE 250: Performed by: STUDENT IN AN ORGANIZED HEALTH CARE EDUCATION/TRAINING PROGRAM

## 2021-10-06 RX ORDER — SODIUM CHLORIDE 9 MG/ML
INJECTION, SOLUTION INTRAVENOUS CONTINUOUS
Status: ACTIVE | OUTPATIENT
Start: 2021-10-06 | End: 2021-10-07

## 2021-10-06 RX ADMIN — POLYETHYLENE GLYCOL 3350 17 G: 17 POWDER, FOR SOLUTION ORAL at 08:10

## 2021-10-06 RX ADMIN — INSULIN ASPART 2 UNITS: 100 INJECTION, SOLUTION INTRAVENOUS; SUBCUTANEOUS at 04:10

## 2021-10-06 RX ADMIN — ASPIRIN 81 MG: 81 TABLET, COATED ORAL at 08:10

## 2021-10-06 RX ADMIN — HYDROMORPHONE HYDROCHLORIDE 0.5 MG: 1 INJECTION, SOLUTION INTRAMUSCULAR; INTRAVENOUS; SUBCUTANEOUS at 02:10

## 2021-10-06 RX ADMIN — HYDROMORPHONE HYDROCHLORIDE 0.5 MG: 1 INJECTION, SOLUTION INTRAMUSCULAR; INTRAVENOUS; SUBCUTANEOUS at 11:10

## 2021-10-06 RX ADMIN — TIMOLOL MALEATE 1 DROP: 5 SOLUTION OPHTHALMIC at 08:10

## 2021-10-06 RX ADMIN — INSULIN ASPART 5 UNITS: 100 INJECTION, SOLUTION INTRAVENOUS; SUBCUTANEOUS at 11:10

## 2021-10-06 RX ADMIN — ALLOPURINOL 100 MG: 100 TABLET ORAL at 09:10

## 2021-10-06 RX ADMIN — MELOXICAM 15 MG: 15 TABLET ORAL at 08:10

## 2021-10-06 RX ADMIN — ATROPINE SULFATE 1 DROP: 10 SOLUTION OPHTHALMIC at 09:10

## 2021-10-06 RX ADMIN — BRIMONIDINE TARTRATE 1 DROP: 1.5 SOLUTION OPHTHALMIC at 02:10

## 2021-10-06 RX ADMIN — TAMSULOSIN HYDROCHLORIDE 0.4 MG: 0.4 CAPSULE ORAL at 08:10

## 2021-10-06 RX ADMIN — INSULIN ASPART 2 UNITS: 100 INJECTION, SOLUTION INTRAVENOUS; SUBCUTANEOUS at 11:10

## 2021-10-06 RX ADMIN — FUROSEMIDE 20 MG: 20 TABLET ORAL at 09:10

## 2021-10-06 RX ADMIN — DICYCLOMINE HYDROCHLORIDE 20 MG: 20 TABLET ORAL at 08:10

## 2021-10-06 RX ADMIN — ATORVASTATIN CALCIUM 80 MG: 20 TABLET, FILM COATED ORAL at 08:10

## 2021-10-06 RX ADMIN — DORZOLAMIDE HYDROCHLORIDE 1 DROP: 20 SOLUTION/ DROPS OPHTHALMIC at 02:10

## 2021-10-06 RX ADMIN — DOCUSATE SODIUM 50MG AND SENNOSIDES 8.6MG 1 TABLET: 8.6; 5 TABLET, FILM COATED ORAL at 08:10

## 2021-10-06 RX ADMIN — SOTALOL HYDROCHLORIDE 80 MG: 80 TABLET ORAL at 08:10

## 2021-10-06 RX ADMIN — TIMOLOL MALEATE 1 DROP: 5 SOLUTION OPHTHALMIC at 09:10

## 2021-10-06 RX ADMIN — ZOLPIDEM TARTRATE 5 MG: 5 TABLET ORAL at 09:10

## 2021-10-06 RX ADMIN — CETIRIZINE HYDROCHLORIDE 10 MG: 10 TABLET, FILM COATED ORAL at 08:10

## 2021-10-06 RX ADMIN — BRIMONIDINE TARTRATE 1 DROP: 1.5 SOLUTION OPHTHALMIC at 09:10

## 2021-10-06 RX ADMIN — BRIMONIDINE TARTRATE 1 DROP: 1.5 SOLUTION OPHTHALMIC at 08:10

## 2021-10-06 RX ADMIN — PANTOPRAZOLE SODIUM 40 MG: 40 TABLET, DELAYED RELEASE ORAL at 08:10

## 2021-10-06 RX ADMIN — ISOSORBIDE MONONITRATE 30 MG: 30 TABLET, EXTENDED RELEASE ORAL at 06:10

## 2021-10-06 RX ADMIN — VALACYCLOVIR HYDROCHLORIDE 1000 MG: 500 TABLET, FILM COATED ORAL at 09:10

## 2021-10-06 RX ADMIN — INSULIN ASPART 4 UNITS: 100 INJECTION, SOLUTION INTRAVENOUS; SUBCUTANEOUS at 08:10

## 2021-10-06 RX ADMIN — SPIRONOLACTONE 25 MG: 25 TABLET ORAL at 09:10

## 2021-10-06 RX ADMIN — AMLODIPINE BESYLATE 10 MG: 10 TABLET ORAL at 06:10

## 2021-10-06 RX ADMIN — DORZOLAMIDE HYDROCHLORIDE 1 DROP: 20 SOLUTION/ DROPS OPHTHALMIC at 08:10

## 2021-10-06 RX ADMIN — AMIODARONE HYDROCHLORIDE 200 MG: 200 TABLET ORAL at 06:10

## 2021-10-06 RX ADMIN — DORZOLAMIDE HYDROCHLORIDE 1 DROP: 20 SOLUTION/ DROPS OPHTHALMIC at 09:10

## 2021-10-06 RX ADMIN — INSULIN ASPART 2 UNITS: 100 INJECTION, SOLUTION INTRAVENOUS; SUBCUTANEOUS at 09:10

## 2021-10-06 RX ADMIN — DICYCLOMINE HYDROCHLORIDE 20 MG: 20 TABLET ORAL at 09:10

## 2021-10-06 RX ADMIN — VALACYCLOVIR HYDROCHLORIDE 1000 MG: 500 TABLET, FILM COATED ORAL at 08:10

## 2021-10-06 RX ADMIN — OXYCODONE 5 MG: 5 TABLET ORAL at 08:10

## 2021-10-06 RX ADMIN — ACETAMINOPHEN 1000 MG: 500 TABLET ORAL at 09:10

## 2021-10-06 RX ADMIN — GABAPENTIN 300 MG: 300 CAPSULE ORAL at 09:10

## 2021-10-06 RX ADMIN — ATROPINE SULFATE 1 DROP: 10 SOLUTION OPHTHALMIC at 08:10

## 2021-10-06 RX ADMIN — HYDROMORPHONE HYDROCHLORIDE 0.5 MG: 1 INJECTION, SOLUTION INTRAMUSCULAR; INTRAVENOUS; SUBCUTANEOUS at 06:10

## 2021-10-06 RX ADMIN — ENOXAPARIN SODIUM 40 MG: 100 INJECTION SUBCUTANEOUS at 04:10

## 2021-10-06 RX ADMIN — DULOXETINE 60 MG: 60 CAPSULE, DELAYED RELEASE ORAL at 08:10

## 2021-10-06 RX ADMIN — ACETAMINOPHEN 1000 MG: 500 TABLET ORAL at 02:10

## 2021-10-06 RX ADMIN — METOPROLOL SUCCINATE 50 MG: 50 TABLET, EXTENDED RELEASE ORAL at 09:10

## 2021-10-06 RX ADMIN — INSULIN ASPART 5 UNITS: 100 INJECTION, SOLUTION INTRAVENOUS; SUBCUTANEOUS at 04:10

## 2021-10-06 RX ADMIN — VALACYCLOVIR HYDROCHLORIDE 1000 MG: 500 TABLET, FILM COATED ORAL at 02:10

## 2021-10-06 RX ADMIN — ACETAMINOPHEN 1000 MG: 500 TABLET ORAL at 06:10

## 2021-10-06 RX ADMIN — SODIUM CHLORIDE: 0.9 INJECTION, SOLUTION INTRAVENOUS at 06:10

## 2021-10-06 RX ADMIN — INSULIN DETEMIR 14 UNITS: 100 INJECTION, SOLUTION SUBCUTANEOUS at 08:10

## 2021-10-06 RX ADMIN — SOTALOL HYDROCHLORIDE 80 MG: 80 TABLET ORAL at 09:10

## 2021-10-06 RX ADMIN — INSULIN ASPART 5 UNITS: 100 INJECTION, SOLUTION INTRAVENOUS; SUBCUTANEOUS at 08:10

## 2021-10-07 VITALS
BODY MASS INDEX: 34.21 KG/M2 | DIASTOLIC BLOOD PRESSURE: 64 MMHG | TEMPERATURE: 98 F | WEIGHT: 218 LBS | OXYGEN SATURATION: 93 % | HEIGHT: 67 IN | RESPIRATION RATE: 18 BRPM | SYSTOLIC BLOOD PRESSURE: 135 MMHG | HEART RATE: 54 BPM

## 2021-10-07 LAB
ANION GAP SERPL CALC-SCNC: 11 MMOL/L (ref 8–16)
BASOPHILS # BLD AUTO: 0.07 K/UL (ref 0–0.2)
BASOPHILS NFR BLD: 0.8 % (ref 0–1.9)
BUN SERPL-MCNC: 28 MG/DL (ref 6–20)
CALCIUM SERPL-MCNC: 9.1 MG/DL (ref 8.7–10.5)
CHLORIDE SERPL-SCNC: 105 MMOL/L (ref 95–110)
CO2 SERPL-SCNC: 21 MMOL/L (ref 23–29)
CREAT SERPL-MCNC: 1.3 MG/DL (ref 0.5–1.4)
DIFFERENTIAL METHOD: ABNORMAL
EOSINOPHIL # BLD AUTO: 0.3 K/UL (ref 0–0.5)
EOSINOPHIL NFR BLD: 3.1 % (ref 0–8)
ERYTHROCYTE [DISTWIDTH] IN BLOOD BY AUTOMATED COUNT: 11.8 % (ref 11.5–14.5)
EST. GFR  (AFRICAN AMERICAN): >60 ML/MIN/1.73 M^2
EST. GFR  (NON AFRICAN AMERICAN): >60 ML/MIN/1.73 M^2
GLUCOSE SERPL-MCNC: 161 MG/DL (ref 70–110)
HCT VFR BLD AUTO: 36.1 % (ref 40–54)
HGB BLD-MCNC: 12.3 G/DL (ref 14–18)
IMM GRANULOCYTES # BLD AUTO: 0.03 K/UL (ref 0–0.04)
IMM GRANULOCYTES NFR BLD AUTO: 0.4 % (ref 0–0.5)
LYMPHOCYTES # BLD AUTO: 3.5 K/UL (ref 1–4.8)
LYMPHOCYTES NFR BLD: 41.8 % (ref 18–48)
MCH RBC QN AUTO: 31.9 PG (ref 27–31)
MCHC RBC AUTO-ENTMCNC: 34.1 G/DL (ref 32–36)
MCV RBC AUTO: 94 FL (ref 82–98)
MONOCYTES # BLD AUTO: 0.5 K/UL (ref 0.3–1)
MONOCYTES NFR BLD: 5.9 % (ref 4–15)
NEUTROPHILS # BLD AUTO: 4.1 K/UL (ref 1.8–7.7)
NEUTROPHILS NFR BLD: 48 % (ref 38–73)
NRBC BLD-RTO: 0 /100 WBC
PLATELET # BLD AUTO: 228 K/UL (ref 150–450)
PMV BLD AUTO: 11.3 FL (ref 9.2–12.9)
POCT GLUCOSE: 168 MG/DL (ref 70–110)
POCT GLUCOSE: 204 MG/DL (ref 70–110)
POCT GLUCOSE: 238 MG/DL (ref 70–110)
POTASSIUM SERPL-SCNC: 4.4 MMOL/L (ref 3.5–5.1)
RBC # BLD AUTO: 3.85 M/UL (ref 4.6–6.2)
SODIUM SERPL-SCNC: 137 MMOL/L (ref 136–145)
WBC # BLD AUTO: 8.47 K/UL (ref 3.9–12.7)

## 2021-10-07 PROCEDURE — 25000003 PHARM REV CODE 250: Performed by: PHYSICIAN ASSISTANT

## 2021-10-07 PROCEDURE — 1111F PR DISCHARGE MEDS RECONCILED W/ CURRENT OUTPATIENT MED LIST: ICD-10-PCS | Mod: CPTII,,, | Performed by: PHYSICIAN ASSISTANT

## 2021-10-07 PROCEDURE — 85025 COMPLETE CBC W/AUTO DIFF WBC: CPT | Performed by: PHYSICIAN ASSISTANT

## 2021-10-07 PROCEDURE — 99239 PR HOSPITAL DISCHARGE DAY,>30 MIN: ICD-10-PCS | Mod: ,,, | Performed by: PHYSICIAN ASSISTANT

## 2021-10-07 PROCEDURE — 99239 HOSP IP/OBS DSCHRG MGMT >30: CPT | Mod: ,,, | Performed by: PHYSICIAN ASSISTANT

## 2021-10-07 PROCEDURE — 25000003 PHARM REV CODE 250: Performed by: STUDENT IN AN ORGANIZED HEALTH CARE EDUCATION/TRAINING PROGRAM

## 2021-10-07 PROCEDURE — 1111F DSCHRG MED/CURRENT MED MERGE: CPT | Mod: CPTII,,, | Performed by: PHYSICIAN ASSISTANT

## 2021-10-07 PROCEDURE — 80048 BASIC METABOLIC PNL TOTAL CA: CPT | Performed by: PHYSICIAN ASSISTANT

## 2021-10-07 PROCEDURE — 36415 COLL VENOUS BLD VENIPUNCTURE: CPT | Performed by: PHYSICIAN ASSISTANT

## 2021-10-07 PROCEDURE — 63600175 PHARM REV CODE 636 W HCPCS: Performed by: PHYSICIAN ASSISTANT

## 2021-10-07 RX ORDER — BRIMONIDINE TARTRATE 1.5 MG/ML
1 SOLUTION/ DROPS OPHTHALMIC 2 TIMES DAILY
Qty: 10 ML | Refills: 11 | Status: SHIPPED | OUTPATIENT
Start: 2021-10-07 | End: 2022-06-24

## 2021-10-07 RX ORDER — ATROPINE SULFATE 10 MG/ML
1 SOLUTION/ DROPS OPHTHALMIC DAILY
Qty: 5 ML | Refills: 0 | Status: SHIPPED | OUTPATIENT
Start: 2021-10-08 | End: 2022-06-24 | Stop reason: SDUPTHER

## 2021-10-07 RX ORDER — BRIMONIDINE TARTRATE 1.5 MG/ML
1 SOLUTION/ DROPS OPHTHALMIC 2 TIMES DAILY
Status: DISCONTINUED | OUTPATIENT
Start: 2021-10-07 | End: 2021-10-07 | Stop reason: HOSPADM

## 2021-10-07 RX ORDER — DORZOLAMIDE HCL 20 MG/ML
1 SOLUTION/ DROPS OPHTHALMIC 2 TIMES DAILY
Qty: 10 ML | Refills: 6 | Status: SHIPPED | OUTPATIENT
Start: 2021-10-07 | End: 2022-06-24

## 2021-10-07 RX ORDER — MOXIFLOXACIN HYDROCHLORIDE 400 MG/1
400 TABLET ORAL DAILY
Qty: 7 TABLET | Refills: 0 | Status: ON HOLD | OUTPATIENT
Start: 2021-10-07 | End: 2022-01-27 | Stop reason: HOSPADM

## 2021-10-07 RX ORDER — ACETAMINOPHEN 325 MG/1
650 TABLET ORAL EVERY 4 HOURS PRN
Status: DISCONTINUED | OUTPATIENT
Start: 2021-10-07 | End: 2021-10-07 | Stop reason: HOSPADM

## 2021-10-07 RX ORDER — DORZOLAMIDE HCL 20 MG/ML
1 SOLUTION/ DROPS OPHTHALMIC 2 TIMES DAILY
Status: DISCONTINUED | OUTPATIENT
Start: 2021-10-07 | End: 2021-10-07 | Stop reason: HOSPADM

## 2021-10-07 RX ORDER — ATROPINE SULFATE 10 MG/ML
1 SOLUTION/ DROPS OPHTHALMIC DAILY
Status: DISCONTINUED | OUTPATIENT
Start: 2021-10-08 | End: 2021-10-07 | Stop reason: HOSPADM

## 2021-10-07 RX ADMIN — ASPIRIN 81 MG: 81 TABLET, COATED ORAL at 08:10

## 2021-10-07 RX ADMIN — ATORVASTATIN CALCIUM 80 MG: 20 TABLET, FILM COATED ORAL at 08:10

## 2021-10-07 RX ADMIN — DORZOLAMIDE HYDROCHLORIDE 1 DROP: 20 SOLUTION/ DROPS OPHTHALMIC at 08:10

## 2021-10-07 RX ADMIN — INSULIN DETEMIR 14 UNITS: 100 INJECTION, SOLUTION SUBCUTANEOUS at 09:10

## 2021-10-07 RX ADMIN — HYDROMORPHONE HYDROCHLORIDE 0.5 MG: 1 INJECTION, SOLUTION INTRAMUSCULAR; INTRAVENOUS; SUBCUTANEOUS at 06:10

## 2021-10-07 RX ADMIN — TAMSULOSIN HYDROCHLORIDE 0.4 MG: 0.4 CAPSULE ORAL at 08:10

## 2021-10-07 RX ADMIN — NATAMYCIN 1 DROP: 50 SUSPENSION/ DROPS OPHTHALMIC at 02:10

## 2021-10-07 RX ADMIN — SOTALOL HYDROCHLORIDE 80 MG: 80 TABLET ORAL at 08:10

## 2021-10-07 RX ADMIN — HYDROMORPHONE HYDROCHLORIDE 0.5 MG: 1 INJECTION, SOLUTION INTRAMUSCULAR; INTRAVENOUS; SUBCUTANEOUS at 02:10

## 2021-10-07 RX ADMIN — NATAMYCIN 1 DROP: 50 SUSPENSION/ DROPS OPHTHALMIC at 11:10

## 2021-10-07 RX ADMIN — DULOXETINE 60 MG: 60 CAPSULE, DELAYED RELEASE ORAL at 08:10

## 2021-10-07 RX ADMIN — DICYCLOMINE HYDROCHLORIDE 20 MG: 20 TABLET ORAL at 08:10

## 2021-10-07 RX ADMIN — VALACYCLOVIR HYDROCHLORIDE 1000 MG: 500 TABLET, FILM COATED ORAL at 02:10

## 2021-10-07 RX ADMIN — AMLODIPINE BESYLATE 10 MG: 10 TABLET ORAL at 06:10

## 2021-10-07 RX ADMIN — DOCUSATE SODIUM 50MG AND SENNOSIDES 8.6MG 1 TABLET: 8.6; 5 TABLET, FILM COATED ORAL at 08:10

## 2021-10-07 RX ADMIN — ACETAMINOPHEN 1000 MG: 500 TABLET ORAL at 06:10

## 2021-10-07 RX ADMIN — CETIRIZINE HYDROCHLORIDE 10 MG: 10 TABLET, FILM COATED ORAL at 08:10

## 2021-10-07 RX ADMIN — HYDROMORPHONE HYDROCHLORIDE 0.5 MG: 1 INJECTION, SOLUTION INTRAMUSCULAR; INTRAVENOUS; SUBCUTANEOUS at 10:10

## 2021-10-07 RX ADMIN — INSULIN ASPART 5 UNITS: 100 INJECTION, SOLUTION INTRAVENOUS; SUBCUTANEOUS at 12:10

## 2021-10-07 RX ADMIN — AMIODARONE HYDROCHLORIDE 200 MG: 200 TABLET ORAL at 06:10

## 2021-10-07 RX ADMIN — POLYETHYLENE GLYCOL 3350 17 G: 17 POWDER, FOR SOLUTION ORAL at 08:10

## 2021-10-07 RX ADMIN — ISOSORBIDE MONONITRATE 30 MG: 30 TABLET, EXTENDED RELEASE ORAL at 06:10

## 2021-10-07 RX ADMIN — OXYCODONE 5 MG: 5 TABLET ORAL at 09:10

## 2021-10-07 RX ADMIN — INSULIN ASPART 5 UNITS: 100 INJECTION, SOLUTION INTRAVENOUS; SUBCUTANEOUS at 08:10

## 2021-10-07 RX ADMIN — ATROPINE SULFATE 1 DROP: 10 SOLUTION OPHTHALMIC at 08:10

## 2021-10-07 RX ADMIN — BRIMONIDINE TARTRATE 1 DROP: 1.5 SOLUTION OPHTHALMIC at 08:10

## 2021-10-07 RX ADMIN — TIMOLOL MALEATE 1 DROP: 5 SOLUTION OPHTHALMIC at 08:10

## 2021-10-07 RX ADMIN — PANTOPRAZOLE SODIUM 40 MG: 40 TABLET, DELAYED RELEASE ORAL at 08:10

## 2021-10-07 RX ADMIN — VALACYCLOVIR HYDROCHLORIDE 1000 MG: 500 TABLET, FILM COATED ORAL at 08:10

## 2021-10-07 RX ADMIN — MELOXICAM 15 MG: 15 TABLET ORAL at 09:10

## 2021-10-08 LAB — POCT GLUCOSE: 180 MG/DL (ref 70–110)

## 2021-10-09 LAB — BACTERIA SPEC AEROBE CULT: NO GROWTH

## 2021-10-09 PROCEDURE — G0180 MD CERTIFICATION HHA PATIENT: HCPCS | Mod: ,,, | Performed by: OPHTHALMOLOGY

## 2021-10-09 PROCEDURE — G0180 PR HOME HEALTH MD CERTIFICATION: ICD-10-PCS | Mod: ,,, | Performed by: OPHTHALMOLOGY

## 2021-10-11 LAB — BACTERIA SPEC ANAEROBE CULT: NORMAL

## 2021-10-14 ENCOUNTER — OFFICE VISIT (OUTPATIENT)
Dept: OPHTHALMOLOGY | Facility: CLINIC | Age: 56
End: 2021-10-14
Payer: MEDICARE

## 2021-10-14 DIAGNOSIS — H44.001 ENDOPHTHALMITIS, RIGHT: Primary | ICD-10-CM

## 2021-10-14 DIAGNOSIS — H16.031 CORNEAL ULCER OF RIGHT EYE WITH HYPOPYON: ICD-10-CM

## 2021-10-14 DIAGNOSIS — H40.41X0 GLAUCOMA OF RIGHT EYE SECONDARY TO EYE INFLAMMATION, UNSPECIFIED GLAUCOMA STAGE: ICD-10-CM

## 2021-10-14 PROCEDURE — 1111F DSCHRG MED/CURRENT MED MERGE: CPT | Mod: CPTII,S$GLB,, | Performed by: OPHTHALMOLOGY

## 2021-10-14 PROCEDURE — 99214 PR OFFICE/OUTPT VISIT, EST, LEVL IV, 30-39 MIN: ICD-10-PCS | Mod: S$GLB,,, | Performed by: OPHTHALMOLOGY

## 2021-10-14 PROCEDURE — 1111F PR DISCHARGE MEDS RECONCILED W/ CURRENT OUTPATIENT MED LIST: ICD-10-PCS | Mod: CPTII,S$GLB,, | Performed by: OPHTHALMOLOGY

## 2021-10-14 PROCEDURE — 99214 OFFICE O/P EST MOD 30 MIN: CPT | Mod: S$GLB,,, | Performed by: OPHTHALMOLOGY

## 2021-10-14 PROCEDURE — 99211 OFF/OP EST MAY X REQ PHY/QHP: CPT | Mod: PBBFAC | Performed by: OPHTHALMOLOGY

## 2021-10-14 PROCEDURE — 99999 PR PBB SHADOW E&M-EST. PATIENT-LVL I: ICD-10-PCS | Mod: PBBFAC,,, | Performed by: OPHTHALMOLOGY

## 2021-10-14 PROCEDURE — 99999 PR PBB SHADOW E&M-EST. PATIENT-LVL I: CPT | Mod: PBBFAC,,, | Performed by: OPHTHALMOLOGY

## 2021-10-14 RX ORDER — ACETAZOLAMIDE 250 MG/1
250 TABLET ORAL 2 TIMES DAILY
Qty: 60 TABLET | Refills: 11 | Status: SHIPPED | OUTPATIENT
Start: 2021-10-14 | End: 2023-05-29

## 2021-10-19 ENCOUNTER — EXTERNAL HOME HEALTH (OUTPATIENT)
Dept: HOME HEALTH SERVICES | Facility: HOSPITAL | Age: 56
End: 2021-10-19
Payer: MEDICARE

## 2021-10-25 LAB — FUNGUS SPEC CULT: NORMAL

## 2021-11-01 ENCOUNTER — OFFICE VISIT (OUTPATIENT)
Dept: OPHTHALMOLOGY | Facility: CLINIC | Age: 56
End: 2021-11-01
Payer: MEDICARE

## 2021-11-01 DIAGNOSIS — B00.52 HERPES KERATITIS: ICD-10-CM

## 2021-11-01 DIAGNOSIS — H16.031 CORNEAL ULCER OF RIGHT EYE WITH HYPOPYON: Primary | ICD-10-CM

## 2021-11-01 DIAGNOSIS — H40.9 GLAUCOMA OF RIGHT EYE, UNSPECIFIED GLAUCOMA TYPE: ICD-10-CM

## 2021-11-01 PROCEDURE — 99999 PR PBB SHADOW E&M-EST. PATIENT-LVL IV: ICD-10-PCS | Mod: PBBFAC,,, | Performed by: OPHTHALMOLOGY

## 2021-11-01 PROCEDURE — 4010F PR ACE/ARB THEARPY RXD/TAKEN: ICD-10-PCS | Mod: ,,, | Performed by: OPHTHALMOLOGY

## 2021-11-01 PROCEDURE — 99214 OFFICE O/P EST MOD 30 MIN: CPT | Mod: S$PBB,,, | Performed by: OPHTHALMOLOGY

## 2021-11-01 PROCEDURE — 99999 PR PBB SHADOW E&M-EST. PATIENT-LVL IV: CPT | Mod: PBBFAC,,, | Performed by: OPHTHALMOLOGY

## 2021-11-01 PROCEDURE — 4010F ACE/ARB THERAPY RXD/TAKEN: CPT | Mod: ,,, | Performed by: OPHTHALMOLOGY

## 2021-11-01 PROCEDURE — 99214 PR OFFICE/OUTPT VISIT, EST, LEVL IV, 30-39 MIN: ICD-10-PCS | Mod: S$PBB,,, | Performed by: OPHTHALMOLOGY

## 2021-11-02 LAB — FUNGUS SPEC CULT: NORMAL

## 2021-11-10 LAB — FUNGUS SPEC CULT: NORMAL

## 2021-11-16 ENCOUNTER — HOSPITAL ENCOUNTER (EMERGENCY)
Facility: HOSPITAL | Age: 56
Discharge: HOME | End: 2021-11-16
Attending: EMERGENCY MEDICINE
Payer: MEDICARE

## 2021-11-16 VITALS
SYSTOLIC BLOOD PRESSURE: 145 MMHG | HEART RATE: 82 BPM | DIASTOLIC BLOOD PRESSURE: 80 MMHG | WEIGHT: 210 LBS | HEIGHT: 67 IN | RESPIRATION RATE: 16 BRPM | OXYGEN SATURATION: 98 % | BODY MASS INDEX: 32.96 KG/M2 | TEMPERATURE: 98 F

## 2021-11-16 DIAGNOSIS — R51.9 NONINTRACTABLE HEADACHE, UNSPECIFIED CHRONICITY PATTERN, UNSPECIFIED HEADACHE TYPE: Primary | ICD-10-CM

## 2021-11-16 PROCEDURE — 99284 EMERGENCY DEPT VISIT MOD MDM: CPT | Mod: 25,ER

## 2021-11-16 PROCEDURE — 96372 THER/PROPH/DIAG INJ SC/IM: CPT | Mod: ER

## 2021-11-16 PROCEDURE — 63600175 PHARM REV CODE 636 W HCPCS: Mod: ER | Performed by: EMERGENCY MEDICINE

## 2021-11-16 RX ORDER — NAPROXEN 500 MG/1
500 TABLET ORAL 2 TIMES DAILY WITH MEALS
Qty: 20 TABLET | Refills: 0 | Status: SHIPPED | OUTPATIENT
Start: 2021-11-16 | End: 2022-06-20

## 2021-11-16 RX ORDER — METOCLOPRAMIDE HYDROCHLORIDE 5 MG/ML
10 INJECTION INTRAMUSCULAR; INTRAVENOUS
Status: COMPLETED | OUTPATIENT
Start: 2021-11-16 | End: 2021-11-16

## 2021-11-16 RX ORDER — TRAMADOL HYDROCHLORIDE 50 MG/1
50 TABLET ORAL EVERY 6 HOURS PRN
Qty: 12 TABLET | Refills: 0 | Status: SHIPPED | OUTPATIENT
Start: 2021-11-16 | End: 2021-11-26

## 2021-11-16 RX ORDER — KETOROLAC TROMETHAMINE 30 MG/ML
15 INJECTION, SOLUTION INTRAMUSCULAR; INTRAVENOUS
Status: COMPLETED | OUTPATIENT
Start: 2021-11-16 | End: 2021-11-16

## 2021-11-16 RX ORDER — METOCLOPRAMIDE 10 MG/1
10 TABLET ORAL EVERY 6 HOURS PRN
Qty: 30 TABLET | Refills: 0 | Status: SHIPPED | OUTPATIENT
Start: 2021-11-16

## 2021-11-16 RX ADMIN — METOCLOPRAMIDE 10 MG: 5 INJECTION, SOLUTION INTRAMUSCULAR; INTRAVENOUS at 04:11

## 2021-11-16 RX ADMIN — KETOROLAC TROMETHAMINE 15 MG: 30 INJECTION, SOLUTION INTRAMUSCULAR; INTRAVENOUS at 04:11

## 2021-11-19 ENCOUNTER — OFFICE VISIT (OUTPATIENT)
Dept: OPHTHALMOLOGY | Facility: CLINIC | Age: 56
End: 2021-11-19
Payer: MEDICARE

## 2021-11-19 DIAGNOSIS — B00.52 HERPES KERATITIS: ICD-10-CM

## 2021-11-19 DIAGNOSIS — H16.031 CORNEAL ULCER OF RIGHT EYE WITH HYPOPYON: Primary | ICD-10-CM

## 2021-11-19 DIAGNOSIS — H20.9 UVEITIS: ICD-10-CM

## 2021-11-19 DIAGNOSIS — H40.9 GLAUCOMA OF RIGHT EYE, UNSPECIFIED GLAUCOMA TYPE: ICD-10-CM

## 2021-11-19 PROCEDURE — 99214 OFFICE O/P EST MOD 30 MIN: CPT | Mod: S$GLB,,, | Performed by: OPHTHALMOLOGY

## 2021-11-19 PROCEDURE — 99214 PR OFFICE/OUTPT VISIT, EST, LEVL IV, 30-39 MIN: ICD-10-PCS | Mod: S$GLB,,, | Performed by: OPHTHALMOLOGY

## 2021-11-19 PROCEDURE — 99999 PR PBB SHADOW E&M-EST. PATIENT-LVL IV: CPT | Mod: PBBFAC,,, | Performed by: OPHTHALMOLOGY

## 2021-11-19 PROCEDURE — 99214 OFFICE O/P EST MOD 30 MIN: CPT | Mod: PBBFAC | Performed by: OPHTHALMOLOGY

## 2021-11-19 PROCEDURE — 99999 PR PBB SHADOW E&M-EST. PATIENT-LVL IV: ICD-10-PCS | Mod: PBBFAC,,, | Performed by: OPHTHALMOLOGY

## 2021-11-19 RX ORDER — LATANOPROST 50 UG/ML
1 SOLUTION/ DROPS OPHTHALMIC NIGHTLY
COMMUNITY
End: 2022-06-24

## 2021-11-19 RX ORDER — BRIMONIDINE TARTRATE, TIMOLOL MALEATE 2; 5 MG/ML; MG/ML
1 SOLUTION/ DROPS OPHTHALMIC 3 TIMES DAILY
COMMUNITY
End: 2022-06-24

## 2021-12-01 ENCOUNTER — OFFICE VISIT (OUTPATIENT)
Dept: UROLOGY | Facility: CLINIC | Age: 56
End: 2021-12-01
Payer: MEDICARE

## 2021-12-01 ENCOUNTER — TELEPHONE (OUTPATIENT)
Dept: OPHTHALMOLOGY | Facility: CLINIC | Age: 56
End: 2021-12-01
Payer: MEDICARE

## 2021-12-01 VITALS — HEIGHT: 67 IN | WEIGHT: 215.81 LBS | BODY MASS INDEX: 33.87 KG/M2

## 2021-12-01 DIAGNOSIS — N52.9 ERECTILE DYSFUNCTION, UNSPECIFIED ERECTILE DYSFUNCTION TYPE: Primary | ICD-10-CM

## 2021-12-01 DIAGNOSIS — R10.30 INGUINAL PAIN, UNSPECIFIED LATERALITY: ICD-10-CM

## 2021-12-01 PROCEDURE — 99999 PR PBB SHADOW E&M-EST. PATIENT-LVL V: CPT | Mod: PBBFAC,,, | Performed by: STUDENT IN AN ORGANIZED HEALTH CARE EDUCATION/TRAINING PROGRAM

## 2021-12-01 PROCEDURE — 4010F PR ACE/ARB THEARPY RXD/TAKEN: ICD-10-PCS | Mod: CPTII,S$GLB,, | Performed by: STUDENT IN AN ORGANIZED HEALTH CARE EDUCATION/TRAINING PROGRAM

## 2021-12-01 PROCEDURE — 99213 OFFICE O/P EST LOW 20 MIN: CPT | Mod: S$GLB,,, | Performed by: STUDENT IN AN ORGANIZED HEALTH CARE EDUCATION/TRAINING PROGRAM

## 2021-12-01 PROCEDURE — 99999 PR PBB SHADOW E&M-EST. PATIENT-LVL V: ICD-10-PCS | Mod: PBBFAC,,, | Performed by: STUDENT IN AN ORGANIZED HEALTH CARE EDUCATION/TRAINING PROGRAM

## 2021-12-01 PROCEDURE — 4010F ACE/ARB THERAPY RXD/TAKEN: CPT | Mod: CPTII,S$GLB,, | Performed by: STUDENT IN AN ORGANIZED HEALTH CARE EDUCATION/TRAINING PROGRAM

## 2021-12-01 PROCEDURE — 99213 PR OFFICE/OUTPT VISIT, EST, LEVL III, 20-29 MIN: ICD-10-PCS | Mod: S$GLB,,, | Performed by: STUDENT IN AN ORGANIZED HEALTH CARE EDUCATION/TRAINING PROGRAM

## 2021-12-12 ENCOUNTER — HOSPITAL ENCOUNTER (EMERGENCY)
Facility: HOSPITAL | Age: 56
Discharge: HOME | End: 2021-12-12
Attending: EMERGENCY MEDICINE
Payer: MEDICARE

## 2021-12-12 VITALS
BODY MASS INDEX: 32.96 KG/M2 | WEIGHT: 210 LBS | HEART RATE: 70 BPM | TEMPERATURE: 98 F | OXYGEN SATURATION: 100 % | SYSTOLIC BLOOD PRESSURE: 130 MMHG | HEIGHT: 67 IN | RESPIRATION RATE: 20 BRPM | DIASTOLIC BLOOD PRESSURE: 78 MMHG

## 2021-12-12 DIAGNOSIS — B00.50 HERPES SIMPLEX OPHTHALMICUS: ICD-10-CM

## 2021-12-12 DIAGNOSIS — R51.9 NONINTRACTABLE EPISODIC HEADACHE, UNSPECIFIED HEADACHE TYPE: Primary | ICD-10-CM

## 2021-12-12 PROCEDURE — 99283 EMERGENCY DEPT VISIT LOW MDM: CPT | Mod: ER

## 2021-12-12 PROCEDURE — 25000003 PHARM REV CODE 250: Mod: ER | Performed by: EMERGENCY MEDICINE

## 2021-12-12 RX ORDER — TRAMADOL HYDROCHLORIDE 50 MG/1
50 TABLET ORAL
Status: COMPLETED | OUTPATIENT
Start: 2021-12-12 | End: 2021-12-12

## 2021-12-12 RX ORDER — TRAMADOL HYDROCHLORIDE 50 MG/1
50 TABLET ORAL EVERY 6 HOURS PRN
Qty: 12 TABLET | Refills: 0 | Status: SHIPPED | OUTPATIENT
Start: 2021-12-12 | End: 2021-12-22

## 2021-12-12 RX ADMIN — TRAMADOL HYDROCHLORIDE 50 MG: 50 TABLET, FILM COATED ORAL at 10:12

## 2021-12-28 ENCOUNTER — HOSPITAL ENCOUNTER (OUTPATIENT)
Dept: PREADMISSION TESTING | Facility: HOSPITAL | Age: 56
Discharge: HOME | End: 2021-12-28
Attending: STUDENT IN AN ORGANIZED HEALTH CARE EDUCATION/TRAINING PROGRAM
Payer: MEDICARE

## 2021-12-28 ENCOUNTER — ANESTHESIA EVENT (OUTPATIENT)
Dept: SURGERY | Facility: HOSPITAL | Age: 56
End: 2021-12-28
Payer: MEDICARE

## 2021-12-28 VITALS
OXYGEN SATURATION: 95 % | BODY MASS INDEX: 34.01 KG/M2 | HEIGHT: 67 IN | DIASTOLIC BLOOD PRESSURE: 83 MMHG | SYSTOLIC BLOOD PRESSURE: 128 MMHG | TEMPERATURE: 98 F | HEART RATE: 81 BPM | RESPIRATION RATE: 18 BRPM | WEIGHT: 216.69 LBS

## 2021-12-28 DIAGNOSIS — N52.9 ERECTILE DYSFUNCTION, UNSPECIFIED ERECTILE DYSFUNCTION TYPE: ICD-10-CM

## 2021-12-28 DIAGNOSIS — Z01.818 PREOP TESTING: Primary | ICD-10-CM

## 2021-12-28 LAB
ANION GAP SERPL CALC-SCNC: 9 MMOL/L (ref 8–16)
BASOPHILS # BLD AUTO: 0.05 K/UL (ref 0–0.2)
BASOPHILS NFR BLD: 0.7 % (ref 0–1.9)
BUN SERPL-MCNC: 21 MG/DL (ref 6–20)
CALCIUM SERPL-MCNC: 9.6 MG/DL (ref 8.7–10.5)
CHLORIDE SERPL-SCNC: 107 MMOL/L (ref 95–110)
CO2 SERPL-SCNC: 25 MMOL/L (ref 23–29)
CREAT SERPL-MCNC: 1.4 MG/DL (ref 0.5–1.4)
DIFFERENTIAL METHOD: ABNORMAL
EOSINOPHIL # BLD AUTO: 0.5 K/UL (ref 0–0.5)
EOSINOPHIL NFR BLD: 6.5 % (ref 0–8)
ERYTHROCYTE [DISTWIDTH] IN BLOOD BY AUTOMATED COUNT: 11.5 % (ref 11.5–14.5)
EST. GFR  (AFRICAN AMERICAN): >60 ML/MIN/1.73 M^2
EST. GFR  (NON AFRICAN AMERICAN): 55 ML/MIN/1.73 M^2
GLUCOSE SERPL-MCNC: 146 MG/DL (ref 70–110)
HCT VFR BLD AUTO: 42.5 % (ref 40–54)
HGB BLD-MCNC: 14.2 G/DL (ref 14–18)
IMM GRANULOCYTES # BLD AUTO: 0.02 K/UL (ref 0–0.04)
IMM GRANULOCYTES NFR BLD AUTO: 0.3 % (ref 0–0.5)
LYMPHOCYTES # BLD AUTO: 2.3 K/UL (ref 1–4.8)
LYMPHOCYTES NFR BLD: 29.3 % (ref 18–48)
MCH RBC QN AUTO: 32.2 PG (ref 27–31)
MCHC RBC AUTO-ENTMCNC: 33.4 G/DL (ref 32–36)
MCV RBC AUTO: 96 FL (ref 82–98)
MONOCYTES # BLD AUTO: 0.6 K/UL (ref 0.3–1)
MONOCYTES NFR BLD: 8.2 % (ref 4–15)
NEUTROPHILS # BLD AUTO: 4.2 K/UL (ref 1.8–7.7)
NEUTROPHILS NFR BLD: 55 % (ref 38–73)
NRBC BLD-RTO: 0 /100 WBC
PLATELET # BLD AUTO: 194 K/UL (ref 150–450)
PMV BLD AUTO: 11.6 FL (ref 9.2–12.9)
POTASSIUM SERPL-SCNC: 3.9 MMOL/L (ref 3.5–5.1)
RBC # BLD AUTO: 4.41 M/UL (ref 4.6–6.2)
SODIUM SERPL-SCNC: 141 MMOL/L (ref 136–145)
WBC # BLD AUTO: 7.69 K/UL (ref 3.9–12.7)

## 2021-12-28 PROCEDURE — 36415 COLL VENOUS BLD VENIPUNCTURE: CPT | Performed by: STUDENT IN AN ORGANIZED HEALTH CARE EDUCATION/TRAINING PROGRAM

## 2021-12-28 PROCEDURE — 80048 BASIC METABOLIC PNL TOTAL CA: CPT | Performed by: STUDENT IN AN ORGANIZED HEALTH CARE EDUCATION/TRAINING PROGRAM

## 2021-12-28 PROCEDURE — 85025 COMPLETE CBC W/AUTO DIFF WBC: CPT | Performed by: STUDENT IN AN ORGANIZED HEALTH CARE EDUCATION/TRAINING PROGRAM

## 2021-12-28 NOTE — ANESTHESIA PREPROCEDURE EVALUATION
12/28/2021  Se Virgil Mcgraw is a 56 y.o., male scheduled for INSERTION, PENILE PROSTHESIS, INFLATABLE (N/A Eye) -on 1/4/2022.    [employer] AICD    Implant Date                  Serial #                           Model #  26-Feb-2021                  [id number]                  [id number]  26-Feb-2021                  [id number]                   [id number]  02-Nov-2021                  [id number]                   [id number]    Anesthesia Recommendation: Place magnet over device if cautery is used.    Past Medical History:   Diagnosis Date    COPD (chronic obstructive pulmonary disease)     Coronary artery disease     Diabetes mellitus type II     DJD (degenerative joint disease)     Gout     Hypertension     Kidney stone     MI (myocardial infarction) 2010    Obesity     JOSE (obstructive sleep apnea)        Past Surgical History:   Procedure Laterality Date    CARDIAC DEFIBRILLATOR PLACEMENT      CATARACT EXTRACTION Right     CYSTOSCOPY N/A 10/1/2020    Procedure: CYSTOSCOPY;  Surgeon: Monica Lieberman MD;  Location: [facility];  Service: Urology;  Laterality: N/A;  RN PRE OP Covid screen 9-  C A    excisional biopsy left inguinal lymph node      FOOT SURGERY      right foot surgery     kidney stones      lithotripsy    REPAIR, SURGICAL WOUND, EYE, ANTERIOR SEGMENT Right 10/5/2021    Procedure: REPAIR, SURGICAL WOUND, EYE, ANTERIOR SEGMENT;  Surgeon: Adelaide Allen MD;  Location: [facility] OR [address] [place] [address];  Service: Ophthalmology;  Laterality: Right;  Anterior Chamber Wash Out Right Eye     Surgery for bulging disc at C7       EKG  Vent. Rate : 079 BPM     Atrial Rate : 079 BPM      P-R Int : 188 ms          QRS Dur : 104 ms       QT Int : 398 ms       P-R-T Axes : 071 065 003 degrees      QTc Int : 456 ms     Normal sinus rhythm   Normal ECG   When compared with ECG of 11-NOV-2017 17:35,    Significant changes have occurred   Confirmed by Darren Springer MD ([id number]) on 12/29/2021 4:41:12 PM    Lab Results   Component Value Date    WBC 7.69 12/28/2021    HGB 14.2 12/28/2021    HCT 42.5 12/28/2021    MCV 96 12/28/2021     12/28/2021     CMP  Sodium   Date Value Ref Range Status   12/28/2021 141 136 - 145 mmol/L Final     Potassium   Date Value Ref Range Status   12/28/2021 3.9 3.5 - 5.1 mmol/L Final     Chloride   Date Value Ref Range Status   12/28/2021 107 95 - 110 mmol/L Final     CO2   Date Value Ref Range Status   12/28/2021 25 23 - 29 mmol/L Final     Glucose   Date Value Ref Range Status   12/28/2021 146 (H) 70 - 110 mg/dL Final     BUN   Date Value Ref Range Status   12/28/2021 21 (H) 6 - 20 mg/dL Final     Creatinine   Date Value Ref Range Status   12/28/2021 1.4 0.5 - 1.4 mg/dL Final     Calcium   Date Value Ref Range Status   12/28/2021 9.6 8.7 - 10.5 mg/dL Final     Total Protein   Date Value Ref Range Status   03/09/2019 7.1 6.0 - 8.4 g/dL Final     Albumin   Date Value Ref Range Status   03/09/2019 3.6 3.5 - 5.2 g/dL Final     Total Bilirubin   Date Value Ref Range Status   03/09/2019 0.4 0.1 - 1.0 mg/dL Final     Comment:     For infants and newborns, interpretation of results should be based  on gestational age, weight and in agreement with clinical  observations.  Premature Infant recommended reference ranges:  Up to 24 hours.............<8.0 mg/dL  Up to 48 hours............<12.0 mg/dL  3-5 days..................<15.0 mg/dL  6-29 days.................<15.0 mg/dL       Alkaline Phosphatase   Date Value Ref Range Status   03/09/2019 100 55 - 135 U/L Final     AST   Date Value Ref Range Status   03/09/2019 17 10 - 40 U/L Final     ALT   Date Value Ref Range Status   03/09/2019 16 10 - 44 U/L Final     Anion Gap   Date Value Ref Range Status   12/28/2021 9 8 - 16 mmol/L Final     eGFR if    Date Value Ref Range Status   12/28/2021 >60 >60 mL/min/1.73 m^2 Final      eGFR if non    Date Value Ref Range Status   12/28/2021 55 (A) >60 mL/min/1.73 m^2 Final     Comment:     Calculation used to obtain the estimated glomerular filtration  rate (eGFR) is the CKD-EPI equation.        COVID neg    Anesthesia Evaluation    I have reviewed the Patient Summary Reports.    I have reviewed the Nursing Notes. I have reviewed the NPO Status.   I have reviewed the Medications.     Review of Systems  Anesthesia Hx:  No problems with previous Anesthesia  History of prior surgery of interest to airway management or planning: Denies Family Hx of Anesthesia complications.   Denies Personal Hx of Anesthesia complications.   Social:  Non-Smoker, No Alcohol Use    Hematology/Oncology:  Hematology Normal   Oncology Normal     EENT/Dental:EENT/Dental Normal   Cardiovascular:   Exercise tolerance: good Pacemaker Hypertension Past MI (stent x1 in 2012) CAD    Denies Angina. hyperlipidemia ECG has been reviewed.  Functional Capacity good / => 4 METS    Pulmonary:   COPD Sleep Apnea    Education provided regarding risk of obstructive sleep apnea     Renal/:   Chronic Renal Disease    Hepatic/GI:   GERD    Musculoskeletal:   Arthritis     Neurological:   Neuromuscular Disease,    Endocrine:   Diabetes, type 2    Dermatological:  Skin Normal    Psych:  Psychiatric Normal           Physical Exam  General:  Obesity     Eyes/Ears/Nose:  EYES/EARS/NOSE FINDINGS: Normal         Mental Status:  Mental Status Findings: Normal        Anesthesia Plan  Type of Anesthesia, risks & benefits discussed:  Anesthesia Type:  general    Patient's Preference:   Plan Factors:          Intra-op Monitoring Plan: standard ASA monitors  Intra-op Monitoring Plan Comments:   Post Op Pain Control Plan: multimodal analgesia  Post Op Pain Control Plan Comments:     Induction:   IV  Beta Blocker:  Patient is not currently on a Beta-Blocker (No further documentation required).       Informed Consent: Patient  "understands risks and agrees with Anesthesia plan.  Questions answered. Anesthesia consent signed with patient.  ASA Score: 3     Day of Surgery Review of History & Physical:  There are no significant changes.      Anesthesia Plan Notes: Seen by Dr. Garcia "Preop cardiac risk assessment: calculated RCRI score is 6.6%. He has moderate functional capacity of at least 4 METS. No chest pain. LVEF 40% per 9/2020 echo and normal coronaries on 2019 cath. Pt can proceed with penile prosthesis under general anesthesia @ mild cardiac risk. OK to hold ASA x 7 days." (Care Everywhere)        Ready For Surgery From Anesthesia Perspective.       "

## 2021-12-29 DIAGNOSIS — N39.41 URGE INCONTINENCE OF URINE: Primary | ICD-10-CM

## 2021-12-30 ENCOUNTER — TELEPHONE (OUTPATIENT)
Dept: UROLOGY | Facility: CLINIC | Age: 56
End: 2021-12-30
Payer: MEDICARE

## 2021-12-30 NOTE — PROGRESS NOTES
patient has not received cardiac clearance to date    his procedure will be rescheduled after clearance has been obtained     attempts to reach patient have been unsuccessful    OR and rep notified

## 2022-01-04 ENCOUNTER — ANESTHESIA (OUTPATIENT)
Dept: SURGERY | Facility: HOSPITAL | Age: 57
End: 2022-01-04
Payer: MEDICARE

## 2022-01-21 ENCOUNTER — OFFICE VISIT (OUTPATIENT)
Dept: UROLOGY | Facility: CLINIC | Age: 57
End: 2022-01-21
Payer: MEDICARE

## 2022-01-21 ENCOUNTER — HOSPITAL ENCOUNTER (OUTPATIENT)
Dept: PREADMISSION TESTING | Facility: HOSPITAL | Age: 57
Discharge: HOME | End: 2022-01-21
Attending: STUDENT IN AN ORGANIZED HEALTH CARE EDUCATION/TRAINING PROGRAM
Payer: MEDICARE

## 2022-01-21 VITALS — BODY MASS INDEX: 34.44 KG/M2 | RESPIRATION RATE: 18 BRPM | HEIGHT: 67 IN | WEIGHT: 219.44 LBS

## 2022-01-21 DIAGNOSIS — E11.69 TYPE 2 DIABETES MELLITUS WITH OTHER SPECIFIED COMPLICATION, UNSPECIFIED WHETHER LONG TERM INSULIN USE: ICD-10-CM

## 2022-01-21 DIAGNOSIS — R35.1 NOCTURIA: ICD-10-CM

## 2022-01-21 DIAGNOSIS — N52.1 ERECTILE DYSFUNCTION ASSOCIATED WITH TYPE 2 DIABETES MELLITUS: Primary | ICD-10-CM

## 2022-01-21 DIAGNOSIS — E11.69 ERECTILE DYSFUNCTION ASSOCIATED WITH TYPE 2 DIABETES MELLITUS: Primary | ICD-10-CM

## 2022-01-21 DIAGNOSIS — Z79.01 CHRONIC ANTICOAGULATION: ICD-10-CM

## 2022-01-21 PROCEDURE — 3051F PR MOST RECENT HEMOGLOBIN A1C LEVEL 7.0 - < 8.0%: ICD-10-PCS | Mod: CPTII,S$GLB,, | Performed by: STUDENT IN AN ORGANIZED HEALTH CARE EDUCATION/TRAINING PROGRAM

## 2022-01-21 PROCEDURE — 1160F PR REVIEW ALL MEDS BY PRESCRIBER/CLIN PHARMACIST DOCUMENTED: ICD-10-PCS | Mod: CPTII,S$GLB,, | Performed by: STUDENT IN AN ORGANIZED HEALTH CARE EDUCATION/TRAINING PROGRAM

## 2022-01-21 PROCEDURE — 99999 PR PBB SHADOW E&M-EST. PATIENT-LVL IV: CPT | Mod: PBBFAC,,, | Performed by: STUDENT IN AN ORGANIZED HEALTH CARE EDUCATION/TRAINING PROGRAM

## 2022-01-21 PROCEDURE — 1159F MED LIST DOCD IN RCRD: CPT | Mod: CPTII,S$GLB,, | Performed by: STUDENT IN AN ORGANIZED HEALTH CARE EDUCATION/TRAINING PROGRAM

## 2022-01-21 PROCEDURE — 99999 PR PBB SHADOW E&M-EST. PATIENT-LVL IV: ICD-10-PCS | Mod: PBBFAC,,, | Performed by: STUDENT IN AN ORGANIZED HEALTH CARE EDUCATION/TRAINING PROGRAM

## 2022-01-21 PROCEDURE — 99214 PR OFFICE/OUTPT VISIT, EST, LEVL IV, 30-39 MIN: ICD-10-PCS | Mod: S$GLB,CS,, | Performed by: STUDENT IN AN ORGANIZED HEALTH CARE EDUCATION/TRAINING PROGRAM

## 2022-01-21 PROCEDURE — 3008F BODY MASS INDEX DOCD: CPT | Mod: CPTII,S$GLB,, | Performed by: STUDENT IN AN ORGANIZED HEALTH CARE EDUCATION/TRAINING PROGRAM

## 2022-01-21 PROCEDURE — 3008F PR BODY MASS INDEX (BMI) DOCUMENTED: ICD-10-PCS | Mod: CPTII,S$GLB,, | Performed by: STUDENT IN AN ORGANIZED HEALTH CARE EDUCATION/TRAINING PROGRAM

## 2022-01-21 PROCEDURE — 87086 URINE CULTURE/COLONY COUNT: CPT | Performed by: STUDENT IN AN ORGANIZED HEALTH CARE EDUCATION/TRAINING PROGRAM

## 2022-01-21 PROCEDURE — 3072F PR LOW RISK FOR RETINOPATHY: ICD-10-PCS | Mod: CPTII,S$GLB,, | Performed by: STUDENT IN AN ORGANIZED HEALTH CARE EDUCATION/TRAINING PROGRAM

## 2022-01-21 PROCEDURE — 1160F RVW MEDS BY RX/DR IN RCRD: CPT | Mod: CPTII,S$GLB,, | Performed by: STUDENT IN AN ORGANIZED HEALTH CARE EDUCATION/TRAINING PROGRAM

## 2022-01-21 PROCEDURE — 99214 OFFICE O/P EST MOD 30 MIN: CPT | Mod: S$GLB,CS,, | Performed by: STUDENT IN AN ORGANIZED HEALTH CARE EDUCATION/TRAINING PROGRAM

## 2022-01-21 PROCEDURE — 1159F PR MEDICATION LIST DOCUMENTED IN MEDICAL RECORD: ICD-10-PCS | Mod: CPTII,S$GLB,, | Performed by: STUDENT IN AN ORGANIZED HEALTH CARE EDUCATION/TRAINING PROGRAM

## 2022-01-21 PROCEDURE — 3072F LOW RISK FOR RETINOPATHY: CPT | Mod: CPTII,S$GLB,, | Performed by: STUDENT IN AN ORGANIZED HEALTH CARE EDUCATION/TRAINING PROGRAM

## 2022-01-21 PROCEDURE — 3051F HG A1C>EQUAL 7.0%<8.0%: CPT | Mod: CPTII,S$GLB,, | Performed by: STUDENT IN AN ORGANIZED HEALTH CARE EDUCATION/TRAINING PROGRAM

## 2022-01-21 NOTE — H&P (VIEW-ONLY)
Patient ID: Se Virgil Mcgraw is a 56 y.o. male.    Chief Complaint:IPP pre op  HPI  56 y.o. who presents to the Urology clinic for evaluation of ED. Patient unable to take PDE5 inhibitors due to nitroglycerin use and cardiac history. His prior procedure was rescheduled due to lack of timely cardiac clearance. He has now been cleared to hold his aspirin. Denies fevers, chills, chest pain, shortness of breath. He wears depends for droplets of urine associated with urinary urgency/diabetes. His DM is well controlled. He denies any skin rashes. He denies dysuria, hematuria, fevers, chills, chest pain, shortness of breath, nausea or vomiting. Patient is aware of his penile length post operatively.     Medically Necessary ROS documented in HPI    Past Medical History  Active Ambulatory Problems     Diagnosis Date Noted    Diabetes mellitus type 2 in obese 08/30/2015    Essential hypertension 08/30/2015    Brachial neuritis or radiculitis NOS 09/15/2015    Elevated LFTs 11/17/2015    Sleep apnea 11/17/2015    CKD (chronic kidney disease) stage 2, GFR 60-89 ml/min 11/17/2015    History of gout 11/17/2015    HLD (hyperlipidemia) 11/17/2015    GERD (gastroesophageal reflux disease) 11/17/2015    Edema 11/17/2015    AICD (automatic cardioverter/defibrillator) present 11/17/2015    Non-ischemic cardiomyopathy 11/19/2015    Restrictive lung disease 11/20/2015    Cervical disc disorder with radiculopathy 11/23/2015    Acute pancreatitis 08/05/2016    Acute kidney injury 08/06/2016    Inguinal lymphadenopathy 06/02/2017    Cellulitis of penis 07/06/2018    Lower urinary tract symptoms (LUTS) 10/01/2020    Corneal ulcer 09/30/2021    Ventricular tachycardia 09/30/2021    Chronic combined systolic and diastolic heart failure 09/30/2021    Chronic hip pain 09/30/2021    Glaucoma, right eye 09/30/2021    Insomnia 09/30/2021    Endophthalmitis, acute, right 10/02/2021     Resolved Ambulatory Problems      Diagnosis Date Noted    Abscess of right leg 09/30/2012    Pain in the chest 08/30/2015     Past Medical History:   Diagnosis Date    COPD (chronic obstructive pulmonary disease)     Coronary artery disease     Diabetes mellitus type II     DJD (degenerative joint disease)     Gout     Hypertension     Kidney stone     MI (myocardial infarction) 2010    Obesity     JOSE (obstructive sleep apnea)          Past Surgical History  Past Surgical History:   Procedure Laterality Date    CARDIAC DEFIBRILLATOR PLACEMENT      CATARACT EXTRACTION Right     CYSTOSCOPY N/A 10/1/2020    Procedure: CYSTOSCOPY;  Surgeon: Monica Lieberman MD;  Location: [facility] OR;  Service: Urology;  Laterality: N/A;  RN PRE OP Covid screen 9-  C A    excisional biopsy left inguinal lymph node      FOOT SURGERY      right foot surgery     kidney stones      lithotripsy    REPAIR, SURGICAL WOUND, EYE, ANTERIOR SEGMENT Right 10/5/2021    Procedure: REPAIR, SURGICAL WOUND, EYE, ANTERIOR SEGMENT;  Surgeon: Adelaide Allen MD;  Location: [facility] OR [address] [place] [address];  Service: Ophthalmology;  Laterality: Right;  Anterior Chamber Wash Out Right Eye     Surgery for bulging disc at C7         Social History  Social Connections: Not on file       Medications    Current Outpatient Medications:     acetaZOLAMIDE (DIAMOX) 250 MG tablet, Take 1 tablet (250 mg total) by mouth 2 (two) times daily., Disp: 60 tablet, Rfl: 11    albuterol (PROVENTIL/VENTOLIN HFA) 90 mcg/actuation inhaler, Inhale 2 puffs into the lungs every 4 (four) hours as needed for Wheezing or Shortness of Breath. Rescue, Disp: 1 Inhaler, Rfl: 0    allopurinol (ZYLOPRIM) 100 MG tablet, , Disp: , Rfl: 5    amiodarone (PACERONE) 400 MG tablet, Take 200 mg by mouth every morning. , Disp: , Rfl: 1    amlodipine (NORVASC) 10 MG tablet, Take 10 mg by mouth every morning. , Disp: , Rfl: 5    aspirin (ECOTRIN) 81 MG EC tablet, Take 81 mg by mouth., Disp: , Rfl:     atropine 1%  (ISOPTO ATROPINE) 1 % Drop, Place 1 drop into the right eye once daily., Disp: 5 mL, Rfl: 0    baclofen (LIORESAL) 10 MG tablet, Take 1 tablet (10 mg total) by mouth 2 (two) times daily as needed (hip pain)., Disp: 30 tablet, Rfl: 0    blood sugar diagnostic Strp, ACCU CHEK KEVAN PLUS TEST STRIPS, Disp: , Rfl:     blood-glucose meter Misc, ACCU CHEK KEVAN PLUS METER: USE 4X/D, Disp: , Rfl:     brimonidine 0.15 % OPTH DROP (ALPHAGAN) 0.15 % ophthalmic solution, Place 1 drop into both eyes 2 (two) times a day., Disp: 10 mL, Rfl: 11    brimonidine-timoloL (COMBIGAN) 0.2-0.5 % Drop, Place 1 drop into the right eye 3 (three) times daily., Disp: , Rfl:     cetirizine (ZYRTEC) 10 MG tablet, Take 10 mg by mouth once daily., Disp: , Rfl:     CRESTOR 20 mg tablet, Take 20 mg by mouth every evening. , Disp: , Rfl: 5    dicyclomine (BENTYL) 20 mg tablet, Take 20 mg by mouth 2 (two) times daily., Disp: , Rfl: 2    famotidine (PEPCID) 20 MG tablet, Take 1 tablet (20 mg total) by mouth 2 (two) times daily., Disp: 60 tablet, Rfl: 2    fluticasone propionate (FLONASE) 50 mcg/actuation nasal spray, 2 sprays (100 mcg total) by Each Nostril route once daily., Disp: 1 Bottle, Rfl: 0    Fortified Tobramycin 15 mg/ml Opht (TOBREX) 15 mg Drop, Place 1 drop into the right eye every 6 (six) hours., Disp: , Rfl: 0    furosemide (LASIX) 20 MG tablet, Take 20 mg by mouth daily as needed. Takes every other day, Disp: , Rfl: 5    gabapentin (NEURONTIN) 300 MG capsule, Take 300 mg by mouth every evening. Takes  Two 300 mg tabs at night, Disp: , Rfl:     insulin glargine 100 units/mL (3mL) SubQ pen, Inject 60 Units into the skin once daily., Disp: , Rfl:     INVOKANA 300 mg Tab tablet, TK 1 T PO QD, Disp: , Rfl: 5    isosorbide mononitrate (IMDUR) 30 MG 24 hr tablet, Take 30 mg by mouth every morning. , Disp: , Rfl: 6    lancets Misc, ACCU CHEK SOFTCLIX LANCETS: USE 4X DAILY, Disp: , Rfl:     latanoprost 0.005 % ophthalmic  "solution, Place 1 drop into the right eye every evening., Disp: , Rfl:     meloxicam (MOBIC) 15 MG tablet, Take 1 tablet (15 mg total) by mouth once daily., Disp: 30 tablet, Rfl: 2    methazoLAMIDE (NEPTAZANE) 25 mg tablet, Take 25 mg by mouth., Disp: , Rfl:     metoclopramide HCl (REGLAN) 10 MG tablet, Take 1 tablet (10 mg total) by mouth every 6 (six) hours as needed., Disp: 30 tablet, Rfl: 0    metoprolol succinate (TOPROL-XL) 50 MG 24 hr tablet, Take 50 mg by mouth every evening. , Disp: , Rfl: 5    moxifloxacin (AVELOX) 400 mg tablet, Take 1 tablet (400 mg total) by mouth once daily., Disp: 7 tablet, Rfl: 0    naproxen (NAPROSYN) 500 MG tablet, Take 1 tablet (500 mg total) by mouth 2 (two) times daily with meals., Disp: 20 tablet, Rfl: 0    natamycin (NATACYN) 5 % ophthalmic solution, Place 1 drop into the right eye 6 (six) times daily., Disp: 15 mL, Rfl: 3    nitroGLYCERIN (NITROSTAT) 0.4 MG SL tablet, Place 0.4 mg under the tongue every 5 (five) minutes as needed for Chest pain., Disp: , Rfl:     omeprazole (PRILOSEC) 20 MG capsule, TK 1 C PO QD FOR CONTROL OF STOMACH ACID BEFORE BREAKFAST, Disp: , Rfl: 1    pen needle, diabetic 31 gauge x 5/16" Ndle, Inject 1 each into the skin., Disp: , Rfl:     potassium chloride SA (K-DUR,KLOR-CON) 20 MEQ tablet, , Disp: , Rfl: 5    prednisoLONE acetate (PRED FORTE) 1 % DrpS, Place 1 drop into the right eye 4 (four) times daily., Disp: 15 mL, Rfl: 0    promethazine (PHENERGAN) 25 MG tablet, TK 1 T PO  Q 4 H PRN NV, Disp: , Rfl: 2    promethazine-dextromethorphan (PROMETHAZINE-DM) 6.25-15 mg/5 mL Syrp, Take 5 mLs by mouth every 4 to 6 hours as needed. 5 mL every 4 to 6 hours; maximum: 30 mL in 24 hours, Disp: 118 mL, Rfl: 0    sotaloL (BETAPACE) 80 MG tablet, TAKE 1 TABLET BY MOUTH TWICE DAILY, Disp: , Rfl:     spironolactone (ALDACTONE) 25 MG tablet, Take 25 mg by mouth., Disp: , Rfl:     zolpidem (AMBIEN) 10 mg Tab, Take 5 mg by mouth nightly as needed., " Disp: , Rfl:     dorzolamide (TRUSOPT) 2 % ophthalmic solution, Place 1 drop into both eyes 2 (two) times a day., Disp: 10 mL, Rfl: 6    DULoxetine (CYMBALTA) 60 MG capsule, Take 60 mg by mouth., Disp: , Rfl:     tamsulosin (FLOMAX) 0.4 mg Cp24, Take 1 capsule (0.4 mg total) by mouth once daily., Disp: 30 capsule, Rfl: 2    Current Facility-Administered Medications:     dexamethasone injection 4 mg, 4 mg, Other, 1 time in Clinic/HOD, Sarah Hirsch MD    lidocaine (PF) 10 mg/ml (1%) injection 10 mg, 1 mL, Intradermal, Once, Brenden Bains MD    Allergies  Review of patient's allergies indicates:   Allergen Reactions    Morphine Itching and Other (See Comments)     Pt denies morphine as an allergy reports is is allergic to a medicine but has no idea what it is      Propoxyphene     Darvocet a500 [propoxyphene n-acetaminophen] Itching       Patient's PMH, FH, Social hx, Medications, allergies reviewed and updated as pertinent to today's visit    Objective:      Physical Exam  Constitutional:       General: He is not in acute distress.     Appearance: He is well-developed. He is not ill-appearing, toxic-appearing or diaphoretic.   HENT:      Head: Normocephalic and atraumatic.      Mouth/Throat:      Mouth: Mucous membranes are moist.   Eyes:      Conjunctiva/sclera: Conjunctivae normal.   Cardiovascular:      Rate and Rhythm: Normal rate and regular rhythm.   Pulmonary:      Effort: Pulmonary effort is normal. No respiratory distress.   Abdominal:      General: Abdomen is flat. There is no distension.      Palpations: Abdomen is soft. There is no mass.      Tenderness: There is no abdominal tenderness. There is no guarding.   Genitourinary:     Comments: Penile length demonstrated for patient again  Scrotum on the smaller size  Musculoskeletal:         General: No swelling or deformity.      Cervical back: Neck supple.   Skin:     General: Skin is warm.      Capillary Refill: Capillary refill takes  less than 2 seconds.      Findings: No rash.      Comments: No rashes or abrasions     Neurological:      Mental Status: He is alert and oriented to person, place, and time.      Gait: Gait normal.   Psychiatric:         Mood and Affect: Mood normal.         Thought Content: Thought content normal.         Judgment: Judgment normal.             Lab Results   Component Value Date    PSADIAG 0.67 01/19/2021    PSADIAG 1.4 08/06/2016      Assessment:       1. Erectile dysfunction associated with type 2 diabetes mellitus    2. Nocturia    3. Type 2 diabetes mellitus with other specified complication, unspecified whether long term insulin use    4. Chronic anticoagulation        Plan:         Holding aspirin for IPP   I discussed the risks and benefits of penile prosthesis surgery.     The operation would be placed using a small incision in the scrotum or sometimes through an incision above the penis. The infrapubic approach can be associated with damage to NVB leading to penile numbness. I discussed with him that it is important to understand what this device can and can't do and what a reasonable outcomes for this surgery is. It does not reproduce the degree of erections as once remembered or elongate the penis. I demonstrated stretched penile length and advised him that this would not make his penis longer but would rather help his penis get a rigid erection.  I discussed the risk of penile shortening in some cases.  I discussed that I recommend pumping the device regularly every day starting 6 weeks after surgery to try to limit shortening of the penis.  I also discussed that the device does not cause any rigidity/hardening of the glans but only the shaft of the penis.  I discussed the main risks of the operation being infection, mechanical malfunction and that these devices can break requiring revision surgery, damage to surrounding organs like the urethra or bladder or major blood vessels or bowel (these are rare  but may require aborting the procedure), bleeding, extrusion or erosion of the device, and cardiopulmonary complications like myocardial infarction, dvt/pe, stroke. Surgery always has a rare risk of death.  I also discussed the expected recovery course being about 4-6 weeks.  I also discussed that this device would preclude the use of any other modes of helping get an erection if the device ever had to be removed.  He had time to ask questions which were answered to his satisfaction. Before surgery we would check a urine culture to rule out bacteriuria as a possible source for infection. Patient will report and new skin infections prior to operative date. Date selected 1/25/22

## 2022-01-21 NOTE — PROGRESS NOTES
Patient ID: Se Virgil Mcgraw is a 56 y.o. male.    Chief Complaint:IPP pre op  HPI  56 y.o. who presents to the Urology clinic for evaluation of ED. Patient unable to take PDE5 inhibitors due to nitroglycerin use and cardiac history. His prior procedure was rescheduled due to lack of timely cardiac clearance. He has now been cleared to hold his aspirin. Denies fevers, chills, chest pain, shortness of breath. He wears depends for droplets of urine associated with urinary urgency/diabetes. His DM is well controlled. He denies any skin rashes. He denies dysuria, hematuria, fevers, chills, chest pain, shortness of breath, nausea or vomiting. Patient is aware of his penile length post operatively.     Medically Necessary ROS documented in HPI    Past Medical History  Active Ambulatory Problems     Diagnosis Date Noted    Diabetes mellitus type 2 in obese 08/30/2015    Essential hypertension 08/30/2015    Brachial neuritis or radiculitis NOS 09/15/2015    Elevated LFTs 11/17/2015    Sleep apnea 11/17/2015    CKD (chronic kidney disease) stage 2, GFR 60-89 ml/min 11/17/2015    History of gout 11/17/2015    HLD (hyperlipidemia) 11/17/2015    GERD (gastroesophageal reflux disease) 11/17/2015    Edema 11/17/2015    AICD (automatic cardioverter/defibrillator) present 11/17/2015    Non-ischemic cardiomyopathy 11/19/2015    Restrictive lung disease 11/20/2015    Cervical disc disorder with radiculopathy 11/23/2015    Acute pancreatitis 08/05/2016    Acute kidney injury 08/06/2016    Inguinal lymphadenopathy 06/02/2017    Cellulitis of penis 07/06/2018    Lower urinary tract symptoms (LUTS) 10/01/2020    Corneal ulcer 09/30/2021    Ventricular tachycardia 09/30/2021    Chronic combined systolic and diastolic heart failure 09/30/2021    Chronic hip pain 09/30/2021    Glaucoma, right eye 09/30/2021    Insomnia 09/30/2021    Endophthalmitis, acute, right 10/02/2021     Resolved Ambulatory Problems      Diagnosis Date Noted    Abscess of right leg 09/30/2012    Pain in the chest 08/30/2015     Past Medical History:   Diagnosis Date    COPD (chronic obstructive pulmonary disease)     Coronary artery disease     Diabetes mellitus type II     DJD (degenerative joint disease)     Gout     Hypertension     Kidney stone     MI (myocardial infarction) 2010    Obesity     JOSE (obstructive sleep apnea)          Past Surgical History  Past Surgical History:   Procedure Laterality Date    CARDIAC DEFIBRILLATOR PLACEMENT      CATARACT EXTRACTION Right     CYSTOSCOPY N/A 10/1/2020    Procedure: CYSTOSCOPY;  Surgeon: Monica Lieberman MD;  Location: [facility] OR;  Service: Urology;  Laterality: N/A;  RN PRE OP Covid screen 9-  C A    excisional biopsy left inguinal lymph node      FOOT SURGERY      right foot surgery     kidney stones      lithotripsy    REPAIR, SURGICAL WOUND, EYE, ANTERIOR SEGMENT Right 10/5/2021    Procedure: REPAIR, SURGICAL WOUND, EYE, ANTERIOR SEGMENT;  Surgeon: Adelaide Allen MD;  Location: [facility] OR [address] [place] [address];  Service: Ophthalmology;  Laterality: Right;  Anterior Chamber Wash Out Right Eye     Surgery for bulging disc at C7         Social History  Social Connections: Not on file       Medications    Current Outpatient Medications:     acetaZOLAMIDE (DIAMOX) 250 MG tablet, Take 1 tablet (250 mg total) by mouth 2 (two) times daily., Disp: 60 tablet, Rfl: 11    albuterol (PROVENTIL/VENTOLIN HFA) 90 mcg/actuation inhaler, Inhale 2 puffs into the lungs every 4 (four) hours as needed for Wheezing or Shortness of Breath. Rescue, Disp: 1 Inhaler, Rfl: 0    allopurinol (ZYLOPRIM) 100 MG tablet, , Disp: , Rfl: 5    amiodarone (PACERONE) 400 MG tablet, Take 200 mg by mouth every morning. , Disp: , Rfl: 1    amlodipine (NORVASC) 10 MG tablet, Take 10 mg by mouth every morning. , Disp: , Rfl: 5    aspirin (ECOTRIN) 81 MG EC tablet, Take 81 mg by mouth., Disp: , Rfl:     atropine 1%  (ISOPTO ATROPINE) 1 % Drop, Place 1 drop into the right eye once daily., Disp: 5 mL, Rfl: 0    baclofen (LIORESAL) 10 MG tablet, Take 1 tablet (10 mg total) by mouth 2 (two) times daily as needed (hip pain)., Disp: 30 tablet, Rfl: 0    blood sugar diagnostic Strp, ACCU CHEK KEVAN PLUS TEST STRIPS, Disp: , Rfl:     blood-glucose meter Misc, ACCU CHEK KEVAN PLUS METER: USE 4X/D, Disp: , Rfl:     brimonidine 0.15 % OPTH DROP (ALPHAGAN) 0.15 % ophthalmic solution, Place 1 drop into both eyes 2 (two) times a day., Disp: 10 mL, Rfl: 11    brimonidine-timoloL (COMBIGAN) 0.2-0.5 % Drop, Place 1 drop into the right eye 3 (three) times daily., Disp: , Rfl:     cetirizine (ZYRTEC) 10 MG tablet, Take 10 mg by mouth once daily., Disp: , Rfl:     CRESTOR 20 mg tablet, Take 20 mg by mouth every evening. , Disp: , Rfl: 5    dicyclomine (BENTYL) 20 mg tablet, Take 20 mg by mouth 2 (two) times daily., Disp: , Rfl: 2    famotidine (PEPCID) 20 MG tablet, Take 1 tablet (20 mg total) by mouth 2 (two) times daily., Disp: 60 tablet, Rfl: 2    fluticasone propionate (FLONASE) 50 mcg/actuation nasal spray, 2 sprays (100 mcg total) by Each Nostril route once daily., Disp: 1 Bottle, Rfl: 0    Fortified Tobramycin 15 mg/ml Opht (TOBREX) 15 mg Drop, Place 1 drop into the right eye every 6 (six) hours., Disp: , Rfl: 0    furosemide (LASIX) 20 MG tablet, Take 20 mg by mouth daily as needed. Takes every other day, Disp: , Rfl: 5    gabapentin (NEURONTIN) 300 MG capsule, Take 300 mg by mouth every evening. Takes  Two 300 mg tabs at night, Disp: , Rfl:     insulin glargine 100 units/mL (3mL) SubQ pen, Inject 60 Units into the skin once daily., Disp: , Rfl:     INVOKANA 300 mg Tab tablet, TK 1 T PO QD, Disp: , Rfl: 5    isosorbide mononitrate (IMDUR) 30 MG 24 hr tablet, Take 30 mg by mouth every morning. , Disp: , Rfl: 6    lancets Misc, ACCU CHEK SOFTCLIX LANCETS: USE 4X DAILY, Disp: , Rfl:     latanoprost 0.005 % ophthalmic  "solution, Place 1 drop into the right eye every evening., Disp: , Rfl:     meloxicam (MOBIC) 15 MG tablet, Take 1 tablet (15 mg total) by mouth once daily., Disp: 30 tablet, Rfl: 2    methazoLAMIDE (NEPTAZANE) 25 mg tablet, Take 25 mg by mouth., Disp: , Rfl:     metoclopramide HCl (REGLAN) 10 MG tablet, Take 1 tablet (10 mg total) by mouth every 6 (six) hours as needed., Disp: 30 tablet, Rfl: 0    metoprolol succinate (TOPROL-XL) 50 MG 24 hr tablet, Take 50 mg by mouth every evening. , Disp: , Rfl: 5    moxifloxacin (AVELOX) 400 mg tablet, Take 1 tablet (400 mg total) by mouth once daily., Disp: 7 tablet, Rfl: 0    naproxen (NAPROSYN) 500 MG tablet, Take 1 tablet (500 mg total) by mouth 2 (two) times daily with meals., Disp: 20 tablet, Rfl: 0    natamycin (NATACYN) 5 % ophthalmic solution, Place 1 drop into the right eye 6 (six) times daily., Disp: 15 mL, Rfl: 3    nitroGLYCERIN (NITROSTAT) 0.4 MG SL tablet, Place 0.4 mg under the tongue every 5 (five) minutes as needed for Chest pain., Disp: , Rfl:     omeprazole (PRILOSEC) 20 MG capsule, TK 1 C PO QD FOR CONTROL OF STOMACH ACID BEFORE BREAKFAST, Disp: , Rfl: 1    pen needle, diabetic 31 gauge x 5/16" Ndle, Inject 1 each into the skin., Disp: , Rfl:     potassium chloride SA (K-DUR,KLOR-CON) 20 MEQ tablet, , Disp: , Rfl: 5    prednisoLONE acetate (PRED FORTE) 1 % DrpS, Place 1 drop into the right eye 4 (four) times daily., Disp: 15 mL, Rfl: 0    promethazine (PHENERGAN) 25 MG tablet, TK 1 T PO  Q 4 H PRN NV, Disp: , Rfl: 2    promethazine-dextromethorphan (PROMETHAZINE-DM) 6.25-15 mg/5 mL Syrp, Take 5 mLs by mouth every 4 to 6 hours as needed. 5 mL every 4 to 6 hours; maximum: 30 mL in 24 hours, Disp: 118 mL, Rfl: 0    sotaloL (BETAPACE) 80 MG tablet, TAKE 1 TABLET BY MOUTH TWICE DAILY, Disp: , Rfl:     spironolactone (ALDACTONE) 25 MG tablet, Take 25 mg by mouth., Disp: , Rfl:     zolpidem (AMBIEN) 10 mg Tab, Take 5 mg by mouth nightly as needed., " Disp: , Rfl:     dorzolamide (TRUSOPT) 2 % ophthalmic solution, Place 1 drop into both eyes 2 (two) times a day., Disp: 10 mL, Rfl: 6    DULoxetine (CYMBALTA) 60 MG capsule, Take 60 mg by mouth., Disp: , Rfl:     tamsulosin (FLOMAX) 0.4 mg Cp24, Take 1 capsule (0.4 mg total) by mouth once daily., Disp: 30 capsule, Rfl: 2    Current Facility-Administered Medications:     dexamethasone injection 4 mg, 4 mg, Other, 1 time in Clinic/HOD, Sarah Hirsch MD    lidocaine (PF) 10 mg/ml (1%) injection 10 mg, 1 mL, Intradermal, Once, Brenden Bains MD    Allergies  Review of patient's allergies indicates:   Allergen Reactions    Morphine Itching and Other (See Comments)     Pt denies morphine as an allergy reports is is allergic to a medicine but has no idea what it is      Propoxyphene     Darvocet a500 [propoxyphene n-acetaminophen] Itching       Patient's PMH, FH, Social hx, Medications, allergies reviewed and updated as pertinent to today's visit    Objective:      Physical Exam  Constitutional:       General: He is not in acute distress.     Appearance: He is well-developed. He is not ill-appearing, toxic-appearing or diaphoretic.   HENT:      Head: Normocephalic and atraumatic.      Mouth/Throat:      Mouth: Mucous membranes are moist.   Eyes:      Conjunctiva/sclera: Conjunctivae normal.   Cardiovascular:      Rate and Rhythm: Normal rate and regular rhythm.   Pulmonary:      Effort: Pulmonary effort is normal. No respiratory distress.   Abdominal:      General: Abdomen is flat. There is no distension.      Palpations: Abdomen is soft. There is no mass.      Tenderness: There is no abdominal tenderness. There is no guarding.   Genitourinary:     Comments: Penile length demonstrated for patient again  Scrotum on the smaller size  Musculoskeletal:         General: No swelling or deformity.      Cervical back: Neck supple.   Skin:     General: Skin is warm.      Capillary Refill: Capillary refill takes  less than 2 seconds.      Findings: No rash.      Comments: No rashes or abrasions     Neurological:      Mental Status: He is alert and oriented to person, place, and time.      Gait: Gait normal.   Psychiatric:         Mood and Affect: Mood normal.         Thought Content: Thought content normal.         Judgment: Judgment normal.             Lab Results   Component Value Date    PSADIAG 0.67 01/19/2021    PSADIAG 1.4 08/06/2016      Assessment:       1. Erectile dysfunction associated with type 2 diabetes mellitus    2. Nocturia    3. Type 2 diabetes mellitus with other specified complication, unspecified whether long term insulin use    4. Chronic anticoagulation        Plan:         Holding aspirin for IPP   I discussed the risks and benefits of penile prosthesis surgery.     The operation would be placed using a small incision in the scrotum or sometimes through an incision above the penis. The infrapubic approach can be associated with damage to NVB leading to penile numbness. I discussed with him that it is important to understand what this device can and can't do and what a reasonable outcomes for this surgery is. It does not reproduce the degree of erections as once remembered or elongate the penis. I demonstrated stretched penile length and advised him that this would not make his penis longer but would rather help his penis get a rigid erection.  I discussed the risk of penile shortening in some cases.  I discussed that I recommend pumping the device regularly every day starting 6 weeks after surgery to try to limit shortening of the penis.  I also discussed that the device does not cause any rigidity/hardening of the glans but only the shaft of the penis.  I discussed the main risks of the operation being infection, mechanical malfunction and that these devices can break requiring revision surgery, damage to surrounding organs like the urethra or bladder or major blood vessels or bowel (these are rare  but may require aborting the procedure), bleeding, extrusion or erosion of the device, and cardiopulmonary complications like myocardial infarction, dvt/pe, stroke. Surgery always has a rare risk of death.  I also discussed the expected recovery course being about 4-6 weeks.  I also discussed that this device would preclude the use of any other modes of helping get an erection if the device ever had to be removed.  He had time to ask questions which were answered to his satisfaction. Before surgery we would check a urine culture to rule out bacteriuria as a possible source for infection. Patient will report and new skin infections prior to operative date. Date selected 1/25/22

## 2022-01-23 LAB — BACTERIA UR CULT: NO GROWTH

## 2022-01-24 ENCOUNTER — HOSPITAL ENCOUNTER (OUTPATIENT)
Dept: PREADMISSION TESTING | Facility: HOSPITAL | Age: 57
Discharge: HOME | End: 2022-01-24
Attending: INTERNAL MEDICINE
Payer: MEDICARE

## 2022-01-24 DIAGNOSIS — Z01.812 ENCOUNTER FOR PREOPERATIVE SCREENING LABORATORY TESTING FOR COVID-19 VIRUS: ICD-10-CM

## 2022-01-24 DIAGNOSIS — Z11.52 ENCOUNTER FOR PREOPERATIVE SCREENING LABORATORY TESTING FOR COVID-19 VIRUS: ICD-10-CM

## 2022-01-24 LAB — SARS-COV-2 RDRP RESP QL NAA+PROBE: NEGATIVE

## 2022-01-24 PROCEDURE — U0002 COVID-19 LAB TEST NON-CDC: HCPCS | Performed by: STUDENT IN AN ORGANIZED HEALTH CARE EDUCATION/TRAINING PROGRAM

## 2022-01-24 NOTE — PRE ADMISSION SCREENING
Pre-operative instructions, medication directives and pain scales reviewed with patient. All questions the patient had  were answered. Re-assurance about surgical procedure and day of surgery routine given as needed. The patient verbalized understanding of the pre-op instructions.The pt stated no change in medications or phycical condition since last visit.Reviewed instructions with pt--he verbalized understanding.

## 2022-01-25 ENCOUNTER — HOSPITAL ENCOUNTER (OUTPATIENT)
Facility: HOSPITAL | Age: 57
Discharge: HOME | End: 2022-01-27
Attending: STUDENT IN AN ORGANIZED HEALTH CARE EDUCATION/TRAINING PROGRAM | Admitting: STUDENT IN AN ORGANIZED HEALTH CARE EDUCATION/TRAINING PROGRAM
Payer: MEDICARE

## 2022-01-25 DIAGNOSIS — Z01.812 ENCOUNTER FOR PREOPERATIVE SCREENING LABORATORY TESTING FOR COVID-19 VIRUS: ICD-10-CM

## 2022-01-25 DIAGNOSIS — N52.9 ERECTILE DYSFUNCTION: Primary | ICD-10-CM

## 2022-01-25 DIAGNOSIS — E11.69 DIABETES MELLITUS TYPE 2 IN OBESE: ICD-10-CM

## 2022-01-25 DIAGNOSIS — Z11.52 ENCOUNTER FOR PREOPERATIVE SCREENING LABORATORY TESTING FOR COVID-19 VIRUS: ICD-10-CM

## 2022-01-25 DIAGNOSIS — N52.9 ERECTILE DYSFUNCTION, UNSPECIFIED ERECTILE DYSFUNCTION TYPE: ICD-10-CM

## 2022-01-25 DIAGNOSIS — E66.9 DIABETES MELLITUS TYPE 2 IN OBESE: ICD-10-CM

## 2022-01-25 LAB
POCT GLUCOSE: 143 MG/DL (ref 70–110)
POCT GLUCOSE: 144 MG/DL (ref 70–110)

## 2022-01-25 PROCEDURE — 63600175 PHARM REV CODE 636 W HCPCS: Performed by: STUDENT IN AN ORGANIZED HEALTH CARE EDUCATION/TRAINING PROGRAM

## 2022-01-25 PROCEDURE — 63600175 PHARM REV CODE 636 W HCPCS: Performed by: ANESTHESIOLOGY

## 2022-01-25 PROCEDURE — 54405 INSERT MULTI-COMP PENIS PROS: CPT | Mod: ,,, | Performed by: STUDENT IN AN ORGANIZED HEALTH CARE EDUCATION/TRAINING PROGRAM

## 2022-01-25 PROCEDURE — C1813 PROSTHESIS, PENILE, INFLATAB: HCPCS | Performed by: STUDENT IN AN ORGANIZED HEALTH CARE EDUCATION/TRAINING PROGRAM

## 2022-01-25 PROCEDURE — 25000003 PHARM REV CODE 250: Performed by: STUDENT IN AN ORGANIZED HEALTH CARE EDUCATION/TRAINING PROGRAM

## 2022-01-25 PROCEDURE — D9220A PRA ANESTHESIA: ICD-10-PCS | Mod: ANES,,, | Performed by: ANESTHESIOLOGY

## 2022-01-25 PROCEDURE — 63600175 PHARM REV CODE 636 W HCPCS: Performed by: NURSE ANESTHETIST, CERTIFIED REGISTERED

## 2022-01-25 PROCEDURE — 36000707: Performed by: STUDENT IN AN ORGANIZED HEALTH CARE EDUCATION/TRAINING PROGRAM

## 2022-01-25 PROCEDURE — 37000009 HC ANESTHESIA EA ADD 15 MINS: Performed by: STUDENT IN AN ORGANIZED HEALTH CARE EDUCATION/TRAINING PROGRAM

## 2022-01-25 PROCEDURE — 71000039 HC RECOVERY, EACH ADD'L HOUR: Performed by: STUDENT IN AN ORGANIZED HEALTH CARE EDUCATION/TRAINING PROGRAM

## 2022-01-25 PROCEDURE — 25000003 PHARM REV CODE 250: Performed by: NURSE ANESTHETIST, CERTIFIED REGISTERED

## 2022-01-25 PROCEDURE — D9220A PRA ANESTHESIA: ICD-10-PCS | Mod: CRNA,,, | Performed by: NURSE ANESTHETIST, CERTIFIED REGISTERED

## 2022-01-25 PROCEDURE — 27201423 OPTIME MED/SURG SUP & DEVICES STERILE SUPPLY: Performed by: STUDENT IN AN ORGANIZED HEALTH CARE EDUCATION/TRAINING PROGRAM

## 2022-01-25 PROCEDURE — 37000008 HC ANESTHESIA 1ST 15 MINUTES: Performed by: STUDENT IN AN ORGANIZED HEALTH CARE EDUCATION/TRAINING PROGRAM

## 2022-01-25 PROCEDURE — D9220A PRA ANESTHESIA: Mod: CRNA,,, | Performed by: NURSE ANESTHETIST, CERTIFIED REGISTERED

## 2022-01-25 PROCEDURE — C1729 CATH, DRAINAGE: HCPCS | Performed by: STUDENT IN AN ORGANIZED HEALTH CARE EDUCATION/TRAINING PROGRAM

## 2022-01-25 PROCEDURE — 54405 PR INSERT,INFLATABLE PENILE PROSTHESIS: ICD-10-PCS | Mod: ,,, | Performed by: STUDENT IN AN ORGANIZED HEALTH CARE EDUCATION/TRAINING PROGRAM

## 2022-01-25 PROCEDURE — A4216 STERILE WATER/SALINE, 10 ML: HCPCS | Performed by: STUDENT IN AN ORGANIZED HEALTH CARE EDUCATION/TRAINING PROGRAM

## 2022-01-25 PROCEDURE — 36000706: Performed by: STUDENT IN AN ORGANIZED HEALTH CARE EDUCATION/TRAINING PROGRAM

## 2022-01-25 PROCEDURE — 71000033 HC RECOVERY, INTIAL HOUR: Performed by: STUDENT IN AN ORGANIZED HEALTH CARE EDUCATION/TRAINING PROGRAM

## 2022-01-25 PROCEDURE — D9220A PRA ANESTHESIA: Mod: ANES,,, | Performed by: ANESTHESIOLOGY

## 2022-01-25 DEVICE — KIT ACCESSORY PENILE: Type: IMPLANTABLE DEVICE | Site: GROIN | Status: FUNCTIONAL

## 2022-01-25 DEVICE — IMPLANTABLE DEVICE: Type: IMPLANTABLE DEVICE | Site: GROIN | Status: FUNCTIONAL

## 2022-01-25 RX ORDER — SODIUM CHLORIDE, SODIUM LACTATE, POTASSIUM CHLORIDE, CALCIUM CHLORIDE 600; 310; 30; 20 MG/100ML; MG/100ML; MG/100ML; MG/100ML
INJECTION, SOLUTION INTRAVENOUS CONTINUOUS
Status: DISCONTINUED | OUTPATIENT
Start: 2022-01-25 | End: 2022-01-27 | Stop reason: HOSPADM

## 2022-01-25 RX ORDER — MIDAZOLAM HYDROCHLORIDE 1 MG/ML
INJECTION, SOLUTION INTRAMUSCULAR; INTRAVENOUS
Status: DISCONTINUED | OUTPATIENT
Start: 2022-01-25 | End: 2022-01-25

## 2022-01-25 RX ORDER — SUCCINYLCHOLINE CHLORIDE 20 MG/ML
INJECTION INTRAMUSCULAR; INTRAVENOUS
Status: DISCONTINUED | OUTPATIENT
Start: 2022-01-25 | End: 2022-01-25

## 2022-01-25 RX ORDER — AMIODARONE HYDROCHLORIDE 200 MG/1
200 TABLET ORAL DAILY
Status: DISCONTINUED | OUTPATIENT
Start: 2022-01-26 | End: 2022-01-27 | Stop reason: HOSPADM

## 2022-01-25 RX ORDER — ROCURONIUM BROMIDE 10 MG/ML
INJECTION, SOLUTION INTRAVENOUS
Status: DISCONTINUED | OUTPATIENT
Start: 2022-01-25 | End: 2022-01-25

## 2022-01-25 RX ORDER — LIDOCAINE HYDROCHLORIDE 10 MG/ML
INJECTION INFILTRATION; PERINEURAL
Status: DISCONTINUED | OUTPATIENT
Start: 2022-01-25 | End: 2022-01-25 | Stop reason: HOSPADM

## 2022-01-25 RX ORDER — OXYCODONE HYDROCHLORIDE 5 MG/1
5 TABLET ORAL EVERY 6 HOURS PRN
Status: DISCONTINUED | OUTPATIENT
Start: 2022-01-25 | End: 2022-01-26

## 2022-01-25 RX ORDER — TRAMADOL HYDROCHLORIDE 50 MG/1
50 TABLET ORAL EVERY 6 HOURS PRN
Status: DISCONTINUED | OUTPATIENT
Start: 2022-01-25 | End: 2022-01-27 | Stop reason: HOSPADM

## 2022-01-25 RX ORDER — CEFAZOLIN SODIUM 2 G/50ML
2 SOLUTION INTRAVENOUS
Status: COMPLETED | OUTPATIENT
Start: 2022-01-25 | End: 2022-01-25

## 2022-01-25 RX ORDER — FENTANYL CITRATE 50 UG/ML
INJECTION, SOLUTION INTRAMUSCULAR; INTRAVENOUS
Status: DISCONTINUED | OUTPATIENT
Start: 2022-01-25 | End: 2022-01-25

## 2022-01-25 RX ORDER — ALBUTEROL SULFATE 90 UG/1
2 AEROSOL, METERED RESPIRATORY (INHALATION) EVERY 4 HOURS PRN
Status: DISCONTINUED | OUTPATIENT
Start: 2022-01-25 | End: 2022-01-27 | Stop reason: HOSPADM

## 2022-01-25 RX ORDER — SODIUM CHLORIDE 0.9 % (FLUSH) 0.9 %
3 SYRINGE (ML) INJECTION EVERY 8 HOURS
Status: DISCONTINUED | OUTPATIENT
Start: 2022-01-25 | End: 2022-01-27 | Stop reason: HOSPADM

## 2022-01-25 RX ORDER — PHENYLEPHRINE HYDROCHLORIDE 10 MG/ML
INJECTION INTRAVENOUS
Status: DISCONTINUED | OUTPATIENT
Start: 2022-01-25 | End: 2022-01-25

## 2022-01-25 RX ORDER — SODIUM CHLORIDE, SODIUM LACTATE, POTASSIUM CHLORIDE, CALCIUM CHLORIDE 600; 310; 30; 20 MG/100ML; MG/100ML; MG/100ML; MG/100ML
INJECTION, SOLUTION INTRAVENOUS CONTINUOUS
Status: DISCONTINUED | OUTPATIENT
Start: 2022-01-25 | End: 2022-01-25

## 2022-01-25 RX ORDER — PROPOFOL 10 MG/ML
VIAL (ML) INTRAVENOUS
Status: DISCONTINUED | OUTPATIENT
Start: 2022-01-25 | End: 2022-01-25

## 2022-01-25 RX ORDER — ATORVASTATIN CALCIUM 40 MG/1
80 TABLET, FILM COATED ORAL DAILY
Status: DISCONTINUED | OUTPATIENT
Start: 2022-01-26 | End: 2022-01-27 | Stop reason: HOSPADM

## 2022-01-25 RX ORDER — DULOXETIN HYDROCHLORIDE 30 MG/1
60 CAPSULE, DELAYED RELEASE ORAL DAILY
Status: DISCONTINUED | OUTPATIENT
Start: 2022-01-26 | End: 2022-01-27 | Stop reason: HOSPADM

## 2022-01-25 RX ORDER — ONDANSETRON 2 MG/ML
INJECTION INTRAMUSCULAR; INTRAVENOUS
Status: DISCONTINUED | OUTPATIENT
Start: 2022-01-25 | End: 2022-01-25

## 2022-01-25 RX ORDER — DEXAMETHASONE SODIUM PHOSPHATE 4 MG/ML
INJECTION, SOLUTION INTRA-ARTICULAR; INTRALESIONAL; INTRAMUSCULAR; INTRAVENOUS; SOFT TISSUE
Status: DISCONTINUED | OUTPATIENT
Start: 2022-01-25 | End: 2022-01-25

## 2022-01-25 RX ORDER — FENTANYL CITRATE 50 UG/ML
25 INJECTION, SOLUTION INTRAMUSCULAR; INTRAVENOUS EVERY 5 MIN PRN
Status: COMPLETED | OUTPATIENT
Start: 2022-01-25 | End: 2022-01-25

## 2022-01-25 RX ORDER — AMLODIPINE BESYLATE 5 MG/1
10 TABLET ORAL DAILY
Status: DISCONTINUED | OUTPATIENT
Start: 2022-01-26 | End: 2022-01-27 | Stop reason: HOSPADM

## 2022-01-25 RX ORDER — CEFAZOLIN SODIUM 1 G/50ML
1 SOLUTION INTRAVENOUS
Status: COMPLETED | OUTPATIENT
Start: 2022-01-25 | End: 2022-01-26

## 2022-01-25 RX ORDER — SODIUM CHLORIDE 0.9 % (FLUSH) 0.9 %
10 SYRINGE (ML) INJECTION
Status: DISCONTINUED | OUTPATIENT
Start: 2022-01-25 | End: 2022-01-25 | Stop reason: HOSPADM

## 2022-01-25 RX ORDER — METHOCARBAMOL 500 MG/1
500 TABLET, FILM COATED ORAL 4 TIMES DAILY
Status: DISCONTINUED | OUTPATIENT
Start: 2022-01-25 | End: 2022-01-27 | Stop reason: HOSPADM

## 2022-01-25 RX ORDER — GENTAMICIN SULFATE 40 MG/ML
INJECTION, SOLUTION INTRAMUSCULAR; INTRAVENOUS
Status: DISCONTINUED | OUTPATIENT
Start: 2022-01-25 | End: 2022-01-25 | Stop reason: HOSPADM

## 2022-01-25 RX ORDER — LIDOCAINE HYDROCHLORIDE 20 MG/ML
INJECTION INTRAVENOUS
Status: DISCONTINUED | OUTPATIENT
Start: 2022-01-25 | End: 2022-01-25

## 2022-01-25 RX ORDER — OXYBUTYNIN CHLORIDE 5 MG/1
10 TABLET, EXTENDED RELEASE ORAL DAILY
Status: DISCONTINUED | OUTPATIENT
Start: 2022-01-25 | End: 2022-01-27 | Stop reason: HOSPADM

## 2022-01-25 RX ORDER — LIDOCAINE HYDROCHLORIDE 10 MG/ML
1 INJECTION, SOLUTION EPIDURAL; INFILTRATION; INTRACAUDAL; PERINEURAL ONCE
Status: DISCONTINUED | OUTPATIENT
Start: 2022-01-25 | End: 2022-01-25 | Stop reason: HOSPADM

## 2022-01-25 RX ADMIN — ROCURONIUM BROMIDE 5 MG: 10 INJECTION, SOLUTION INTRAVENOUS at 11:01

## 2022-01-25 RX ADMIN — OXYCODONE 5 MG: 5 TABLET ORAL at 06:01

## 2022-01-25 RX ADMIN — PROPOFOL 30 MG: 10 INJECTION, EMULSION INTRAVENOUS at 03:01

## 2022-01-25 RX ADMIN — FENTANYL CITRATE 25 MCG: 50 INJECTION, SOLUTION INTRAMUSCULAR; INTRAVENOUS at 03:01

## 2022-01-25 RX ADMIN — PHENYLEPHRINE HYDROCHLORIDE 100 MCG: 10 INJECTION INTRAVENOUS at 12:01

## 2022-01-25 RX ADMIN — SODIUM CHLORIDE, SODIUM LACTATE, POTASSIUM CHLORIDE, AND CALCIUM CHLORIDE: .6; .31; .03; .02 INJECTION, SOLUTION INTRAVENOUS at 06:01

## 2022-01-25 RX ADMIN — Medication 3 ML: at 10:01

## 2022-01-25 RX ADMIN — ROCURONIUM BROMIDE 25 MG: 10 INJECTION, SOLUTION INTRAVENOUS at 11:01

## 2022-01-25 RX ADMIN — ROCURONIUM BROMIDE 10 MG: 10 INJECTION, SOLUTION INTRAVENOUS at 12:01

## 2022-01-25 RX ADMIN — SUGAMMADEX 200 MG: 100 INJECTION, SOLUTION INTRAVENOUS at 03:01

## 2022-01-25 RX ADMIN — FENTANYL CITRATE 25 MCG: 50 INJECTION, SOLUTION INTRAMUSCULAR; INTRAVENOUS at 05:01

## 2022-01-25 RX ADMIN — FENTANYL CITRATE 100 MCG: 50 INJECTION, SOLUTION INTRAMUSCULAR; INTRAVENOUS at 11:01

## 2022-01-25 RX ADMIN — SODIUM CHLORIDE, SODIUM LACTATE, POTASSIUM CHLORIDE, AND CALCIUM CHLORIDE: .6; .31; .03; .02 INJECTION, SOLUTION INTRAVENOUS at 11:01

## 2022-01-25 RX ADMIN — SODIUM CHLORIDE, SODIUM LACTATE, POTASSIUM CHLORIDE, AND CALCIUM CHLORIDE: .6; .31; .03; .02 INJECTION, SOLUTION INTRAVENOUS at 09:01

## 2022-01-25 RX ADMIN — PROPOFOL 30 MG: 10 INJECTION, EMULSION INTRAVENOUS at 12:01

## 2022-01-25 RX ADMIN — PROPOFOL 120 MG: 10 INJECTION, EMULSION INTRAVENOUS at 11:01

## 2022-01-25 RX ADMIN — ROCURONIUM BROMIDE 10 MG: 10 INJECTION, SOLUTION INTRAVENOUS at 01:01

## 2022-01-25 RX ADMIN — CEFAZOLIN SODIUM 1 G: 1 SOLUTION INTRAVENOUS at 10:01

## 2022-01-25 RX ADMIN — MIDAZOLAM HYDROCHLORIDE 2 MG: 1 INJECTION, SOLUTION INTRAMUSCULAR; INTRAVENOUS at 11:01

## 2022-01-25 RX ADMIN — FENTANYL CITRATE 25 MCG: 50 INJECTION, SOLUTION INTRAMUSCULAR; INTRAVENOUS at 04:01

## 2022-01-25 RX ADMIN — PHENYLEPHRINE HYDROCHLORIDE 100 MCG: 10 INJECTION INTRAVENOUS at 11:01

## 2022-01-25 RX ADMIN — CEFAZOLIN SODIUM 2 G: 2 SOLUTION INTRAVENOUS at 11:01

## 2022-01-25 RX ADMIN — PROPOFOL 20 MG: 10 INJECTION, EMULSION INTRAVENOUS at 03:01

## 2022-01-25 RX ADMIN — DEXAMETHASONE SODIUM PHOSPHATE 4 MG: 4 INJECTION, SOLUTION INTRAMUSCULAR; INTRAVENOUS at 11:01

## 2022-01-25 RX ADMIN — FENTANYL CITRATE 50 MCG: 50 INJECTION, SOLUTION INTRAMUSCULAR; INTRAVENOUS at 12:01

## 2022-01-25 RX ADMIN — GENTAMICIN SULFATE 118.2 MG: 40 INJECTION, SOLUTION INTRAMUSCULAR; INTRAVENOUS at 11:01

## 2022-01-25 RX ADMIN — VANCOMYCIN HYDROCHLORIDE 1000 MG: 1 INJECTION, POWDER, LYOPHILIZED, FOR SOLUTION INTRAVENOUS at 02:01

## 2022-01-25 RX ADMIN — FENTANYL CITRATE 50 MCG: 50 INJECTION, SOLUTION INTRAMUSCULAR; INTRAVENOUS at 01:01

## 2022-01-25 RX ADMIN — METHOCARBAMOL 500 MG: 500 TABLET ORAL at 08:01

## 2022-01-25 RX ADMIN — PROPOFOL 50 MG: 10 INJECTION, EMULSION INTRAVENOUS at 11:01

## 2022-01-25 RX ADMIN — LIDOCAINE HYDROCHLORIDE 100 MG: 20 INJECTION, SOLUTION INTRAVENOUS at 11:01

## 2022-01-25 RX ADMIN — SUCCINYLCHOLINE CHLORIDE 170 MG: 20 INJECTION, SOLUTION INTRAMUSCULAR; INTRAVENOUS at 11:01

## 2022-01-25 RX ADMIN — ONDANSETRON 4 MG: 2 INJECTION, SOLUTION INTRAMUSCULAR; INTRAVENOUS at 02:01

## 2022-01-25 NOTE — BRIEF OP NOTE
[facility] [place]    SUMMARY     Surgery Date: 1/25/2022     Surgeon(s) and Role:     * Monica Lieberman MD - Primary    Assisting Surgeon: None    Pre-op Diagnosis:  Erectile dysfunction, unspecified erectile dysfunction type [N52.9]    Post-op Diagnosis:  Post-Op Diagnosis Codes:     * Erectile dysfunction, unspecified erectile dysfunction type [N52.9]    Procedure(s) (LRB):  INSERTION, PENILE PROSTHESIS, INFLATABLE (N/A)    Anesthesia: General    Operative Findings:   Placement of infrapubic IPP with counter incision for reservoir submuscular location     EBL 150cc    Estimated Blood Loss has been documented.         Specimens:   Specimen (24h ago, onward)            None          [id number]

## 2022-01-25 NOTE — ANESTHESIA PROCEDURE NOTES
Intubation    Date/Time: 1/25/2022 11:47 AM  Performed by: Luis Patterson CRNA  Authorized by: Rogers Hein MD     Intubation:     Induction:  Intravenous    Intubated:  Postinduction    Mask Ventilation:  Easy with oral airway    Attempts:  1    Attempted By:  Other (see comments) (ER resident)    Method of Intubation:  Video laryngoscopy    Blade:  Payne 4    Laryngeal View Grade: Grade I - full view of cords      Difficult Airway Encountered?: No      Complications:  None    Airway Device:  Oral endotracheal tube    Airway Device Size:  7.5    Style/Cuff Inflation:  Cuffed (inflated to minimal occlusive pressure)    Tube secured:  23    Secured at:  The lips    Placement Verified By:  Capnometry and Revisualization with laryngoscopy    Complicating Factors:  None    Findings Post-Intubation:  BS equal bilateral and atraumatic/condition of teeth unchanged

## 2022-01-25 NOTE — PLAN OF CARE
Pt transferred to 241 via bed , vital signs stable no distress noted, report given to Kellie alexandra .

## 2022-01-25 NOTE — INTERVAL H&P NOTE
The patient has been examined and the H&P has been reviewed:  Negative urine culture  Patient requests post op admission for observation due to lack of support at home.   Surgery risks, benefits and alternative options discussed and understood by patient/family.

## 2022-01-25 NOTE — TRANSFER OF CARE
Anesthesia Transfer of Care Note    Patient: Se Virgil Mcgraw    Procedure(s) Performed: Procedure(s) (LRB):  INSERTION, PENILE PROSTHESIS, INFLATABLE (N/A)    Patient location: PACU    Anesthesia Type: general    Transport from OR: Transported from OR on room air with adequate spontaneous ventilation    Post pain: adequate analgesia    Post assessment: no apparent anesthetic complications and tolerated procedure well    Post vital signs: stable    Level of consciousness: awake, alert and oriented    Nausea/Vomiting: no nausea/vomiting    Complications: none    Transfer of care protocol was followed      Last vitals:   Visit Vitals  /80 (BP Location: Right arm, Patient Position: Standing)   Pulse 89   Temp 36.6 °C (97.9 °F) (Oral)   Resp 16   Wt 98.3 kg (216 lb 11 oz)   SpO2 97%   BMI 33.94 kg/m²

## 2022-01-25 NOTE — OP NOTE
DATE OF PROCEDURE: 1/25/2022  PREOPERATIVE DIAGNOSIS: Erectile Dysfunction, Type 2 DM, AICD, history of chronic anticoagulation  POSTOPERATIVE DIAGNOSIS:  as above     PROCEDURE PERFORMED:  - Insertion of inflatable penile prosthesis      Infrapubic approach     PRIMARY SURGEON:  Monica Lieberman M.D.     ANESTHESIA:  General.     ESTIMATED BLOOD LOSS: 150 cc     DRAINS:   Flat drain, chapman     SPECIMENS REMOVED:  None     COMPLICATIONS:  None.     INDICATIONS:  Erectile dysfunction, unable to take PDE5 inhibitors due to cardiac history, unwilling to start intra cavernosal injection therapy. Patient has been counseled regarding risks and expectations for penile prosthesis.           OPERATIVE DETAILS  Mr. Mcgraw was taken into the operating room and placed under satisfactory general anesthesia. SCDs were in place. Patient received ancef and 5mg/kg gentamicin perioperatively.  The patient was sterilely prepped and draped. A Chapman catheter was placed with return of clear yellow urine, a pre operative culture was noted to be negative. A penile ring block was performed for anesthesia. Hydrodistention of the corpora was carried out with injectable saline and lidocaine mixture. A 1.5 cm incision was made 1 finger breath above the penile-pubic bone junction.  Sophie's fascia was incised, hemostasis achieved with bipolar cautery. the corpora were dissected out bilaterally and stay sutures placed in each corpora laterally with 2-0 PDS sutures, taking care to identify the corpus spongiosum and avoid the neurovascular bundle. The corporotomies were made using mets and the corpora sized with jalyn revealing 11cm proximal, 11 cm distal on the left and 11 cm proximally, 10cm distally on the right. No dilation was necessary. There was not evidence of urethral injury. An  21 cm CX  three-piece inflatable device with 1 cm rear tip extender on the left. A 100mL balloon reservoir was then prepared. The space for the  reservoir was prepared by perforating the abdominal fascia into the space of retzius. The space was digitally expanded and the space of Retzius with the use of a nasal speculum. Insufflation of the resevoir did not suggest correct location, so a counter incision was made in the left lower quadrant, submuscular approach.. After changing gloves, the reservoir was placed in the appropriate space and inflated with 100 mL of sterile saline without significant back pressure identified confirm a satisfactory position.  Each of the cylinders were then placed in to the corpora using the insertion tool and then each of the cylinders were placed in the corresponding corpora. After placement, the device was tested hooking a saline-filled syringe to the tubing that will go to the reservoir and it expanded out satisfactorily giving a satisfactory appearing erection. The fluid was removed from the corpus cylinders and the corpora were closed using the preplaced stay sutures. The pump pocket was made in the midline of the scrotum in the most dependent area of the scrotum.  The tubing was measured for going to the reservoir. Using rubber-shod clamps, the tubing was clamped again and excess tubing trimmed. Using [employer] device, the two pieces of tubing were connected. The tubing was buried using running 3-0 Vicryl and then subcutaneous tissue approximated with 3-0 Vicryl. The corpus cylinders were inflated to just full and stay sutures were using to close the corporotomies. A flat drain was placed in the scrotum and secured at the skin level with a nylon suture.  The pubic incision and counter incision for the resevoir was closed in 3 layers, subcutaneous layer closed using running 4-0 Monocryl. Dermond was applied to the skin incision. Sterile penoscrotal dressing was applied. Rodriguez catheter was left in place. I was present and participated in the entirety of the procedure.     His anesthesia was reversed.  He was then taken to  the Recovery Room in stable condition. Patient to be admitted post operatively given his cardiac history and lack of support at home for immediate post op care..      Counts were correct at the end of the case.

## 2022-01-26 LAB
POCT GLUCOSE: 181 MG/DL (ref 70–110)
POCT GLUCOSE: 283 MG/DL (ref 70–110)

## 2022-01-26 PROCEDURE — C9399 UNCLASSIFIED DRUGS OR BIOLOG: HCPCS | Performed by: HOSPITALIST

## 2022-01-26 PROCEDURE — 63600175 PHARM REV CODE 636 W HCPCS: Performed by: STUDENT IN AN ORGANIZED HEALTH CARE EDUCATION/TRAINING PROGRAM

## 2022-01-26 PROCEDURE — 36415 COLL VENOUS BLD VENIPUNCTURE: CPT | Performed by: HOSPITALIST

## 2022-01-26 PROCEDURE — 25000003 PHARM REV CODE 250: Performed by: HOSPITALIST

## 2022-01-26 PROCEDURE — A4216 STERILE WATER/SALINE, 10 ML: HCPCS | Performed by: STUDENT IN AN ORGANIZED HEALTH CARE EDUCATION/TRAINING PROGRAM

## 2022-01-26 PROCEDURE — 83036 HEMOGLOBIN GLYCOSYLATED A1C: CPT | Performed by: HOSPITALIST

## 2022-01-26 PROCEDURE — 25000242 PHARM REV CODE 250 ALT 637 W/ HCPCS: Performed by: HOSPITALIST

## 2022-01-26 PROCEDURE — 25000003 PHARM REV CODE 250: Performed by: STUDENT IN AN ORGANIZED HEALTH CARE EDUCATION/TRAINING PROGRAM

## 2022-01-26 RX ORDER — ONDANSETRON 2 MG/ML
8 INJECTION INTRAMUSCULAR; INTRAVENOUS EVERY 6 HOURS PRN
Status: DISCONTINUED | OUTPATIENT
Start: 2022-01-26 | End: 2022-01-27 | Stop reason: HOSPADM

## 2022-01-26 RX ORDER — ISOSORBIDE MONONITRATE 30 MG/1
30 TABLET, EXTENDED RELEASE ORAL EVERY MORNING
Status: DISCONTINUED | OUTPATIENT
Start: 2022-01-26 | End: 2022-01-27

## 2022-01-26 RX ORDER — INSULIN ASPART 100 [IU]/ML
1-10 INJECTION, SOLUTION INTRAVENOUS; SUBCUTANEOUS
Status: DISCONTINUED | OUTPATIENT
Start: 2022-01-26 | End: 2022-01-27 | Stop reason: HOSPADM

## 2022-01-26 RX ORDER — FLUTICASONE PROPIONATE 50 MCG
2 SPRAY, SUSPENSION (ML) NASAL DAILY
Status: DISCONTINUED | OUTPATIENT
Start: 2022-01-26 | End: 2022-01-27 | Stop reason: HOSPADM

## 2022-01-26 RX ORDER — POLYETHYLENE GLYCOL 3350 17 G/17G
17 POWDER, FOR SOLUTION ORAL 2 TIMES DAILY PRN
Status: DISCONTINUED | OUTPATIENT
Start: 2022-01-26 | End: 2022-01-27 | Stop reason: HOSPADM

## 2022-01-26 RX ORDER — TAMSULOSIN HYDROCHLORIDE 0.4 MG/1
0.4 CAPSULE ORAL DAILY
Status: DISCONTINUED | OUTPATIENT
Start: 2022-01-26 | End: 2022-01-27 | Stop reason: HOSPADM

## 2022-01-26 RX ORDER — GABAPENTIN 400 MG/1
800 CAPSULE ORAL 2 TIMES DAILY
Status: DISCONTINUED | OUTPATIENT
Start: 2022-01-26 | End: 2022-01-27 | Stop reason: HOSPADM

## 2022-01-26 RX ORDER — CLONIDINE HYDROCHLORIDE 0.1 MG/1
0.1 TABLET ORAL EVERY 8 HOURS PRN
Status: DISCONTINUED | OUTPATIENT
Start: 2022-01-26 | End: 2022-01-27 | Stop reason: HOSPADM

## 2022-01-26 RX ORDER — IBUPROFEN 200 MG
16 TABLET ORAL
Status: DISCONTINUED | OUTPATIENT
Start: 2022-01-26 | End: 2022-01-27 | Stop reason: HOSPADM

## 2022-01-26 RX ORDER — AMOXICILLIN AND CLAVULANATE POTASSIUM 500; 125 MG/1; MG/1
1 TABLET, FILM COATED ORAL 2 TIMES DAILY
Status: DISCONTINUED | OUTPATIENT
Start: 2022-01-26 | End: 2022-01-27 | Stop reason: HOSPADM

## 2022-01-26 RX ORDER — HYDROCODONE BITARTRATE AND ACETAMINOPHEN 10; 325 MG/1; MG/1
1 TABLET ORAL EVERY 6 HOURS PRN
Status: DISCONTINUED | OUTPATIENT
Start: 2022-01-26 | End: 2022-01-27 | Stop reason: HOSPADM

## 2022-01-26 RX ORDER — DORZOLAMIDE HCL 20 MG/ML
1 SOLUTION/ DROPS OPHTHALMIC 2 TIMES DAILY
Status: DISCONTINUED | OUTPATIENT
Start: 2022-01-26 | End: 2022-01-27 | Stop reason: HOSPADM

## 2022-01-26 RX ORDER — GLUCAGON 1 MG
1 KIT INJECTION
Status: DISCONTINUED | OUTPATIENT
Start: 2022-01-26 | End: 2022-01-27 | Stop reason: HOSPADM

## 2022-01-26 RX ORDER — ATROPINE SULFATE 10 MG/ML
1 SOLUTION/ DROPS OPHTHALMIC 2 TIMES DAILY
Status: DISCONTINUED | OUTPATIENT
Start: 2022-01-26 | End: 2022-01-27 | Stop reason: HOSPADM

## 2022-01-26 RX ORDER — METOPROLOL SUCCINATE 50 MG/1
50 TABLET, EXTENDED RELEASE ORAL NIGHTLY
Status: DISCONTINUED | OUTPATIENT
Start: 2022-01-26 | End: 2022-01-27 | Stop reason: HOSPADM

## 2022-01-26 RX ORDER — BRIMONIDINE TARTRATE 1.5 MG/ML
1 SOLUTION/ DROPS OPHTHALMIC 2 TIMES DAILY
Status: DISCONTINUED | OUTPATIENT
Start: 2022-01-26 | End: 2022-01-27 | Stop reason: HOSPADM

## 2022-01-26 RX ORDER — IBUPROFEN 200 MG
24 TABLET ORAL
Status: DISCONTINUED | OUTPATIENT
Start: 2022-01-26 | End: 2022-01-27 | Stop reason: HOSPADM

## 2022-01-26 RX ADMIN — AMLODIPINE BESYLATE 10 MG: 5 TABLET ORAL at 09:01

## 2022-01-26 RX ADMIN — Medication 3 ML: at 01:01

## 2022-01-26 RX ADMIN — SODIUM CHLORIDE, SODIUM LACTATE, POTASSIUM CHLORIDE, AND CALCIUM CHLORIDE: .6; .31; .03; .02 INJECTION, SOLUTION INTRAVENOUS at 01:01

## 2022-01-26 RX ADMIN — OXYCODONE 5 MG: 5 TABLET ORAL at 12:01

## 2022-01-26 RX ADMIN — OXYBUTYNIN CHLORIDE 10 MG: 5 TABLET, EXTENDED RELEASE ORAL at 09:01

## 2022-01-26 RX ADMIN — CEFAZOLIN SODIUM 1 G: 1 SOLUTION INTRAVENOUS at 02:01

## 2022-01-26 RX ADMIN — ATORVASTATIN CALCIUM 80 MG: 40 TABLET, FILM COATED ORAL at 09:01

## 2022-01-26 RX ADMIN — TAMSULOSIN HYDROCHLORIDE 0.4 MG: 0.4 CAPSULE ORAL at 01:01

## 2022-01-26 RX ADMIN — OXYCODONE 5 MG: 5 TABLET ORAL at 07:01

## 2022-01-26 RX ADMIN — OXYCODONE 5 MG: 5 TABLET ORAL at 01:01

## 2022-01-26 RX ADMIN — AMOXICILLIN AND CLAVULANATE POTASSIUM 500 MG: 500; 125 TABLET, FILM COATED ORAL at 09:01

## 2022-01-26 RX ADMIN — Medication 3 ML: at 09:01

## 2022-01-26 RX ADMIN — METOPROLOL SUCCINATE 50 MG: 50 TABLET, EXTENDED RELEASE ORAL at 09:01

## 2022-01-26 RX ADMIN — ATROPINE SULFATE 1 DROP: 10 SOLUTION OPHTHALMIC at 09:01

## 2022-01-26 RX ADMIN — BRIMONIDINE TARTRATE 1 DROP: 1.5 SOLUTION OPHTHALMIC at 02:01

## 2022-01-26 RX ADMIN — CEFAZOLIN SODIUM 1 G: 1 SOLUTION INTRAVENOUS at 05:01

## 2022-01-26 RX ADMIN — METHOCARBAMOL 500 MG: 500 TABLET ORAL at 02:01

## 2022-01-26 RX ADMIN — METHOCARBAMOL 500 MG: 500 TABLET ORAL at 09:01

## 2022-01-26 RX ADMIN — METHOCARBAMOL 500 MG: 500 TABLET ORAL at 05:01

## 2022-01-26 RX ADMIN — DORZOLAMIDE HYDROCHLORIDE 1 DROP: 20 SOLUTION/ DROPS OPHTHALMIC at 09:01

## 2022-01-26 RX ADMIN — Medication 3 ML: at 05:01

## 2022-01-26 RX ADMIN — AMIODARONE HYDROCHLORIDE 200 MG: 200 TABLET ORAL at 09:01

## 2022-01-26 RX ADMIN — ISOSORBIDE MONONITRATE 30 MG: 30 TABLET, EXTENDED RELEASE ORAL at 02:01

## 2022-01-26 RX ADMIN — FLUTICASONE PROPIONATE 100 MCG: 50 SPRAY, METERED NASAL at 02:01

## 2022-01-26 RX ADMIN — DULOXETINE HYDROCHLORIDE 60 MG: 30 CAPSULE, DELAYED RELEASE ORAL at 09:01

## 2022-01-26 RX ADMIN — HYDROCODONE BITARTRATE AND ACETAMINOPHEN 1 TABLET: 10; 325 TABLET ORAL at 05:01

## 2022-01-26 RX ADMIN — DORZOLAMIDE HYDROCHLORIDE 1 DROP: 20 SOLUTION/ DROPS OPHTHALMIC at 02:01

## 2022-01-26 RX ADMIN — BRIMONIDINE TARTRATE 1 DROP: 1.5 SOLUTION OPHTHALMIC at 09:01

## 2022-01-26 RX ADMIN — GABAPENTIN 800 MG: 400 CAPSULE ORAL at 09:01

## 2022-01-26 RX ADMIN — INSULIN DETEMIR 20 UNITS: 100 INJECTION, SOLUTION SUBCUTANEOUS at 02:01

## 2022-01-26 NOTE — SUBJECTIVE & OBJECTIVE
Past Medical History:   Diagnosis Date    COPD (chronic obstructive pulmonary disease)     Coronary artery disease     Diabetes mellitus type II     DJD (degenerative joint disease)     Gout     Hypertension     Kidney stone     MI (myocardial infarction) 2010    Obesity     JOSE (obstructive sleep apnea)        Past Surgical History:   Procedure Laterality Date    CARDIAC DEFIBRILLATOR PLACEMENT      CATARACT EXTRACTION Right     CYSTOSCOPY N/A 10/1/2020    Procedure: CYSTOSCOPY;  Surgeon: Monica Lieberman MD;  Location: [facility] OR;  Service: Urology;  Laterality: N/A;  RN PRE OP Covid screen 9-  C A    excisional biopsy left inguinal lymph node      FOOT SURGERY      right foot surgery     INSERTION OF INFLATABLE PENILE PROSTHESIS N/A 1/25/2022    Procedure: INSERTION, PENILE PROSTHESIS, INFLATABLE;  Surgeon: Monica Lieberman MD;  Location: [facility] OR;  Service: Urology;  Laterality: N/A;  [employer] JUAN LUIS ARNOLD [phone] TEXTED HIM @ 5:32PM ON 12-  RN PRE OP 12-28-21. Covid NEGATIVE 1-3-22. [place]-----AICD-----HAS CARDS CLEARANCE    kidney stones      lithotripsy    REPAIR, SURGICAL WOUND, EYE, ANTERIOR SEGMENT Right 10/5/2021    Procedure: REPAIR, SURGICAL WOUND, EYE, ANTERIOR SEGMENT;  Surgeon: Adelaide Allen MD;  Location: [address];  Service: Ophthalmology;  Laterality: Right;  Anterior Chamber Wash Out Right Eye     Surgery for bulging disc at C7         Review of patient's allergies indicates:   Allergen Reactions    Morphine Itching and Other (See Comments)     Pt denies morphine as an allergy reports is is allergic to a medicine but has no idea what it is      Propoxyphene     Darvocet a500 [propoxyphene n-acetaminophen] Itching       No current facility-administered medications on file prior to encounter.     Current Outpatient Medications on File Prior to Encounter   Medication Sig    albuterol (PROVENTIL/VENTOLIN HFA) 90 mcg/actuation inhaler Inhale 2  puffs into the lungs every 4 (four) hours as needed for Wheezing or Shortness of Breath. Rescue    amiodarone (PACERONE) 400 MG tablet Take 200 mg by mouth every morning.     amlodipine (NORVASC) 10 MG tablet Take 10 mg by mouth every morning.     atropine 1% (ISOPTO ATROPINE) 1 % Drop Place 1 drop into the right eye once daily. (Patient taking differently: Place 1 drop into the right eye 2 (two) times a day.)    baclofen (LIORESAL) 10 MG tablet Take 1 tablet (10 mg total) by mouth 2 (two) times daily as needed (hip pain).    blood sugar diagnostic Strp ACCU CHEK KEVAN PLUS TEST STRIPS    blood-glucose meter Misc ACCU CHEK KEVAN PLUS METER: USE 4X/D    brimonidine 0.15 % OPTH DROP (ALPHAGAN) 0.15 % ophthalmic solution Place 1 drop into both eyes 2 (two) times a day.    brimonidine-timoloL (COMBIGAN) 0.2-0.5 % Drop Place 1 drop into the right eye 3 (three) times daily.    cetirizine (ZYRTEC) 10 MG tablet Take 10 mg by mouth once daily.    CRESTOR 20 mg tablet Take 20 mg by mouth every evening.     dicyclomine (BENTYL) 20 mg tablet Take 20 mg by mouth 2 (two) times daily.    dorzolamide (TRUSOPT) 2 % ophthalmic solution Place 1 drop into both eyes 2 (two) times a day.    DULoxetine (CYMBALTA) 60 MG capsule Take 60 mg by mouth.    famotidine (PEPCID) 20 MG tablet Take 1 tablet (20 mg total) by mouth 2 (two) times daily.    fluticasone propionate (FLONASE) 50 mcg/actuation nasal spray 2 sprays (100 mcg total) by Each Nostril route once daily. (Patient taking differently: 2 sprays by Each Nostril route daily as needed.)    Fortified Tobramycin 15 mg/ml Opht (TOBREX) 15 mg Drop Place 1 drop into the right eye every 6 (six) hours. (Patient taking differently: Place 1 drop into the right eye 3 (three) times daily.)    furosemide (LASIX) 20 MG tablet Take 20 mg by mouth daily as needed. Takes every other day    gabapentin (NEURONTIN) 300 MG capsule Take 800 mg by mouth 2 (two) times daily. Takes  Two 300 mg  "tabs at night    insulin glargine 100 units/mL (3mL) SubQ pen Inject 60 Units into the skin once daily.    INVOKANA 300 mg Tab tablet TK 1 T PO QD    isosorbide mononitrate (IMDUR) 30 MG 24 hr tablet Take 30 mg by mouth every morning.     lancets [facility] ACCU CHEK SOFTCLIX LANCETS: USE 4X DAILY    latanoprost 0.005 % ophthalmic solution Place 1 drop into the right eye every evening.    methazoLAMIDE (NEPTAZANE) 25 mg tablet Take 25 mg by mouth once daily.    metoclopramide HCl (REGLAN) 10 MG tablet Take 1 tablet (10 mg total) by mouth every 6 (six) hours as needed.    metoprolol succinate (TOPROL-XL) 50 MG 24 hr tablet Take 50 mg by mouth every evening.     natamycin (NATACYN) 5 % ophthalmic solution Place 1 drop into the right eye 6 (six) times daily. (Patient taking differently: Place 1 drop into the right eye 2 (two) times a day.)    omeprazole (PRILOSEC) 20 MG capsule TK 1 C PO QD FOR CONTROL OF STOMACH ACID BEFORE BREAKFAST    pen needle, diabetic 31 gauge x 5/16" Ndle Inject 1 each into the skin.    potassium chloride SA (K-DUR,KLOR-CON) 20 MEQ tablet Take by mouth once daily.    prednisoLONE acetate (PRED FORTE) 1 % DrpS Place 1 drop into the right eye 4 (four) times daily.    zolpidem (AMBIEN) 10 mg Tab Take 5 mg by mouth nightly as needed.    acetaZOLAMIDE (DIAMOX) 250 MG tablet Take 1 tablet (250 mg total) by mouth 2 (two) times daily.    allopurinol (ZYLOPRIM) 100 MG tablet     aspirin (ECOTRIN) 81 MG EC tablet Take 81 mg by mouth.    meloxicam (MOBIC) 15 MG tablet Take 1 tablet (15 mg total) by mouth once daily.    moxifloxacin (AVELOX) 400 mg tablet Take 1 tablet (400 mg total) by mouth once daily.    naproxen (NAPROSYN) 500 MG tablet Take 1 tablet (500 mg total) by mouth 2 (two) times daily with meals.    nitroGLYCERIN (NITROSTAT) 0.4 MG SL tablet Place 0.4 mg under the tongue every 5 (five) minutes as needed for Chest pain.    promethazine (PHENERGAN) 25 MG tablet TK 1 T PO  Q 4 H " PRN NV    sotaloL (BETAPACE) 80 MG tablet TAKE 1 TABLET BY MOUTH TWICE DAILY    spironolactone (ALDACTONE) 25 MG tablet Take 25 mg by mouth daily as needed.    tamsulosin (FLOMAX) 0.4 mg Cp24 Take 1 capsule (0.4 mg total) by mouth once daily.    [DISCONTINUED] promethazine-dextromethorphan (PROMETHAZINE-DM) 6.25-15 mg/5 mL Syrp Take 5 mLs by mouth every 4 to 6 hours as needed. 5 mL every 4 to 6 hours; maximum: 30 mL in 24 hours     Family History     Problem Relation (Age of Onset)    Asthma Daughter    Cancer Maternal Uncle, Cousin    Diabetes Mother, Brother    Heart disease Father    Hypertension Mother, Brother    Kidney disease Cousin        Tobacco Use    Smoking status: Never Smoker    Smokeless tobacco: Never Used   Substance and Sexual Activity    Alcohol use: No    Drug use: No    Sexual activity: Not Currently     Partners: Female     Comment: divorce     Review of Systems   Constitutional: Negative for chills and fever.   HENT: Negative for ear discharge and ear pain.    Eyes: Negative for discharge and itching.   Respiratory: Negative for cough and shortness of breath.    Cardiovascular: Negative for chest pain and palpitations.   Gastrointestinal: Positive for abdominal pain. Negative for diarrhea.   Endocrine: Negative for cold intolerance and heat intolerance.   Genitourinary: Negative for difficulty urinating and dysuria.   Musculoskeletal: Negative for neck pain and neck stiffness.   Skin: Negative for rash and wound.   Neurological: Negative for seizures and syncope.   Psychiatric/Behavioral: Negative for agitation and hallucinations.     Objective:     Vital Signs (Most Recent):  Temp: 98.1 °F (36.7 °C) (01/26/22 1141)  Pulse: 97 (01/26/22 1141)  Resp: 18 (01/26/22 1141)  BP: 129/72 (01/26/22 1141)  SpO2: 95 % (01/26/22 1141) Vital Signs (24h Range):  Temp:  [97.3 °F (36.3 °C)-98.3 °F (36.8 °C)] 98.1 °F (36.7 °C)  Pulse:  [75-98] 97  Resp:  [10-20] 18  SpO2:  [93 %-100 %] 95 %  BP:  (116-139)/(67-86) 129/72     Weight: 98.3 kg (216 lb 11 oz)  Body mass index is 33.94 kg/m².    Physical Exam  Constitutional:       Appearance: He is obese. He is not ill-appearing.   HENT:      Head: Normocephalic and atraumatic.      Mouth/Throat:      Pharynx: Oropharynx is clear. No oropharyngeal exudate or posterior oropharyngeal erythema.   Cardiovascular:      Rate and Rhythm: Normal rate and regular rhythm.   Pulmonary:      Effort: No respiratory distress.      Breath sounds: Normal breath sounds.   Abdominal:      General: Bowel sounds are normal.      Palpations: Abdomen is soft.   Musculoskeletal:         General: No deformity or signs of injury.      Cervical back: Neck supple. No rigidity.   Skin:     General: Skin is warm and dry.   Neurological:      Mental Status: He is oriented to person, place, and time.      Cranial Nerves: No cranial nerve deficit.         Significant Labs: All pertinent labs within the past 24 hours have been reviewed.  BMP: No results for input(s): GLU, NA, K, CL, CO2, BUN, CREATININE, CALCIUM, MG in the last 48 hours.  CBC: No results for input(s): WBC, HGB, HCT, PLT in the last 48 hours.    Significant Imaging: I have reviewed all pertinent imaging results/findings within the past 24 hours.

## 2022-01-26 NOTE — HPI
55 y/o male with ED presented for insertion of inflatable penile prosthesis.  Patient with multiple chronic medical conditions, including DM, HTN, HLP, non ischemic cardiomyopathy and arrhythmia S/P AICD.  Hospital Medicine consulted for medical management.  Patient denies any recent medical complications or illness.  States compliance with medications.  Currently complaining of abdominal soreness.  No other complaints.

## 2022-01-26 NOTE — PROGRESS NOTES
Patient seen in PM   Patient in pain, requesting additional day  Changed pain medication  Will plan for void trial in AM, drain removal in AM

## 2022-01-26 NOTE — CONSULTS
[facility] Medicine  Consult Note    Patient Name: Se Virgil Mcgraw  MRN: [id number]  Admission Date: 1/25/2022  Hospital Length of Stay: 0 days  Attending Physician: Monica Lieberman MD   Primary Care Provider: Coreen Anderson MD           Patient information was obtained from patient.     Consults  Subjective:     Principal Problem: Erectile dysfunction    Chief Complaint: No chief complaint on file.       HPI: 55 y/o male with ED presented for insertion of inflatable penile prosthesis.  Patient with multiple chronic medical conditions, including DM, HTN, HLP, non ischemic cardiomyopathy and arrhythmia S/P AICD.  Hospital Medicine consulted for medical management.  Patient denies any recent medical complications or illness.  States compliance with medications.  Currently complaining of abdominal soreness.  No other complaints.      Past Medical History:   Diagnosis Date    COPD (chronic obstructive pulmonary disease)     Coronary artery disease     Diabetes mellitus type II     DJD (degenerative joint disease)     Gout     Hypertension     Kidney stone     MI (myocardial infarction) 2010    Obesity     JOSE (obstructive sleep apnea)        Past Surgical History:   Procedure Laterality Date    CARDIAC DEFIBRILLATOR PLACEMENT      CATARACT EXTRACTION Right     CYSTOSCOPY N/A 10/1/2020    Procedure: CYSTOSCOPY;  Surgeon: Monica Lieberman MD;  Location: [facility] OR;  Service: Urology;  Laterality: N/A;  RN PRE OP Covid screen 9-  C A    excisional biopsy left inguinal lymph node      FOOT SURGERY      right foot surgery     INSERTION OF INFLATABLE PENILE PROSTHESIS N/A 1/25/2022    Procedure: INSERTION, PENILE PROSTHESIS, INFLATABLE;  Surgeon: Monica Lieberman MD;  Location: [facility] OR;  Service: Urology;  Laterality: N/A;  [employer] JUAN LUIS ARNOLD [phone] TEXTED HIM @ 5:32PM ON 12-  RN PRE OP 12-28-21. Covid NEGATIVE 1-3-22. [place]-----AICD-----HAS CARDS  CLEARANCE    kidney stones      lithotripsy    REPAIR, SURGICAL WOUND, EYE, ANTERIOR SEGMENT Right 10/5/2021    Procedure: REPAIR, SURGICAL WOUND, EYE, ANTERIOR SEGMENT;  Surgeon: Adelaide Allen MD;  Location: [facility] OR [address] [place] [address];  Service: Ophthalmology;  Laterality: Right;  Anterior Chamber Wash Out Right Eye     Surgery for bulging disc at C7         Review of patient's allergies indicates:   Allergen Reactions    Morphine Itching and Other (See Comments)     Pt denies morphine as an allergy reports is is allergic to a medicine but has no idea what it is      Propoxyphene     Darvocet a500 [propoxyphene n-acetaminophen] Itching       No current facility-administered medications on file prior to encounter.     Current Outpatient Medications on File Prior to Encounter   Medication Sig    albuterol (PROVENTIL/VENTOLIN HFA) 90 mcg/actuation inhaler Inhale 2 puffs into the lungs every 4 (four) hours as needed for Wheezing or Shortness of Breath. Rescue    amiodarone (PACERONE) 400 MG tablet Take 200 mg by mouth every morning.     amlodipine (NORVASC) 10 MG tablet Take 10 mg by mouth every morning.     atropine 1% (ISOPTO ATROPINE) 1 % Drop Place 1 drop into the right eye once daily. (Patient taking differently: Place 1 drop into the right eye 2 (two) times a day.)    baclofen (LIORESAL) 10 MG tablet Take 1 tablet (10 mg total) by mouth 2 (two) times daily as needed (hip pain).    blood sugar diagnostic Strp ACCU CHEK KEVAN PLUS TEST STRIPS    blood-glucose meter [facility] ACCU CHEK KEVAN PLUS METER: USE 4X/D    brimonidine 0.15 % OPTH DROP (ALPHAGAN) 0.15 % ophthalmic solution Place 1 drop into both eyes 2 (two) times a day.    brimonidine-timoloL (COMBIGAN) 0.2-0.5 % Drop Place 1 drop into the right eye 3 (three) times daily.    cetirizine (ZYRTEC) 10 MG tablet Take 10 mg by mouth once daily.    CRESTOR 20 mg tablet Take 20 mg by mouth every evening.     dicyclomine (BENTYL) 20 mg tablet Take 20 mg by  "mouth 2 (two) times daily.    dorzolamide (TRUSOPT) 2 % ophthalmic solution Place 1 drop into both eyes 2 (two) times a day.    DULoxetine (CYMBALTA) 60 MG capsule Take 60 mg by mouth.    famotidine (PEPCID) 20 MG tablet Take 1 tablet (20 mg total) by mouth 2 (two) times daily.    fluticasone propionate (FLONASE) 50 mcg/actuation nasal spray 2 sprays (100 mcg total) by Each Nostril route once daily. (Patient taking differently: 2 sprays by Each Nostril route daily as needed.)    Fortified Tobramycin 15 mg/ml Opht (TOBREX) 15 mg Drop Place 1 drop into the right eye every 6 (six) hours. (Patient taking differently: Place 1 drop into the right eye 3 (three) times daily.)    furosemide (LASIX) 20 MG tablet Take 20 mg by mouth daily as needed. Takes every other day    gabapentin (NEURONTIN) 300 MG capsule Take 800 mg by mouth 2 (two) times daily. Takes  Two 300 mg tabs at night    insulin glargine 100 units/mL (3mL) SubQ pen Inject 60 Units into the skin once daily.    INVOKANA 300 mg Tab tablet TK 1 T PO QD    isosorbide mononitrate (IMDUR) 30 MG 24 hr tablet Take 30 mg by mouth every morning.     lancets Misc ACCU CHEK SOFTCLIX LANCETS: USE 4X DAILY    latanoprost 0.005 % ophthalmic solution Place 1 drop into the right eye every evening.    methazoLAMIDE (NEPTAZANE) 25 mg tablet Take 25 mg by mouth once daily.    metoclopramide HCl (REGLAN) 10 MG tablet Take 1 tablet (10 mg total) by mouth every 6 (six) hours as needed.    metoprolol succinate (TOPROL-XL) 50 MG 24 hr tablet Take 50 mg by mouth every evening.     natamycin (NATACYN) 5 % ophthalmic solution Place 1 drop into the right eye 6 (six) times daily. (Patient taking differently: Place 1 drop into the right eye 2 (two) times a day.)    omeprazole (PRILOSEC) 20 MG capsule TK 1 C PO QD FOR CONTROL OF STOMACH ACID BEFORE BREAKFAST    pen needle, diabetic 31 gauge x 5/16" Ndle Inject 1 each into the skin.    potassium chloride SA (K-DUR,KLOR-CON) " 20 MEQ tablet Take by mouth once daily.    prednisoLONE acetate (PRED FORTE) 1 % DrpS Place 1 drop into the right eye 4 (four) times daily.    zolpidem (AMBIEN) 10 mg Tab Take 5 mg by mouth nightly as needed.    acetaZOLAMIDE (DIAMOX) 250 MG tablet Take 1 tablet (250 mg total) by mouth 2 (two) times daily.    allopurinol (ZYLOPRIM) 100 MG tablet     aspirin (ECOTRIN) 81 MG EC tablet Take 81 mg by mouth.    meloxicam (MOBIC) 15 MG tablet Take 1 tablet (15 mg total) by mouth once daily.    moxifloxacin (AVELOX) 400 mg tablet Take 1 tablet (400 mg total) by mouth once daily.    naproxen (NAPROSYN) 500 MG tablet Take 1 tablet (500 mg total) by mouth 2 (two) times daily with meals.    nitroGLYCERIN (NITROSTAT) 0.4 MG SL tablet Place 0.4 mg under the tongue every 5 (five) minutes as needed for Chest pain.    promethazine (PHENERGAN) 25 MG tablet TK 1 T PO  Q 4 H PRN NV    sotaloL (BETAPACE) 80 MG tablet TAKE 1 TABLET BY MOUTH TWICE DAILY    spironolactone (ALDACTONE) 25 MG tablet Take 25 mg by mouth daily as needed.    tamsulosin (FLOMAX) 0.4 mg Cp24 Take 1 capsule (0.4 mg total) by mouth once daily.    [DISCONTINUED] promethazine-dextromethorphan (PROMETHAZINE-DM) 6.25-15 mg/5 mL Syrp Take 5 mLs by mouth every 4 to 6 hours as needed. 5 mL every 4 to 6 hours; maximum: 30 mL in 24 hours     Family History     Problem Relation (Age of Onset)    Asthma Daughter    Cancer Maternal Uncle, Cousin    Diabetes Mother, Brother    Heart disease Father    Hypertension Mother, Brother    Kidney disease Cousin        Tobacco Use    Smoking status: Never Smoker    Smokeless tobacco: Never Used   Substance and Sexual Activity    Alcohol use: No    Drug use: No    Sexual activity: Not Currently     Partners: Female     Comment: divorce     Review of Systems   Constitutional: Negative for chills and fever.   HENT: Negative for ear discharge and ear pain.    Eyes: Negative for discharge and itching.   Respiratory:  Negative for cough and shortness of breath.    Cardiovascular: Negative for chest pain and palpitations.   Gastrointestinal: Positive for abdominal pain. Negative for diarrhea.   Endocrine: Negative for cold intolerance and heat intolerance.   Genitourinary: Negative for difficulty urinating and dysuria.   Musculoskeletal: Negative for neck pain and neck stiffness.   Skin: Negative for rash and wound.   Neurological: Negative for seizures and syncope.   Psychiatric/Behavioral: Negative for agitation and hallucinations.     Objective:     Vital Signs (Most Recent):  Temp: 98.1 °F (36.7 °C) (01/26/22 1141)  Pulse: 97 (01/26/22 1141)  Resp: 18 (01/26/22 1141)  BP: 129/72 (01/26/22 1141)  SpO2: 95 % (01/26/22 1141) Vital Signs (24h Range):  Temp:  [97.3 °F (36.3 °C)-98.3 °F (36.8 °C)] 98.1 °F (36.7 °C)  Pulse:  [75-98] 97  Resp:  [10-20] 18  SpO2:  [93 %-100 %] 95 %  BP: (116-139)/(67-86) 129/72     Weight: 98.3 kg (216 lb 11 oz)  Body mass index is 33.94 kg/m².    Physical Exam  Constitutional:       Appearance: He is obese. He is not ill-appearing.   HENT:      Head: Normocephalic and atraumatic.      Mouth/Throat:      Pharynx: Oropharynx is clear. No oropharyngeal exudate or posterior oropharyngeal erythema.   Cardiovascular:      Rate and Rhythm: Normal rate and regular rhythm.   Pulmonary:      Effort: No respiratory distress.      Breath sounds: Normal breath sounds.   Abdominal:      General: Bowel sounds are normal.      Palpations: Abdomen is soft.   Musculoskeletal:         General: No deformity or signs of injury.      Cervical back: Neck supple. No rigidity.   Skin:     General: Skin is warm and dry.   Neurological:      Mental Status: He is oriented to person, place, and time.      Cranial Nerves: No cranial nerve deficit.         Significant Labs: All pertinent labs within the past 24 hours have been reviewed.  BMP: No results for input(s): GLU, NA, K, CL, CO2, BUN, CREATININE, CALCIUM, MG in the last 48  hours.  CBC: No results for input(s): WBC, HGB, HCT, PLT in the last 48 hours.    Significant Imaging: I have reviewed all pertinent imaging results/findings within the past 24 hours.    Assessment/Plan:     * Erectile dysfunction  S/P inflatable penile implant procedure.  Treatment and plan per urology.      Chronic combined systolic and diastolic heart failure  Seems euvolemic at this time.  Use Lasix prn.      AICD (automatic cardioverter/defibrillator) present  Hx of arrhythmia  Patient seems to be on Amio, Sotalol and B blocker at home?  Continue Amio and B blocker.  AICD in place.      HLD (hyperlipidemia)  Continue Statin.      Essential hypertension  Resume home medications.  Add prn Clonidine.    Diabetes mellitus type 2 in obese  Resume home insulin regimen at lower dose to prevent hypoglycemic episodes.  Adjust as needed.  Diabetic diet and insulin sliding scale.        VTE Risk Mitigation (From admission, onward)    None              Thank you for your consult. I will follow-up with patient. Please contact us if you have any additional questions.    Janes Soria MD  Department of Hospital Medicine   [facility] [place] [facility] [place]

## 2022-01-26 NOTE — PROGRESS NOTES
Progress note    POD 1 IPP  Patient with post operative pain on PO medications  Ambulated this morning  ARMANDO with SS output  Chapman without hematuria    VSS, reviewed in EMR  Abdomen sore  Penoscrotal dressing in place  Chapman in place      IPP infrapubic placement  POD 1  Monitor blood sugars, appreciate hospitalist recommendations  Perioperative antibiotics  Anticipate d/c later today  Patient to follow up in clinic tomorrow for dressing/chapman removal  If ARMANDO drain < 100 cc with ambulation this afternoon will consider removal prior to discharge  Pain control

## 2022-01-26 NOTE — NURSING
Pt received to room 241. Oriented to room and surroundings. Rodriguez cath patent and draining. Dressing to penis dry and intact. ARMANDO drain intact and draining sanguinous drainage. NAD noted.

## 2022-01-26 NOTE — ASSESSMENT & PLAN NOTE
Hx of arrhythmia  Patient seems to be on Amio, Sotalol and B blocker at home?  Continue Amio and B blocker.  AICD in place.

## 2022-01-26 NOTE — ASSESSMENT & PLAN NOTE
Resume home insulin regimen at lower dose to prevent hypoglycemic episodes.  Adjust as needed.  Diabetic diet and insulin sliding scale.

## 2022-01-26 NOTE — ANESTHESIA POSTPROCEDURE EVALUATION
Anesthesia Post Evaluation    Patient: Se Virgil Mcgraw    Procedure(s) Performed: Procedure(s) (LRB):  INSERTION, PENILE PROSTHESIS, INFLATABLE (N/A)    Final Anesthesia Type: general      Patient location during evaluation: PACU  Patient participation: Yes- Able to Participate  Level of consciousness: awake and alert, oriented and awake  Post-procedure vital signs: reviewed and stable  Pain management: adequate  Airway patency: patent    PONV status at discharge: No PONV  Anesthetic complications: no      Cardiovascular status: blood pressure returned to baseline, hemodynamically stable and stable  Respiratory status: unassisted and spontaneous ventilation  Hydration status: euvolemic  Follow-up not needed.          Vitals Value Taken Time   /72 01/26/22 1141   Temp 36.7 °C (98.1 °F) 01/26/22 1141   Pulse 97 01/26/22 1141   Resp 20 01/26/22 1332   SpO2 95 % 01/26/22 1141         Event Time   Out of Recovery 17:36:15         Pain/Laurie Score: Pain Rating Prior to Med Admin: 10 (1/26/2022  1:32 PM)  Laurie Score: 9 (1/25/2022  4:28 PM)

## 2022-01-26 NOTE — PLAN OF CARE
01/26/22 0905   Discharge Planning   Assessment Type Discharge Planning Brief Assessment   Resource/Environmental Concerns none   Support Systems Family members   Equipment Currently Used at Home none   Current Living Arrangements home/apartment/condo   Patient/Family Anticipates Transition to home   Patient/Family Anticipated Services at Transition none   DME Needed Upon Discharge  none   Discharge Plan A Home  (with followup)     [id number] - [place] - [address] AT [facility] [address]  [address]  [place] [address]  Phone: [phone] Fax: [phone]    [facility] - [place] - [address]  [address]  [place] [address]  Phone: [phone] Fax: [phone]    [facility] - [place], [place] - [address]   [address]  [facility] [address]  Phone: [phone] Fax: [phone]

## 2022-01-26 NOTE — PROGRESS NOTES
WRITTEN HEALTHCARE DISCHARGE INFORMATION      Things that YOU are RESPONSIBLE for to Manage Your Care At Home:     1. Getting your prescriptions filled.  2. Taking you medications as directed. DO NOT MISS ANY DOSES!  3. Going to your follow-up doctor appointments. This is important because it allows the doctor to monitor your progress and to determine if any changes need to be made to your treatment plan.     If you are unable to make your follow up appointments, please call the number listed and reschedule this appointment.      ____________HELP AT HOME____________________     Experiencing any SIGNS or SYMPTOMS: YOU CAN     Schedule a same day appopintment with your Primary Care Doctor or  you can call [facility] On Call Nurse Care Line for 24/7 assistance at [phone]     If you are experience any signs or symptoms that have become severe, Call 911 and come to your nearest Emergency Room.     Thank you for choosing [facility] and allowing us to care for you.   From your care management team:      You should receive a call from [facility] Discharge Department within 48-72 hours to help manage your care after discharge. Please try to make sure that you answer your phone for this important phone call.     Follow-up Information     Monica Lieberman MD On 2/3/2022.    Specialty: Urology  Why: appointment scheduled for February 3rd at 10:45am  Contact information:  [address]  [address]  [place] [address] [phone]             Coreen Anderson MD.    Specialty: Internal Medicine  Why: please call as needed post hospital discharge to schedule PCP follow up   Contact information:  [id number]  [place] [address] [phone]

## 2022-01-27 VITALS
DIASTOLIC BLOOD PRESSURE: 64 MMHG | OXYGEN SATURATION: 92 % | RESPIRATION RATE: 17 BRPM | HEART RATE: 88 BPM | BODY MASS INDEX: 33.94 KG/M2 | SYSTOLIC BLOOD PRESSURE: 112 MMHG | WEIGHT: 216.69 LBS | TEMPERATURE: 98 F

## 2022-01-27 LAB
ESTIMATED AVG GLUCOSE: 169 MG/DL (ref 68–131)
HBA1C MFR BLD: 7.5 % (ref 4–5.6)
POCT GLUCOSE: 184 MG/DL (ref 70–110)
POCT GLUCOSE: 234 MG/DL (ref 70–110)
POCT GLUCOSE: 283 MG/DL (ref 70–110)

## 2022-01-27 PROCEDURE — 25000003 PHARM REV CODE 250: Performed by: STUDENT IN AN ORGANIZED HEALTH CARE EDUCATION/TRAINING PROGRAM

## 2022-01-27 PROCEDURE — 25000003 PHARM REV CODE 250: Performed by: HOSPITALIST

## 2022-01-27 PROCEDURE — C9399 UNCLASSIFIED DRUGS OR BIOLOG: HCPCS | Performed by: HOSPITALIST

## 2022-01-27 PROCEDURE — 63600175 PHARM REV CODE 636 W HCPCS: Performed by: HOSPITALIST

## 2022-01-27 RX ORDER — OXYCODONE HYDROCHLORIDE 5 MG/1
10 TABLET ORAL EVERY 8 HOURS PRN
Qty: 20 TABLET | Refills: 0 | OUTPATIENT
Start: 2022-01-27 | End: 2022-06-20

## 2022-01-27 RX ORDER — ISOSORBIDE MONONITRATE 30 MG/1
30 TABLET, EXTENDED RELEASE ORAL DAILY
Status: DISCONTINUED | OUTPATIENT
Start: 2022-01-27 | End: 2022-01-27 | Stop reason: HOSPADM

## 2022-01-27 RX ORDER — DOCUSATE SODIUM 100 MG/1
100 CAPSULE, LIQUID FILLED ORAL 2 TIMES DAILY
Qty: 30 CAPSULE | Refills: 0 | Status: SHIPPED | OUTPATIENT
Start: 2022-01-27

## 2022-01-27 RX ORDER — AMOXICILLIN AND CLAVULANATE POTASSIUM 875; 125 MG/1; MG/1
1 TABLET, FILM COATED ORAL EVERY 12 HOURS
Qty: 10 TABLET | Refills: 0 | Status: SHIPPED | OUTPATIENT
Start: 2022-01-27 | End: 2022-02-01

## 2022-01-27 RX ORDER — METHOCARBAMOL 750 MG/1
750 TABLET, FILM COATED ORAL 4 TIMES DAILY
Qty: 28 TABLET | Refills: 0 | Status: SHIPPED | OUTPATIENT
Start: 2022-01-27 | End: 2022-02-03

## 2022-01-27 RX ADMIN — INSULIN ASPART 6 UNITS: 100 INJECTION, SOLUTION INTRAVENOUS; SUBCUTANEOUS at 04:01

## 2022-01-27 RX ADMIN — INSULIN ASPART 6 UNITS: 100 INJECTION, SOLUTION INTRAVENOUS; SUBCUTANEOUS at 11:01

## 2022-01-27 RX ADMIN — HYDROCODONE BITARTRATE AND ACETAMINOPHEN 1 TABLET: 10; 325 TABLET ORAL at 11:01

## 2022-01-27 RX ADMIN — DULOXETINE HYDROCHLORIDE 60 MG: 30 CAPSULE, DELAYED RELEASE ORAL at 10:01

## 2022-01-27 RX ADMIN — BRIMONIDINE TARTRATE 1 DROP: 1.5 SOLUTION OPHTHALMIC at 10:01

## 2022-01-27 RX ADMIN — HYDROCODONE BITARTRATE AND ACETAMINOPHEN 1 TABLET: 10; 325 TABLET ORAL at 05:01

## 2022-01-27 RX ADMIN — DORZOLAMIDE HYDROCHLORIDE 1 DROP: 20 SOLUTION/ DROPS OPHTHALMIC at 10:01

## 2022-01-27 RX ADMIN — ATORVASTATIN CALCIUM 80 MG: 40 TABLET, FILM COATED ORAL at 10:01

## 2022-01-27 RX ADMIN — AMOXICILLIN AND CLAVULANATE POTASSIUM 500 MG: 500; 125 TABLET, FILM COATED ORAL at 10:01

## 2022-01-27 RX ADMIN — AMIODARONE HYDROCHLORIDE 200 MG: 200 TABLET ORAL at 10:01

## 2022-01-27 RX ADMIN — METHOCARBAMOL 500 MG: 500 TABLET ORAL at 10:01

## 2022-01-27 RX ADMIN — GABAPENTIN 800 MG: 400 CAPSULE ORAL at 10:01

## 2022-01-27 RX ADMIN — INSULIN DETEMIR 40 UNITS: 100 INJECTION, SOLUTION SUBCUTANEOUS at 09:01

## 2022-01-27 RX ADMIN — FLUTICASONE PROPIONATE 100 MCG: 50 SPRAY, METERED NASAL at 10:01

## 2022-01-27 RX ADMIN — METHOCARBAMOL 500 MG: 500 TABLET ORAL at 02:01

## 2022-01-27 RX ADMIN — ATROPINE SULFATE 1 DROP: 10 SOLUTION OPHTHALMIC at 09:01

## 2022-01-27 RX ADMIN — TAMSULOSIN HYDROCHLORIDE 0.4 MG: 0.4 CAPSULE ORAL at 10:01

## 2022-01-27 RX ADMIN — OXYBUTYNIN CHLORIDE 10 MG: 5 TABLET, EXTENDED RELEASE ORAL at 10:01

## 2022-01-27 NOTE — CARE UPDATE
Medical issues stable.  Ok to discharge from our standpoint.  Continue taking all of previous home medications without changes.  Follow up with PCP.

## 2022-01-27 NOTE — DISCHARGE SUMMARY
[facility]  Urology  Discharge Summary      Patient Name: Se Virgil Mcgraw  MRN: [id number]  Admission Date: 1/25/2022  Hospital Length of Stay: 0 days  Discharge Date and Time:  01/27/2022 12:17 PM  Attending Physician: Monica Lieberman MD   Discharging Provider: Monica Lieberman MD  Primary Care Physician: Coreen Anderson MD    HPI: 56 year old man with history of ED admitted for IPP.     Procedure(s) (LRB):  INSERTION, PENILE PROSTHESIS, INFLATABLE (N/A)     Indwelling Lines/Drains at time of discharge: None    Hospital Course:Post operative course complicated by pain, on POD 2 pain voided spontaneously, drain was removed and device was deflated. Patient counseled regarding discharge instructions.     Consults:   Consults (From admission, onward)        Status Ordering Provider     Inpatient consult to Hospitalist  Once        Provider:  MD Albina Nicole OMOTOLA            Final Active Diagnoses:    Diagnosis Date Noted POA    PRINCIPAL PROBLEM:  Erectile dysfunction [N52.9] 01/25/2022 Yes    Chronic combined systolic and diastolic heart failure [I50.42] 09/30/2021 Yes    AICD (automatic cardioverter/defibrillator) present [Z95.810] 11/17/2015 Yes    HLD (hyperlipidemia) [E78.5] 11/17/2015 Yes    Diabetes mellitus type 2 in obese [E11.69, E66.9] 08/30/2015 Yes    Essential hypertension [I10] 08/30/2015 Yes      Problems Resolved During this Admission:       Discharged Condition: good    Disposition:     Follow Up:   Follow-up Information     Monica Lieberman MD On 2/3/2022.    Specialty: Urology  Why: appointment scheduled for February 3rd at 10:45am  Contact information:  [address]  [address]  [place] [address]  [phone]             Coreen Anderson MD.    Specialty: Internal Medicine  Why: please call as needed post hospital discharge to schedule PCP follow up   Contact information:  [id number]  [place] [address] [phone]                        Patient Instructions:   Post Surgery Instructions  - Do not strain to have a bowel movement, please take stool softeners. If no bowel movements 5 days after surgery, go to the drugstore and purchase over the counter MiraLax to help with bowel movements.   - No strenuous exercise for 6 weeks  -No lifting greater than a gallon of milk for 3 weeks.  - No driving while you are on narcotic pain medications     You can expect:  - Some swelling in your scrotum  - Swelling should resolve in the next few weeks. Wear scrotal support garment for at least 4 weeks to prevent discomfort.   - Typically after the first few days, you can transition from percocet to ibuprofen or tylenol for pain control.     Wound care:   - You can shower 48 hours (2 days) after the surgery. Pat incision(s) dry with a towel.  - No soaking underwater in a tub for 6 weeks after surgery.     Recommendations:  - You can place ice on your scrotum for 30 min to 1 hour at a time for swelling.   - You can also elevate your scrotum with towels to help with swelling when you are sitting or laying down.     Call the doctor if:   Temperature is greater than 101F   Persistent vomiting and inability to keep food down   Inability to pee/urinate      Medications:  Reconciled Home Medications:      Medication List      START taking these medications    amoxicillin-clavulanate 875-125mg 875-125 mg per tablet  Commonly known as: AUGMENTIN  Take 1 tablet by mouth every 12 (twelve) hours. for 5 days     docusate sodium 100 MG capsule  Commonly known as: COLACE  Take 1 capsule (100 mg total) by mouth 2 (two) times daily.     methocarbamoL 750 MG Tab  Commonly known as: ROBAXIN  Take 1 tablet (750 mg total) by mouth 4 (four) times daily. for 7 days     oxyCODONE 5 MG immediate release tablet  Commonly known as: ROXICODONE  Take 2 tablets (10 mg total) by mouth every 8 (eight) hours as needed for Pain (If tylenol is insufficient).        CHANGE how you take these  medications    atropine 1% 1 % Drop  Commonly known as: ISOPTO ATROPINE  Place 1 drop into the right eye once daily.  What changed: when to take this     fluticasone propionate 50 mcg/actuation nasal spray  Commonly known as: FLONASE  2 sprays (100 mcg total) by Each Nostril route once daily.  What changed:   · when to take this  · reasons to take this     Fortified Tobramycin 15 mg/ml Opht 15 mg Drop  Commonly known as: TOBREX  Place 1 drop into the right eye every 6 (six) hours.  What changed: when to take this     natamycin 5 % ophthalmic solution  Commonly known as: NATACYN  Place 1 drop into the right eye 6 (six) times daily.  What changed: when to take this        CONTINUE taking these medications    acetaZOLAMIDE 250 MG tablet  Commonly known as: DIAMOX  Take 1 tablet (250 mg total) by mouth 2 (two) times daily.     albuterol 90 mcg/actuation inhaler  Commonly known as: PROVENTIL/VENTOLIN HFA  Inhale 2 puffs into the lungs every 4 (four) hours as needed for Wheezing or Shortness of Breath. Rescue     allopurinoL 100 MG tablet  Commonly known as: ZYLOPRIM     amiodarone 400 MG tablet  Commonly known as: PACERONE  Take 200 mg by mouth every morning.     amLODIPine 10 MG tablet  Commonly known as: NORVASC  Take 10 mg by mouth every morning.     aspirin 81 MG EC tablet  Commonly known as: ECOTRIN  Take 81 mg by mouth.     baclofen 10 MG tablet  Commonly known as: LIORESAL  Take 1 tablet (10 mg total) by mouth 2 (two) times daily as needed (hip pain).     blood sugar diagnostic Str  ACCU CHEK KEVAN PLUS TEST STRIPS     blood-glucose meter [facility]  ACCU CHEK KEVAN PLUS METER: USE 4X/D     brimonidine 0.15 % OPTH DROP 0.15 % ophthalmic solution  Commonly known as: ALPHAGAN  Place 1 drop into both eyes 2 (two) times a day.     cetirizine 10 MG tablet  Commonly known as: ZYRTEC  Take 10 mg by mouth once daily.     COMBIGAN 0.2-0.5 % Drop  Generic drug: brimonidine-timoloL  Place 1 drop into the right eye 3 (three) times  daily.     CRESTOR 20 MG tablet  Generic drug: rosuvastatin  Take 20 mg by mouth every evening.     dicyclomine 20 mg tablet  Commonly known as: BENTYL  Take 20 mg by mouth 2 (two) times daily.     dorzolamide 2 % ophthalmic solution  Commonly known as: TRUSOPT  Place 1 drop into both eyes 2 (two) times a day.     DULoxetine 60 MG capsule  Commonly known as: CYMBALTA  Take 60 mg by mouth.     famotidine 20 MG tablet  Commonly known as: PEPCID  Take 1 tablet (20 mg total) by mouth 2 (two) times daily.     furosemide 20 MG tablet  Commonly known as: LASIX  Take 20 mg by mouth daily as needed. Takes every other day     gabapentin 300 MG capsule  Commonly known as: NEURONTIN  Take 800 mg by mouth 2 (two) times daily. Takes  Two 300 mg tabs at night     insulin glargine 100 units/mL (3mL) SubQ pen  Inject 60 Units into the skin once daily.     INVOKANA 300 mg Tab tablet  Generic drug: canagliflozin  TK 1 T PO QD     isosorbide mononitrate 30 MG 24 hr tablet  Commonly known as: IMDUR  Take 30 mg by mouth every morning.     lancets [facility]  ACCU CHEK SOFTCLIX LANCETS: USE 4X DAILY     latanoprost 0.005 % ophthalmic solution  Place 1 drop into the right eye every evening.     meloxicam 15 MG tablet  Commonly known as: MOBIC  Take 1 tablet (15 mg total) by mouth once daily.     methazoLAMIDE 25 mg tablet  Commonly known as: NEPTAZANE  Take 25 mg by mouth once daily.     metoclopramide HCl 10 MG tablet  Commonly known as: REGLAN  Take 1 tablet (10 mg total) by mouth every 6 (six) hours as needed.     metoprolol succinate 50 MG 24 hr tablet  Commonly known as: TOPROL-XL  Take 50 mg by mouth every evening.     naproxen 500 MG tablet  Commonly known as: NAPROSYN  Take 1 tablet (500 mg total) by mouth 2 (two) times daily with meals.     nitroGLYCERIN 0.4 MG SL tablet  Commonly known as: NITROSTAT  Place 0.4 mg under the tongue every 5 (five) minutes as needed for Chest pain.     omeprazole 20 MG capsule  Commonly known as:  "PRILOSEC  TK 1 C PO QD FOR CONTROL OF STOMACH ACID BEFORE BREAKFAST     pen needle, diabetic 31 gauge x 5/16" Ndle  Inject 1 each into the skin.     potassium chloride SA 20 MEQ tablet  Commonly known as: K-DUR,KLOR-CON  Take by mouth once daily.     prednisoLONE acetate 1 % Drps  Commonly known as: PRED FORTE  Place 1 drop into the right eye 4 (four) times daily.     promethazine 25 MG tablet  Commonly known as: PHENERGAN  TK 1 T PO  Q 4 H PRN NV     sotaloL 80 MG tablet  Commonly known as: BETAPACE  TAKE 1 TABLET BY MOUTH TWICE DAILY     spironolactone 25 MG tablet  Commonly known as: ALDACTONE  Take 25 mg by mouth daily as needed.     tamsulosin 0.4 mg Cap  Commonly known as: FLOMAX  Take 1 capsule (0.4 mg total) by mouth once daily.     zolpidem 10 mg Tab  Commonly known as: AMBIEN  Take 5 mg by mouth nightly as needed.        STOP taking these medications    moxifloxacin 400 mg tablet  Commonly known as: AVELOX            Time spent on the discharge of patient: 30 minutes    Monica Lieberman MD  Urology  [facility] [place]  "

## 2022-01-27 NOTE — DISCHARGE INSTRUCTIONS
Do not strain to have a bowel movement. Please take stool softeners. If no BM 5 days after surgery, go to the drugstore and purchase OTC Miralax to help with bowel movements.   No strenuous exercise FOR SIX WEEKS  No lifting greater than a gallon of milk FOR THREE WEEKS  NO DRIVING WHILE YOU ARE ON NARCOTIC PAIN MEDS    You can expect:   Some swelling to your scrotum  Swelling should resolve in the next few months.   Wear scrotal support garment for at least 4 weeks to prevent discomfort.  Typically after the first few days, you can transition from percocet to ibuprofen or tylenol for pain control.    Wound care:-  Avoid touching your incision  You can shower 48 hours after the surgery.  Pat incisioins dry with a towel.  No soaking underwater in a tub FOR SIX WEEKS after surgery.  --- you can place ice on your scrotum for 30 minutes to 1 hour at a time for swelling.    You can elevate your scrotum with towels to help with swelling when you are sitting or laying down.    Call the doctor if:  Temperature is greater than 101' F  Persistent vomiting and inability to keep down food  Inability to pee/urinate.

## 2022-01-27 NOTE — PROGRESS NOTES
POD 2  Pain control improved  Ambulation improved  Tolerating diet    VSS  Device deflated  Drain removed  Rodriguez removed      Patient to familiarize himself with device  Follow up 2/3/22  Discharge instructions reviewed

## 2022-01-27 NOTE — PROGRESS NOTES
During rounds noted RN administered insulin in Left lower quadrant in the same vicinity of patient's sub muscular reservoir  Counseled patient that he should not administer insulin anywhere near his device to prevent damage to his device

## 2022-01-27 NOTE — PLAN OF CARE
01/27/22 1104   Final Note   Assessment Type Final Discharge Note   Anticipated Discharge Disposition Home   Hospital Resources/Appts/Education Provided Appointments scheduled and added to AVS;Provided education on problems/symptoms using teachback   Post-Acute Status   Post-Acute Authorization Other   Other Status No Post-Acute Service Needs   Pts nurse Xochilt notified that once order written pt can d/c from CM standpoint

## 2022-01-27 NOTE — NURSING
Mesh underwear provided to pt. Discharge instructions given to patient  at bedside. Patient verbalized understanding of instructions. Patient states willingness to comply. Saline lock removed. Tele monitoring removed. Pt states that his brother will be coming to get him when he gets off work at 4 pm. Will call pharmacy when brother arrives to pay for meds from pharmacy.

## 2022-01-27 NOTE — NURSING
Pt states brother coming to pick him up at 5:30 pm (daughter at work). meds coming from pharmacy.

## 2022-02-09 ENCOUNTER — TELEPHONE (OUTPATIENT)
Dept: UROLOGY | Facility: CLINIC | Age: 57
End: 2022-02-09
Payer: MEDICARE

## 2022-02-09 NOTE — TELEPHONE ENCOUNTER
Called patient as he missed his 1 week post op appt  Counseled patient to start pulling down his scrotal pump each time he is in the restroom

## 2022-02-14 ENCOUNTER — OFFICE VISIT (OUTPATIENT)
Dept: UROLOGY | Facility: CLINIC | Age: 57
End: 2022-02-14
Payer: MEDICARE

## 2022-02-14 VITALS — BODY MASS INDEX: 33.9 KG/M2 | HEIGHT: 67 IN | WEIGHT: 216 LBS

## 2022-02-14 DIAGNOSIS — N50.82 SCROTAL PAIN: Primary | ICD-10-CM

## 2022-02-14 DIAGNOSIS — N52.1 ERECTILE DYSFUNCTION ASSOCIATED WITH TYPE 2 DIABETES MELLITUS: ICD-10-CM

## 2022-02-14 DIAGNOSIS — E11.69 ERECTILE DYSFUNCTION ASSOCIATED WITH TYPE 2 DIABETES MELLITUS: ICD-10-CM

## 2022-02-14 PROCEDURE — 1160F RVW MEDS BY RX/DR IN RCRD: CPT | Mod: CPTII,S$GLB,, | Performed by: STUDENT IN AN ORGANIZED HEALTH CARE EDUCATION/TRAINING PROGRAM

## 2022-02-14 PROCEDURE — 3051F PR MOST RECENT HEMOGLOBIN A1C LEVEL 7.0 - < 8.0%: ICD-10-PCS | Mod: CPTII,S$GLB,, | Performed by: STUDENT IN AN ORGANIZED HEALTH CARE EDUCATION/TRAINING PROGRAM

## 2022-02-14 PROCEDURE — 3072F LOW RISK FOR RETINOPATHY: CPT | Mod: CPTII,S$GLB,, | Performed by: STUDENT IN AN ORGANIZED HEALTH CARE EDUCATION/TRAINING PROGRAM

## 2022-02-14 PROCEDURE — 99024 PR POST-OP FOLLOW-UP VISIT: ICD-10-PCS | Mod: S$GLB,,, | Performed by: STUDENT IN AN ORGANIZED HEALTH CARE EDUCATION/TRAINING PROGRAM

## 2022-02-14 PROCEDURE — 99024 POSTOP FOLLOW-UP VISIT: CPT | Mod: S$GLB,,, | Performed by: STUDENT IN AN ORGANIZED HEALTH CARE EDUCATION/TRAINING PROGRAM

## 2022-02-14 PROCEDURE — 1159F PR MEDICATION LIST DOCUMENTED IN MEDICAL RECORD: ICD-10-PCS | Mod: CPTII,S$GLB,, | Performed by: STUDENT IN AN ORGANIZED HEALTH CARE EDUCATION/TRAINING PROGRAM

## 2022-02-14 PROCEDURE — 99999 PR PBB SHADOW E&M-EST. PATIENT-LVL IV: CPT | Mod: PBBFAC,,, | Performed by: STUDENT IN AN ORGANIZED HEALTH CARE EDUCATION/TRAINING PROGRAM

## 2022-02-14 PROCEDURE — 1159F MED LIST DOCD IN RCRD: CPT | Mod: CPTII,S$GLB,, | Performed by: STUDENT IN AN ORGANIZED HEALTH CARE EDUCATION/TRAINING PROGRAM

## 2022-02-14 PROCEDURE — 99999 PR PBB SHADOW E&M-EST. PATIENT-LVL IV: ICD-10-PCS | Mod: PBBFAC,,, | Performed by: STUDENT IN AN ORGANIZED HEALTH CARE EDUCATION/TRAINING PROGRAM

## 2022-02-14 PROCEDURE — 3008F PR BODY MASS INDEX (BMI) DOCUMENTED: ICD-10-PCS | Mod: CPTII,S$GLB,, | Performed by: STUDENT IN AN ORGANIZED HEALTH CARE EDUCATION/TRAINING PROGRAM

## 2022-02-14 PROCEDURE — 3051F HG A1C>EQUAL 7.0%<8.0%: CPT | Mod: CPTII,S$GLB,, | Performed by: STUDENT IN AN ORGANIZED HEALTH CARE EDUCATION/TRAINING PROGRAM

## 2022-02-14 PROCEDURE — 3008F BODY MASS INDEX DOCD: CPT | Mod: CPTII,S$GLB,, | Performed by: STUDENT IN AN ORGANIZED HEALTH CARE EDUCATION/TRAINING PROGRAM

## 2022-02-14 PROCEDURE — 1160F PR REVIEW ALL MEDS BY PRESCRIBER/CLIN PHARMACIST DOCUMENTED: ICD-10-PCS | Mod: CPTII,S$GLB,, | Performed by: STUDENT IN AN ORGANIZED HEALTH CARE EDUCATION/TRAINING PROGRAM

## 2022-02-14 PROCEDURE — 3072F PR LOW RISK FOR RETINOPATHY: ICD-10-PCS | Mod: CPTII,S$GLB,, | Performed by: STUDENT IN AN ORGANIZED HEALTH CARE EDUCATION/TRAINING PROGRAM

## 2022-02-14 RX ORDER — ACETAMINOPHEN 500 MG
1000 TABLET ORAL EVERY 6 HOURS PRN
Qty: 20 TABLET | Refills: 0 | Status: SHIPPED | OUTPATIENT
Start: 2022-02-14 | End: 2022-02-19

## 2022-02-14 NOTE — PROGRESS NOTES
Patient ID: Se Virgil Mcgraw is a 57 y.o. male.    Chief Complaint: Post-op Evaluation (3 week follow up, pt states that he is in pain/ refill on Flomax)      HPI  57 y.o. who presents to the Urology clinic for evaluation for ED. Patient is 3 weeks s/p infrapubic IPP. Patient missed 1 week post op appt for device education. Patient was called to encourage scrotal pump pull down to prevent proximal migrate, patient admits he has not been doing this. Patient has been taking daily showers. Patient notes he is having discomfort in his penis.     Medically Necessary ROS documented in HPI    Past Medical History  Active Ambulatory Problems     Diagnosis Date Noted    Diabetes mellitus type 2 in obese 08/30/2015    Essential hypertension 08/30/2015    Brachial neuritis or radiculitis NOS 09/15/2015    Elevated LFTs 11/17/2015    Sleep apnea 11/17/2015    CKD (chronic kidney disease) stage 2, GFR 60-89 ml/min 11/17/2015    History of gout 11/17/2015    HLD (hyperlipidemia) 11/17/2015    GERD (gastroesophageal reflux disease) 11/17/2015    Edema 11/17/2015    AICD (automatic cardioverter/defibrillator) present 11/17/2015    Non-ischemic cardiomyopathy 11/19/2015    Restrictive lung disease 11/20/2015    Cervical disc disorder with radiculopathy 11/23/2015    Inguinal lymphadenopathy 06/02/2017    Cellulitis of penis 07/06/2018    Lower urinary tract symptoms (LUTS) 10/01/2020    Corneal ulcer 09/30/2021    Ventricular tachycardia 09/30/2021    Chronic combined systolic and diastolic heart failure 09/30/2021    Chronic hip pain 09/30/2021    Glaucoma, right eye 09/30/2021    Insomnia 09/30/2021    Endophthalmitis, acute, right 10/02/2021    Erectile dysfunction 01/25/2022     Resolved Ambulatory Problems     Diagnosis Date Noted    Abscess of right leg 09/30/2012    Pain in the chest 08/30/2015    Acute pancreatitis 08/05/2016    Acute kidney injury 08/06/2016     Past Medical History:    Diagnosis Date    COPD (chronic obstructive pulmonary disease)     Coronary artery disease     Diabetes mellitus type II     DJD (degenerative joint disease)     Gout     Hypertension     Kidney stone     MI (myocardial infarction) 2010    Obesity     JOSE (obstructive sleep apnea)          Past Surgical History  Past Surgical History:   Procedure Laterality Date    CARDIAC DEFIBRILLATOR PLACEMENT      CATARACT EXTRACTION Right     CYSTOSCOPY N/A 10/1/2020    Procedure: CYSTOSCOPY;  Surgeon: Monica Lieberman MD;  Location: [facility] OR;  Service: Urology;  Laterality: N/A;  RN PRE OP Covid screen 9-  C A    excisional biopsy left inguinal lymph node      FOOT SURGERY      right foot surgery     INSERTION OF INFLATABLE PENILE PROSTHESIS N/A 1/25/2022    Procedure: INSERTION, PENILE PROSTHESIS, INFLATABLE;  Surgeon: Monica Lieberman MD;  Location: [facility] OR;  Service: Urology;  Laterality: N/A;  [employer] JUAN LUIS ARNOLD [phone] TEXTED HIM @ 5:32PM ON 12-  RN PRE OP 12-28-21. Covid NEGATIVE 1-3-22. CA-----AICD-----HAS CARDS CLEARANCE    kidney stones      lithotripsy    REPAIR, SURGICAL WOUND, EYE, ANTERIOR SEGMENT Right 10/5/2021    Procedure: REPAIR, SURGICAL WOUND, EYE, ANTERIOR SEGMENT;  Surgeon: Adelaide Allen MD;  Location: [facility] OR [address] [place] [address];  Service: Ophthalmology;  Laterality: Right;  Anterior Chamber Wash Out Right Eye     Surgery for bulging disc at C7         Social History  Social Connections: Not on file       Medications    Current Outpatient Medications:     acetaZOLAMIDE (DIAMOX) 250 MG tablet, Take 1 tablet (250 mg total) by mouth 2 (two) times daily., Disp: 60 tablet, Rfl: 11    albuterol (PROVENTIL/VENTOLIN HFA) 90 mcg/actuation inhaler, Inhale 2 puffs into the lungs every 4 (four) hours as needed for Wheezing or Shortness of Breath. Rescue, Disp: 1 Inhaler, Rfl: 0    allopurinol (ZYLOPRIM) 100 MG tablet, , Disp: , Rfl: 5    amiodarone (PACERONE) 400 MG  tablet, Take 200 mg by mouth every morning. , Disp: , Rfl: 1    amlodipine (NORVASC) 10 MG tablet, Take 10 mg by mouth every morning. , Disp: , Rfl: 5    aspirin (ECOTRIN) 81 MG EC tablet, Take 81 mg by mouth., Disp: , Rfl:     atropine 1% (ISOPTO ATROPINE) 1 % Drop, Place 1 drop into the right eye once daily. (Patient taking differently: Place 1 drop into the right eye 2 (two) times a day.), Disp: 5 mL, Rfl: 0    baclofen (LIORESAL) 10 MG tablet, Take 1 tablet (10 mg total) by mouth 2 (two) times daily as needed (hip pain)., Disp: 30 tablet, Rfl: 0    blood sugar diagnostic Strp, ACCU CHEK KEVAN PLUS TEST STRIPS, Disp: , Rfl:     blood-glucose meter Misc, ACCU CHEK KEVAN PLUS METER: USE 4X/D, Disp: , Rfl:     brimonidine 0.15 % OPTH DROP (ALPHAGAN) 0.15 % ophthalmic solution, Place 1 drop into both eyes 2 (two) times a day., Disp: 10 mL, Rfl: 11    brimonidine-timoloL (COMBIGAN) 0.2-0.5 % Drop, Place 1 drop into the right eye 3 (three) times daily., Disp: , Rfl:     cetirizine (ZYRTEC) 10 MG tablet, Take 10 mg by mouth once daily., Disp: , Rfl:     CRESTOR 20 mg tablet, Take 20 mg by mouth every evening. , Disp: , Rfl: 5    dicyclomine (BENTYL) 20 mg tablet, Take 20 mg by mouth 2 (two) times daily., Disp: , Rfl: 2    docusate sodium (COLACE) 100 MG capsule, Take 1 capsule (100 mg total) by mouth 2 (two) times daily., Disp: 30 capsule, Rfl: 0    famotidine (PEPCID) 20 MG tablet, Take 1 tablet (20 mg total) by mouth 2 (two) times daily., Disp: 60 tablet, Rfl: 2    fluticasone propionate (FLONASE) 50 mcg/actuation nasal spray, 2 sprays (100 mcg total) by Each Nostril route once daily. (Patient taking differently: 2 sprays by Each Nostril route daily as needed.), Disp: 1 Bottle, Rfl: 0    Fortified Tobramycin 15 mg/ml Opht (TOBREX) 15 mg Drop, Place 1 drop into the right eye every 6 (six) hours. (Patient taking differently: Place 1 drop into the right eye 3 (three) times daily.), Disp: , Rfl: 0     furosemide (LASIX) 20 MG tablet, Take 20 mg by mouth daily as needed. Takes every other day, Disp: , Rfl: 5    gabapentin (NEURONTIN) 300 MG capsule, Take 800 mg by mouth 2 (two) times daily. Takes  Two 300 mg tabs at night, Disp: , Rfl:     insulin glargine 100 units/mL (3mL) SubQ pen, Inject 60 Units into the skin once daily., Disp: , Rfl:     INVOKANA 300 mg Tab tablet, TK 1 T PO QD, Disp: , Rfl: 5    isosorbide mononitrate (IMDUR) 30 MG 24 hr tablet, Take 30 mg by mouth every morning. , Disp: , Rfl: 6    lancets Misc, ACCU CHEK SOFTCLIX LANCETS: USE 4X DAILY, Disp: , Rfl:     latanoprost 0.005 % ophthalmic solution, Place 1 drop into the right eye every evening., Disp: , Rfl:     meloxicam (MOBIC) 15 MG tablet, Take 1 tablet (15 mg total) by mouth once daily., Disp: 30 tablet, Rfl: 2    methazoLAMIDE (NEPTAZANE) 25 mg tablet, Take 25 mg by mouth once daily., Disp: , Rfl:     metoclopramide HCl (REGLAN) 10 MG tablet, Take 1 tablet (10 mg total) by mouth every 6 (six) hours as needed., Disp: 30 tablet, Rfl: 0    metoprolol succinate (TOPROL-XL) 50 MG 24 hr tablet, Take 50 mg by mouth every evening. , Disp: , Rfl: 5    naproxen (NAPROSYN) 500 MG tablet, Take 1 tablet (500 mg total) by mouth 2 (two) times daily with meals., Disp: 20 tablet, Rfl: 0    natamycin (NATACYN) 5 % ophthalmic solution, Place 1 drop into the right eye 6 (six) times daily. (Patient taking differently: Place 1 drop into the right eye 2 (two) times a day.), Disp: 15 mL, Rfl: 3    nitroGLYCERIN (NITROSTAT) 0.4 MG SL tablet, Place 0.4 mg under the tongue every 5 (five) minutes as needed for Chest pain., Disp: , Rfl:     omeprazole (PRILOSEC) 20 MG capsule, TK 1 C PO QD FOR CONTROL OF STOMACH ACID BEFORE BREAKFAST, Disp: , Rfl: 1    oxyCODONE (ROXICODONE) 5 MG immediate release tablet, Take 2 tablets (10 mg total) by mouth every 8 (eight) hours as needed for Pain (If tylenol is insufficient)., Disp: 20 tablet, Rfl: 0    pen needle,  "diabetic 31 gauge x 5/16" Ndle, Inject 1 each into the skin., Disp: , Rfl:     potassium chloride SA (K-DUR,KLOR-CON) 20 MEQ tablet, Take by mouth once daily., Disp: , Rfl: 5    prednisoLONE acetate (PRED FORTE) 1 % DrpS, Place 1 drop into the right eye 4 (four) times daily., Disp: 15 mL, Rfl: 0    promethazine (PHENERGAN) 25 MG tablet, TK 1 T PO  Q 4 H PRN NV, Disp: , Rfl: 2    sotaloL (BETAPACE) 80 MG tablet, TAKE 1 TABLET BY MOUTH TWICE DAILY, Disp: , Rfl:     spironolactone (ALDACTONE) 25 MG tablet, Take 25 mg by mouth daily as needed., Disp: , Rfl:     zolpidem (AMBIEN) 10 mg Tab, Take 5 mg by mouth nightly as needed., Disp: , Rfl:     dorzolamide (TRUSOPT) 2 % ophthalmic solution, Place 1 drop into both eyes 2 (two) times a day., Disp: 10 mL, Rfl: 6    DULoxetine (CYMBALTA) 60 MG capsule, Take 60 mg by mouth., Disp: , Rfl:     tamsulosin (FLOMAX) 0.4 mg Cp24, Take 1 capsule (0.4 mg total) by mouth once daily., Disp: 30 capsule, Rfl: 2    Allergies  Review of patient's allergies indicates:   Allergen Reactions    Morphine Itching and Other (See Comments)     Pt denies morphine as an allergy reports is is allergic to a medicine but has no idea what it is      Propoxyphene     Darvocet a500 [propoxyphene n-acetaminophen] Itching       Patient's PMH, FH, Social hx, Medications, allergies reviewed and updated as pertinent to today's visit    Objective:      Physical Exam  Vitals reviewed.   Constitutional:       General: He is not in acute distress.     Appearance: He is well-developed. He is not ill-appearing, toxic-appearing or diaphoretic.   HENT:      Head: Normocephalic and atraumatic.      Mouth/Throat:      Mouth: Mucous membranes are moist.   Eyes:      Conjunctiva/sclera: Conjunctivae normal.   Pulmonary:      Effort: Pulmonary effort is normal. No respiratory distress.   Abdominal:      General: Abdomen is flat. There is no distension.      Tenderness: There is no abdominal tenderness. There " is no right CVA tenderness or left CVA tenderness.   Genitourinary:     Comments: No fluctuance, no erythema  Patient with small scrotum, pump riding a bit high on the phallus  Musculoskeletal:         General: No swelling or deformity.      Cervical back: Neck supple.   Skin:     General: Skin is warm.      Capillary Refill: Capillary refill takes less than 2 seconds.      Findings: No rash.      Comments: Incisions intact without breakdown   Neurological:      Mental Status: He is alert and oriented to person, place, and time.      Gait: Gait normal.   Psychiatric:         Mood and Affect: Mood normal.         Thought Content: Thought content normal.         Judgment: Judgment normal.             Lab Results   Component Value Date    PSADIAG 0.67 01/19/2021    PSADIAG 1.4 08/06/2016      Assessment:       1. Scrotal pain    2. Erectile dysfunction associated with type 2 diabetes mellitus        Plan:       Patient with scrotal pain after cycling device  Patient reports that he is not going to use the device. Encouraged the patient, discussed it will take time to get used to  Reminded patient that cycling the device will improve confidence  Patient with small scrotum, pump riding a bit high on the phallus  Encouraged patient to pull pump down into scrotum  Discussed cycling the device 20 mins a day  Tylenol Rx provided  Discussed with patient that opiates are no longer required this far out in the post op period  RTC 1 week for education and reassurance

## 2022-02-16 ENCOUNTER — CLINICAL SUPPORT (OUTPATIENT)
Dept: OPHTHALMOLOGY | Facility: CLINIC | Age: 57
End: 2022-02-16
Payer: MEDICARE

## 2022-02-16 ENCOUNTER — OFFICE VISIT (OUTPATIENT)
Dept: OPHTHALMOLOGY | Facility: CLINIC | Age: 57
End: 2022-02-16
Payer: MEDICARE

## 2022-02-16 DIAGNOSIS — H40.022 OPEN ANGLE WITH BORDERLINE FINDINGS AND HIGH GLAUCOMA RISK IN LEFT EYE: ICD-10-CM

## 2022-02-16 DIAGNOSIS — H40.41X3 GLAUCOMA OF RIGHT EYE SECONDARY TO EYE INFLAMMATION, SEVERE STAGE: ICD-10-CM

## 2022-02-16 DIAGNOSIS — H40.9 GLAUCOMA OF RIGHT EYE, UNSPECIFIED GLAUCOMA TYPE: Primary | ICD-10-CM

## 2022-02-16 PROCEDURE — 92133 POSTERIOR SEGMENT OCT OPTIC NERVE(OCULAR COHERENCE TOMOGRAPHY) - OU - BOTH EYES: ICD-10-PCS | Mod: S$GLB,,, | Performed by: STUDENT IN AN ORGANIZED HEALTH CARE EDUCATION/TRAINING PROGRAM

## 2022-02-16 PROCEDURE — 1159F PR MEDICATION LIST DOCUMENTED IN MEDICAL RECORD: ICD-10-PCS | Mod: CPTII,S$GLB,, | Performed by: STUDENT IN AN ORGANIZED HEALTH CARE EDUCATION/TRAINING PROGRAM

## 2022-02-16 PROCEDURE — 92020 GONIOSCOPY: CPT | Mod: S$GLB,,, | Performed by: STUDENT IN AN ORGANIZED HEALTH CARE EDUCATION/TRAINING PROGRAM

## 2022-02-16 PROCEDURE — 1160F PR REVIEW ALL MEDS BY PRESCRIBER/CLIN PHARMACIST DOCUMENTED: ICD-10-PCS | Mod: CPTII,S$GLB,, | Performed by: STUDENT IN AN ORGANIZED HEALTH CARE EDUCATION/TRAINING PROGRAM

## 2022-02-16 PROCEDURE — 1159F MED LIST DOCD IN RCRD: CPT | Mod: CPTII,S$GLB,, | Performed by: STUDENT IN AN ORGANIZED HEALTH CARE EDUCATION/TRAINING PROGRAM

## 2022-02-16 PROCEDURE — 99214 PR OFFICE/OUTPT VISIT, EST, LEVL IV, 30-39 MIN: ICD-10-PCS | Mod: S$GLB,,, | Performed by: STUDENT IN AN ORGANIZED HEALTH CARE EDUCATION/TRAINING PROGRAM

## 2022-02-16 PROCEDURE — 92133 CPTRZD OPH DX IMG PST SGM ON: CPT | Mod: S$GLB,,, | Performed by: STUDENT IN AN ORGANIZED HEALTH CARE EDUCATION/TRAINING PROGRAM

## 2022-02-16 PROCEDURE — 1160F RVW MEDS BY RX/DR IN RCRD: CPT | Mod: CPTII,S$GLB,, | Performed by: STUDENT IN AN ORGANIZED HEALTH CARE EDUCATION/TRAINING PROGRAM

## 2022-02-16 PROCEDURE — 3051F PR MOST RECENT HEMOGLOBIN A1C LEVEL 7.0 - < 8.0%: ICD-10-PCS | Mod: CPTII,S$GLB,, | Performed by: STUDENT IN AN ORGANIZED HEALTH CARE EDUCATION/TRAINING PROGRAM

## 2022-02-16 PROCEDURE — 99999 PR PBB SHADOW E&M-EST. PATIENT-LVL III: CPT | Mod: PBBFAC,,, | Performed by: STUDENT IN AN ORGANIZED HEALTH CARE EDUCATION/TRAINING PROGRAM

## 2022-02-16 PROCEDURE — 99999 PR PBB SHADOW E&M-EST. PATIENT-LVL III: ICD-10-PCS | Mod: PBBFAC,,, | Performed by: STUDENT IN AN ORGANIZED HEALTH CARE EDUCATION/TRAINING PROGRAM

## 2022-02-16 PROCEDURE — 99214 OFFICE O/P EST MOD 30 MIN: CPT | Mod: S$GLB,,, | Performed by: STUDENT IN AN ORGANIZED HEALTH CARE EDUCATION/TRAINING PROGRAM

## 2022-02-16 PROCEDURE — 92083 EXTENDED VISUAL FIELD XM: CPT | Mod: S$GLB,,, | Performed by: STUDENT IN AN ORGANIZED HEALTH CARE EDUCATION/TRAINING PROGRAM

## 2022-02-16 PROCEDURE — 92083 HUMPHREY VISUAL FIELD - OU - BOTH EYES: ICD-10-PCS | Mod: S$GLB,,, | Performed by: STUDENT IN AN ORGANIZED HEALTH CARE EDUCATION/TRAINING PROGRAM

## 2022-02-16 PROCEDURE — 92020 PR SPECIAL EYE EVAL,GONIOSCOPY: ICD-10-PCS | Mod: S$GLB,,, | Performed by: STUDENT IN AN ORGANIZED HEALTH CARE EDUCATION/TRAINING PROGRAM

## 2022-02-16 PROCEDURE — 3051F HG A1C>EQUAL 7.0%<8.0%: CPT | Mod: CPTII,S$GLB,, | Performed by: STUDENT IN AN ORGANIZED HEALTH CARE EDUCATION/TRAINING PROGRAM

## 2022-02-16 RX ORDER — DORZOLAMIDE HYDROCHLORIDE AND TIMOLOL MALEATE 20; 5 MG/ML; MG/ML
1 SOLUTION/ DROPS OPHTHALMIC 2 TIMES DAILY
Qty: 10 ML | Refills: 1 | Status: SHIPPED | OUTPATIENT
Start: 2022-02-16 | End: 2022-06-07 | Stop reason: SDUPTHER

## 2022-02-16 NOTE — PROGRESS NOTES
"Subjective:       Patient ID: Se Virgil Mcgraw is a 57 y.o. male.    Chief Complaint: Concerns About Ocular Health     HPI     DLS: 11/19/21 Alejandro    Pt here to discuss glaucoma sx options. Pt reports he is out of all of his   eye drops except atropine. Still taking the diamox. Reports he had hip   surgery recently and took a Percocet before clinic. Says he was falling   asleep during his visual field. Wears his glasses all the time but feels   like he needs a new Rx.     H/o cat sx OD with Dr Manrique 2020 - "complicated"   Glaucoma   H/o HSV (?) uveitis OD   K ulcer OD - serratia     Valacyclovir PO BID  (unclear if pt taking this med)   Atropine 1%- once a week  OD     Natamycin 3-6 x's/day OD   Combigan TID OD   Dorzolamide BID OD   Latanoprost QHS OD   Diamox BID     Last edited by Shaina Gan MD on 2/16/2022  3:48 PM. (History)            Assessment & Plan   Glaucoma of right eye, unspecified glaucoma type  -     Color Fundus Photography - OU - Both Eyes; Future  -     Burks Visual Field - OU - Extended - Both Eyes; Future  -     dorzolamide-timolol 2-0.5% (COSOPT) 22.3-6.8 mg/mL ophthalmic solution; Place 1 drop into both eyes 2 (two) times daily.  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  Dispense: 10 mL; Refill: 1    Open angle with borderline findings and high glaucoma risk in left eye       previously seen by blossom james guillmette  Referral from Dr. 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 RNFL WNL.   Monitor as glaucoma suspect. Since monocular, and IOP previously high, will use dorzolamide-timolol BID OU for simplicity    RTC 6 weeks IOP check      Pseudophakia OD  Dr Manrique 2020 - "complicated"      Hx Infectious keratitis OD   Initial presentation new K ulcer with large hypyopyon OD   S/p a/c washout Dr. Allen   Cx grew serratia, however unresponsive to abx.  S/p tap and injection OD for questionable endophthalmitis  Natamycin 3-6 x's/day OD -- not taking this    Monocular  Discussed monocular status with patient and increased risk of damage to well-seeing eye  Recommend protective eyewear at all times  Any MRx should be written with polycarbonate lenses  Refer to Dr. Treviño for refraction        PLAN  Do not restart all other drops -- too many and patient not taking    Refer Dr. Treviño for refraction    Start dorzolamide-timolol BID OU  Cont atropine qweekly OD  Ok to cont diamox for now    RTC 6 weeks IOP check, instructed pt to bring all gtts to clinic    Shaina Gan M.D., M.S.  Department of Ophthalmology   Division of Glaucoma Surgery  [facility]  "

## 2022-02-16 NOTE — PROGRESS NOTES
Visual field test done.  Patient stated no latex allergies used coverlet      -3.00+ 1.25x20  -1.75x sph    Os only

## 2022-02-23 ENCOUNTER — OFFICE VISIT (OUTPATIENT)
Dept: UROLOGY | Facility: CLINIC | Age: 57
End: 2022-02-23
Payer: MEDICARE

## 2022-02-23 VITALS — HEIGHT: 67 IN | WEIGHT: 216.06 LBS | BODY MASS INDEX: 33.91 KG/M2

## 2022-02-23 DIAGNOSIS — E11.69 ERECTILE DYSFUNCTION ASSOCIATED WITH TYPE 2 DIABETES MELLITUS: Primary | ICD-10-CM

## 2022-02-23 DIAGNOSIS — N52.1 ERECTILE DYSFUNCTION ASSOCIATED WITH TYPE 2 DIABETES MELLITUS: Primary | ICD-10-CM

## 2022-02-23 PROCEDURE — 1159F MED LIST DOCD IN RCRD: CPT | Mod: CPTII,S$GLB,, | Performed by: STUDENT IN AN ORGANIZED HEALTH CARE EDUCATION/TRAINING PROGRAM

## 2022-02-23 PROCEDURE — 99999 PR PBB SHADOW E&M-EST. PATIENT-LVL IV: ICD-10-PCS | Mod: PBBFAC,,, | Performed by: STUDENT IN AN ORGANIZED HEALTH CARE EDUCATION/TRAINING PROGRAM

## 2022-02-23 PROCEDURE — 1159F PR MEDICATION LIST DOCUMENTED IN MEDICAL RECORD: ICD-10-PCS | Mod: CPTII,S$GLB,, | Performed by: STUDENT IN AN ORGANIZED HEALTH CARE EDUCATION/TRAINING PROGRAM

## 2022-02-23 PROCEDURE — 99024 POSTOP FOLLOW-UP VISIT: CPT | Mod: S$GLB,,, | Performed by: STUDENT IN AN ORGANIZED HEALTH CARE EDUCATION/TRAINING PROGRAM

## 2022-02-23 PROCEDURE — 3072F LOW RISK FOR RETINOPATHY: CPT | Mod: CPTII,S$GLB,, | Performed by: STUDENT IN AN ORGANIZED HEALTH CARE EDUCATION/TRAINING PROGRAM

## 2022-02-23 PROCEDURE — 99999 PR PBB SHADOW E&M-EST. PATIENT-LVL IV: CPT | Mod: PBBFAC,,, | Performed by: STUDENT IN AN ORGANIZED HEALTH CARE EDUCATION/TRAINING PROGRAM

## 2022-02-23 PROCEDURE — 3051F HG A1C>EQUAL 7.0%<8.0%: CPT | Mod: CPTII,S$GLB,, | Performed by: STUDENT IN AN ORGANIZED HEALTH CARE EDUCATION/TRAINING PROGRAM

## 2022-02-23 PROCEDURE — 1160F PR REVIEW ALL MEDS BY PRESCRIBER/CLIN PHARMACIST DOCUMENTED: ICD-10-PCS | Mod: CPTII,S$GLB,, | Performed by: STUDENT IN AN ORGANIZED HEALTH CARE EDUCATION/TRAINING PROGRAM

## 2022-02-23 PROCEDURE — 3072F PR LOW RISK FOR RETINOPATHY: ICD-10-PCS | Mod: CPTII,S$GLB,, | Performed by: STUDENT IN AN ORGANIZED HEALTH CARE EDUCATION/TRAINING PROGRAM

## 2022-02-23 PROCEDURE — 3008F BODY MASS INDEX DOCD: CPT | Mod: CPTII,S$GLB,, | Performed by: STUDENT IN AN ORGANIZED HEALTH CARE EDUCATION/TRAINING PROGRAM

## 2022-02-23 PROCEDURE — 3051F PR MOST RECENT HEMOGLOBIN A1C LEVEL 7.0 - < 8.0%: ICD-10-PCS | Mod: CPTII,S$GLB,, | Performed by: STUDENT IN AN ORGANIZED HEALTH CARE EDUCATION/TRAINING PROGRAM

## 2022-02-23 PROCEDURE — 3008F PR BODY MASS INDEX (BMI) DOCUMENTED: ICD-10-PCS | Mod: CPTII,S$GLB,, | Performed by: STUDENT IN AN ORGANIZED HEALTH CARE EDUCATION/TRAINING PROGRAM

## 2022-02-23 PROCEDURE — 1160F RVW MEDS BY RX/DR IN RCRD: CPT | Mod: CPTII,S$GLB,, | Performed by: STUDENT IN AN ORGANIZED HEALTH CARE EDUCATION/TRAINING PROGRAM

## 2022-02-23 PROCEDURE — 99024 PR POST-OP FOLLOW-UP VISIT: ICD-10-PCS | Mod: S$GLB,,, | Performed by: STUDENT IN AN ORGANIZED HEALTH CARE EDUCATION/TRAINING PROGRAM

## 2022-02-23 NOTE — PROGRESS NOTES
58 yo man s/p infrapubic IPP for ED 2/2 advanced Diabetes. He has hx of BPH s/p TURP.  Patient has recovered well but has not been cycling the device or familiarizing himself with the device as previously counseled. Patient denies fevers, chills, chest pain, shortness of breath, skin incision concerns.     Exam  NAD  RRR  Unlabored breathing  Incisions intact  Pump in superior aspect of small scrotum    Device cycled with patient, patient appears to not grasp the concept between pumping device    Patient expressed he wants to get   Before he uses the pump, he is not engaged  Discussed with patient that cycling the device and familiarizing himself with his new anatomy is critical to perception of device success  Patient notably experience no instances of pain while cycling the device with me today

## 2022-03-23 ENCOUNTER — OFFICE VISIT (OUTPATIENT)
Dept: UROLOGY | Facility: CLINIC | Age: 57
End: 2022-03-23
Payer: MEDICARE

## 2022-03-23 VITALS — BODY MASS INDEX: 33.91 KG/M2 | HEIGHT: 67 IN | WEIGHT: 216.06 LBS

## 2022-03-23 DIAGNOSIS — N52.1 ERECTILE DYSFUNCTION ASSOCIATED WITH TYPE 2 DIABETES MELLITUS: Primary | ICD-10-CM

## 2022-03-23 DIAGNOSIS — E11.69 ERECTILE DYSFUNCTION ASSOCIATED WITH TYPE 2 DIABETES MELLITUS: Primary | ICD-10-CM

## 2022-03-23 PROCEDURE — 3072F LOW RISK FOR RETINOPATHY: CPT | Mod: CPTII,S$GLB,, | Performed by: STUDENT IN AN ORGANIZED HEALTH CARE EDUCATION/TRAINING PROGRAM

## 2022-03-23 PROCEDURE — 1160F RVW MEDS BY RX/DR IN RCRD: CPT | Mod: CPTII,S$GLB,, | Performed by: STUDENT IN AN ORGANIZED HEALTH CARE EDUCATION/TRAINING PROGRAM

## 2022-03-23 PROCEDURE — 99024 POSTOP FOLLOW-UP VISIT: CPT | Mod: S$GLB,,, | Performed by: STUDENT IN AN ORGANIZED HEALTH CARE EDUCATION/TRAINING PROGRAM

## 2022-03-23 PROCEDURE — 3008F PR BODY MASS INDEX (BMI) DOCUMENTED: ICD-10-PCS | Mod: CPTII,S$GLB,, | Performed by: STUDENT IN AN ORGANIZED HEALTH CARE EDUCATION/TRAINING PROGRAM

## 2022-03-23 PROCEDURE — 99024 PR POST-OP FOLLOW-UP VISIT: ICD-10-PCS | Mod: S$GLB,,, | Performed by: STUDENT IN AN ORGANIZED HEALTH CARE EDUCATION/TRAINING PROGRAM

## 2022-03-23 PROCEDURE — 3072F PR LOW RISK FOR RETINOPATHY: ICD-10-PCS | Mod: CPTII,S$GLB,, | Performed by: STUDENT IN AN ORGANIZED HEALTH CARE EDUCATION/TRAINING PROGRAM

## 2022-03-23 PROCEDURE — 1159F PR MEDICATION LIST DOCUMENTED IN MEDICAL RECORD: ICD-10-PCS | Mod: CPTII,S$GLB,, | Performed by: STUDENT IN AN ORGANIZED HEALTH CARE EDUCATION/TRAINING PROGRAM

## 2022-03-23 PROCEDURE — 3008F BODY MASS INDEX DOCD: CPT | Mod: CPTII,S$GLB,, | Performed by: STUDENT IN AN ORGANIZED HEALTH CARE EDUCATION/TRAINING PROGRAM

## 2022-03-23 PROCEDURE — 3051F HG A1C>EQUAL 7.0%<8.0%: CPT | Mod: CPTII,S$GLB,, | Performed by: STUDENT IN AN ORGANIZED HEALTH CARE EDUCATION/TRAINING PROGRAM

## 2022-03-23 PROCEDURE — 1159F MED LIST DOCD IN RCRD: CPT | Mod: CPTII,S$GLB,, | Performed by: STUDENT IN AN ORGANIZED HEALTH CARE EDUCATION/TRAINING PROGRAM

## 2022-03-23 PROCEDURE — 99999 PR PBB SHADOW E&M-EST. PATIENT-LVL IV: ICD-10-PCS | Mod: PBBFAC,,, | Performed by: STUDENT IN AN ORGANIZED HEALTH CARE EDUCATION/TRAINING PROGRAM

## 2022-03-23 PROCEDURE — 3051F PR MOST RECENT HEMOGLOBIN A1C LEVEL 7.0 - < 8.0%: ICD-10-PCS | Mod: CPTII,S$GLB,, | Performed by: STUDENT IN AN ORGANIZED HEALTH CARE EDUCATION/TRAINING PROGRAM

## 2022-03-23 PROCEDURE — 1160F PR REVIEW ALL MEDS BY PRESCRIBER/CLIN PHARMACIST DOCUMENTED: ICD-10-PCS | Mod: CPTII,S$GLB,, | Performed by: STUDENT IN AN ORGANIZED HEALTH CARE EDUCATION/TRAINING PROGRAM

## 2022-03-23 PROCEDURE — 99999 PR PBB SHADOW E&M-EST. PATIENT-LVL IV: CPT | Mod: PBBFAC,,, | Performed by: STUDENT IN AN ORGANIZED HEALTH CARE EDUCATION/TRAINING PROGRAM

## 2022-03-23 NOTE — PROGRESS NOTES
Patient w/ ED s/p IPP 1/25/2022  No wound complications, pain resolved  Has not been cycling device at home    Device teaching provided, patient in better mood today, actively cycling the device with more confidence    Incisions intact well approximated without signs of separation      Requests semen analysis study, order completed  Has hx of TURP , discussed challenges related to that ( retrograde ejaculation)   BOARDING C2CN

## 2022-04-25 ENCOUNTER — OFFICE VISIT (OUTPATIENT)
Dept: OPTOMETRY | Facility: CLINIC | Age: 57
End: 2022-04-25
Payer: MEDICARE

## 2022-04-25 DIAGNOSIS — H52.4 PRESBYOPIA: ICD-10-CM

## 2022-04-25 DIAGNOSIS — Z13.5 GLAUCOMA SCREENING: ICD-10-CM

## 2022-04-25 DIAGNOSIS — H25.12 NUCLEAR SCLEROSIS, LEFT: Primary | ICD-10-CM

## 2022-04-25 PROCEDURE — 92002 PR EYE EXAM, NEW PATIENT,INTERMED: ICD-10-PCS | Mod: S$GLB,,, | Performed by: OPTOMETRIST

## 2022-04-25 PROCEDURE — 1160F PR REVIEW ALL MEDS BY PRESCRIBER/CLIN PHARMACIST DOCUMENTED: ICD-10-PCS | Mod: CPTII,S$GLB,, | Performed by: OPTOMETRIST

## 2022-04-25 PROCEDURE — 1159F PR MEDICATION LIST DOCUMENTED IN MEDICAL RECORD: ICD-10-PCS | Mod: CPTII,S$GLB,, | Performed by: OPTOMETRIST

## 2022-04-25 PROCEDURE — 1160F RVW MEDS BY RX/DR IN RCRD: CPT | Mod: CPTII,S$GLB,, | Performed by: OPTOMETRIST

## 2022-04-25 PROCEDURE — 92015 DETERMINE REFRACTIVE STATE: CPT | Mod: S$GLB,,, | Performed by: OPTOMETRIST

## 2022-04-25 PROCEDURE — 3051F PR MOST RECENT HEMOGLOBIN A1C LEVEL 7.0 - < 8.0%: ICD-10-PCS | Mod: CPTII,S$GLB,, | Performed by: OPTOMETRIST

## 2022-04-25 PROCEDURE — 92015 PR REFRACTION: ICD-10-PCS | Mod: S$GLB,,, | Performed by: OPTOMETRIST

## 2022-04-25 PROCEDURE — 3051F HG A1C>EQUAL 7.0%<8.0%: CPT | Mod: CPTII,S$GLB,, | Performed by: OPTOMETRIST

## 2022-04-25 PROCEDURE — 99999 PR PBB SHADOW E&M-EST. PATIENT-LVL III: ICD-10-PCS | Mod: PBBFAC,,, | Performed by: OPTOMETRIST

## 2022-04-25 PROCEDURE — 99999 PR PBB SHADOW E&M-EST. PATIENT-LVL III: CPT | Mod: PBBFAC,,, | Performed by: OPTOMETRIST

## 2022-04-25 PROCEDURE — 1159F MED LIST DOCD IN RCRD: CPT | Mod: CPTII,S$GLB,, | Performed by: OPTOMETRIST

## 2022-04-25 PROCEDURE — 92002 INTRM OPH EXAM NEW PATIENT: CPT | Mod: S$GLB,,, | Performed by: OPTOMETRIST

## 2022-04-25 NOTE — PROGRESS NOTES
HPI     56 Y/o male is here for refraction with C/o vision is getting really   blurry with Rx glasses on   Pt denies pain and discomfort.  See EXTENSIVE previous notes  No f/f    Eye med: Combigan TID OD   Dorolamide BID OD   Latanoprost QHS OD   Diamox BID      Last edited by Ron Treviño, OD on 4/25/2022  9:13 AM. (History)        ROS     Positive for: Eyes (reduced VA OD sp complicated cat   surgery/endoph?Kscarring.  glaucoma)    Negative for: Constitutional, Gastrointestinal, Neurological, Skin,   Genitourinary, Musculoskeletal, HENT, Endocrine, Cardiovascular,   Respiratory, Psychiatric, Allergic/Imm, Heme/Lymph    Last edited by Ron Treviño, OD on 4/25/2022  9:29 AM. (History)        Assessment /Plan     For exam results, see Encounter Report.    Nuclear sclerosis, left    Glaucoma screening    Presbyopia      1. See extensive prev notes: poor VA OD 2 to comp cat surgery (endophth?)/K scarring/glaucoma--followed by Dr Gan  2. Cat OS--pt wishes new spex ([facility] 20/30)    PLAN:    1. Wrote spex Rx--advised POLYCARB since monocular  2. Cont glauc meds as prescribed  3. rtc as sched w Dr Gan

## 2022-05-02 ENCOUNTER — OFFICE VISIT (OUTPATIENT)
Dept: UROLOGY | Facility: CLINIC | Age: 57
End: 2022-05-02
Payer: MEDICARE

## 2022-05-02 VITALS — BODY MASS INDEX: 33.84 KG/M2 | HEIGHT: 67 IN

## 2022-05-02 DIAGNOSIS — R39.9 LOWER URINARY TRACT SYMPTOMS (LUTS): Primary | ICD-10-CM

## 2022-05-02 DIAGNOSIS — N52.9 ERECTILE DYSFUNCTION, UNSPECIFIED ERECTILE DYSFUNCTION TYPE: ICD-10-CM

## 2022-05-02 PROCEDURE — 1160F PR REVIEW ALL MEDS BY PRESCRIBER/CLIN PHARMACIST DOCUMENTED: ICD-10-PCS | Mod: CPTII,S$GLB,, | Performed by: STUDENT IN AN ORGANIZED HEALTH CARE EDUCATION/TRAINING PROGRAM

## 2022-05-02 PROCEDURE — 99999 PR PBB SHADOW E&M-EST. PATIENT-LVL V: CPT | Mod: PBBFAC,,, | Performed by: STUDENT IN AN ORGANIZED HEALTH CARE EDUCATION/TRAINING PROGRAM

## 2022-05-02 PROCEDURE — 3072F PR LOW RISK FOR RETINOPATHY: ICD-10-PCS | Mod: CPTII,S$GLB,, | Performed by: STUDENT IN AN ORGANIZED HEALTH CARE EDUCATION/TRAINING PROGRAM

## 2022-05-02 PROCEDURE — 1159F PR MEDICATION LIST DOCUMENTED IN MEDICAL RECORD: ICD-10-PCS | Mod: CPTII,S$GLB,, | Performed by: STUDENT IN AN ORGANIZED HEALTH CARE EDUCATION/TRAINING PROGRAM

## 2022-05-02 PROCEDURE — 3008F PR BODY MASS INDEX (BMI) DOCUMENTED: ICD-10-PCS | Mod: CPTII,S$GLB,, | Performed by: STUDENT IN AN ORGANIZED HEALTH CARE EDUCATION/TRAINING PROGRAM

## 2022-05-02 PROCEDURE — 3051F HG A1C>EQUAL 7.0%<8.0%: CPT | Mod: CPTII,S$GLB,, | Performed by: STUDENT IN AN ORGANIZED HEALTH CARE EDUCATION/TRAINING PROGRAM

## 2022-05-02 PROCEDURE — 3008F BODY MASS INDEX DOCD: CPT | Mod: CPTII,S$GLB,, | Performed by: STUDENT IN AN ORGANIZED HEALTH CARE EDUCATION/TRAINING PROGRAM

## 2022-05-02 PROCEDURE — 99213 PR OFFICE/OUTPT VISIT, EST, LEVL III, 20-29 MIN: ICD-10-PCS | Mod: S$GLB,,, | Performed by: STUDENT IN AN ORGANIZED HEALTH CARE EDUCATION/TRAINING PROGRAM

## 2022-05-02 PROCEDURE — 3072F LOW RISK FOR RETINOPATHY: CPT | Mod: CPTII,S$GLB,, | Performed by: STUDENT IN AN ORGANIZED HEALTH CARE EDUCATION/TRAINING PROGRAM

## 2022-05-02 PROCEDURE — 99999 PR PBB SHADOW E&M-EST. PATIENT-LVL V: ICD-10-PCS | Mod: PBBFAC,,, | Performed by: STUDENT IN AN ORGANIZED HEALTH CARE EDUCATION/TRAINING PROGRAM

## 2022-05-02 PROCEDURE — 99213 OFFICE O/P EST LOW 20 MIN: CPT | Mod: S$GLB,,, | Performed by: STUDENT IN AN ORGANIZED HEALTH CARE EDUCATION/TRAINING PROGRAM

## 2022-05-02 PROCEDURE — 3051F PR MOST RECENT HEMOGLOBIN A1C LEVEL 7.0 - < 8.0%: ICD-10-PCS | Mod: CPTII,S$GLB,, | Performed by: STUDENT IN AN ORGANIZED HEALTH CARE EDUCATION/TRAINING PROGRAM

## 2022-05-02 PROCEDURE — 1160F RVW MEDS BY RX/DR IN RCRD: CPT | Mod: CPTII,S$GLB,, | Performed by: STUDENT IN AN ORGANIZED HEALTH CARE EDUCATION/TRAINING PROGRAM

## 2022-05-02 PROCEDURE — 1159F MED LIST DOCD IN RCRD: CPT | Mod: CPTII,S$GLB,, | Performed by: STUDENT IN AN ORGANIZED HEALTH CARE EDUCATION/TRAINING PROGRAM

## 2022-05-02 NOTE — PROGRESS NOTES
Patient ID: Se Virgil Mcgraw is a 57 y.o. male.    Chief Complaint: Follow up IPP, LUTS    HPI  57 y.o. who presents to the Urology clinic for evaluation hx of ED status post IPP in Jan 2022. Patient notes resolution of pain in scrotum. He cycles his device once every other day. He denies pain with cycling his device. Occasionally notes urinary urgency with leakage, not bothersome. Patient drinks 4 bottles of water daily. Denies constipation. Denies hematuria, flank pain, shortness of breath.     Medically Necessary ROS documented in HPI    Past Medical History  Active Ambulatory Problems     Diagnosis Date Noted    Diabetes mellitus type 2 in obese 08/30/2015    Essential hypertension 08/30/2015    Brachial neuritis or radiculitis NOS 09/15/2015    Elevated LFTs 11/17/2015    Sleep apnea 11/17/2015    CKD (chronic kidney disease) stage 2, GFR 60-89 ml/min 11/17/2015    History of gout 11/17/2015    HLD (hyperlipidemia) 11/17/2015    GERD (gastroesophageal reflux disease) 11/17/2015    Edema 11/17/2015    AICD (automatic cardioverter/defibrillator) present 11/17/2015    Non-ischemic cardiomyopathy 11/19/2015    Restrictive lung disease 11/20/2015    Cervical disc disorder with radiculopathy 11/23/2015    Inguinal lymphadenopathy 06/02/2017    Cellulitis of penis 07/06/2018    Lower urinary tract symptoms (LUTS) 10/01/2020    Corneal ulcer 09/30/2021    Ventricular tachycardia 09/30/2021    Chronic combined systolic and diastolic heart failure 09/30/2021    Chronic hip pain 09/30/2021    Glaucoma, right eye 09/30/2021    Insomnia 09/30/2021    Endophthalmitis, acute, right 10/02/2021    Erectile dysfunction 01/25/2022     Resolved Ambulatory Problems     Diagnosis Date Noted    Abscess of right leg 09/30/2012    Pain in the chest 08/30/2015    Acute pancreatitis 08/05/2016    Acute kidney injury 08/06/2016     Past Medical History:   Diagnosis Date    COPD (chronic obstructive  pulmonary disease)     Coronary artery disease     Diabetes mellitus type II     DJD (degenerative joint disease)     Gout     Hypertension     Kidney stone     MI (myocardial infarction) 2010    Obesity     JOSE (obstructive sleep apnea)          Past Surgical History  Past Surgical History:   Procedure Laterality Date    CARDIAC DEFIBRILLATOR PLACEMENT      CATARACT EXTRACTION Right     CYSTOSCOPY N/A 10/1/2020    Procedure: CYSTOSCOPY;  Surgeon: Monica Lieberman MD;  Location: [facility] OR;  Service: Urology;  Laterality: N/A;  RN PRE OP Covid screen 9-  C A    excisional biopsy left inguinal lymph node      FOOT SURGERY      right foot surgery     INSERTION OF INFLATABLE PENILE PROSTHESIS N/A 1/25/2022    Procedure: INSERTION, PENILE PROSTHESIS, INFLATABLE;  Surgeon: Monica Lieberman MD;  Location: [facility] OR;  Service: Urology;  Laterality: N/A;  [facility] JUAN LUIS ARNOLD [phone] TEXTED HIM @ 5:32PM ON 12-  RN PRE OP 12-28-21. Covid NEGATIVE 1-3-22. CA-----AICD-----HAS CARDS CLEARANCE    kidney stones      lithotripsy    REPAIR, SURGICAL WOUND, EYE, ANTERIOR SEGMENT Right 10/5/2021    Procedure: REPAIR, SURGICAL WOUND, EYE, ANTERIOR SEGMENT;  Surgeon: Adelaide Allen MD;  Location: [facility] OR [address] [place] [address];  Service: Ophthalmology;  Laterality: Right;  Anterior Chamber Wash Out Right Eye     Surgery for bulging disc at C7         Social History  Social Connections: Not on file       Medications    Current Outpatient Medications:     acetaZOLAMIDE (DIAMOX) 250 MG tablet, Take 1 tablet (250 mg total) by mouth 2 (two) times daily., Disp: 60 tablet, Rfl: 11    albuterol (PROVENTIL/VENTOLIN HFA) 90 mcg/actuation inhaler, Inhale 2 puffs into the lungs every 4 (four) hours as needed for Wheezing or Shortness of Breath. Rescue, Disp: 1 Inhaler, Rfl: 0    allopurinol (ZYLOPRIM) 100 MG tablet, , Disp: , Rfl: 5    amiodarone (PACERONE) 400 MG tablet, Take 200 mg by mouth every morning. ,  Disp: , Rfl: 1    amlodipine (NORVASC) 10 MG tablet, Take 10 mg by mouth every morning. , Disp: , Rfl: 5    aspirin (ECOTRIN) 81 MG EC tablet, Take 81 mg by mouth., Disp: , Rfl:     atropine 1% (ISOPTO ATROPINE) 1 % Drop, Place 1 drop into the right eye once daily. (Patient not taking: Reported on 4/25/2022), Disp: 5 mL, Rfl: 0    baclofen (LIORESAL) 10 MG tablet, Take 1 tablet (10 mg total) by mouth 2 (two) times daily as needed (hip pain)., Disp: 30 tablet, Rfl: 0    blood sugar diagnostic Strp, ACCU CHEK KEVAN PLUS TEST STRIPS, Disp: , Rfl:     blood-glucose meter Misc, ACCU CHEK KEVAN PLUS METER: USE 4X/D, Disp: , Rfl:     brimonidine 0.15 % OPTH DROP (ALPHAGAN) 0.15 % ophthalmic solution, Place 1 drop into both eyes 2 (two) times a day., Disp: 10 mL, Rfl: 11    brimonidine-timoloL (COMBIGAN) 0.2-0.5 % Drop, Place 1 drop into the right eye 3 (three) times daily., Disp: , Rfl:     cetirizine (ZYRTEC) 10 MG tablet, Take 10 mg by mouth once daily., Disp: , Rfl:     CRESTOR 20 mg tablet, Take 20 mg by mouth every evening. , Disp: , Rfl: 5    dicyclomine (BENTYL) 20 mg tablet, Take 20 mg by mouth 2 (two) times daily., Disp: , Rfl: 2    docusate sodium (COLACE) 100 MG capsule, Take 1 capsule (100 mg total) by mouth 2 (two) times daily., Disp: 30 capsule, Rfl: 0    dorzolamide (TRUSOPT) 2 % ophthalmic solution, Place 1 drop into both eyes 2 (two) times a day., Disp: 10 mL, Rfl: 6    dorzolamide-timolol 2-0.5% (COSOPT) 22.3-6.8 mg/mL ophthalmic solution, Place 1 drop into both eyes 2 (two) times daily., Disp: 10 mL, Rfl: 1    DULoxetine (CYMBALTA) 60 MG capsule, Take 60 mg by mouth., Disp: , Rfl:     famotidine (PEPCID) 20 MG tablet, Take 1 tablet (20 mg total) by mouth 2 (two) times daily., Disp: 60 tablet, Rfl: 2    fluticasone propionate (FLONASE) 50 mcg/actuation nasal spray, 2 sprays (100 mcg total) by Each Nostril route once daily. (Patient taking differently: 2 sprays by Each Nostril route daily  as needed.), Disp: 1 Bottle, Rfl: 0    Fortified Tobramycin 15 mg/ml Opht (TOBREX) 15 mg Drop, Place 1 drop into the right eye every 6 (six) hours. (Patient taking differently: Place 1 drop into the right eye 3 (three) times daily.), Disp: , Rfl: 0    furosemide (LASIX) 20 MG tablet, Take 20 mg by mouth daily as needed. Takes every other day, Disp: , Rfl: 5    gabapentin (NEURONTIN) 300 MG capsule, Take 800 mg by mouth 2 (two) times daily. Takes  Two 300 mg tabs at night, Disp: , Rfl:     insulin glargine 100 units/mL (3mL) SubQ pen, Inject 60 Units into the skin once daily., Disp: , Rfl:     INVOKANA 300 mg Tab tablet, TK 1 T PO QD, Disp: , Rfl: 5    isosorbide mononitrate (IMDUR) 30 MG 24 hr tablet, Take 30 mg by mouth every morning. , Disp: , Rfl: 6    lancets Misc, ACCU CHEK SOFTCLIX LANCETS: USE 4X DAILY, Disp: , Rfl:     latanoprost 0.005 % ophthalmic solution, Place 1 drop into the right eye every evening., Disp: , Rfl:     meloxicam (MOBIC) 15 MG tablet, Take 1 tablet (15 mg total) by mouth once daily., Disp: 30 tablet, Rfl: 2    methazoLAMIDE (NEPTAZANE) 25 mg tablet, Take 25 mg by mouth once daily., Disp: , Rfl:     metoclopramide HCl (REGLAN) 10 MG tablet, Take 1 tablet (10 mg total) by mouth every 6 (six) hours as needed., Disp: 30 tablet, Rfl: 0    metoprolol succinate (TOPROL-XL) 50 MG 24 hr tablet, Take 50 mg by mouth every evening. , Disp: , Rfl: 5    naproxen (NAPROSYN) 500 MG tablet, Take 1 tablet (500 mg total) by mouth 2 (two) times daily with meals., Disp: 20 tablet, Rfl: 0    natamycin (NATACYN) 5 % ophthalmic solution, Place 1 drop into the right eye 6 (six) times daily. (Patient taking differently: Place 1 drop into the right eye 2 (two) times a day.), Disp: 15 mL, Rfl: 3    nitroGLYCERIN (NITROSTAT) 0.4 MG SL tablet, Place 0.4 mg under the tongue every 5 (five) minutes as needed for Chest pain., Disp: , Rfl:     omeprazole (PRILOSEC) 20 MG capsule, TK 1 C PO QD FOR CONTROL OF  "STOMACH ACID BEFORE BREAKFAST, Disp: , Rfl: 1    oxyCODONE (ROXICODONE) 5 MG immediate release tablet, Take 2 tablets (10 mg total) by mouth every 8 (eight) hours as needed for Pain (If tylenol is insufficient)., Disp: 20 tablet, Rfl: 0    pen needle, diabetic 31 gauge x 5/16" Ndle, Inject 1 each into the skin., Disp: , Rfl:     potassium chloride SA (K-DUR,KLOR-CON) 20 MEQ tablet, Take by mouth once daily., Disp: , Rfl: 5    prednisoLONE acetate (PRED FORTE) 1 % DrpS, Place 1 drop into the right eye 4 (four) times daily., Disp: 15 mL, Rfl: 0    promethazine (PHENERGAN) 25 MG tablet, TK 1 T PO  Q 4 H PRN NV, Disp: , Rfl: 2    sotaloL (BETAPACE) 80 MG tablet, TAKE 1 TABLET BY MOUTH TWICE DAILY, Disp: , Rfl:     spironolactone (ALDACTONE) 25 MG tablet, Take 25 mg by mouth daily as needed., Disp: , Rfl:     tamsulosin (FLOMAX) 0.4 mg Cp24, Take 1 capsule (0.4 mg total) by mouth once daily., Disp: 30 capsule, Rfl: 2    zolpidem (AMBIEN) 10 mg Tab, Take 5 mg by mouth nightly as needed., Disp: , Rfl:     Allergies  Review of patient's allergies indicates:   Allergen Reactions    Morphine Itching and Other (See Comments)     Pt denies morphine as an allergy reports is is allergic to a medicine but has no idea what it is      Propoxyphene     Darvocet a500 [propoxyphene n-acetaminophen] Itching       Patient's PMH, FH, Social hx, Medications, allergies reviewed and updated as pertinent to today's visit    Objective:      Physical Exam  Constitutional:       General: He is not in acute distress.     Appearance: He is well-developed. He is not ill-appearing, toxic-appearing or diaphoretic.   HENT:      Head: Normocephalic and atraumatic.      Mouth/Throat:      Mouth: Mucous membranes are moist.   Eyes:      Conjunctiva/sclera: Conjunctivae normal.   Cardiovascular:      Rate and Rhythm: Normal rate.   Pulmonary:      Effort: Pulmonary effort is normal. No respiratory distress.   Abdominal:      General: Abdomen " is flat. There is no distension.      Palpations: Abdomen is soft.      Tenderness: There is no abdominal tenderness. There is no right CVA tenderness or left CVA tenderness.   Genitourinary:     Comments: Cycled IPP, no signs of erosion  Pump in scrotum  Musculoskeletal:         General: No swelling or deformity.      Cervical back: Neck supple.   Skin:     General: Skin is warm.      Findings: No rash.   Neurological:      Mental Status: He is alert and oriented to person, place, and time.      Gait: Gait normal.   Psychiatric:         Mood and Affect: Mood normal.         Thought Content: Thought content normal.         Judgment: Judgment normal.             Lab Results   Component Value Date    PSADIAG 0.67 01/19/2021    PSADIAG 1.4 08/06/2016        Assessment:       1. Lower urinary tract symptoms (LUTS)    2. Erectile dysfunction, unspecified erectile dysfunction type        Plan:       ED   Status post IPP  Satisfied with result  Patient free to use device without restrictions    LUTS  Recommend PFPT  Can consider OAB medication vs botox, given hx of DM    RTC 3 months

## 2022-05-19 ENCOUNTER — OFFICE VISIT (OUTPATIENT)
Dept: URGENT CARE | Facility: CLINIC | Age: 57
End: 2022-05-19
Payer: MEDICARE

## 2022-05-19 VITALS
SYSTOLIC BLOOD PRESSURE: 125 MMHG | TEMPERATURE: 97 F | RESPIRATION RATE: 16 BRPM | HEART RATE: 90 BPM | HEIGHT: 67 IN | WEIGHT: 216 LBS | DIASTOLIC BLOOD PRESSURE: 84 MMHG | OXYGEN SATURATION: 95 % | BODY MASS INDEX: 33.9 KG/M2

## 2022-05-19 DIAGNOSIS — M54.41 ACUTE RIGHT-SIDED LOW BACK PAIN WITH RIGHT-SIDED SCIATICA: Primary | ICD-10-CM

## 2022-05-19 PROCEDURE — 99203 OFFICE O/P NEW LOW 30 MIN: CPT | Mod: 25,S$GLB,, | Performed by: PHYSICIAN ASSISTANT

## 2022-05-19 PROCEDURE — 3074F PR MOST RECENT SYSTOLIC BLOOD PRESSURE < 130 MM HG: ICD-10-PCS | Mod: CPTII,S$GLB,, | Performed by: PHYSICIAN ASSISTANT

## 2022-05-19 PROCEDURE — 3051F HG A1C>EQUAL 7.0%<8.0%: CPT | Mod: CPTII,S$GLB,, | Performed by: PHYSICIAN ASSISTANT

## 2022-05-19 PROCEDURE — 1159F MED LIST DOCD IN RCRD: CPT | Mod: CPTII,S$GLB,, | Performed by: PHYSICIAN ASSISTANT

## 2022-05-19 PROCEDURE — 3008F BODY MASS INDEX DOCD: CPT | Mod: CPTII,S$GLB,, | Performed by: PHYSICIAN ASSISTANT

## 2022-05-19 PROCEDURE — 1160F PR REVIEW ALL MEDS BY PRESCRIBER/CLIN PHARMACIST DOCUMENTED: ICD-10-PCS | Mod: CPTII,S$GLB,, | Performed by: PHYSICIAN ASSISTANT

## 2022-05-19 PROCEDURE — 1160F RVW MEDS BY RX/DR IN RCRD: CPT | Mod: CPTII,S$GLB,, | Performed by: PHYSICIAN ASSISTANT

## 2022-05-19 PROCEDURE — 3074F SYST BP LT 130 MM HG: CPT | Mod: CPTII,S$GLB,, | Performed by: PHYSICIAN ASSISTANT

## 2022-05-19 PROCEDURE — 1159F PR MEDICATION LIST DOCUMENTED IN MEDICAL RECORD: ICD-10-PCS | Mod: CPTII,S$GLB,, | Performed by: PHYSICIAN ASSISTANT

## 2022-05-19 PROCEDURE — 3072F PR LOW RISK FOR RETINOPATHY: ICD-10-PCS | Mod: CPTII,S$GLB,, | Performed by: PHYSICIAN ASSISTANT

## 2022-05-19 PROCEDURE — 3008F PR BODY MASS INDEX (BMI) DOCUMENTED: ICD-10-PCS | Mod: CPTII,S$GLB,, | Performed by: PHYSICIAN ASSISTANT

## 2022-05-19 PROCEDURE — 96372 THER/PROPH/DIAG INJ SC/IM: CPT | Mod: S$GLB,,, | Performed by: FAMILY MEDICINE

## 2022-05-19 PROCEDURE — 3079F DIAST BP 80-89 MM HG: CPT | Mod: CPTII,S$GLB,, | Performed by: PHYSICIAN ASSISTANT

## 2022-05-19 PROCEDURE — 99203 PR OFFICE/OUTPT VISIT, NEW, LEVL III, 30-44 MIN: ICD-10-PCS | Mod: 25,S$GLB,, | Performed by: PHYSICIAN ASSISTANT

## 2022-05-19 PROCEDURE — 96372 PR INJECTION,THERAP/PROPH/DIAG2ST, IM OR SUBCUT: ICD-10-PCS | Mod: S$GLB,,, | Performed by: FAMILY MEDICINE

## 2022-05-19 PROCEDURE — 3079F PR MOST RECENT DIASTOLIC BLOOD PRESSURE 80-89 MM HG: ICD-10-PCS | Mod: CPTII,S$GLB,, | Performed by: PHYSICIAN ASSISTANT

## 2022-05-19 PROCEDURE — 3072F LOW RISK FOR RETINOPATHY: CPT | Mod: CPTII,S$GLB,, | Performed by: PHYSICIAN ASSISTANT

## 2022-05-19 PROCEDURE — 3051F PR MOST RECENT HEMOGLOBIN A1C LEVEL 7.0 - < 8.0%: ICD-10-PCS | Mod: CPTII,S$GLB,, | Performed by: PHYSICIAN ASSISTANT

## 2022-05-19 RX ORDER — OXYCODONE HYDROCHLORIDE AND ACETAMINOPHEN 7.5; 325 MG/1; MG/1
TABLET ORAL
COMMUNITY
Start: 2022-04-21 | End: 2022-06-20

## 2022-05-19 RX ORDER — TIZANIDINE 4 MG/1
TABLET ORAL
COMMUNITY
Start: 2022-04-21 | End: 2022-05-19

## 2022-05-19 RX ORDER — KETOROLAC TROMETHAMINE 30 MG/ML
30 INJECTION, SOLUTION INTRAMUSCULAR; INTRAVENOUS
Status: COMPLETED | OUTPATIENT
Start: 2022-05-19 | End: 2022-05-19

## 2022-05-19 RX ORDER — INSULIN GLARGINE AND LIXISENATIDE 100; 33 U/ML; UG/ML
60 INJECTION, SOLUTION SUBCUTANEOUS
COMMUNITY
Start: 2021-10-04 | End: 2023-04-14

## 2022-05-19 RX ORDER — OMEPRAZOLE 20 MG/1
1 TABLET, DELAYED RELEASE ORAL
COMMUNITY
End: 2023-04-14 | Stop reason: SDUPTHER

## 2022-05-19 RX ORDER — ZOLMITRIPTAN 2.5 MG/1
2.5 TABLET, FILM COATED ORAL DAILY PRN
COMMUNITY
Start: 2021-06-28 | End: 2023-11-15

## 2022-05-19 RX ORDER — OMEGA-3-ACID ETHYL ESTERS 1 G/1
2 CAPSULE, LIQUID FILLED ORAL DAILY
COMMUNITY
Start: 2021-09-17

## 2022-05-19 RX ORDER — LIDOCAINE 50 MG/G
1 PATCH TOPICAL
COMMUNITY
Start: 2021-10-15 | End: 2022-05-19

## 2022-05-19 RX ORDER — NAPROXEN 500 MG/1
500 TABLET ORAL 2 TIMES DAILY WITH MEALS
Qty: 20 TABLET | Refills: 1 | OUTPATIENT
Start: 2022-05-19 | End: 2022-06-20

## 2022-05-19 RX ORDER — MEDICAL SUPPLY, MISCELLANEOUS
MISCELLANEOUS MISCELLANEOUS
COMMUNITY
Start: 2021-06-14 | End: 2023-04-14

## 2022-05-19 RX ORDER — KETOCONAZOLE 20 MG/G
CREAM TOPICAL
COMMUNITY
Start: 2022-04-05

## 2022-05-19 RX ORDER — TIZANIDINE 4 MG/1
4 TABLET ORAL EVERY 8 HOURS PRN
Qty: 30 TABLET | Refills: 1 | Status: SHIPPED | OUTPATIENT
Start: 2022-05-19 | End: 2022-05-29

## 2022-05-19 RX ORDER — DIVALPROEX SODIUM 500 MG/1
500 TABLET, FILM COATED, EXTENDED RELEASE ORAL
COMMUNITY
Start: 2022-04-04 | End: 2023-04-14

## 2022-05-19 RX ADMIN — KETOROLAC TROMETHAMINE 30 MG: 30 INJECTION, SOLUTION INTRAMUSCULAR; INTRAVENOUS at 10:05

## 2022-05-19 NOTE — PROGRESS NOTES
"Subjective:       Patient ID: Se Virgil Mcgraw is a 57 y.o. male.    Vitals:  height is 5' 7" (1.702 m) and weight is 98 kg (216 lb). His temperature is 96.7 °F (35.9 °C). His blood pressure is 125/84 and his pulse is 90. His respiration is 16 and oxygen saturation is 95%.     Chief Complaint: Back Pain    Pt is coming in today with lower back pain, pain started yesterday, pt states history with lower back pain, pain level 10, constant pain, pt states moving a certain way hurts or walking, pain does not radiate, tylenol taken mild relief but not much.   Patient states he has chronic low back pain but had a slip and fall in itch bathtub yesterday landing on his butt.  He is uncertain if he hit his side.  He has no other injury no loss of consciousness and no hematuria.  He is able ambulate    Back Pain  This is a recurrent problem. The current episode started yesterday. The problem occurs constantly. The problem has been gradually worsening since onset. The pain is present in the gluteal. The quality of the pain is described as aching and shooting. The pain does not radiate. The pain is at a severity of 10/10. The pain is severe. The pain is the same all the time. The symptoms are aggravated by bending, position, sitting, twisting and standing. Stiffness is present all day. He has tried NSAIDs for the symptoms. The treatment provided mild relief.       Musculoskeletal: Positive for back pain.       Objective:      Physical Exam   Constitutional: He is oriented to person, place, and time. He appears well-developed. He is cooperative.  Non-toxic appearance. He does not appear ill. No distress.   HENT:   Head: Normocephalic and atraumatic.   Ears:   Right Ear: Hearing and external ear normal.   Left Ear: Hearing and external ear normal.   Nose: Nose normal. No mucosal edema, rhinorrhea or nasal deformity. No epistaxis. Right sinus exhibits no maxillary sinus tenderness and no frontal sinus tenderness. Left sinus " exhibits no maxillary sinus tenderness and no frontal sinus tenderness.   Mouth/Throat: Uvula is midline, oropharynx is clear and moist and mucous membranes are normal. No trismus in the jaw. Normal dentition. No uvula swelling. No oropharyngeal exudate, posterior oropharyngeal edema or posterior oropharyngeal erythema.   Eyes: Conjunctivae and lids are normal. No scleral icterus.   Neck: Trachea normal and phonation normal. Neck supple. No edema present. No erythema present. No neck rigidity present.   Cardiovascular: Normal rate, regular rhythm, normal heart sounds and normal pulses.   Pulmonary/Chest: Effort normal and breath sounds normal. No respiratory distress. He has no decreased breath sounds. He has no rhonchi.   Abdominal: Normal appearance and bowel sounds are normal. He exhibits no abdominal bruit, no pulsatile midline mass and no mass. Soft.   Musculoskeletal: Normal range of motion.         General: Tenderness present. No swelling or deformity. Normal range of motion.        Back:       Right lower leg: No edema.      Left lower leg: No edema.   Neurological: He is alert and oriented to person, place, and time. He has normal strength and normal reflexes. No sensory deficit. He exhibits normal muscle tone. Coordination normal.   Skin: Skin is warm, dry, intact, not diaphoretic, not pale and no rash. Capillary refill takes less than 2 seconds. No lesion   Psychiatric: His speech is normal and behavior is normal. Judgment and thought content normal.   Nursing note and vitals reviewed.        Assessment:       1. Acute right-sided low back pain with right-sided sciatica          Plan:         Acute right-sided low back pain with right-sided sciatica  -     ketorolac injection 30 mg  -     naproxen (NAPROSYN) 500 MG tablet; Take 1 tablet (500 mg total) by mouth 2 (two) times daily with meals.  Dispense: 20 tablet; Refill: 1  -     tiZANidine (ZANAFLEX) 4 MG tablet; Take 1 tablet (4 mg total) by mouth every  8 (eight) hours as needed (muscle pain).  Dispense: 30 tablet; Refill: 1    Follow up if symptoms worsen or fail to improve, for F/U with PCP or ED. There are no Patient Instructions on file for this visit.

## 2022-06-06 ENCOUNTER — TELEPHONE (OUTPATIENT)
Dept: OPHTHALMOLOGY | Facility: CLINIC | Age: 57
End: 2022-06-06
Payer: MEDICARE

## 2022-06-06 NOTE — TELEPHONE ENCOUNTER
----- Message from Lakesha Robertson sent at 6/6/2022 10:49 AM CDT -----  Contact: [phone]  Patient is calling stating that the OD is closing and isn't able to open. He stated that it began over the weekend. He stated that he needs to be seen today on the [facility].  Please contact patient to schedule at [phone].

## 2022-06-20 ENCOUNTER — HOSPITAL ENCOUNTER (EMERGENCY)
Facility: HOSPITAL | Age: 57
Discharge: HOME | End: 2022-06-20
Attending: EMERGENCY MEDICINE
Payer: MEDICARE

## 2022-06-20 VITALS
TEMPERATURE: 99 F | BODY MASS INDEX: 33.67 KG/M2 | WEIGHT: 215 LBS | RESPIRATION RATE: 18 BRPM | SYSTOLIC BLOOD PRESSURE: 119 MMHG | OXYGEN SATURATION: 94 % | HEART RATE: 91 BPM | DIASTOLIC BLOOD PRESSURE: 80 MMHG

## 2022-06-20 DIAGNOSIS — M54.41 ACUTE BILATERAL LOW BACK PAIN WITH BILATERAL SCIATICA: Primary | ICD-10-CM

## 2022-06-20 DIAGNOSIS — M54.42 ACUTE BILATERAL LOW BACK PAIN WITH BILATERAL SCIATICA: Primary | ICD-10-CM

## 2022-06-20 LAB
BILIRUBIN, POC UA: NEGATIVE
BLOOD, POC UA: NEGATIVE
CLARITY, POC UA: CLEAR
COLOR, POC UA: YELLOW
GLUCOSE, POC UA: ABNORMAL
KETONES, POC UA: NEGATIVE
LEUKOCYTE EST, POC UA: NEGATIVE
NITRITE, POC UA: NEGATIVE
PH UR STRIP: 5.5 [PH]
POCT GLUCOSE: 188 MG/DL (ref 70–110)
PROTEIN, POC UA: NEGATIVE
SPECIFIC GRAVITY, POC UA: 1.01
UROBILINOGEN, POC UA: 0.2 E.U./DL

## 2022-06-20 PROCEDURE — 81003 URINALYSIS AUTO W/O SCOPE: CPT | Mod: ER

## 2022-06-20 PROCEDURE — 63600175 PHARM REV CODE 636 W HCPCS: Mod: ER | Performed by: NURSE PRACTITIONER

## 2022-06-20 PROCEDURE — 99284 EMERGENCY DEPT VISIT MOD MDM: CPT | Mod: 25,ER

## 2022-06-20 PROCEDURE — 82962 GLUCOSE BLOOD TEST: CPT | Mod: ER

## 2022-06-20 PROCEDURE — 96372 THER/PROPH/DIAG INJ SC/IM: CPT | Performed by: NURSE PRACTITIONER

## 2022-06-20 RX ORDER — DEXTROMETHORPHAN HYDROBROMIDE, GUAIFENESIN 5; 100 MG/5ML; MG/5ML
650 LIQUID ORAL EVERY 8 HOURS
Qty: 20 TABLET | Refills: 0 | Status: SHIPPED | OUTPATIENT
Start: 2022-06-20 | End: 2022-06-25

## 2022-06-20 RX ORDER — LIDOCAINE 50 MG/G
1 PATCH TOPICAL DAILY
Qty: 15 PATCH | Refills: 0 | Status: SHIPPED | OUTPATIENT
Start: 2022-06-20 | End: 2022-07-05

## 2022-06-20 RX ORDER — ORPHENADRINE CITRATE 30 MG/ML
60 INJECTION INTRAMUSCULAR; INTRAVENOUS
Status: COMPLETED | OUTPATIENT
Start: 2022-06-20 | End: 2022-06-20

## 2022-06-20 RX ORDER — ORPHENADRINE CITRATE 100 MG/1
100 TABLET, EXTENDED RELEASE ORAL 2 TIMES DAILY
Qty: 10 TABLET | Refills: 0 | Status: SHIPPED | OUTPATIENT
Start: 2022-06-20 | End: 2022-06-25

## 2022-06-20 RX ADMIN — ORPHENADRINE CITRATE 60 MG: 30 INJECTION INTRAMUSCULAR; INTRAVENOUS at 12:06

## 2022-06-20 NOTE — Clinical Note
"Se Virgil Mcgraw (SeRyran) was seen and treated in our emergency department on 6/20/2022.  He may return to work on 06/22/2022.       If you have any questions or concerns, please don't hesitate to call.      KOJO Martin"

## 2022-06-20 NOTE — ED PROVIDER NOTES
"Encounter Date: 6/20/2022    SCRIBE #1 NOTE: I, Salima Kendall, am scribing for, and in the presence of,  KOJO Solano. I have scribed the following portions of the note - Other sections scribed: HPI, ROS, PE.       History     Chief Complaint   Patient presents with    Back Pain     Pt states," I have back pain in the lower back for several days."     57 year old male with history of DM, cardiac pacemaker, COPD, and HTN presents to the ED with complaints of back pain beginning three days ago. Patient denies rash, fever, chest pain, SOB, numbness, weakness, tingling, abdominal pain, dysuria, hematuria, nausea, vomiting, diarrhea, or any other complaints. Pt notes his pain is exacerbated with movement such as walking or bending.  Additionally, pt states he has nerve pain that is treated with injections. Pt does not endorse smoking, alcohol or drug use. He rates his pain as 10/10 and has not taken any medications for his symptoms today.           The history is provided by the patient. No  was used.     Review of patient's allergies indicates:   Allergen Reactions    Morphine Itching and Other (See Comments)     Pt denies morphine as an allergy reports is is allergic to a medicine but has no idea what it is      Propoxyphene     Tramadol Itching    Darvocet a500 [propoxyphene n-acetaminophen] Itching     Past Medical History:   Diagnosis Date    COPD (chronic obstructive pulmonary disease)     Coronary artery disease     Diabetes mellitus type II     DJD (degenerative joint disease)     Gout     Hypertension     Kidney stone     MI (myocardial infarction) 2010    Obesity     JOSE (obstructive sleep apnea)      Past Surgical History:   Procedure Laterality Date    CARDIAC DEFIBRILLATOR PLACEMENT      CATARACT EXTRACTION Right     CYSTOSCOPY N/A 10/1/2020    Procedure: CYSTOSCOPY;  Surgeon: Monica Lieberman MD;  Location: [facility];  Service: Urology;  Laterality: N/A;  RN PRE " OP Covid screen 9-  C A    excisional biopsy left inguinal lymph node      FOOT SURGERY      right foot surgery     INSERTION OF INFLATABLE PENILE PROSTHESIS N/A 1/25/2022    Procedure: INSERTION, PENILE PROSTHESIS, INFLATABLE;  Surgeon: Monica Lieberman MD;  Location: [facility];  Service: Urology;  Laterality: N/A;  [employer] JUAN LUIS ARNOLD [phone] TEXTED HIM @ 5:32PM ON 12-  RN PRE OP 12-28-21. Covid NEGATIVE 1-3-22. CA-----AICD-----HAS CARDS CLEARANCE    kidney stones      lithotripsy    REPAIR, SURGICAL WOUND, EYE, ANTERIOR SEGMENT Right 10/5/2021    Procedure: REPAIR, SURGICAL WOUND, EYE, ANTERIOR SEGMENT;  Surgeon: Adelaide Allen MD;  Location: [facility] OR [address] [place] [address];  Service: Ophthalmology;  Laterality: Right;  Anterior Chamber Wash Out Right Eye     Surgery for bulging disc at C7       Family History   Problem Relation Age of Onset    Diabetes Mother     Hypertension Mother     Heart disease Father     Diabetes Brother     Hypertension Brother     Asthma Daughter     Cancer Maternal Uncle         prostate    Cancer Cousin     Kidney disease Cousin      Social History     Tobacco Use    Smoking status: Never Smoker    Smokeless tobacco: Never Used   Substance Use Topics    Alcohol use: No    Drug use: No     Review of Systems   Constitutional: Negative for chills and fever.   HENT: Negative for congestion, ear pain, rhinorrhea and sore throat.    Eyes: Negative for pain, discharge and redness.   Respiratory: Negative for cough and shortness of breath.    Cardiovascular: Negative for chest pain.   Gastrointestinal: Negative for abdominal pain, diarrhea, nausea and vomiting.   Genitourinary: Negative for dysuria and hematuria.   Musculoskeletal: Positive for back pain. Negative for neck pain and neck stiffness.   Skin: Negative for rash.   Neurological: Negative for dizziness, weakness, light-headedness, numbness and headaches.   Psychiatric/Behavioral: Negative for  confusion.       Physical Exam     Initial Vitals [06/20/22 1133]   BP Pulse Resp Temp SpO2   (!) 139/92 100 16 98.4 °F (36.9 °C) 95 %      MAP       --         Physical Exam    Nursing note and vitals reviewed.  Constitutional: He appears well-developed and well-nourished. He is cooperative.  Non-toxic appearance. He does not appear ill.   HENT:   Head: Normocephalic and atraumatic.   Right Ear: External ear normal.   Left Ear: External ear normal.   Nose: Nose normal.   Mouth/Throat: Oropharynx is clear and moist.   Eyes: Conjunctivae and EOM are normal. Pupils are equal, round, and reactive to light.   Neck: Neck supple.   Normal range of motion.  Cardiovascular: Normal rate and regular rhythm.   Pulmonary/Chest: Effort normal and breath sounds normal.   Abdominal: Abdomen is soft. There is no abdominal tenderness.   No right CVA tenderness.  No left CVA tenderness. There is no rebound and no guarding.   Musculoskeletal:      Cervical back: Normal range of motion and neck supple.      Lumbar back: Tenderness present. Positive right straight leg raise test and positive left straight leg raise test.      Comments: Bilateral paraspinal muscle tenderness in the lumbar region with Lumbar tenderness and no step-offs. No bruising, erythema or rash. No saddle anesthesia. Positive bilateral straight leg test; Normal ROM, strength, and sensation; normal gait     Neurological: He is alert and oriented to person, place, and time. No cranial nerve deficit. GCS eye subscore is 4. GCS verbal subscore is 5. GCS motor subscore is 6.   Skin: Skin is warm, dry and intact. No bruising and no rash noted. No erythema.   Psychiatric: He has a normal mood and affect. His speech is normal and behavior is normal. Thought content normal.         ED Course   Procedures  Labs Reviewed   POCT URINALYSIS W/O SCOPE - Abnormal; Notable for the following components:       Result Value    Glucose, UA 3+ (*)     All other components within normal  limits   POCT GLUCOSE - Abnormal; Notable for the following components:    POCT Glucose 188 (*)     All other components within normal limits   POCT URINALYSIS W/O SCOPE   POCT GLUCOSE, HAND-HELD DEVICE          Imaging Results          X-Ray Lumbar Spine Ap And Lateral (Final result)  Result time 06/20/22 14:14:54    Final result by Ron Leon MD (06/20/22 14:14:54)                 Impression:      No acute displaced fracture-dislocation identified.      Electronically signed by: Ron Leon MD  Date:    06/20/2022  Time:    14:14             Narrative:    EXAMINATION:  XR LUMBAR SPINE AP AND LATERAL    CLINICAL HISTORY:  back pain;    TECHNIQUE:  AP, lateral and spot images were performed of the lumbar spine.    COMPARISON:  Lumbar spine series 09/05/2010 and CT abdomen and pelvis 08/08/2016    FINDINGS:  Mild dextrocurvature.  Lumbar lordosis is maintained.  Vertebral body heights appear maintained.  No displaced fracture, dislocation or significant listhesis.  No pars defect.  Slight loss of disc height at L5-S1.  Multilevel minimal endplate change with small marginal osteophytes and minimal to mild facet arthrosis.  No subcutaneous emphysema or radiodense retained foreign body.                                 Medications   orphenadrine injection 60 mg (60 mg Intramuscular Given 6/20/22 1254)     Medical Decision Making:   Clinical Tests:   Lab Tests: Ordered and Reviewed       APC / Resident Notes:   This is an evaluation of a 57 y.o. male that presents to the Emergency Department for back pain    Physical Exam shows a non-toxic, afebrile, and well appearing male. Bilateral paraspinal muscle tenderness in the lumbar region with Lumbar tenderness and no step-offs. No bruising, erythema or rash. No saddle anesthesia. Positive bilateral straight leg test; Normal ROM, strength, and sensation; normal gait    Vital signs are reassuring. If available, previous records reviewed.   RESULTS: , UA negative,  Xray negative     My overall impression is Bilateral Sciatica. I considered, but at this time, do not suspect fracture, dislocation, shingles, UTI, Kidney stone.    ED Course: Xray, Norflex, UA, CBG. Discharge Meds/Instructions: Tylenol, Norflex, Lidoderm, Ortho referral. The diagnosis, treatment plan, instructions for follow-up as well as ED return precautions were discussed. All questions have been answered.        Scribe Attestation:   Scribe #1: I performed the above scribed service and the documentation accurately describes the services I performed. I attest to the accuracy of the note.               I, ESPINOZA Godfrey, personally performed the services described in this documentation.  All medical record entries made by the scribe were at my direction and in my presence.  I have reviewed the chart and agree that the record reflects my personal performance and is accurate and complete.  Clinical Impression:   Final diagnoses:  [M54.42, M54.41] Acute bilateral low back pain with bilateral sciatica (Primary)          ED Disposition Condition    Discharge Stable        ED Prescriptions     Medication Sig Dispense Start Date End Date Auth. Provider    LIDOcaine (LIDODERM) 5 % Place 1 patch onto the skin once daily. Remove & Discard patch within 12 hours or as directed by MD, remove prior to bedtime for 15 days 15 patch 6/20/2022 7/5/2022 KOJO Martin    orphenadrine (NORFLEX) 100 mg tablet Take 1 tablet (100 mg total) by mouth 2 (two) times daily. for 5 days 10 tablet 6/20/2022 6/25/2022 KOJO Martin    acetaminophen (TYLENOL) 650 MG TbSR Take 1 tablet (650 mg total) by mouth every 8 (eight) hours. for 5 days 20 tablet 6/20/2022 6/25/2022 KOJO Martin        Follow-up Information     Follow up With Specialties Details Why Contact Info    Maida Munoz PA-C Neurosurgery Schedule an appointment as soon as possible for a visit in 2 days  [address]  [address]  [place]  [address]  [phone]      [facility] ED Emergency Medicine Go to  If symptoms worsen [address] [address] [phone]           Waleska Arriola, KOJO  06/20/22 8976

## 2022-06-22 ENCOUNTER — OFFICE VISIT (OUTPATIENT)
Dept: URGENT CARE | Facility: CLINIC | Age: 57
End: 2022-06-22
Payer: MEDICARE

## 2022-06-22 VITALS
SYSTOLIC BLOOD PRESSURE: 138 MMHG | TEMPERATURE: 98 F | HEART RATE: 87 BPM | WEIGHT: 215 LBS | OXYGEN SATURATION: 95 % | HEIGHT: 67 IN | RESPIRATION RATE: 16 BRPM | DIASTOLIC BLOOD PRESSURE: 84 MMHG | BODY MASS INDEX: 33.74 KG/M2

## 2022-06-22 DIAGNOSIS — M54.41 ACUTE RIGHT-SIDED LOW BACK PAIN WITH RIGHT-SIDED SCIATICA: Primary | ICD-10-CM

## 2022-06-22 DIAGNOSIS — M62.830 MUSCLE SPASM OF BACK: ICD-10-CM

## 2022-06-22 PROCEDURE — 1159F MED LIST DOCD IN RCRD: CPT | Mod: CPTII,S$GLB,, | Performed by: PHYSICIAN ASSISTANT

## 2022-06-22 PROCEDURE — 3072F PR LOW RISK FOR RETINOPATHY: ICD-10-PCS | Mod: CPTII,S$GLB,, | Performed by: PHYSICIAN ASSISTANT

## 2022-06-22 PROCEDURE — 4010F ACE/ARB THERAPY RXD/TAKEN: CPT | Mod: CPTII,S$GLB,, | Performed by: PHYSICIAN ASSISTANT

## 2022-06-22 PROCEDURE — 3079F PR MOST RECENT DIASTOLIC BLOOD PRESSURE 80-89 MM HG: ICD-10-PCS | Mod: CPTII,S$GLB,, | Performed by: PHYSICIAN ASSISTANT

## 2022-06-22 PROCEDURE — 3072F LOW RISK FOR RETINOPATHY: CPT | Mod: CPTII,S$GLB,, | Performed by: PHYSICIAN ASSISTANT

## 2022-06-22 PROCEDURE — 4010F PR ACE/ARB THEARPY RXD/TAKEN: ICD-10-PCS | Mod: CPTII,S$GLB,, | Performed by: PHYSICIAN ASSISTANT

## 2022-06-22 PROCEDURE — 3008F BODY MASS INDEX DOCD: CPT | Mod: CPTII,S$GLB,, | Performed by: PHYSICIAN ASSISTANT

## 2022-06-22 PROCEDURE — 3079F DIAST BP 80-89 MM HG: CPT | Mod: CPTII,S$GLB,, | Performed by: PHYSICIAN ASSISTANT

## 2022-06-22 PROCEDURE — 3051F PR MOST RECENT HEMOGLOBIN A1C LEVEL 7.0 - < 8.0%: ICD-10-PCS | Mod: CPTII,S$GLB,, | Performed by: PHYSICIAN ASSISTANT

## 2022-06-22 PROCEDURE — 99214 PR OFFICE/OUTPT VISIT, EST, LEVL IV, 30-39 MIN: ICD-10-PCS | Mod: 25,S$GLB,, | Performed by: PHYSICIAN ASSISTANT

## 2022-06-22 PROCEDURE — 3051F HG A1C>EQUAL 7.0%<8.0%: CPT | Mod: CPTII,S$GLB,, | Performed by: PHYSICIAN ASSISTANT

## 2022-06-22 PROCEDURE — 3075F PR MOST RECENT SYSTOLIC BLOOD PRESS GE 130-139MM HG: ICD-10-PCS | Mod: CPTII,S$GLB,, | Performed by: PHYSICIAN ASSISTANT

## 2022-06-22 PROCEDURE — 1159F PR MEDICATION LIST DOCUMENTED IN MEDICAL RECORD: ICD-10-PCS | Mod: CPTII,S$GLB,, | Performed by: PHYSICIAN ASSISTANT

## 2022-06-22 PROCEDURE — 99214 OFFICE O/P EST MOD 30 MIN: CPT | Mod: 25,S$GLB,, | Performed by: PHYSICIAN ASSISTANT

## 2022-06-22 PROCEDURE — 96372 THER/PROPH/DIAG INJ SC/IM: CPT | Mod: S$GLB,,, | Performed by: EMERGENCY MEDICINE

## 2022-06-22 PROCEDURE — 3008F PR BODY MASS INDEX (BMI) DOCUMENTED: ICD-10-PCS | Mod: CPTII,S$GLB,, | Performed by: PHYSICIAN ASSISTANT

## 2022-06-22 PROCEDURE — 96372 PR INJECTION,THERAP/PROPH/DIAG2ST, IM OR SUBCUT: ICD-10-PCS | Mod: S$GLB,,, | Performed by: EMERGENCY MEDICINE

## 2022-06-22 PROCEDURE — 3075F SYST BP GE 130 - 139MM HG: CPT | Mod: CPTII,S$GLB,, | Performed by: PHYSICIAN ASSISTANT

## 2022-06-22 RX ORDER — HYDROCHLOROTHIAZIDE 12.5 MG/1
12.5 CAPSULE ORAL DAILY
COMMUNITY
Start: 2021-09-24

## 2022-06-22 RX ORDER — LISINOPRIL 20 MG/1
1 TABLET ORAL DAILY
COMMUNITY
Start: 2022-05-26

## 2022-06-22 RX ORDER — KETOROLAC TROMETHAMINE 30 MG/ML
15 INJECTION, SOLUTION INTRAMUSCULAR; INTRAVENOUS
Status: COMPLETED | OUTPATIENT
Start: 2022-06-22 | End: 2022-06-22

## 2022-06-22 RX ORDER — PROMETHAZINE HYDROCHLORIDE AND DEXTROMETHORPHAN HYDROBROMIDE 6.25; 15 MG/5ML; MG/5ML
5 SYRUP ORAL
COMMUNITY
Start: 2022-01-20 | End: 2023-04-14

## 2022-06-22 RX ORDER — TIZANIDINE 4 MG/1
4 TABLET ORAL
Qty: 30 TABLET | Refills: 0 | Status: SHIPPED | OUTPATIENT
Start: 2022-06-22 | End: 2022-07-02

## 2022-06-22 RX ORDER — TIZANIDINE 4 MG/1
4 TABLET ORAL NIGHTLY PRN
COMMUNITY
Start: 2022-06-16 | End: 2023-04-14

## 2022-06-22 RX ORDER — FAMOTIDINE 20 MG/1
20 TABLET, FILM COATED ORAL 2 TIMES DAILY PRN
COMMUNITY
Start: 2022-06-01

## 2022-06-22 RX ORDER — LIDOCAINE 50 MG/G
PATCH TOPICAL
COMMUNITY
Start: 2022-06-01

## 2022-06-22 RX ADMIN — KETOROLAC TROMETHAMINE 15 MG: 30 INJECTION, SOLUTION INTRAMUSCULAR; INTRAVENOUS at 02:06

## 2022-06-22 NOTE — PROGRESS NOTES
"Subjective:       Patient ID: Se Virgil Mcgraw is a 57 y.o. male.    Vitals:  height is 5' 7" (1.702 m) and weight is 97.5 kg (215 lb). His temperature is 98.3 °F (36.8 °C). His blood pressure is 138/84 and his pulse is 87. His respiration is 16 and oxygen saturation is 95%.     Chief Complaint: Back Pain (Five days )      57-year-old male with a history of previous MI, COPD, coronary artery disease, s/p aicd placement,  diabetes, degenerative joint disease, gout, hypertension, HLD, previous kidney stone, BPH with urinary urge incontinence, GERD, obstructive sleep apnea, and obesity (BMI 34.5) who presents to urgent care clinic for ealuation.  Patient is complaining of flare-up of his chronic low back pain and this radiating down right posterior leg into right calf.  Pain exacerbates with all movement.  Denies any bladder bowel incontinence, saddle anesthesia, injury, or other complaints.  Previously taking anti inflammatory and tizanidine with good relief.  Was seen in ED 2 days ago and referred to Neurosurgery for evaluation.  States that he was unsure about see Neurosurgery since he did not want to be cut on.    Reports no longer taking amiodarone.  He tolerates standing with no issues.      Medical assistant notes:  Pt states he has been having lower back pain for five days . Pt states it hurts to move , sleep , bend .     Back Pain  This is a new problem. The current episode started in the past 7 days. The problem occurs constantly. The problem is unchanged. The quality of the pain is described as burning, aching and stabbing. The pain is at a severity of 7/10. The patient is experiencing no pain. The pain is the same all the time. The symptoms are aggravated by bending, lying down, standing and sitting. Pertinent negatives include no abdominal pain, bladder incontinence, bowel incontinence, chest pain, dysuria, fever, headaches or numbness. He has tried nothing for the symptoms. The treatment provided no " relief.       Constitution: Negative for activity change, chills, sweating, fatigue, fever and generalized weakness.   HENT: Negative for ear pain, hearing loss, facial swelling, congestion, postnasal drip, sinus pain, sinus pressure, sore throat, trouble swallowing and voice change.    Neck: Negative for neck pain, neck stiffness and painful lymph nodes.   Cardiovascular: Negative for chest pain, leg swelling, palpitations, sob on exertion and passing out.   Eyes: Negative for eye discharge, eye pain, eye redness, photophobia, vision loss, double vision, blurred vision and eyelid swelling.   Respiratory: Negative for chest tightness, cough, sputum production, COPD, shortness of breath, wheezing and asthma.    Gastrointestinal: Negative for abdominal pain, nausea, vomiting, diarrhea, bright red blood in stool, dark colored stools, rectal bleeding, heartburn and bowel incontinence.   Genitourinary: Negative for dysuria, frequency, urgency, urine decreased, flank pain, bladder incontinence, hematuria and history of kidney stones.   Musculoskeletal: Positive for back pain and muscle ache. Negative for trauma, joint pain, joint swelling, abnormal ROM of joint and muscle cramps.   Skin: Negative for color change, pale, rash and wound.   Allergic/Immunologic: Negative for seasonal allergies, asthma and immunocompromised state.   Neurological: Negative for dizziness, history of vertigo, light-headedness, passing out, facial drooping, speech difficulty, coordination disturbances, loss of balance, headaches, disorientation, altered mental status, loss of consciousness, numbness, tingling and seizures.   Hematologic/Lymphatic: Negative for swollen lymph nodes, easy bruising/bleeding and trouble clotting. Does not bruise/bleed easily.   Psychiatric/Behavioral: Negative for altered mental status and disorientation.       Past Medical History:   Diagnosis Date    COPD (chronic obstructive pulmonary disease)     Coronary artery  disease     Diabetes mellitus type II     DJD (degenerative joint disease)     Gout     Hypertension     Kidney stone     MI (myocardial infarction) 2010    Obesity     JOSE (obstructive sleep apnea)        Objective:      Physical Exam   Constitutional: He appears well-developed. He is cooperative.  Non-toxic appearance. He does not appear ill. No distress.   HENT:   Head: Normocephalic and atraumatic.   Ears:   Right Ear: Hearing, external ear and ear canal normal.   Left Ear: Hearing, external ear and ear canal normal.   Nose: Nose normal.   Eyes: Conjunctivae and EOM are normal. Pupils are equal, round, and reactive to light. Right eye exhibits no discharge. Left eye exhibits no discharge. Right eye exhibits normal extraocular motion. Left eye exhibits normal extraocular motion. Extraocular movement intact vision grossly intact gaze aligned appropriately   Neck: Phonation normal. Neck supple.   Cardiovascular: Normal rate and regular rhythm.   Pulmonary/Chest: Effort normal. No accessory muscle usage. No respiratory distress. He has no wheezes.   Abdominal: Normal appearance. He exhibits no distension.   Musculoskeletal:      Right lower leg: No edema.      Left lower leg: No edema.      Comments: Spinal exam:   Moves all extremities with good strength 5/5  BUE- deltoid, triceps, biceps, wrist ext/flexion, hand , and interossei  BLE- hip flexion, knee ext/flexion, dorsiflexion, plantar flexion, EHL, ankle inversion, and ankle eversion    No drift or dysmetria.   Gait normal.  No difficulty with tandem gait.    Positive straight leg raise on right    Sensation intact to light touch throughout.  2+ DTR bilaterally.    Full back ROM; pain with all ROM  Full neck ROM; no pain with all ROM.    Negative Lhermitte's or Spurling's    There is no tenderness to palpation of midline spine or paraspinal muscles.  There is no bony step-off or bony tenderness to palpation.    There is muscle spasms present.     No  tenderness to palpation of bilateral SI joint or trochanteric bursa  No pain on hip ROM.        Neurological: no focal deficit. He is alert and at baseline. He has normal motor skills. He displays no weakness, facial symmetry, normal reflexes and no dysarthria. No cranial nerve deficit or sensory deficit. He exhibits normal muscle tone. Gait and coordination normal. Coordination normal.   Skin: Skin is warm, dry, intact, not diaphoretic and no rash. Capillary refill takes less than 2 seconds.        Psychiatric: His speech is normal and behavior is normal. Mood, affect and thought content normal.   Nursing note and vitals reviewed.      XR LUMBAR SPINE AP AND LATERAL     CLINICAL HISTORY:  back pain;     TECHNIQUE:  AP, lateral and spot images were performed of the lumbar spine.     COMPARISON:  Lumbar spine series 09/05/2010 and CT abdomen and pelvis 08/08/2016     FINDINGS:  Mild dextrocurvature.  Lumbar lordosis is maintained.  Vertebral body heights appear maintained.  No displaced fracture, dislocation or significant listhesis.  No pars defect.  Slight loss of disc height at L5-S1.  Multilevel minimal endplate change with small marginal osteophytes and minimal to mild facet arthrosis.  No subcutaneous emphysema or radiodense retained foreign body.     Impression:     No acute displaced fracture-dislocation identified.        Electronically signed by: Ron Leon MD  Date:                                            06/20/2022  Time:                                           14:14  Assessment:       1. Acute right-sided low back pain with right-sided sciatica    2. Muscle spasm of back        On exam, patient is nontoxic appearing and vitals are stable.  Patient is essentially neurovascularly intact on exam.  Lumbar Xrays from ED visit 06/20/2022 showed no acute fracture or dislocation.  Mild dextroscoliosis present.  Mild disc height loss at L5-S1 with associated osteophyte formation and facet arthrosis.   Diagnostic testing results were independently reviewed and interpreted, which were discussed in depth with patient.  Patient was given 15mg Toradol injection clinic for the pain. Patient was prescribed medsand recommended OTC treatments for their symptoms. Patient was treated conservatively with activity modification, OTC pain reliever, muscle stretches, and RICE therapy. Patient was referred to neurosurgery for evaluation. If symptoms do not improve/worsens, patient was referred back to PCP for continued outpatient workup and management.       Patient was instructed to return for re-evaluation for any worsening or change in current symptoms. Strict ED versus clinic precautions given in depth. Discharge and follow-up instructions given verbally/printed with the patient who expressed understanding and willingness to comply with my recommendations.  Patient verbalized understanding and agreed with the entirety of plan of care.    Note dictated with voice recognition software, please excuse any grammatical errors.    Plan:         Acute right-sided low back pain with right-sided sciatica  -     ketorolac injection 15 mg  -     tiZANidine (ZANAFLEX) 4 MG tablet; Take 1 tablet (4 mg total) by mouth every 6 to 8 hours as needed (muscle spasm).  Dispense: 30 tablet; Refill: 0    Muscle spasm of back  -     ketorolac injection 15 mg  -     tiZANidine (ZANAFLEX) 4 MG tablet; Take 1 tablet (4 mg total) by mouth every 6 to 8 hours as needed (muscle spasm).  Dispense: 30 tablet; Refill: 0              Additional MDM:     Heart Failure Score:   COPD = No    Patient Instructions     PLEASE READ YOUR DISCHARGE INSTRUCTIONS ENTIRELY AS IT CONTAINS IMPORTANT INFORMATION.  - OTC Tylenol/anti-inflammatory as needed for pain (see below). These medications can be obtained over the counter at any local pharmacy without requiring a prescription.   OTC ORAL medications for pain reliever or fever reducing:  - Acetaminophen (tylenol 650mg  every 8 hour as needed with food) or Ibuprofen (advil,motrin 400-600mg every 8 hour with food as needed) as directed as needed for fever/pain. Avoid tylenol if you have a history of liver disease or allergic reactions. Do not take ibuprofen if you have a history of allergic reactions, stomach bleeding ulcers, kidney disease, or if you take blood thinners.  -The patient was advised that NSAID-type medications have two very important potential side effects: gastrointestinal irritation including hemorrhage and renal injuries. patient was asked to take the medication with food and to stop if patient experiences any GI upset. I asked patient to call for vomiting, abdominal pain or black/bloody stools. The patient expresses understanding of these issues and all questions were answered.    OTC TOPICAL meds for pain reliever :  -You can apply OTC diclofenac or Volatren Gel 2-3 times daily to muscle or joints.  -You can also apply OTC lidocaine with menthol ointment 2-3 daily to muscle or joints.  -Please let one absorb before using the other. Do not use both at the same time as it may decrease efficacy. Please stop using if you develop any skin rash or irritation.  -Please wash your hands after application of these topical products.     - do not use OTC anti-inflammatory if taking prescribed (Rx) anti-inflammatory; start Rx inflammatory tomorrow.    Because you were given a concentrated anti-inflammatory injection in clinic.    - no operating machinery with muscle relaxant as it may cause drowsiness.  - can continue OTC brace, splint or wrap as needed to help with your symptoms.  - If placed in splint, do not remove or get wet till evaluated by orthopedics.  - continue heat (muscle) /ice (bone/joint) compression, rice therapy, and muscle stretches.   - Radiographs and all diagnostic testing reviewed with patient  - if no improvement or worsening symptoms, recommend follow-up with *orthopedics or PCP for further evaluation.   Please call the number below to set up appointment; if a referral has been placed.  - If you smoke, please stop smoking.  - discussed weight loss given obesity  -You must understand that you've received an Urgent Care treatment only and that you may be released before all your medical problems are known or treated. You, the patient, will arrange for follow up care as instructed. Please arrange follow up with your primary medical clinic within 2-5 days if your signs and symptoms have not resolved or worsen.   - Follow up with your PCP or specialty clinic as directed.  You can call [phone] or [phone] to schedule an appointment with the appropriate provider.    - If your condition worsens or fails to improve we recommend that you receive another evaluation at the emergency room immediately or contact your primary medical clinic to discuss your concerns in next 2-5 days.  Strict ER versus clinic precautions given.      RED FLAGS/WARNING SYMPTOMS DISCUSSED WITH PATIENT THAT WOULD WARRANT EMERGENT MEDICAL ATTENTION. Patient aware and verbalized understanding.      RICE     Rest an injury, elevate it, and use ice and compression as directed.   RICE stands for rest, ice, compression, and elevation. These can limit pain and swelling after an injury. RICE may be recommended to help treat fractures, sprains, strains, and bruises or bumps.   Home care  The following explain the details of RICE:  · Rest. Limit the use of the injured body part. This helps prevent further damage to the body part and gives it time to heal. In some cases, you may need a sling, brace, splint, or cast to help keep the body part still until it has healed.  · Ice. Applying ice right after an injury helps relieve pain and swelling. Wrap a bag of ice in a thin towel. Then, place it over the injured area. Do this for 10 to 15 minutes every 3 to 4 hours. Continue for the next 1 to 3 days or until your symptoms improve. Never put ice directly on  your skin or ice an area longer than 15 minutes at a time.  · Compression. Putting pressure on an injury helps reduce swelling and provides support. Wrap the injured area firmly with an elastic bandage/wrap. Make sure not to wrap the bandage too tightly or you will cut off blood flow to the injured area. If your bandage loosens, rewrap it.  · Elevation. Keeping an injury raised above the level of your heart reduces swelling, pain, and throbbing. For instance, if you have a broken leg, it may help to rest your leg on several pillows when sitting or lying down. Try to keep the injured area elevated for at least 2 to 3 hours per day.  Follow-up care  Follow up with your healthcare provider, or as advised.  When to seek medical advice  Call your healthcare provider right away if any of these occur:  · Fever of 100.4°F (38°C) or higher, or as directed by your healthcare provider  · Increased pain or swelling in the injured body part  · Injured body part becomes cold, blue, numb, or tingly  · Signs of infection. These include warmth in the skin, redness, drainage, or bad smell coming from the injured body part.  Date Last Reviewed: 1/18/2016  © 8457-2591 [employer]. [address] [place] [address], [place] [address]. All rights reserved. This information is not intended as a substitute for professional medical care. Always follow your healthcare professional's instructions.

## 2022-06-22 NOTE — PATIENT INSTRUCTIONS
PLEASE READ YOUR DISCHARGE INSTRUCTIONS ENTIRELY AS IT CONTAINS IMPORTANT INFORMATION.  - OTC Tylenol/anti-inflammatory as needed for pain (see below). These medications can be obtained over the counter at any local pharmacy without requiring a prescription.   OTC ORAL medications for pain reliever or fever reducing:  - Acetaminophen (tylenol 650mg every 8 hour as needed with food) or Ibuprofen (advil,motrin 400-600mg every 8 hour with food as needed) as directed as needed for fever/pain. Avoid tylenol if you have a history of liver disease or allergic reactions. Do not take ibuprofen if you have a history of allergic reactions, stomach bleeding ulcers, kidney disease, or if you take blood thinners.  -The patient was advised that NSAID-type medications have two very important potential side effects: gastrointestinal irritation including hemorrhage and renal injuries. patient was asked to take the medication with food and to stop if patient experiences any GI upset. I asked patient to call for vomiting, abdominal pain or black/bloody stools. The patient expresses understanding of these issues and all questions were answered.    OTC TOPICAL meds for pain reliever :  -You can apply OTC diclofenac or Volatren Gel 2-3 times daily to muscle or joints.  -You can also apply OTC lidocaine with menthol ointment 2-3 daily to muscle or joints.  -Please let one absorb before using the other. Do not use both at the same time as it may decrease efficacy. Please stop using if you develop any skin rash or irritation.  -Please wash your hands after application of these topical products.     - do not use OTC anti-inflammatory if taking prescribed (Rx) anti-inflammatory; start Rx inflammatory tomorrow.    Because you were given a concentrated anti-inflammatory injection in clinic.    - no operating machinery with muscle relaxant as it may cause drowsiness.  - can continue OTC brace, splint or wrap as needed to help with your  symptoms.  - If placed in splint, do not remove or get wet till evaluated by orthopedics.  - continue heat (muscle) /ice (bone/joint) compression, rice therapy, and muscle stretches.   - Radiographs and all diagnostic testing reviewed with patient  - if no improvement or worsening symptoms, recommend follow-up with *orthopedics or PCP for further evaluation.  Please call the number below to set up appointment; if a referral has been placed.  - If you smoke, please stop smoking.  - discussed weight loss given obesity  -You must understand that you've received an Urgent Care treatment only and that you may be released before all your medical problems are known or treated. You, the patient, will arrange for follow up care as instructed. Please arrange follow up with your primary medical clinic within 2-5 days if your signs and symptoms have not resolved or worsen.   - Follow up with your PCP or specialty clinic as directed.  You can call [phone] or [phone] to schedule an appointment with the appropriate provider.    - If your condition worsens or fails to improve we recommend that you receive another evaluation at the emergency room immediately or contact your primary medical clinic to discuss your concerns in next 2-5 days.  Strict ER versus clinic precautions given.      RED FLAGS/WARNING SYMPTOMS DISCUSSED WITH PATIENT THAT WOULD WARRANT EMERGENT MEDICAL ATTENTION. Patient aware and verbalized understanding.      RICE     Rest an injury, elevate it, and use ice and compression as directed.   RICE stands for rest, ice, compression, and elevation. These can limit pain and swelling after an injury. RICE may be recommended to help treat fractures, sprains, strains, and bruises or bumps.   Home care  The following explain the details of RICE:  Rest. Limit the use of the injured body part. This helps prevent further damage to the body part and gives it time to heal. In some cases, you may need a sling, brace,  splint, or cast to help keep the body part still until it has healed.  Ice. Applying ice right after an injury helps relieve pain and swelling. Wrap a bag of ice in a thin towel. Then, place it over the injured area. Do this for 10 to 15 minutes every 3 to 4 hours. Continue for the next 1 to 3 days or until your symptoms improve. Never put ice directly on your skin or ice an area longer than 15 minutes at a time.  Compression. Putting pressure on an injury helps reduce swelling and provides support. Wrap the injured area firmly with an elastic bandage/wrap. Make sure not to wrap the bandage too tightly or you will cut off blood flow to the injured area. If your bandage loosens, rewrap it.  Elevation. Keeping an injury raised above the level of your heart reduces swelling, pain, and throbbing. For instance, if you have a broken leg, it may help to rest your leg on several pillows when sitting or lying down. Try to keep the injured area elevated for at least 2 to 3 hours per day.  Follow-up care  Follow up with your healthcare provider, or as advised.  When to seek medical advice  Call your healthcare provider right away if any of these occur:  Fever of 100.4°F (38°C) or higher, or as directed by your healthcare provider  Increased pain or swelling in the injured body part  Injured body part becomes cold, blue, numb, or tingly  Signs of infection. These include warmth in the skin, redness, drainage, or bad smell coming from the injured body part.  Date Last Reviewed: 1/18/2016  © 2483-5848 [employer]. [address] [place] [address], [place] [address]. All rights reserved. This information is not intended as a substitute for professional medical care. Always follow your healthcare professional's instructions.

## 2022-06-24 ENCOUNTER — OFFICE VISIT (OUTPATIENT)
Dept: OPHTHALMOLOGY | Facility: CLINIC | Age: 57
End: 2022-06-24
Payer: MEDICARE

## 2022-06-24 DIAGNOSIS — H40.022 OPEN ANGLE WITH BORDERLINE FINDINGS AND HIGH GLAUCOMA RISK IN LEFT EYE: ICD-10-CM

## 2022-06-24 DIAGNOSIS — H40.41X3 GLAUCOMA OF RIGHT EYE SECONDARY TO EYE INFLAMMATION, SEVERE STAGE: Primary | ICD-10-CM

## 2022-06-24 DIAGNOSIS — H40.9 GLAUCOMA OF RIGHT EYE, UNSPECIFIED GLAUCOMA TYPE: ICD-10-CM

## 2022-06-24 DIAGNOSIS — H54.40 BLIND PAINFUL RIGHT EYE: ICD-10-CM

## 2022-06-24 DIAGNOSIS — H18.20 CORNEAL EDEMA: ICD-10-CM

## 2022-06-24 DIAGNOSIS — H20.9 UVEITIS: ICD-10-CM

## 2022-06-24 DIAGNOSIS — H57.11 BLIND PAINFUL RIGHT EYE: ICD-10-CM

## 2022-06-24 PROCEDURE — 99999 PR PBB SHADOW E&M-EST. PATIENT-LVL III: ICD-10-PCS | Mod: PBBFAC,,, | Performed by: STUDENT IN AN ORGANIZED HEALTH CARE EDUCATION/TRAINING PROGRAM

## 2022-06-24 PROCEDURE — 1160F RVW MEDS BY RX/DR IN RCRD: CPT | Mod: CPTII,S$GLB,, | Performed by: STUDENT IN AN ORGANIZED HEALTH CARE EDUCATION/TRAINING PROGRAM

## 2022-06-24 PROCEDURE — 99999 PR PBB SHADOW E&M-EST. PATIENT-LVL III: CPT | Mod: PBBFAC,,, | Performed by: STUDENT IN AN ORGANIZED HEALTH CARE EDUCATION/TRAINING PROGRAM

## 2022-06-24 PROCEDURE — 99214 PR OFFICE/OUTPT VISIT, EST, LEVL IV, 30-39 MIN: ICD-10-PCS | Mod: S$GLB,,, | Performed by: STUDENT IN AN ORGANIZED HEALTH CARE EDUCATION/TRAINING PROGRAM

## 2022-06-24 PROCEDURE — 4010F PR ACE/ARB THEARPY RXD/TAKEN: ICD-10-PCS | Mod: CPTII,S$GLB,, | Performed by: STUDENT IN AN ORGANIZED HEALTH CARE EDUCATION/TRAINING PROGRAM

## 2022-06-24 PROCEDURE — 4010F ACE/ARB THERAPY RXD/TAKEN: CPT | Mod: CPTII,S$GLB,, | Performed by: STUDENT IN AN ORGANIZED HEALTH CARE EDUCATION/TRAINING PROGRAM

## 2022-06-24 PROCEDURE — 3051F PR MOST RECENT HEMOGLOBIN A1C LEVEL 7.0 - < 8.0%: ICD-10-PCS | Mod: CPTII,S$GLB,, | Performed by: STUDENT IN AN ORGANIZED HEALTH CARE EDUCATION/TRAINING PROGRAM

## 2022-06-24 PROCEDURE — 1160F PR REVIEW ALL MEDS BY PRESCRIBER/CLIN PHARMACIST DOCUMENTED: ICD-10-PCS | Mod: CPTII,S$GLB,, | Performed by: STUDENT IN AN ORGANIZED HEALTH CARE EDUCATION/TRAINING PROGRAM

## 2022-06-24 PROCEDURE — 1159F PR MEDICATION LIST DOCUMENTED IN MEDICAL RECORD: ICD-10-PCS | Mod: CPTII,S$GLB,, | Performed by: STUDENT IN AN ORGANIZED HEALTH CARE EDUCATION/TRAINING PROGRAM

## 2022-06-24 PROCEDURE — 1159F MED LIST DOCD IN RCRD: CPT | Mod: CPTII,S$GLB,, | Performed by: STUDENT IN AN ORGANIZED HEALTH CARE EDUCATION/TRAINING PROGRAM

## 2022-06-24 PROCEDURE — 99214 OFFICE O/P EST MOD 30 MIN: CPT | Mod: S$GLB,,, | Performed by: STUDENT IN AN ORGANIZED HEALTH CARE EDUCATION/TRAINING PROGRAM

## 2022-06-24 PROCEDURE — 3051F HG A1C>EQUAL 7.0%<8.0%: CPT | Mod: CPTII,S$GLB,, | Performed by: STUDENT IN AN ORGANIZED HEALTH CARE EDUCATION/TRAINING PROGRAM

## 2022-06-24 RX ORDER — ATROPINE SULFATE 10 MG/ML
1 SOLUTION/ DROPS OPHTHALMIC 2 TIMES DAILY
Qty: 15 ML | Refills: 3 | Status: SHIPPED | OUTPATIENT
Start: 2022-06-24 | End: 2022-08-24 | Stop reason: SDUPTHER

## 2022-06-24 RX ORDER — DORZOLAMIDE HYDROCHLORIDE AND TIMOLOL MALEATE 20; 5 MG/ML; MG/ML
1 SOLUTION/ DROPS OPHTHALMIC 2 TIMES DAILY
Qty: 3 EACH | Refills: 3 | Status: SHIPPED | OUTPATIENT
Start: 2022-06-24 | End: 2022-08-24 | Stop reason: SDUPTHER

## 2022-07-26 ENCOUNTER — TELEPHONE (OUTPATIENT)
Dept: OPHTHALMOLOGY | Facility: CLINIC | Age: 57
End: 2022-07-26
Payer: MEDICARE

## 2022-07-26 NOTE — TELEPHONE ENCOUNTER
Appt has been schedule for 7/29/2022 at 8:45 AM with Dr. Gan at [facility]

## 2022-08-24 ENCOUNTER — TELEPHONE (OUTPATIENT)
Dept: OPHTHALMOLOGY | Facility: CLINIC | Age: 57
End: 2022-08-24
Payer: MEDICARE

## 2022-08-24 DIAGNOSIS — H57.11 BLIND PAINFUL RIGHT EYE: ICD-10-CM

## 2022-08-24 DIAGNOSIS — H54.40 BLIND PAINFUL RIGHT EYE: ICD-10-CM

## 2022-08-24 DIAGNOSIS — H40.41X3 GLAUCOMA OF RIGHT EYE SECONDARY TO EYE INFLAMMATION, SEVERE STAGE: ICD-10-CM

## 2022-08-24 DIAGNOSIS — H40.9 GLAUCOMA OF RIGHT EYE, UNSPECIFIED GLAUCOMA TYPE: ICD-10-CM

## 2022-08-24 NOTE — TELEPHONE ENCOUNTER
Contacted pt- Rescheduled appointment with Dr. Gan for 9/7/2022.    ----- Message from Betty Solis sent at 8/24/2022  3:02 PM CDT -----  Regarding: Appt  Contact: SE ROJO  Pt is calling to reschedule appt. I was not able to see any available appts.    [phone]

## 2022-08-24 NOTE — TELEPHONE ENCOUNTER
Contact pt- Refilled his eye medication with the [facility] requested.    ----- Message from Maricruz Anya sent at 8/24/2022  3:59 PM CDT -----  Regarding: Refill  Contact: Pt  Please refill the following :    1. dorzolamide-timolol 2-0.5% (COSOPT) 22.3-6.8 mg/mL ophthalmic solution 3 each 3 6/24/2022 6/24/2023 No  Sig - Route: Place 1 drop into both eyes 2 (two) times daily. - Both Eyes        2. atropine 1% (ISOPTO ATROPINE) 1 % Drop 15 mL 3 6/24/2022  No  Sig - Route: Place 1 drop into the right eye 2 (two) times a day. - Right Eye  Sent to pharmacy as: atropine 1% (ISOPTO ATROPINE) 1 % Drop        Please  send to the following pharmacy:    [id number] - [place], [place] - [address] AT [facility]

## 2022-08-25 RX ORDER — DORZOLAMIDE HYDROCHLORIDE AND TIMOLOL MALEATE 20; 5 MG/ML; MG/ML
1 SOLUTION/ DROPS OPHTHALMIC 2 TIMES DAILY
Qty: 10 ML | Refills: 11 | Status: SHIPPED | OUTPATIENT
Start: 2022-08-25 | End: 2023-09-05

## 2022-08-25 RX ORDER — ATROPINE SULFATE 10 MG/ML
1 SOLUTION/ DROPS OPHTHALMIC 2 TIMES DAILY
Qty: 15 ML | Refills: 11 | Status: SHIPPED | OUTPATIENT
Start: 2022-08-25 | End: 2023-11-09

## 2023-01-04 ENCOUNTER — OFFICE VISIT (OUTPATIENT)
Dept: URGENT CARE | Facility: CLINIC | Age: 58
End: 2023-01-04
Payer: MEDICARE

## 2023-01-04 VITALS
OXYGEN SATURATION: 97 % | HEART RATE: 84 BPM | RESPIRATION RATE: 17 BRPM | DIASTOLIC BLOOD PRESSURE: 81 MMHG | BODY MASS INDEX: 32.96 KG/M2 | HEIGHT: 67 IN | TEMPERATURE: 99 F | SYSTOLIC BLOOD PRESSURE: 122 MMHG | WEIGHT: 210 LBS

## 2023-01-04 DIAGNOSIS — B02.8 HERPES ZOSTER WITH COMPLICATION: Primary | ICD-10-CM

## 2023-01-04 DIAGNOSIS — M54.9 BACK PAIN, UNSPECIFIED BACK LOCATION, UNSPECIFIED BACK PAIN LATERALITY, UNSPECIFIED CHRONICITY: ICD-10-CM

## 2023-01-04 LAB
BILIRUB UR QL STRIP: NEGATIVE
GLUCOSE UR QL STRIP: NEGATIVE
KETONES UR QL STRIP: NEGATIVE
LEUKOCYTE ESTERASE UR QL STRIP: NEGATIVE
PH, POC UA: 5
POC BLOOD, URINE: NEGATIVE
POC NITRATES, URINE: NEGATIVE
PROT UR QL STRIP: NEGATIVE
SP GR UR STRIP: 1.02 (ref 1–1.03)
UROBILINOGEN UR STRIP-ACNC: 1 (ref 0.3–2.2)

## 2023-01-04 PROCEDURE — 3074F PR MOST RECENT SYSTOLIC BLOOD PRESSURE < 130 MM HG: ICD-10-PCS | Mod: CPTII,S$GLB,, | Performed by: PHYSICIAN ASSISTANT

## 2023-01-04 PROCEDURE — 1159F PR MEDICATION LIST DOCUMENTED IN MEDICAL RECORD: ICD-10-PCS | Mod: CPTII,S$GLB,, | Performed by: PHYSICIAN ASSISTANT

## 2023-01-04 PROCEDURE — 81003 POCT URINALYSIS, DIPSTICK, AUTOMATED, W/O SCOPE: ICD-10-PCS | Mod: QW,S$GLB,, | Performed by: PHYSICIAN ASSISTANT

## 2023-01-04 PROCEDURE — 1160F RVW MEDS BY RX/DR IN RCRD: CPT | Mod: CPTII,S$GLB,, | Performed by: PHYSICIAN ASSISTANT

## 2023-01-04 PROCEDURE — 3079F DIAST BP 80-89 MM HG: CPT | Mod: CPTII,S$GLB,, | Performed by: PHYSICIAN ASSISTANT

## 2023-01-04 PROCEDURE — 81003 URINALYSIS AUTO W/O SCOPE: CPT | Mod: QW,S$GLB,, | Performed by: PHYSICIAN ASSISTANT

## 2023-01-04 PROCEDURE — 3079F PR MOST RECENT DIASTOLIC BLOOD PRESSURE 80-89 MM HG: ICD-10-PCS | Mod: CPTII,S$GLB,, | Performed by: PHYSICIAN ASSISTANT

## 2023-01-04 PROCEDURE — 99213 OFFICE O/P EST LOW 20 MIN: CPT | Mod: S$GLB,,, | Performed by: PHYSICIAN ASSISTANT

## 2023-01-04 PROCEDURE — 3008F PR BODY MASS INDEX (BMI) DOCUMENTED: ICD-10-PCS | Mod: CPTII,S$GLB,, | Performed by: PHYSICIAN ASSISTANT

## 2023-01-04 PROCEDURE — 1160F PR REVIEW ALL MEDS BY PRESCRIBER/CLIN PHARMACIST DOCUMENTED: ICD-10-PCS | Mod: CPTII,S$GLB,, | Performed by: PHYSICIAN ASSISTANT

## 2023-01-04 PROCEDURE — 3008F BODY MASS INDEX DOCD: CPT | Mod: CPTII,S$GLB,, | Performed by: PHYSICIAN ASSISTANT

## 2023-01-04 PROCEDURE — 1159F MED LIST DOCD IN RCRD: CPT | Mod: CPTII,S$GLB,, | Performed by: PHYSICIAN ASSISTANT

## 2023-01-04 PROCEDURE — 3074F SYST BP LT 130 MM HG: CPT | Mod: CPTII,S$GLB,, | Performed by: PHYSICIAN ASSISTANT

## 2023-01-04 PROCEDURE — 99213 PR OFFICE/OUTPT VISIT, EST, LEVL III, 20-29 MIN: ICD-10-PCS | Mod: S$GLB,,, | Performed by: PHYSICIAN ASSISTANT

## 2023-01-04 RX ORDER — PREDNISONE 10 MG/1
TABLET ORAL
Qty: 30 TABLET | Refills: 0 | Status: SHIPPED | OUTPATIENT
Start: 2023-01-04 | End: 2023-01-17

## 2023-01-04 RX ORDER — MUPIROCIN 20 MG/G
OINTMENT TOPICAL 3 TIMES DAILY
Qty: 1 G | Refills: 0 | Status: SHIPPED | OUTPATIENT
Start: 2023-01-04 | End: 2023-01-09

## 2023-01-04 NOTE — PROGRESS NOTES
"Subjective:       Patient ID: Se Virgil Mcgraw is a 57 y.o. male.    Vitals:  height is 5' 7" (1.702 m) and weight is 95.3 kg (210 lb). His tympanic temperature is 98.7 °F (37.1 °C). His blood pressure is 122/81 and his pulse is 84. His respiration is 17 and oxygen saturation is 97%.     Chief Complaint: Back Pain    Patient presents with back pain that is radiating down right leg he reports it painful to walk and change positions. Onset a week ago. Patient denies an injury     Back Pain  This is a new problem. Episode onset: 7 days ago. The problem occurs constantly. The problem is unchanged. The pain is present in the lumbar spine. The quality of the pain is described as aching. The pain radiates to the right thigh. The pain is at a severity of 10/10. The pain is severe. The symptoms are aggravated by bending, twisting and position. Associated symptoms include weakness. Pertinent negatives include no abdominal pain, chest pain, fever, headaches, numbness or tingling. He has tried nothing for the symptoms. The treatment provided no relief.     Constitution: Negative for appetite change, chills, sweating, fatigue, fever and unexpected weight change.   HENT:  Negative for ear pain, ear discharge, mouth sores, tongue pain, tongue lesion, congestion, postnasal drip, sinus pain, sinus pressure, sore throat and trouble swallowing.    Neck: Negative for neck pain and neck stiffness.   Cardiovascular:  Negative for chest pain, palpitations and sob on exertion.   Eyes:  Negative for eye discharge and eye itching.   Respiratory:  Negative for chest tightness, cough and shortness of breath.    Gastrointestinal:  Negative for abdominal pain, nausea, vomiting, constipation and diarrhea.   Musculoskeletal:  Positive for pain and back pain. Negative for muscle cramps and muscle ache.   Skin:  Positive for rash. Negative for color change and pale.   Neurological:  Negative for dizziness, headaches, disorientation, altered mental " status and numbness.   Psychiatric/Behavioral:  Negative for altered mental status, disorientation and confusion.    Past Medical History:   Diagnosis Date    COPD (chronic obstructive pulmonary disease)     Coronary artery disease     Diabetes mellitus type II     DJD (degenerative joint disease)     Gout     Hypertension     Kidney stone     MI (myocardial infarction) 2010    Obesity     JOSE (obstructive sleep apnea)        Past Surgical History:   Procedure Laterality Date    CARDIAC DEFIBRILLATOR PLACEMENT      CATARACT EXTRACTION Right     CYSTOSCOPY N/A 10/1/2020    Procedure: CYSTOSCOPY;  Surgeon: Monica Lieberman MD;  Location: [facility] OR;  Service: Urology;  Laterality: N/A;  RN PRE OP Covid screen 9-  C A    excisional biopsy left inguinal lymph node      FOOT SURGERY      right foot surgery     INSERTION OF INFLATABLE PENILE PROSTHESIS N/A 1/25/2022    Procedure: INSERTION, PENILE PROSTHESIS, INFLATABLE;  Surgeon: Monica Lieberman MD;  Location: [facility] OR;  Service: Urology;  Laterality: N/A;  [employer] JUAN LUIS ARNOLD [phone] TEXTED HIM @ 5:32PM ON 12-  RN PRE OP 12-28-21. Covid NEGATIVE 1-3-22. CA-----AICD-----HAS CARDS CLEARANCE    kidney stones      lithotripsy    REPAIR, SURGICAL WOUND, EYE, ANTERIOR SEGMENT Right 10/5/2021    Procedure: REPAIR, SURGICAL WOUND, EYE, ANTERIOR SEGMENT;  Surgeon: Adelaide Allen MD;  Location: [address];  Service: Ophthalmology;  Laterality: Right;  Anterior Chamber Wash Out Right Eye     Surgery for bulging disc at C7         Family History   Problem Relation Age of Onset    Diabetes Mother     Hypertension Mother     Heart disease Father     Diabetes Brother     Hypertension Brother     Asthma Daughter     Cancer Maternal Uncle         prostate    Cancer Cousin     Kidney disease Cousin        Social History     Socioeconomic History    Marital status: Single   Tobacco Use    Smoking status: Never     Passive exposure: Past    Smokeless  tobacco: Never   Substance and Sexual Activity    Alcohol use: No    Drug use: No    Sexual activity: Not Currently     Partners: Female     Comment: divorce       Current Outpatient Medications   Medication Sig Dispense Refill    albuterol (PROVENTIL/VENTOLIN HFA) 90 mcg/actuation inhaler Inhale 2 puffs into the lungs every 4 (four) hours as needed for Wheezing or Shortness of Breath. Rescue 1 Inhaler 0    allopurinol (ZYLOPRIM) 100 MG tablet   5    amiodarone (PACERONE) 400 MG tablet Take 200 mg by mouth every morning.   1    amlodipine (NORVASC) 10 MG tablet Take 10 mg by mouth every morning.   5    aspirin (ECOTRIN) 81 MG EC tablet Take 81 mg by mouth.      atropine 1% (ISOPTO ATROPINE) 1 % Drop Place 1 drop into the right eye 2 (two) times a day. 15 mL 11    blood sugar diagnostic Strp ACCU CHEK KEVAN PLUS TEST STRIPS      blood-glucose meter Misc ACCU CHEK KEVAN PLUS METER: USE 4X/D      cetirizine (ZYRTEC) 10 MG tablet Take 10 mg by mouth once daily.      CRESTOR 20 mg tablet Take 20 mg by mouth every evening.   5    dicyclomine (BENTYL) 20 mg tablet Take 20 mg by mouth 2 (two) times daily.  2    divalproex ER (DEPAKOTE) 500 MG Tb24 Take 500 mg by mouth.      docusate sodium (COLACE) 100 MG capsule Take 1 capsule (100 mg total) by mouth 2 (two) times daily. 30 capsule 0    dorzolamide-timolol 2-0.5% (COSOPT) 22.3-6.8 mg/mL ophthalmic solution Place 1 drop into both eyes 2 (two) times daily. 10 mL 11    famotidine (PEPCID) 20 MG tablet Take 20 mg by mouth 2 (two) times daily as needed.      fluticasone propionate (FLONASE) 50 mcg/actuation nasal spray 2 sprays (100 mcg total) by Each Nostril route once daily. (Patient taking differently: 2 sprays by Each Nostril route daily as needed.) 1 Bottle 0    Fortified Tobramycin 15 mg/ml Opht (TOBREX) 15 mg Drop Place 1 drop into the right eye every 6 (six) hours.  0    furosemide (LASIX) 20 MG tablet Take 20 mg by mouth daily as needed. Takes every other day  5     "gabapentin (NEURONTIN) 300 MG capsule Take 800 mg by mouth 2 (two) times daily. Takes  Two 300 mg tabs at night      hydroCHLOROthiazide (MICROZIDE) 12.5 mg capsule Take 12.5 mg by mouth.      insulin glargine 100 units/mL (3mL) SubQ pen Inject 60 Units into the skin once daily.      insulin glargine-lixisenatide (SOLIQUA 100/33) 100 unit-33 mcg/mL InPn pen Inject 60 Units into the skin.      INVOKANA 300 mg Tab tablet TK 1 T PO QD  5    isosorbide mononitrate (IMDUR) 30 MG 24 hr tablet Take 30 mg by mouth every morning.   6    ketoconazole (NIZORAL) 2 % cream Apply  a small amount to affected area twice a day  apply to skin for fungal infection      lancets [facility] [place] ACCU CHEK SOFTCLIX LANCETS: USE 4X DAILY      LIDOcaine (LIDODERM) 5 % Apply 1-3 patches every day prn pain      lisinopriL (PRINIVIL,ZESTRIL) 20 MG tablet Take 1 tablet by mouth once daily.      methazoLAMIDE (NEPTAZANE) 25 mg tablet Take 25 mg by mouth once daily.      metoclopramide HCl (REGLAN) 10 MG tablet Take 1 tablet (10 mg total) by mouth every 6 (six) hours as needed. 30 tablet 0    metoprolol succinate (TOPROL-XL) 50 MG 24 hr tablet Take 50 mg by mouth every evening.   5    miscellaneous medical supply (C-TUB) [facility] [place] DIABETIC SHOES      natamycin (NATACYN) 5 % ophthalmic solution Place 1 drop into the right eye 6 (six) times daily. (Patient taking differently: Place 1 drop into the right eye 2 (two) times a day.) 15 mL 3    nitroGLYCERIN (NITROSTAT) 0.4 MG SL tablet Place 0.4 mg under the tongue every 5 (five) minutes as needed for Chest pain.      omega-3 acid ethyl esters (LOVAZA) 1 gram capsule Take 2 capsules by mouth.      omeprazole (PRILOSEC OTC) 20 MG tablet Take 1 tablet by mouth.      omeprazole (PRILOSEC) 20 MG capsule TK 1 C PO QD FOR CONTROL OF STOMACH ACID BEFORE BREAKFAST  1    pen needle, diabetic 31 gauge x 5/16" Ndle Inject 1 each into the skin.      potassium chloride SA (K-DUR,KLOR-CON) 20 MEQ tablet Take by mouth once daily.  5 "    promethazine-dextromethorphan (PROMETHAZINE-DM) 6.25-15 mg/5 mL Syrp Take 5 mLs by mouth.      sotaloL (BETAPACE) 80 MG tablet TAKE 1 TABLET BY MOUTH TWICE DAILY      spironolactone (ALDACTONE) 25 MG tablet Take 25 mg by mouth daily as needed.      tiZANidine (ZANAFLEX) 4 MG tablet Take 4 mg by mouth nightly as needed.      zolpidem (AMBIEN) 10 mg Tab Take 5 mg by mouth nightly as needed.      acetaZOLAMIDE (DIAMOX) 250 MG tablet Take 1 tablet (250 mg total) by mouth 2 (two) times daily. 60 tablet 11    DULoxetine (CYMBALTA) 60 MG capsule Take 60 mg by mouth.      mupirocin (BACTROBAN) 2 % ointment Apply topically 3 (three) times daily. for 5 days 1 g 0    predniSONE (DELTASONE) 10 MG tablet Take 4 tablets (40 mg total) by mouth once daily for 3 days, THEN 3 tablets (30 mg total) once daily for 3 days, THEN 2 tablets (20 mg total) once daily for 3 days, THEN 1 tablet (10 mg total) once daily for 2 days, THEN 0.5 tablets (5 mg total) once daily for 2 days. 30 tablet 0    tamsulosin (FLOMAX) 0.4 mg Cp24 Take 1 capsule (0.4 mg total) by mouth once daily. 30 capsule 2    ZOLMitriptan (ZOMIG) 2.5 mg tablet Take 2.5 mg by mouth daily as needed.       No current facility-administered medications for this visit.       Review of patient's allergies indicates:   Allergen Reactions    Morphine Itching and Other (See Comments)     Pt denies morphine as an allergy reports is is allergic to a medicine but has no idea what it is      Propoxyphene     Tramadol Itching    Darvocet a500 [propoxyphene n-acetaminophen] Itching         Objective:      Physical Exam   Constitutional: He is oriented to person, place, and time.  Non-toxic appearance. He does not appear ill. No distress.   HENT:   Head: Normocephalic and atraumatic.   Ears:   Right Ear: External ear normal.   Left Ear: External ear normal.   Nose: Nose normal.   Eyes: Conjunctivae are normal.   Cardiovascular: Normal rate, regular rhythm, normal heart sounds and normal  pulses.   No murmur heard.Exam reveals no gallop.   Pulmonary/Chest: Effort normal and breath sounds normal. No respiratory distress. He has no wheezes. He has no rales.   Abdominal: Normal appearance.   Musculoskeletal: Normal range of motion.         General: No swelling, tenderness, deformity or signs of injury. Normal range of motion.   Neurological: He is alert and oriented to person, place, and time. He displays no weakness. No sensory deficit. Coordination normal.   Skin: Skin is warm, dry, rash and vesicular.        Psychiatric: His behavior is normal. Mood normal.   Nursing note and vitals reviewed.      Results for orders placed or performed in visit on 01/04/23   POCT Urinalysis, Dipstick, Automated, W/O Scope   Result Value Ref Range    POC Blood, Urine Negative Negative    POC Bilirubin, Urine Negative Negative    POC Urobilinogen, Urine 1.0 0.3 - 2.2    POC Ketones, Urine Negative Negative    POC Protein, Urine Negative Negative    POC Nitrates, Urine Negative Negative    POC Glucose, Urine Negative Negative    pH, UA 5.0     POC Specific Gravity, Urine 1.025 1.003 - 1.029    POC Leukocytes, Urine Negative Negative       Assessment:       1. Herpes zoster with complication    2. Back pain, unspecified back location, unspecified back pain laterality, unspecified chronicity          Plan:         Herpes zoster with complication  -     predniSONE (DELTASONE) 10 MG tablet; Take 4 tablets (40 mg total) by mouth once daily for 3 days, THEN 3 tablets (30 mg total) once daily for 3 days, THEN 2 tablets (20 mg total) once daily for 3 days, THEN 1 tablet (10 mg total) once daily for 2 days, THEN 0.5 tablets (5 mg total) once daily for 2 days.  Dispense: 30 tablet; Refill: 0  -     mupirocin (BACTROBAN) 2 % ointment; Apply topically 3 (three) times daily. for 5 days  Dispense: 1 g; Refill: 0    Back pain, unspecified back location, unspecified back pain laterality, unspecified chronicity  -     POCT Urinalysis,  Dipstick, Automated, W/O Scope    - wound cleaned, dressed and topical antibiotic ointment placed.   Patient Instructions   Use steriods with food as discussed. Use topical bactroban for wounds as directed and cover until healed. Be aware of signs of infection such as increased pain, drainage or new lesions. Follow up as needed

## 2023-01-04 NOTE — PATIENT INSTRUCTIONS
Use steriods with food as discussed. Use topical bactroban for wounds as directed and cover until healed. Be aware of signs of infection such as increased pain, drainage or new lesions. Follow up as needed

## 2023-03-31 ENCOUNTER — OFFICE VISIT (OUTPATIENT)
Dept: UROLOGY | Facility: CLINIC | Age: 58
End: 2023-03-31
Payer: MEDICARE

## 2023-03-31 VITALS — BODY MASS INDEX: 33.56 KG/M2 | WEIGHT: 214.31 LBS

## 2023-03-31 DIAGNOSIS — R39.89 SUSPECTED UTI: Primary | ICD-10-CM

## 2023-03-31 DIAGNOSIS — R10.30 INGUINAL PAIN, UNSPECIFIED LATERALITY: ICD-10-CM

## 2023-03-31 PROCEDURE — 4010F PR ACE/ARB THEARPY RXD/TAKEN: ICD-10-PCS | Mod: CPTII,S$GLB,, | Performed by: STUDENT IN AN ORGANIZED HEALTH CARE EDUCATION/TRAINING PROGRAM

## 2023-03-31 PROCEDURE — 1159F PR MEDICATION LIST DOCUMENTED IN MEDICAL RECORD: ICD-10-PCS | Mod: CPTII,S$GLB,, | Performed by: STUDENT IN AN ORGANIZED HEALTH CARE EDUCATION/TRAINING PROGRAM

## 2023-03-31 PROCEDURE — 3008F PR BODY MASS INDEX (BMI) DOCUMENTED: ICD-10-PCS | Mod: CPTII,S$GLB,, | Performed by: STUDENT IN AN ORGANIZED HEALTH CARE EDUCATION/TRAINING PROGRAM

## 2023-03-31 PROCEDURE — 4010F ACE/ARB THERAPY RXD/TAKEN: CPT | Mod: CPTII,S$GLB,, | Performed by: STUDENT IN AN ORGANIZED HEALTH CARE EDUCATION/TRAINING PROGRAM

## 2023-03-31 PROCEDURE — 87088 URINE BACTERIA CULTURE: CPT | Performed by: STUDENT IN AN ORGANIZED HEALTH CARE EDUCATION/TRAINING PROGRAM

## 2023-03-31 PROCEDURE — 3008F BODY MASS INDEX DOCD: CPT | Mod: CPTII,S$GLB,, | Performed by: STUDENT IN AN ORGANIZED HEALTH CARE EDUCATION/TRAINING PROGRAM

## 2023-03-31 PROCEDURE — 87186 SC STD MICRODIL/AGAR DIL: CPT | Performed by: STUDENT IN AN ORGANIZED HEALTH CARE EDUCATION/TRAINING PROGRAM

## 2023-03-31 PROCEDURE — 99999 PR PBB SHADOW E&M-EST. PATIENT-LVL III: CPT | Mod: PBBFAC,,, | Performed by: STUDENT IN AN ORGANIZED HEALTH CARE EDUCATION/TRAINING PROGRAM

## 2023-03-31 PROCEDURE — 87086 URINE CULTURE/COLONY COUNT: CPT | Performed by: STUDENT IN AN ORGANIZED HEALTH CARE EDUCATION/TRAINING PROGRAM

## 2023-03-31 PROCEDURE — 99214 OFFICE O/P EST MOD 30 MIN: CPT | Mod: S$GLB,,, | Performed by: STUDENT IN AN ORGANIZED HEALTH CARE EDUCATION/TRAINING PROGRAM

## 2023-03-31 PROCEDURE — 1159F MED LIST DOCD IN RCRD: CPT | Mod: CPTII,S$GLB,, | Performed by: STUDENT IN AN ORGANIZED HEALTH CARE EDUCATION/TRAINING PROGRAM

## 2023-03-31 PROCEDURE — 99214 PR OFFICE/OUTPT VISIT, EST, LEVL IV, 30-39 MIN: ICD-10-PCS | Mod: S$GLB,,, | Performed by: STUDENT IN AN ORGANIZED HEALTH CARE EDUCATION/TRAINING PROGRAM

## 2023-03-31 PROCEDURE — 99999 PR PBB SHADOW E&M-EST. PATIENT-LVL III: ICD-10-PCS | Mod: PBBFAC,,, | Performed by: STUDENT IN AN ORGANIZED HEALTH CARE EDUCATION/TRAINING PROGRAM

## 2023-03-31 PROCEDURE — 87077 CULTURE AEROBIC IDENTIFY: CPT | Performed by: STUDENT IN AN ORGANIZED HEALTH CARE EDUCATION/TRAINING PROGRAM

## 2023-03-31 RX ORDER — AMOXICILLIN AND CLAVULANATE POTASSIUM 875; 125 MG/1; MG/1
1 TABLET, FILM COATED ORAL EVERY 12 HOURS
Qty: 28 TABLET | Refills: 0 | Status: SHIPPED | OUTPATIENT
Start: 2023-03-31 | End: 2023-04-14

## 2023-03-31 RX ORDER — METHOCARBAMOL 500 MG/1
500 TABLET, FILM COATED ORAL 4 TIMES DAILY
Qty: 40 TABLET | Refills: 0 | Status: SHIPPED | OUTPATIENT
Start: 2023-03-31 | End: 2023-04-10

## 2023-03-31 NOTE — PROGRESS NOTES
Patient ID: Se Virgil Mcgraw is a 58 y.o. male.    Chief Complaint: Groin Pain    Referral: No referring provider defined for this encounter.     HPI  58 y.o. who presents to the Urology clinic for evaluation of groin pain for past 3 weeks, patient denies voiding difficulty, has incontinence at baseline. Patient has hx of IPP, has not used/cycled device since last appointment. Patient denies tenderness along infrapubic incision site, penis or resevoir site. Denies fevers, or chills. Denies scrotal swelling.       Medically Necessary ROS documented in HPI    Past Medical History  Active Ambulatory Problems     Diagnosis Date Noted    Diabetes mellitus type 2 in obese 08/30/2015    Essential hypertension 08/30/2015    Brachial neuritis or radiculitis NOS 09/15/2015    Elevated LFTs 11/17/2015    Sleep apnea 11/17/2015    CKD (chronic kidney disease) stage 2, GFR 60-89 ml/min 11/17/2015    History of gout 11/17/2015    HLD (hyperlipidemia) 11/17/2015    GERD (gastroesophageal reflux disease) 11/17/2015    Edema 11/17/2015    AICD (automatic cardioverter/defibrillator) present 11/17/2015    Non-ischemic cardiomyopathy 11/19/2015    Restrictive lung disease 11/20/2015    Cervical disc disorder with radiculopathy 11/23/2015    Inguinal lymphadenopathy 06/02/2017    Cellulitis of penis 07/06/2018    Lower urinary tract symptoms (LUTS) 10/01/2020    Corneal ulcer 09/30/2021    Ventricular tachycardia 09/30/2021    Chronic combined systolic and diastolic heart failure 09/30/2021    Chronic hip pain 09/30/2021    Glaucoma, right eye 09/30/2021    Insomnia 09/30/2021    Endophthalmitis, acute, right 10/02/2021    Erectile dysfunction 01/25/2022     Resolved Ambulatory Problems     Diagnosis Date Noted    Abscess of right leg 09/30/2012    Pain in the chest 08/30/2015    Acute pancreatitis 08/05/2016    Acute kidney injury 08/06/2016     Past Medical History:   Diagnosis Date    COPD (chronic obstructive pulmonary disease)   Navdeep Avery is a 62 y.o. male seen today for follow-up for his hyperlipidemia and diabetes.  Patient is fasting this morning but his A1c will not be due until the 15th.  We will ask his home health nurse to draw his A1c at that time.  Patient is also here for TCC visit to follow-up for his PAD and diabetic wound.  Patient reports he is doing well and his blood sugars are well controlled.  Patient has been out of his Vistaril on Lexapro and has been battling increased symptoms of anxiety lately.  I have asked him to restart his Lexapro but for the next 2 weeks to only take it daily.    Past Medical History:   Diagnosis Date    Anemia     Anxiety     Atherosclerotic heart disease of native coronary artery with other forms of angina pectoris     Atrophic rhinitis     Carpal tunnel syndrome     Cellulitis of right lower extremity     COPD (chronic obstructive pulmonary disease)     Coronary arteriosclerosis     COVID 02/02/2022    Depression     Diabetic ulcer of right foot associated with diabetes mellitus due to underlying condition, with bone involvement without evidence of necrosis     GERD (gastroesophageal reflux disease)     Hyperlipidemia     Hypertension     Insomnia     MI (myocardial infarction)     Neuropathy     Peripheral vascular disease, unspecified     Right foot ulcer, with fat layer exposed 01/07/2015    Sleep apnea     Type 1 diabetes mellitus with foot ulcer     Type 2 diabetes mellitus      Family History   Problem Relation Age of Onset    Arthritis Mother     Hypertension Mother     Learning disabilities Mother     Alcohol abuse Father     Early death Father     Heart disease Father     Cancer Sister     Diabetes Sister     Heart disease Maternal Grandfather     COPD Paternal Grandfather     Heart disease Son      Current Outpatient Medications on File Prior to Visit   Medication Sig Dispense Refill    albuterol (PROVENTIL) 2.5 mg /3 mL (0.083 %) nebulizer     Coronary artery disease     Diabetes mellitus type II     DJD (degenerative joint disease)     Gout     Hypertension     Kidney stone     MI (myocardial infarction) 2010    Obesity     JOSE (obstructive sleep apnea)          Past Surgical History  Past Surgical History:   Procedure Laterality Date    CARDIAC DEFIBRILLATOR PLACEMENT      CATARACT EXTRACTION Right     CYSTOSCOPY N/A 10/1/2020    Procedure: CYSTOSCOPY;  Surgeon: Monica Lieberman MD;  Location: [facility] OR;  Service: Urology;  Laterality: N/A;  RN PRE OP Covid screen 9-  C A    excisional biopsy left inguinal lymph node      FOOT SURGERY      right foot surgery     INSERTION OF INFLATABLE PENILE PROSTHESIS N/A 1/25/2022    Procedure: INSERTION, PENILE PROSTHESIS, INFLATABLE;  Surgeon: Monica Lieberman MD;  Location: [facility] OR;  Service: Urology;  Laterality: N/A;  Riffyn JUAN LUIS ARNOLD [phone] TEXTED HIM @ 5:32PM ON 12-  RN PRE OP 12-28-21. Covid NEGATIVE 1-3-22. CA-----AICD-----HAS CARDS CLEARANCE    kidney stones      lithotripsy    REPAIR, SURGICAL WOUND, EYE, ANTERIOR SEGMENT Right 10/5/2021    Procedure: REPAIR, SURGICAL WOUND, EYE, ANTERIOR SEGMENT;  Surgeon: Adelaide Allen MD;  Location: [facility] OR [address] [place] [address];  Service: Ophthalmology;  Laterality: Right;  Anterior Chamber Wash Out Right Eye     Surgery for bulging disc at C7         Social History  Social Connections: Not on file       Medications    Current Outpatient Medications:     albuterol (PROVENTIL/VENTOLIN HFA) 90 mcg/actuation inhaler, Inhale 2 puffs into the lungs every 4 (four) hours as needed for Wheezing or Shortness of Breath. Rescue, Disp: 1 Inhaler, Rfl: 0    allopurinol (ZYLOPRIM) 100 MG tablet, , Disp: , Rfl: 5    amiodarone (PACERONE) 400 MG tablet, Take 200 mg by mouth every morning. , Disp: , Rfl: 1    amlodipine (NORVASC) 10 MG tablet, Take 10 mg by mouth every morning. , Disp: , Rfl: 5    aspirin (ECOTRIN) 81 MG EC tablet, Take 81 mg by mouth.,  solution USE 1 VIAL IN NEBULIZER 3 TIMES DAILY 100 mL 11    albuterol (PROVENTIL/VENTOLIN HFA) 90 mcg/actuation inhaler Inhale 2 puffs into the lungs 2 (two) times daily as needed for Wheezing. 18 g 2    amLODIPine (NORVASC) 10 MG tablet Take 1 tablet (10 mg total) by mouth once daily. 90 tablet 1    amoxicillin-clavulanate 875-125mg (AUGMENTIN) 875-125 mg per tablet Take 1 tablet by mouth every 12 (twelve) hours. 20 tablet 0    aspirin 81 MG Chew Take 81 mg by mouth once daily.      atorvastatin (LIPITOR) 40 MG tablet Take 1 tablet (40 mg total) by mouth nightly. 90 tablet 1    clopidogreL (PLAVIX) 75 mg tablet Take 1 tablet (75 mg total) by mouth once daily. 90 tablet 1    diclofenac sodium (VOLTAREN) 1 % Gel Apply 1 g topically 4 (four) times daily as needed (pain). 20 g 1    empagliflozin (JARDIANCE) 10 mg tablet Take 1 tablet (10 mg total) by mouth once daily. 30 tablet 6    fluticasone propionate (FLONASE) 50 mcg/actuation nasal spray 2 sprays (100 mcg total) by Each Nostril route once daily. 16 g 2    gabapentin (NEURONTIN) 300 MG capsule Take 3 capsules (900 mg total) by mouth every 6 (six) hours. (Patient taking differently: Take 900 mg by mouth 3 (three) times daily.) 360 capsule 2    [START ON 7/16/2022] HYDROcodone-acetaminophen (NORCO)  mg per tablet Take 1 tablet by mouth every 8 (eight) hours as needed for Pain. 90 tablet 0    lisinopriL 10 MG tablet Take 1 tablet (10 mg total) by mouth once daily. 90 tablet 1    metoprolol succinate (TOPROL-XL) 25 MG 24 hr tablet Take 1 tablet (25 mg total) by mouth once daily. 90 tablet 1    pantoprazole (PROTONIX) 40 MG tablet Take 1 tablet (40 mg total) by mouth once daily. 90 tablet 1    SPIRIVA RESPIMAT 2.5 mcg/actuation inhaler Inhale 1 puff into the lungs once daily. 4 g 5    [DISCONTINUED] EScitalopram oxalate (LEXAPRO) 10 MG tablet Take 1 tablet (10 mg total) by mouth 2 (two) times a day. 180 tablet 1    [DISCONTINUED] hydrOXYzine  Disp: , Rfl:     atropine 1% (ISOPTO ATROPINE) 1 % Drop, Place 1 drop into the right eye 2 (two) times a day., Disp: 15 mL, Rfl: 11    blood sugar diagnostic Strp, ACCU CHEK KEVAN PLUS TEST STRIPS, Disp: , Rfl:     blood-glucose meter Misc, ACCU CHEK KEVAN PLUS METER: USE 4X/D, Disp: , Rfl:     cetirizine (ZYRTEC) 10 MG tablet, Take 10 mg by mouth once daily., Disp: , Rfl:     CRESTOR 20 mg tablet, Take 20 mg by mouth every evening. , Disp: , Rfl: 5    dicyclomine (BENTYL) 20 mg tablet, Take 20 mg by mouth 2 (two) times daily., Disp: , Rfl: 2    divalproex ER (DEPAKOTE) 500 MG Tb24, Take 500 mg by mouth., Disp: , Rfl:     docusate sodium (COLACE) 100 MG capsule, Take 1 capsule (100 mg total) by mouth 2 (two) times daily., Disp: 30 capsule, Rfl: 0    dorzolamide-timolol 2-0.5% (COSOPT) 22.3-6.8 mg/mL ophthalmic solution, Place 1 drop into both eyes 2 (two) times daily., Disp: 10 mL, Rfl: 11    famotidine (PEPCID) 20 MG tablet, Take 20 mg by mouth 2 (two) times daily as needed., Disp: , Rfl:     fluticasone propionate (FLONASE) 50 mcg/actuation nasal spray, 2 sprays (100 mcg total) by Each Nostril route once daily. (Patient taking differently: 2 sprays by Each Nostril route daily as needed.), Disp: 1 Bottle, Rfl: 0    Fortified Tobramycin 15 mg/ml Opht (TOBREX) 15 mg Drop, Place 1 drop into the right eye every 6 (six) hours., Disp: , Rfl: 0    furosemide (LASIX) 20 MG tablet, Take 20 mg by mouth daily as needed. Takes every other day, Disp: , Rfl: 5    gabapentin (NEURONTIN) 300 MG capsule, Take 800 mg by mouth 2 (two) times daily. Takes  Two 300 mg tabs at night, Disp: , Rfl:     hydroCHLOROthiazide (MICROZIDE) 12.5 mg capsule, Take 12.5 mg by mouth., Disp: , Rfl:     insulin glargine 100 units/mL (3mL) SubQ pen, Inject 60 Units into the skin once daily., Disp: , Rfl:     insulin glargine-lixisenatide (SOLIQUA 100/33) 100 unit-33 mcg/mL InPn pen, Inject 60 Units into the skin., Disp: , Rfl:     INVOKANA 300 mg Tab  pamoate (VISTARIL) 25 MG Cap Take 1 capsule (25 mg total) by mouth once daily. Take nightly for sleep 90 capsule 1    [DISCONTINUED] insulin aspart protamine-insulin aspart (NOVOLOG MIX 70-30FLEXPEN U-100) 100 unit/mL (70-30) InPn pen Inject 15 Units into the skin every morning. 4.5 mL 2    [DISCONTINUED] insulin glargine U-300 conc (TOUJEO MAX U-300 SOLOSTAR) 300 unit/mL (3 mL) insulin pen Inject 40 Units into the skin once daily at 6am.      nitroGLYCERIN (NITROSTAT) 0.4 MG SL tablet Place 1 tablet (0.4 mg total) under the tongue every 5 (five) minutes as needed for Chest pain. 30 tablet 2     No current facility-administered medications on file prior to visit.     Immunization History   Administered Date(s) Administered    Influenza - Quadrivalent - PF *Preferred* (6 months and older) 09/14/2021    Pneumococcal Conjugate - 13 Valent 10/25/2017    Pneumococcal Polysaccharide - 23 Valent 10/19/2016    Tdap 12/20/2019       Review of Systems   Constitutional: Negative for fever, malaise/fatigue and weight loss.   Respiratory: Negative for shortness of breath.    Cardiovascular: Negative for chest pain and palpitations.   Gastrointestinal: Negative for nausea and vomiting.   Psychiatric/Behavioral: Negative for depression. The patient has insomnia.         Vitals:    07/06/22 1008   BP: 134/75   Pulse: 61   Resp: 18   Temp: 97.5 °F (36.4 °C)       Physical Exam  Vitals reviewed.   Constitutional:       Appearance: Normal appearance.   HENT:      Head: Normocephalic.   Eyes:      Extraocular Movements: Extraocular movements intact.      Conjunctiva/sclera: Conjunctivae normal.      Pupils: Pupils are equal, round, and reactive to light.   Neck:      Thyroid: No thyroid mass or thyromegaly.   Cardiovascular:      Rate and Rhythm: Normal rate and regular rhythm.      Heart sounds: Normal heart sounds. No murmur heard.    No gallop.   Pulmonary:      Effort: Pulmonary effort is normal. No respiratory distress.       Breath sounds: Normal breath sounds. No wheezing or rales.   Skin:     General: Skin is warm and dry.      Coloration: Skin is not jaundiced or pale.   Neurological:      Mental Status: He is alert.   Psychiatric:         Mood and Affect: Mood normal.         Behavior: Behavior normal.         Thought Content: Thought content normal.         Judgment: Judgment normal.          Assessment and Plan  Diabetes mellitus with complication, with long-term current use of insulin  -     insulin aspart protamine-insulin aspart (NOVOLOG MIX 70-30FLEXPEN U-100) 100 unit/mL (70-30) InPn pen; Inject 15 Units into the skin every morning.  Dispense: 4.5 mL; Refill: 2  -     insulin glargine U-300 conc (TOUJEO MAX U-300 SOLOSTAR) 300 unit/mL (3 mL) insulin pen; Inject 40 Units into the skin once daily at 6am.  Dispense: 2 pen; Refill: 1    Anxiety  -     hydrOXYzine pamoate (VISTARIL) 25 MG Cap; Take 1 capsule (25 mg total) by mouth once daily. Take nightly for sleep  Dispense: 90 capsule; Refill: 1  -     EScitalopram oxalate (LEXAPRO) 10 MG tablet; Take 1 tablet (10 mg total) by mouth 2 (two) times a day.  Dispense: 180 tablet; Refill: 1    Hyperlipidemia, unspecified hyperlipidemia type  -     Lipid Panel; Future; Expected date: 07/06/2022            Return to clinic in 2 weeks for follow-up or as needed.    Health Maintenance Topics with due status: Not Due       Topic Last Completion Date    Pneumococcal Vaccines (Age 0-64) 10/25/2017    TETANUS VACCINE 12/20/2019    Influenza Vaccine 09/14/2021    Diabetes Urine Screening 04/06/2022    Lipid Panel 04/15/2022    Hemoglobin A1c 04/15/2022    Low Dose Statin 07/06/2022          "tablet, TK 1 T PO QD, Disp: , Rfl: 5    isosorbide mononitrate (IMDUR) 30 MG 24 hr tablet, Take 30 mg by mouth every morning. , Disp: , Rfl: 6    ketoconazole (NIZORAL) 2 % cream, Apply  a small amount to affected area twice a day  apply to skin for fungal infection, Disp: , Rfl:     lancets Misc, ACCU CHEK SOFTCLIX LANCETS: USE 4X DAILY, Disp: , Rfl:     LIDOcaine (LIDODERM) 5 %, Apply 1-3 patches every day prn pain, Disp: , Rfl:     lisinopriL (PRINIVIL,ZESTRIL) 20 MG tablet, Take 1 tablet by mouth once daily., Disp: , Rfl:     methazoLAMIDE (NEPTAZANE) 25 mg tablet, Take 25 mg by mouth once daily., Disp: , Rfl:     metoclopramide HCl (REGLAN) 10 MG tablet, Take 1 tablet (10 mg total) by mouth every 6 (six) hours as needed., Disp: 30 tablet, Rfl: 0    metoprolol succinate (TOPROL-XL) 50 MG 24 hr tablet, Take 50 mg by mouth every evening. , Disp: , Rfl: 5    miscellaneous medical supply (C-TUB) Misc, DIABETIC SHOES, Disp: , Rfl:     natamycin (NATACYN) 5 % ophthalmic solution, Place 1 drop into the right eye 6 (six) times daily. (Patient taking differently: Place 1 drop into the right eye 2 (two) times a day.), Disp: 15 mL, Rfl: 3    nitroGLYCERIN (NITROSTAT) 0.4 MG SL tablet, Place 0.4 mg under the tongue every 5 (five) minutes as needed for Chest pain., Disp: , Rfl:     omega-3 acid ethyl esters (LOVAZA) 1 gram capsule, Take 2 capsules by mouth., Disp: , Rfl:     omeprazole (PRILOSEC OTC) 20 MG tablet, Take 1 tablet by mouth., Disp: , Rfl:     omeprazole (PRILOSEC) 20 MG capsule, TK 1 C PO QD FOR CONTROL OF STOMACH ACID BEFORE BREAKFAST, Disp: , Rfl: 1    pen needle, diabetic 31 gauge x 5/16" Ndle, Inject 1 each into the skin., Disp: , Rfl:     potassium chloride SA (K-DUR,KLOR-CON) 20 MEQ tablet, Take by mouth once daily., Disp: , Rfl: 5    promethazine-dextromethorphan (PROMETHAZINE-DM) 6.25-15 mg/5 mL Syrp, Take 5 mLs by mouth., Disp: , Rfl:     sotaloL (BETAPACE) 80 MG tablet, TAKE 1 TABLET BY MOUTH TWICE " DAILY, Disp: , Rfl:     spironolactone (ALDACTONE) 25 MG tablet, Take 25 mg by mouth daily as needed., Disp: , Rfl:     tiZANidine (ZANAFLEX) 4 MG tablet, Take 4 mg by mouth nightly as needed., Disp: , Rfl:     zolpidem (AMBIEN) 10 mg Tab, Take 5 mg by mouth nightly as needed., Disp: , Rfl:     acetaZOLAMIDE (DIAMOX) 250 MG tablet, Take 1 tablet (250 mg total) by mouth 2 (two) times daily., Disp: 60 tablet, Rfl: 11    DULoxetine (CYMBALTA) 60 MG capsule, Take 60 mg by mouth., Disp: , Rfl:     tamsulosin (FLOMAX) 0.4 mg Cp24, Take 1 capsule (0.4 mg total) by mouth once daily., Disp: 30 capsule, Rfl: 2    ZOLMitriptan (ZOMIG) 2.5 mg tablet, Take 2.5 mg by mouth daily as needed., Disp: , Rfl:     Allergies  Review of patient's allergies indicates:   Allergen Reactions    Morphine Itching and Other (See Comments)     Pt denies morphine as an allergy reports is is allergic to a medicine but has no idea what it is      Propoxyphene     Tramadol Itching    Darvocet a500 [propoxyphene n-acetaminophen] Itching       Patient's PMH, FH, Social hx, Medications, allergies reviewed and updated as pertinent to today's visit    Objective:      Physical Exam  Constitutional:       General: He is not in acute distress.     Appearance: He is well-developed. He is not ill-appearing, toxic-appearing or diaphoretic.   HENT:      Head: Normocephalic and atraumatic.      Mouth/Throat:      Mouth: Mucous membranes are moist.   Eyes:      Conjunctiva/sclera: Conjunctivae normal.   Pulmonary:      Effort: Pulmonary effort is normal. No respiratory distress.   Abdominal:      General: There is no distension.      Palpations: Abdomen is soft. There is no mass.      Tenderness: There is no abdominal tenderness. There is no guarding.   Genitourinary:     Comments: No tenderness or crepitus along device site in scrotum, penis,  infrapubic incision or reservoir. No scrotal swelling  R lower pole of scrotum tender  High riding scrotal  pump  Musculoskeletal:         General: No swelling or deformity.      Cervical back: Neck supple.   Skin:     General: Skin is warm.      Capillary Refill: Capillary refill takes less than 2 seconds.      Findings: No rash.   Neurological:      Mental Status: He is alert and oriented to person, place, and time.      Gait: Gait abnormal.   Psychiatric:         Mood and Affect: Mood normal.         Thought Content: Thought content normal.         Judgment: Judgment normal.           Lab Results   Component Value Date    PSADIAG 0.67 01/19/2021    PSADIAG 1.4 08/06/2016        Assessment:       1. Suspected UTI    2. Inguinal pain, unspecified laterality          Plan:     POCT UA concerning for UTI, urine culture  Abx sent  Re-eval in 2 weeks

## 2023-04-02 LAB — BACTERIA UR CULT: ABNORMAL

## 2023-04-03 ENCOUNTER — TELEPHONE (OUTPATIENT)
Dept: UROLOGY | Facility: CLINIC | Age: 58
End: 2023-04-03
Payer: MEDICARE

## 2023-04-03 NOTE — TELEPHONE ENCOUNTER
I attempted to reach the pt but his mailbox was full.    ----- Message from Monica Lieberman MD sent at 4/2/2023  4:07 PM CDT -----  pls alert pt of uti  he is on correct abx

## 2023-04-13 ENCOUNTER — OFFICE VISIT (OUTPATIENT)
Dept: UROLOGY | Facility: CLINIC | Age: 58
End: 2023-04-13
Payer: MEDICARE

## 2023-04-13 DIAGNOSIS — Z87.440 HISTORY OF UTI: ICD-10-CM

## 2023-04-13 DIAGNOSIS — R39.9 LOWER URINARY TRACT SYMPTOMS (LUTS): ICD-10-CM

## 2023-04-13 DIAGNOSIS — N50.82 SCROTAL PAIN: Primary | ICD-10-CM

## 2023-04-13 PROBLEM — N48.22 CELLULITIS OF PENIS: Status: RESOLVED | Noted: 2018-07-06 | Resolved: 2023-04-13

## 2023-04-13 PROCEDURE — 1159F MED LIST DOCD IN RCRD: CPT | Mod: CPTII,S$GLB,, | Performed by: STUDENT IN AN ORGANIZED HEALTH CARE EDUCATION/TRAINING PROGRAM

## 2023-04-13 PROCEDURE — 1160F RVW MEDS BY RX/DR IN RCRD: CPT | Mod: CPTII,S$GLB,, | Performed by: STUDENT IN AN ORGANIZED HEALTH CARE EDUCATION/TRAINING PROGRAM

## 2023-04-13 PROCEDURE — 99214 OFFICE O/P EST MOD 30 MIN: CPT | Mod: S$GLB,,, | Performed by: STUDENT IN AN ORGANIZED HEALTH CARE EDUCATION/TRAINING PROGRAM

## 2023-04-13 PROCEDURE — 1159F PR MEDICATION LIST DOCUMENTED IN MEDICAL RECORD: ICD-10-PCS | Mod: CPTII,S$GLB,, | Performed by: STUDENT IN AN ORGANIZED HEALTH CARE EDUCATION/TRAINING PROGRAM

## 2023-04-13 PROCEDURE — 4010F ACE/ARB THERAPY RXD/TAKEN: CPT | Mod: CPTII,S$GLB,, | Performed by: STUDENT IN AN ORGANIZED HEALTH CARE EDUCATION/TRAINING PROGRAM

## 2023-04-13 PROCEDURE — 99214 PR OFFICE/OUTPT VISIT, EST, LEVL IV, 30-39 MIN: ICD-10-PCS | Mod: S$GLB,,, | Performed by: STUDENT IN AN ORGANIZED HEALTH CARE EDUCATION/TRAINING PROGRAM

## 2023-04-13 PROCEDURE — 1160F PR REVIEW ALL MEDS BY PRESCRIBER/CLIN PHARMACIST DOCUMENTED: ICD-10-PCS | Mod: CPTII,S$GLB,, | Performed by: STUDENT IN AN ORGANIZED HEALTH CARE EDUCATION/TRAINING PROGRAM

## 2023-04-13 PROCEDURE — 99999 PR PBB SHADOW E&M-EST. PATIENT-LVL II: ICD-10-PCS | Mod: PBBFAC,,, | Performed by: STUDENT IN AN ORGANIZED HEALTH CARE EDUCATION/TRAINING PROGRAM

## 2023-04-13 PROCEDURE — 99999 PR PBB SHADOW E&M-EST. PATIENT-LVL II: CPT | Mod: PBBFAC,,, | Performed by: STUDENT IN AN ORGANIZED HEALTH CARE EDUCATION/TRAINING PROGRAM

## 2023-04-13 PROCEDURE — 4010F PR ACE/ARB THEARPY RXD/TAKEN: ICD-10-PCS | Mod: CPTII,S$GLB,, | Performed by: STUDENT IN AN ORGANIZED HEALTH CARE EDUCATION/TRAINING PROGRAM

## 2023-04-13 RX ORDER — TAMSULOSIN HYDROCHLORIDE 0.4 MG/1
0.4 CAPSULE ORAL DAILY
Qty: 90 CAPSULE | Refills: 3 | Status: SHIPPED | OUTPATIENT
Start: 2023-04-13 | End: 2023-11-29

## 2023-04-13 NOTE — PROGRESS NOTES
Patient ID: Se Virgil Mcgraw is a 58 y.o. male.    Chief Complaint: No chief complaint on file.  Follow up for UTI      HPI  58 y.o. who presents to the Urology clinic for follow up of UTI, patient reports groin pain has resolved after completion of abx. Patient was seen at [facility] for cardiac symptoms. CT AP was performed. Has LUTS, flomax Rx .       Medically Necessary ROS documented in HPI    Past Medical History  Active Ambulatory Problems     Diagnosis Date Noted    Diabetes mellitus type 2 in obese 2015    Essential hypertension 2015    Brachial neuritis or radiculitis NOS 09/15/2015    Elevated LFTs 2015    Sleep apnea 2015    CKD (chronic kidney disease) stage 2, GFR 60-89 ml/min 2015    History of gout 2015    HLD (hyperlipidemia) 2015    GERD (gastroesophageal reflux disease) 2015    Edema 2015    AICD (automatic cardioverter/defibrillator) present 2015    Non-ischemic cardiomyopathy 2015    Restrictive lung disease 2015    Cervical disc disorder with radiculopathy 2015    Inguinal lymphadenopathy 2017    Cellulitis of penis 2018    Lower urinary tract symptoms (LUTS) 10/01/2020    Corneal ulcer 2021    Ventricular tachycardia 2021    Chronic combined systolic and diastolic heart failure 2021    Chronic hip pain 2021    Glaucoma, right eye 2021    Insomnia 2021    Endophthalmitis, acute, right 10/02/2021    Erectile dysfunction 2022     Resolved Ambulatory Problems     Diagnosis Date Noted    Abscess of right leg 2012    Pain in the chest 2015    Acute pancreatitis 2016    Acute kidney injury 2016     Past Medical History:   Diagnosis Date    COPD (chronic obstructive pulmonary disease)     Coronary artery disease     Diabetes mellitus type II     DJD (degenerative joint disease)     Gout     Hypertension     Kidney stone     MI (myocardial  infarction) 2010    Obesity     JOSE (obstructive sleep apnea)          Past Surgical History  Past Surgical History:   Procedure Laterality Date    CARDIAC DEFIBRILLATOR PLACEMENT      CATARACT EXTRACTION Right     CYSTOSCOPY N/A 10/1/2020    Procedure: CYSTOSCOPY;  Surgeon: Monica Lieberman MD;  Location: [facility] OR;  Service: Urology;  Laterality: N/A;  RN PRE OP Covid screen 9-  C A    excisional biopsy left inguinal lymph node      FOOT SURGERY      right foot surgery     INSERTION OF INFLATABLE PENILE PROSTHESIS N/A 1/25/2022    Procedure: INSERTION, PENILE PROSTHESIS, INFLATABLE;  Surgeon: Monica Lieberman MD;  Location: [facility] OR;  Service: Urology;  Laterality: N/A;  [employer] JUAN LUIS ARNOLD [phone] TEXTED HIM @ 5:32PM ON 12-  RN PRE OP 12-28-21. Covid NEGATIVE 1-3-22. CA-----AICD-----HAS CARDS CLEARANCE    kidney stones      lithotripsy    REPAIR, SURGICAL WOUND, EYE, ANTERIOR SEGMENT Right 10/5/2021    Procedure: REPAIR, SURGICAL WOUND, EYE, ANTERIOR SEGMENT;  Surgeon: Adelaide Allen MD;  Location: [address];  Service: Ophthalmology;  Laterality: Right;  Anterior Chamber Wash Out Right Eye     Surgery for bulging disc at C7         Social History  Social Connections: Not on file       Medications    Current Outpatient Medications:     acetaZOLAMIDE (DIAMOX) 250 MG tablet, Take 1 tablet (250 mg total) by mouth 2 (two) times daily., Disp: 60 tablet, Rfl: 11    albuterol (PROVENTIL/VENTOLIN HFA) 90 mcg/actuation inhaler, Inhale 2 puffs into the lungs every 4 (four) hours as needed for Wheezing or Shortness of Breath. Rescue, Disp: 1 Inhaler, Rfl: 0    allopurinol (ZYLOPRIM) 100 MG tablet, , Disp: , Rfl: 5    amiodarone (PACERONE) 400 MG tablet, Take 200 mg by mouth every morning. , Disp: , Rfl: 1    amlodipine (NORVASC) 10 MG tablet, Take 10 mg by mouth every morning. , Disp: , Rfl: 5    amoxicillin-clavulanate 875-125mg (AUGMENTIN) 875-125 mg per tablet, Take 1 tablet by  mouth every 12 (twelve) hours. for 14 days, Disp: 28 tablet, Rfl: 0    aspirin (ECOTRIN) 81 MG EC tablet, Take 81 mg by mouth., Disp: , Rfl:     atropine 1% (ISOPTO ATROPINE) 1 % Drop, Place 1 drop into the right eye 2 (two) times a day., Disp: 15 mL, Rfl: 11    blood sugar diagnostic Strp, ACCU CHEK KEVAN PLUS TEST STRIPS, Disp: , Rfl:     blood-glucose meter Misc, ACCU CHEK KEVAN PLUS METER: USE 4X/D, Disp: , Rfl:     cetirizine (ZYRTEC) 10 MG tablet, Take 10 mg by mouth once daily., Disp: , Rfl:     CRESTOR 20 mg tablet, Take 20 mg by mouth every evening. , Disp: , Rfl: 5    dicyclomine (BENTYL) 20 mg tablet, Take 20 mg by mouth 2 (two) times daily., Disp: , Rfl: 2    divalproex ER (DEPAKOTE) 500 MG Tb24, Take 500 mg by mouth., Disp: , Rfl:     docusate sodium (COLACE) 100 MG capsule, Take 1 capsule (100 mg total) by mouth 2 (two) times daily., Disp: 30 capsule, Rfl: 0    dorzolamide-timolol 2-0.5% (COSOPT) 22.3-6.8 mg/mL ophthalmic solution, Place 1 drop into both eyes 2 (two) times daily., Disp: 10 mL, Rfl: 11    DULoxetine (CYMBALTA) 60 MG capsule, Take 60 mg by mouth., Disp: , Rfl:     famotidine (PEPCID) 20 MG tablet, Take 20 mg by mouth 2 (two) times daily as needed., Disp: , Rfl:     fluticasone propionate (FLONASE) 50 mcg/actuation nasal spray, 2 sprays (100 mcg total) by Each Nostril route once daily. (Patient taking differently: 2 sprays by Each Nostril route daily as needed.), Disp: 1 Bottle, Rfl: 0    Fortified Tobramycin 15 mg/ml Opht (TOBREX) 15 mg Drop, Place 1 drop into the right eye every 6 (six) hours., Disp: , Rfl: 0    furosemide (LASIX) 20 MG tablet, Take 20 mg by mouth daily as needed. Takes every other day, Disp: , Rfl: 5    gabapentin (NEURONTIN) 300 MG capsule, Take 800 mg by mouth 2 (two) times daily. Takes  Two 300 mg tabs at night, Disp: , Rfl:     hydroCHLOROthiazide (MICROZIDE) 12.5 mg capsule, Take 12.5 mg by mouth., Disp: , Rfl:     insulin glargine 100 units/mL (3mL) SubQ pen,  "Inject 60 Units into the skin once daily., Disp: , Rfl:     insulin glargine-lixisenatide (SOLIQUA 100/33) 100 unit-33 mcg/mL InPn pen, Inject 60 Units into the skin., Disp: , Rfl:     INVOKANA 300 mg Tab tablet, TK 1 T PO QD, Disp: , Rfl: 5    isosorbide mononitrate (IMDUR) 30 MG 24 hr tablet, Take 30 mg by mouth every morning. , Disp: , Rfl: 6    ketoconazole (NIZORAL) 2 % cream, Apply  a small amount to affected area twice a day  apply to skin for fungal infection, Disp: , Rfl:     lancets Misc, ACCU CHEK SOFTCLIX LANCETS: USE 4X DAILY, Disp: , Rfl:     LIDOcaine (LIDODERM) 5 %, Apply 1-3 patches every day prn pain, Disp: , Rfl:     lisinopriL (PRINIVIL,ZESTRIL) 20 MG tablet, Take 1 tablet by mouth once daily., Disp: , Rfl:     methazoLAMIDE (NEPTAZANE) 25 mg tablet, Take 25 mg by mouth once daily., Disp: , Rfl:     metoclopramide HCl (REGLAN) 10 MG tablet, Take 1 tablet (10 mg total) by mouth every 6 (six) hours as needed., Disp: 30 tablet, Rfl: 0    metoprolol succinate (TOPROL-XL) 50 MG 24 hr tablet, Take 50 mg by mouth every evening. , Disp: , Rfl: 5    miscellaneous medical supply (C-TUB) Misc, DIABETIC SHOES, Disp: , Rfl:     natamycin (NATACYN) 5 % ophthalmic solution, Place 1 drop into the right eye 6 (six) times daily. (Patient taking differently: Place 1 drop into the right eye 2 (two) times a day.), Disp: 15 mL, Rfl: 3    nitroGLYCERIN (NITROSTAT) 0.4 MG SL tablet, Place 0.4 mg under the tongue every 5 (five) minutes as needed for Chest pain., Disp: , Rfl:     omega-3 acid ethyl esters (LOVAZA) 1 gram capsule, Take 2 capsules by mouth., Disp: , Rfl:     omeprazole (PRILOSEC OTC) 20 MG tablet, Take 1 tablet by mouth., Disp: , Rfl:     omeprazole (PRILOSEC) 20 MG capsule, TK 1 C PO QD FOR CONTROL OF STOMACH ACID BEFORE BREAKFAST, Disp: , Rfl: 1    pen needle, diabetic 31 gauge x 5/16" Ndle, Inject 1 each into the skin., Disp: , Rfl:     potassium chloride SA (K-DUR,KLOR-CON) 20 MEQ tablet, Take by mouth " once daily., Disp: , Rfl: 5    promethazine-dextromethorphan (PROMETHAZINE-DM) 6.25-15 mg/5 mL Syrp, Take 5 mLs by mouth., Disp: , Rfl:     sotaloL (BETAPACE) 80 MG tablet, TAKE 1 TABLET BY MOUTH TWICE DAILY, Disp: , Rfl:     spironolactone (ALDACTONE) 25 MG tablet, Take 25 mg by mouth daily as needed., Disp: , Rfl:     tamsulosin (FLOMAX) 0.4 mg Cp24, Take 1 capsule (0.4 mg total) by mouth once daily., Disp: 30 capsule, Rfl: 2    tiZANidine (ZANAFLEX) 4 MG tablet, Take 4 mg by mouth nightly as needed., Disp: , Rfl:     ZOLMitriptan (ZOMIG) 2.5 mg tablet, Take 2.5 mg by mouth daily as needed., Disp: , Rfl:     zolpidem (AMBIEN) 10 mg Tab, Take 5 mg by mouth nightly as needed., Disp: , Rfl:     Allergies  Review of patient's allergies indicates:   Allergen Reactions    Morphine Itching and Other (See Comments)     Pt denies morphine as an allergy reports is is allergic to a medicine but has no idea what it is      Propoxyphene     Tramadol Itching    Darvocet a500 [propoxyphene n-acetaminophen] Itching       Patient's PMH, FH, Social hx, Medications, allergies reviewed and updated as pertinent to today's visit    Objective:      Physical Exam  Constitutional:       General: He is not in acute distress.     Appearance: He is well-developed. He is not ill-appearing, toxic-appearing or diaphoretic.   HENT:      Head: Normocephalic and atraumatic.      Mouth/Throat:      Mouth: Mucous membranes are moist.   Eyes:      Conjunctiva/sclera: Conjunctivae normal.   Cardiovascular:      Rate and Rhythm: Normal rate and regular rhythm.   Pulmonary:      Effort: Pulmonary effort is normal. No respiratory distress.   Abdominal:      General: There is no distension.      Palpations: Abdomen is soft. There is no mass.      Tenderness: There is no abdominal tenderness. There is no guarding.   Genitourinary:     Comments: Penile implant palpated without tenderness  No scrotal swelling  Musculoskeletal:         General: No swelling or  deformity.      Cervical back: Neck supple.   Skin:     General: Skin is warm.      Capillary Refill: Capillary refill takes less than 2 seconds.      Findings: No rash.   Neurological:      Mental Status: He is alert and oriented to person, place, and time.      Gait: Gait abnormal.   Psychiatric:         Mood and Affect: Mood normal.         Thought Content: Thought content normal.         Judgment: Judgment normal.           Lab Results   Component Value Date    PSADIAG 0.67 01/19/2021    PSADIAG 1.4 08/06/2016      Imaging results: [place] [facility] [place]      Penile prosthesis with a reservoir in the anterior wall of the left hemipelvis.     Prominent bilateral inguinal lymph nodes, left greater than right, likely reactive.  Some of the left inguinal lymph nodes are larger than 6/9/2018.     Prostate is enlarged.     Cholelithiasis.  No CT evidence of acute cholecystitis.         Electronically Signed By: Carl Contreras MD 4/2/2023 23:53 CDT, [facility]  Narrative      HISTORY:       Pain on percussion of the lumbar spine.     Frequent falls.       ICD10:  T83.410A   Penile implant failure, initial encounter (CMS/[facility])       TECHNIQUE:       Oral Contrast:  None     IV Contrast:  None.       IV Contrast Phase:  without     CT Scan Area:  abdomen and pelvis     Reconstructions:  axial, coronal, sagittal     Dose Lowering Technique:  One or more of the following were used:  automated exposure control, iterative reconstruction technique, and/or adjustment of the mA and kV according to patient size.         REFERENCE EXAMS:     6/9/2018 CT abdomen/pelvis         FINDINGS - CT ABDOMEN:     Visualization of the intraabdominal organs is limited by lack of oral and IV contrast.     Calcified granuloma in the posterior basal segment of the right lower lobe.     Heart size is at the upper limits of normal.     A few calcified granulomas in the liver.   No evidence of intrahepatic biliary dilatation.    Calcified gallstones in the gallbladder.     Pancreas is grossly normal.     Spleen is not enlarged.     No adrenal mass demonstrated.     No renal stone demonstrated.     No hydronephrosis demonstrated.   Evidence of small bilateral renal cortical cysts.     Aorta tapers normally without aneurysmal dilatation.       Stomach is partially distended.     No intestinal obstruction.     No evidence of appendicitis.            FINDINGS - CT PELVIS:   Penile prosthesis.   Reservoir for deep penile prosthesis in the anterior wall of the left hemipelvis.     Prominent bilateral inguinal lymph nodes, left greater than right, likely reactive.   Some of the left inguinal lymph nodes are larger than 6/9/2018.     Urinary bladder is normal.     Prostate is enlarged.     Right hip prosthesis.   Artifact from the right hip prosthesis obscures surrounding structures.  Procedure Note    Carl Contreras MD - 04/02/2023   Formatting of this note might be different from the original.     HISTORY:       Pain on percussion of the lumbar spine.     Frequent falls.       ICD10:  T83.410A   Penile implant failure, initial encounter (CMS/[facility])       TECHNIQUE:       Oral Contrast:  None     IV Contrast:  None.       IV Contrast Phase:  without     CT Scan Area:  abdomen and pelvis     Reconstructions:  axial, coronal, sagittal     Dose Lowering Technique:  One or more of the following were used:  automated exposure control, iterative reconstruction technique, and/or adjustment of the mA and kV according to patient size.         REFERENCE EXAMS:     6/9/2018 CT abdomen/pelvis         FINDINGS - CT ABDOMEN:     Visualization of the intraabdominal organs is limited by lack of oral and IV contrast.     Calcified granuloma in the posterior basal segment of the right lower lobe.     Heart size is at the upper limits of normal.     A few calcified granulomas in the liver.   No evidence of intrahepatic biliary dilatation.   Calcified gallstones  in the gallbladder.     Pancreas is grossly normal.     Spleen is not enlarged.     No adrenal mass demonstrated.     No renal stone demonstrated.     No hydronephrosis demonstrated.   Evidence of small bilateral renal cortical cysts.     Aorta tapers normally without aneurysmal dilatation.       Stomach is partially distended.     No intestinal obstruction.     No evidence of appendicitis.         FINDINGS - CT PELVIS:   Penile prosthesis.   Reservoir for deep penile prosthesis in the anterior wall of the left hemipelvis.     Prominent bilateral inguinal lymph nodes, left greater than right, likely reactive.   Some of the left inguinal lymph nodes are larger than 6/9/2018.     Urinary bladder is normal.     Prostate is enlarged.     Right hip prosthesis.   Artifact from the right hip prosthesis obscures surrounding structures.       IMPRESSION:     Penile prosthesis with a reservoir in the anterior wall of the left hemipelvis.     Prominent bilateral inguinal lymph nodes, left greater than right, likely reactive.  Some of the left inguinal lymph nodes are larger than 6/9/2018.     Prostate is enlarged.     Cholelithiasis.  No CT evidence of acute cholecystitis.         Electronically Signed By: Carl Contreras MD 4/2/2023 23:53 CDT, [facility]  Exam End: 04/02/23 18:28    Specimen Collected: 04/02/23 23:39 Last Resulted: 04/02/23 23:53   [place] [facility] [place] [facility]  Result Received: 04/13/23 08:49     Assessment:       1. Scrotal pain    2. History of UTI    3. Lower urinary tract symptoms (LUTS)        Plan:       Scrotal pain resolved after treatment of UTI  No indication to remove IPP    RTC 1 month for device/IPP cycling teaching    Flomax refill provided for LUTS

## 2023-04-14 ENCOUNTER — OFFICE VISIT (OUTPATIENT)
Dept: OPHTHALMOLOGY | Facility: CLINIC | Age: 58
End: 2023-04-14
Payer: MEDICARE

## 2023-04-14 DIAGNOSIS — H54.40 BLIND PAINFUL RIGHT EYE: Primary | ICD-10-CM

## 2023-04-14 DIAGNOSIS — H57.11 BLIND PAINFUL RIGHT EYE: Primary | ICD-10-CM

## 2023-04-14 DIAGNOSIS — H40.41X3 GLAUCOMA OF RIGHT EYE SECONDARY TO EYE INFLAMMATION, SEVERE STAGE: ICD-10-CM

## 2023-04-14 DIAGNOSIS — H40.022 OPEN ANGLE WITH BORDERLINE FINDINGS AND HIGH GLAUCOMA RISK IN LEFT EYE: ICD-10-CM

## 2023-04-14 PROBLEM — H44.001 ENDOPHTHALMITIS, ACUTE, RIGHT: Status: RESOLVED | Noted: 2021-10-02 | Resolved: 2023-04-14

## 2023-04-14 PROBLEM — H40.9 GLAUCOMA, RIGHT EYE: Status: RESOLVED | Noted: 2021-09-30 | Resolved: 2023-04-14

## 2023-04-14 PROBLEM — H16.009 CORNEAL ULCER: Status: RESOLVED | Noted: 2021-09-30 | Resolved: 2023-04-14

## 2023-04-14 PROCEDURE — 4010F PR ACE/ARB THEARPY RXD/TAKEN: ICD-10-PCS | Mod: CPTII,S$GLB,, | Performed by: STUDENT IN AN ORGANIZED HEALTH CARE EDUCATION/TRAINING PROGRAM

## 2023-04-14 PROCEDURE — 4010F ACE/ARB THERAPY RXD/TAKEN: CPT | Mod: CPTII,S$GLB,, | Performed by: STUDENT IN AN ORGANIZED HEALTH CARE EDUCATION/TRAINING PROGRAM

## 2023-04-14 PROCEDURE — 99999 PR PBB SHADOW E&M-EST. PATIENT-LVL III: ICD-10-PCS | Mod: PBBFAC,,, | Performed by: STUDENT IN AN ORGANIZED HEALTH CARE EDUCATION/TRAINING PROGRAM

## 2023-04-14 PROCEDURE — 99214 PR OFFICE/OUTPT VISIT, EST, LEVL IV, 30-39 MIN: ICD-10-PCS | Mod: S$GLB,,, | Performed by: STUDENT IN AN ORGANIZED HEALTH CARE EDUCATION/TRAINING PROGRAM

## 2023-04-14 PROCEDURE — 99214 OFFICE O/P EST MOD 30 MIN: CPT | Mod: S$GLB,,, | Performed by: STUDENT IN AN ORGANIZED HEALTH CARE EDUCATION/TRAINING PROGRAM

## 2023-04-14 PROCEDURE — 1160F PR REVIEW ALL MEDS BY PRESCRIBER/CLIN PHARMACIST DOCUMENTED: ICD-10-PCS | Mod: CPTII,S$GLB,, | Performed by: STUDENT IN AN ORGANIZED HEALTH CARE EDUCATION/TRAINING PROGRAM

## 2023-04-14 PROCEDURE — 1159F MED LIST DOCD IN RCRD: CPT | Mod: CPTII,S$GLB,, | Performed by: STUDENT IN AN ORGANIZED HEALTH CARE EDUCATION/TRAINING PROGRAM

## 2023-04-14 PROCEDURE — 1159F PR MEDICATION LIST DOCUMENTED IN MEDICAL RECORD: ICD-10-PCS | Mod: CPTII,S$GLB,, | Performed by: STUDENT IN AN ORGANIZED HEALTH CARE EDUCATION/TRAINING PROGRAM

## 2023-04-14 PROCEDURE — 1160F RVW MEDS BY RX/DR IN RCRD: CPT | Mod: CPTII,S$GLB,, | Performed by: STUDENT IN AN ORGANIZED HEALTH CARE EDUCATION/TRAINING PROGRAM

## 2023-04-14 PROCEDURE — 99999 PR PBB SHADOW E&M-EST. PATIENT-LVL III: CPT | Mod: PBBFAC,,, | Performed by: STUDENT IN AN ORGANIZED HEALTH CARE EDUCATION/TRAINING PROGRAM

## 2023-04-14 RX ORDER — SPIRONOLACTONE 25 MG/1
1 TABLET ORAL DAILY
COMMUNITY
Start: 2023-04-06

## 2023-04-14 RX ORDER — MUPIROCIN 20 MG/G
OINTMENT TOPICAL 3 TIMES DAILY
COMMUNITY

## 2023-04-14 RX ORDER — INSULIN GLARGINE AND LIXISENATIDE 100; 33 U/ML; UG/ML
60 INJECTION, SOLUTION SUBCUTANEOUS EVERY MORNING
COMMUNITY
Start: 2023-03-30

## 2023-04-14 RX ORDER — DIVALPROEX SODIUM 250 MG/1
750 TABLET, FILM COATED, EXTENDED RELEASE ORAL NIGHTLY
COMMUNITY
Start: 2022-09-08 | End: 2023-11-15

## 2023-04-14 RX ORDER — DAPAGLIFLOZIN 10 MG/1
10 TABLET, FILM COATED ORAL DAILY
COMMUNITY

## 2023-04-14 RX ORDER — CIPROFLOXACIN 500 MG/1
1 TABLET ORAL 2 TIMES DAILY
COMMUNITY
Start: 2023-04-06 | End: 2023-05-06

## 2023-04-14 RX ORDER — AMLODIPINE BESYLATE 5 MG/1
1 TABLET ORAL DAILY
COMMUNITY
Start: 2023-01-23 | End: 2023-04-23

## 2023-04-14 RX ORDER — OXYCODONE AND ACETAMINOPHEN 7.5; 325 MG/1; MG/1
1 TABLET ORAL EVERY 4 HOURS PRN
COMMUNITY

## 2023-04-14 RX ORDER — PROMETHAZINE HYDROCHLORIDE 25 MG/1
1 TABLET ORAL EVERY 4 HOURS PRN
COMMUNITY
Start: 2023-01-13

## 2023-04-14 RX ORDER — TIZANIDINE HYDROCHLORIDE 4 MG/1
4 CAPSULE, GELATIN COATED ORAL 2 TIMES DAILY
COMMUNITY

## 2023-04-14 RX ORDER — LEVOCETIRIZINE DIHYDROCHLORIDE 5 MG/1
5 TABLET, FILM COATED ORAL DAILY PRN
COMMUNITY
Start: 2023-03-01

## 2023-04-14 RX ORDER — OXYBUTYNIN CHLORIDE 10 MG/1
1 TABLET, EXTENDED RELEASE ORAL DAILY
COMMUNITY
Start: 2023-01-17 | End: 2023-11-29

## 2023-04-14 RX ORDER — GABAPENTIN 800 MG/1
800 TABLET ORAL 2 TIMES DAILY
COMMUNITY
Start: 2022-12-29

## 2023-04-14 NOTE — PROGRESS NOTES
Subjective:       Patient ID: Se Virgil Mcgraw is a 58 y.o. male.    Chief Complaint: Glaucoma (Overdue glaucoma follow up and pt states he still has pain OD that comes and goes but the pain lasts awhile )    HPI     Glaucoma            Comments: Overdue glaucoma follow up and pt states he still has pain OD   that comes and goes but the pain lasts awhile           Comments    DLS: 6/24/22    Still with pain that comes and goes in the right eye - at time unbearable   and affecting the quality of his life.     1. Uveitic Glaucoma OD  2. GS OS  3. PCIOL OD  4. Hx Infectious Keratitis OD  5. Monocular     MEDS:  Atropine BID OD  Cosopt BID OU  Diamox 250mg BID PO = RAN OUT X 2 WEEKS          Last edited by Shaina Gan MD on 4/14/2023 10:15 AM.               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 RNFL WNL.    Monitor as glaucoma suspect.    Since monocular, and IOP previously high, will use dorzolamide-timolol BID OU for simplicity    Blind painful eye OD  Recommend TS-dCPC + retrobular thorazine   Discussed risk of worsening vision, no improvement in pain, eyelid swelling  Pt wishes to proceed and informed consent obtained      Pseudophakia OD  Dr Manrique 2020 - complicated      Hx  Infectious keratitis OD   Initial presentation new K ulcer with large hypyopyon OD   S/p a/c washout Dr. Allen   Cx grew serratia, however unresponsive to abx.  S/p tap and injection OD for questionable endophthalmitis  Natamycin 3-6 x's/day OD -- not taking this    Monocular  Discussed monocular status with patient and increased risk of damage to well-seeing eye  Recommend protective eyewear at all times  Any MRx should be written with polycarbonate lenses  Saw Dr Treviño 4/22 - Mrx written with polycarbonate       PLAN  Continue Dorzolamide-timolol BID OU     Schedule destruction of ciliary body by cyclophotocoagulation and retrobulbar injection OD    RTC POD#1         Shaina Gan M.D., M.S.  Department of Ophthalmology   Division of Glaucoma Surgery  [facility]

## 2023-04-26 ENCOUNTER — TELEPHONE (OUTPATIENT)
Dept: OPHTHALMOLOGY | Facility: CLINIC | Age: 58
End: 2023-04-26
Payer: MEDICARE

## 2023-05-15 ENCOUNTER — OFFICE VISIT (OUTPATIENT)
Dept: UROLOGY | Facility: CLINIC | Age: 58
End: 2023-05-15
Payer: MEDICARE

## 2023-05-15 VITALS — WEIGHT: 211.63 LBS | BODY MASS INDEX: 33.15 KG/M2

## 2023-05-15 DIAGNOSIS — Z96.89 HISTORY OF IMPLANTATION OF PENILE PROSTHESIS: ICD-10-CM

## 2023-05-15 DIAGNOSIS — N52.9 ERECTILE DYSFUNCTION, UNSPECIFIED ERECTILE DYSFUNCTION TYPE: Primary | ICD-10-CM

## 2023-05-15 PROCEDURE — 3008F PR BODY MASS INDEX (BMI) DOCUMENTED: ICD-10-PCS | Mod: CPTII,S$GLB,, | Performed by: STUDENT IN AN ORGANIZED HEALTH CARE EDUCATION/TRAINING PROGRAM

## 2023-05-15 PROCEDURE — 4010F ACE/ARB THERAPY RXD/TAKEN: CPT | Mod: CPTII,S$GLB,, | Performed by: STUDENT IN AN ORGANIZED HEALTH CARE EDUCATION/TRAINING PROGRAM

## 2023-05-15 PROCEDURE — 4010F PR ACE/ARB THEARPY RXD/TAKEN: ICD-10-PCS | Mod: CPTII,S$GLB,, | Performed by: STUDENT IN AN ORGANIZED HEALTH CARE EDUCATION/TRAINING PROGRAM

## 2023-05-15 PROCEDURE — 1160F RVW MEDS BY RX/DR IN RCRD: CPT | Mod: CPTII,S$GLB,, | Performed by: STUDENT IN AN ORGANIZED HEALTH CARE EDUCATION/TRAINING PROGRAM

## 2023-05-15 PROCEDURE — 99213 OFFICE O/P EST LOW 20 MIN: CPT | Mod: S$GLB,,, | Performed by: STUDENT IN AN ORGANIZED HEALTH CARE EDUCATION/TRAINING PROGRAM

## 2023-05-15 PROCEDURE — 3008F BODY MASS INDEX DOCD: CPT | Mod: CPTII,S$GLB,, | Performed by: STUDENT IN AN ORGANIZED HEALTH CARE EDUCATION/TRAINING PROGRAM

## 2023-05-15 PROCEDURE — 1159F PR MEDICATION LIST DOCUMENTED IN MEDICAL RECORD: ICD-10-PCS | Mod: CPTII,S$GLB,, | Performed by: STUDENT IN AN ORGANIZED HEALTH CARE EDUCATION/TRAINING PROGRAM

## 2023-05-15 PROCEDURE — 99999 PR PBB SHADOW E&M-EST. PATIENT-LVL IV: CPT | Mod: PBBFAC,,, | Performed by: STUDENT IN AN ORGANIZED HEALTH CARE EDUCATION/TRAINING PROGRAM

## 2023-05-15 PROCEDURE — 1160F PR REVIEW ALL MEDS BY PRESCRIBER/CLIN PHARMACIST DOCUMENTED: ICD-10-PCS | Mod: CPTII,S$GLB,, | Performed by: STUDENT IN AN ORGANIZED HEALTH CARE EDUCATION/TRAINING PROGRAM

## 2023-05-15 PROCEDURE — 99999 PR PBB SHADOW E&M-EST. PATIENT-LVL IV: ICD-10-PCS | Mod: PBBFAC,,, | Performed by: STUDENT IN AN ORGANIZED HEALTH CARE EDUCATION/TRAINING PROGRAM

## 2023-05-15 PROCEDURE — 1159F MED LIST DOCD IN RCRD: CPT | Mod: CPTII,S$GLB,, | Performed by: STUDENT IN AN ORGANIZED HEALTH CARE EDUCATION/TRAINING PROGRAM

## 2023-05-15 PROCEDURE — 99213 PR OFFICE/OUTPT VISIT, EST, LEVL III, 20-29 MIN: ICD-10-PCS | Mod: S$GLB,,, | Performed by: STUDENT IN AN ORGANIZED HEALTH CARE EDUCATION/TRAINING PROGRAM

## 2023-05-15 NOTE — PROGRESS NOTES
Patient ID: Se Virgil Mcgraw is a 58 y.o. male.    Chief Complaint: Follow-up    Referral: No referring provider defined for this encounter.     HPI  58 y.o. who presents to the Urology clinic for evaluation of ED/ s/p IPP. Patient has not been cycling device at home as previously instructed. Pain of scrotum resolved after treatment of UTI. Patient here today for IPP cycling/teaching.   Medically Necessary ROS documented in HPI    Past Medical History  Active Ambulatory Problems     Diagnosis Date Noted    Diabetes mellitus type 2 in obese 08/30/2015    Essential hypertension 08/30/2015    Brachial neuritis or radiculitis NOS 09/15/2015    Elevated LFTs 11/17/2015    Sleep apnea 11/17/2015    CKD (chronic kidney disease) stage 2, GFR 60-89 ml/min 11/17/2015    History of gout 11/17/2015    HLD (hyperlipidemia) 11/17/2015    GERD (gastroesophageal reflux disease) 11/17/2015    Edema 11/17/2015    AICD (automatic cardioverter/defibrillator) present 11/17/2015    Non-ischemic cardiomyopathy 11/19/2015    Restrictive lung disease 11/20/2015    Cervical disc disorder with radiculopathy 11/23/2015    Inguinal lymphadenopathy 06/02/2017    Lower urinary tract symptoms (LUTS) 10/01/2020    Ventricular tachycardia 09/30/2021    Chronic combined systolic and diastolic heart failure 09/30/2021    Chronic hip pain 09/30/2021    Insomnia 09/30/2021    Erectile dysfunction 01/25/2022    Blind painful right eye 04/14/2023     Resolved Ambulatory Problems     Diagnosis Date Noted    Abscess of right leg 09/30/2012    Pain in the chest 08/30/2015    Acute pancreatitis 08/05/2016    Acute kidney injury 08/06/2016    Cellulitis of penis 07/06/2018    Corneal ulcer 09/30/2021    Glaucoma, right eye 09/30/2021    Endophthalmitis, acute, right 10/02/2021     Past Medical History:   Diagnosis Date    Cataract     COPD (chronic obstructive pulmonary disease)     Coronary artery disease     Diabetes mellitus type II     DJD (degenerative  joint disease)     Glaucoma     Gout     Hypertension     Kidney stone     MI (myocardial infarction) 2010    Obesity     JOSE (obstructive sleep apnea)     Uveitis          Past Surgical History  Past Surgical History:   Procedure Laterality Date    CARDIAC DEFIBRILLATOR PLACEMENT      CATARACT EXTRACTION W/  INTRAOCULAR LENS IMPLANT Right 2020    OUTSIDE [facility] ()    CYSTOSCOPY N/A 10/01/2020    Procedure: CYSTOSCOPY;  Surgeon: Monica Lieberman MD;  Location: [facility] OR;  Service: Urology;  Laterality: N/A;  RN PRE OP Covid screen 9-  C A    excisional biopsy left inguinal lymph node      FOOT SURGERY      right foot surgery     INSERTION OF INFLATABLE PENILE PROSTHESIS N/A 01/25/2022    Procedure: INSERTION, PENILE PROSTHESIS, INFLATABLE;  Surgeon: Monica Lieberman MD;  Location: [facility] OR;  Service: Urology;  Laterality: N/A;  [employer] JUAN LUIS ARNOLD [phone] TEXTED HIM @ 5:32PM ON 12-  RN PRE OP 12-28-21. Covid NEGATIVE 1-3-22. [place]-----AICD-----HAS CARDS CLEARANCE    kidney stones      lithotripsy    REPAIR, SURGICAL WOUND, EYE, ANTERIOR SEGMENT Right 10/05/2021    Procedure: REPAIR, SURGICAL WOUND, EYE, ANTERIOR SEGMENT;  Surgeon: Adelaide Allen MD;  Location: [facility] OR [address] [place] [address];  Service: Ophthalmology;  Laterality: Right;  Anterior Chamber Wash Out Right Eye     Surgery for bulging disc at C7         Social History  Social Connections: Not on file       Medications    Current Outpatient Medications:     albuterol (PROVENTIL/VENTOLIN HFA) 90 mcg/actuation inhaler, Inhale 2 puffs into the lungs every 4 (four) hours as needed for Wheezing or Shortness of Breath. Rescue, Disp: 1 Inhaler, Rfl: 0    allopurinol (ZYLOPRIM) 100 MG tablet, Take 100 mg by mouth Daily., Disp: , Rfl: 5    aspirin (ECOTRIN) 81 MG EC tablet, Take 81 mg by mouth once daily., Disp: , Rfl:     atropine 1% (ISOPTO ATROPINE) 1 % Drop, Place 1 drop into the right eye 2 (two) times a day., Disp: 15 mL, Rfl: 11     blood sugar diagnostic Strp, ACCU CHEK KEVAN PLUS TEST STRIPS, Disp: , Rfl:     blood-glucose meter Misc, ACCU CHEK KEVAN PLUS METER: USE 4X/D, Disp: , Rfl:     CRESTOR 20 mg tablet, Take 20 mg by mouth every evening. , Disp: , Rfl: 5    dapagliflozin (FARXIGA) 10 mg tablet, Take 10 mg by mouth once daily., Disp: , Rfl:     dicyclomine (BENTYL) 20 mg tablet, Take 20 mg by mouth 2 (two) times daily., Disp: , Rfl: 2    divalproex ER (DEPAKOTE ER) 250 MG 24 hr tablet, Take 750 mg by mouth every evening., Disp: , Rfl:     docusate sodium (COLACE) 100 MG capsule, Take 1 capsule (100 mg total) by mouth 2 (two) times daily., Disp: 30 capsule, Rfl: 0    dorzolamide-timolol 2-0.5% (COSOPT) 22.3-6.8 mg/mL ophthalmic solution, Place 1 drop into both eyes 2 (two) times daily., Disp: 10 mL, Rfl: 11    famotidine (PEPCID) 20 MG tablet, Take 20 mg by mouth 2 (two) times daily as needed., Disp: , Rfl:     fluticasone propionate (FLONASE) 50 mcg/actuation nasal spray, 2 sprays (100 mcg total) by Each Nostril route once daily., Disp: 1 Bottle, Rfl: 0    furosemide (LASIX) 20 MG tablet, Take 20 mg by mouth daily as needed. Takes every other day, Disp: , Rfl: 5    gabapentin (NEURONTIN) 800 MG tablet, Take 800 mg by mouth 2 (two) times a day., Disp: , Rfl:     hydroCHLOROthiazide (MICROZIDE) 12.5 mg capsule, Take 12.5 mg by mouth once daily., Disp: , Rfl:     insulin glargine-lixisenatide (SOLIQUA 100/33) 100 unit-33 mcg/mL InPn pen, Inject 60 Units into the skin every morning., Disp: , Rfl:     INVOKANA 300 mg Tab tablet, Take 300 mg by mouth once daily., Disp: , Rfl: 5    ketoconazole (NIZORAL) 2 % cream, Apply  a small amount to affected area twice a day  apply to skin for fungal infection, Disp: , Rfl:     lancets Misc, ACCU CHEK SOFTCLIX LANCETS: USE 4X DAILY, Disp: , Rfl:     levocetirizine (XYZAL) 5 MG tablet, Take 5 mg by mouth daily as needed., Disp: , Rfl:     LIDOcaine (LIDODERM) 5 %, Apply 1-3 patches every day prn pain,  "Disp: , Rfl:     lisinopriL (PRINIVIL,ZESTRIL) 20 MG tablet, Take 1 tablet by mouth once daily., Disp: , Rfl:     metoclopramide HCl (REGLAN) 10 MG tablet, Take 1 tablet (10 mg total) by mouth every 6 (six) hours as needed., Disp: 30 tablet, Rfl: 0    metoprolol succinate (TOPROL-XL) 50 MG 24 hr tablet, Take 50 mg by mouth every evening. , Disp: , Rfl: 5    mupirocin (BACTROBAN) 2 % ointment, Apply topically 3 (three) times daily., Disp: , Rfl:     nitroGLYCERIN (NITROSTAT) 0.4 MG SL tablet, Place 0.4 mg under the tongue every 5 (five) minutes as needed for Chest pain., Disp: , Rfl:     omega-3 acid ethyl esters (LOVAZA) 1 gram capsule, Take 2 capsules by mouth once daily., Disp: , Rfl:     omeprazole (PRILOSEC) 20 MG capsule, TK 1 C PO QD FOR CONTROL OF STOMACH ACID BEFORE BREAKFAST, Disp: , Rfl: 1    oxybutynin (DITROPAN-XL) 10 MG 24 hr tablet, Take 1 tablet by mouth once daily., Disp: , Rfl:     oxyCODONE-acetaminophen (PERCOCET) 7.5-325 mg per tablet, Take 1 tablet by mouth every 4 (four) hours as needed., Disp: , Rfl:     pen needle, diabetic 31 gauge x 5/16" Ndle, Inject 1 each into the skin., Disp: , Rfl:     potassium chloride SA (K-DUR,KLOR-CON) 20 MEQ tablet, Take 20 mEq by mouth once daily., Disp: , Rfl: 5    promethazine (PHENERGAN) 25 MG tablet, Take 1 tablet by mouth every 4 (four) hours as needed., Disp: , Rfl:     sotaloL (BETAPACE) 80 MG tablet, TAKE 1 TABLET BY MOUTH TWICE DAILY, Disp: , Rfl:     spironolactone (ALDACTONE) 25 MG tablet, Take 1 tablet by mouth once daily., Disp: , Rfl:     tamsulosin (FLOMAX) 0.4 mg Cap, Take 1 capsule (0.4 mg total) by mouth once daily., Disp: 90 capsule, Rfl: 3    tiZANidine 4 mg Cap, Take 4 mg by mouth 2 (two) times a day., Disp: , Rfl:     zolpidem (AMBIEN) 10 mg Tab, Take 5 mg by mouth nightly as needed., Disp: , Rfl:     acetaZOLAMIDE (DIAMOX) 250 MG tablet, Take 1 tablet (250 mg total) by mouth 2 (two) times daily., Disp: 60 tablet, Rfl: 11    DULoxetine " (CYMBALTA) 60 MG capsule, Take 60 mg by mouth once daily., Disp: , Rfl:     ZOLMitriptan (ZOMIG) 2.5 mg tablet, Take 2.5 mg by mouth daily as needed., Disp: , Rfl:     Allergies  Review of patient's allergies indicates:   Allergen Reactions    Darvocet a500 [propoxyphene n-acetaminophen] Itching    Morphine Itching           Tramadol Itching       Patient's PMH, FH, Social hx, Medications, allergies reviewed and updated as pertinent to today's visit    Objective:      Physical Exam  Constitutional:       General: He is not in acute distress.     Appearance: He is well-developed. He is not ill-appearing, toxic-appearing or diaphoretic.   HENT:      Head: Normocephalic and atraumatic.      Mouth/Throat:      Mouth: Mucous membranes are moist.   Eyes:      Conjunctiva/sclera: Conjunctivae normal.   Cardiovascular:      Rate and Rhythm: Normal rate and regular rhythm.   Pulmonary:      Effort: Pulmonary effort is normal. No respiratory distress.   Abdominal:      General: There is no distension.      Palpations: Abdomen is soft. There is no mass.      Tenderness: There is no abdominal tenderness. There is no guarding.   Genitourinary:     Comments: IPP cycled  No signs of crepitus  Patient non tender to penis or scrotum  MD Able to isolate scrotal pump without difficulty  Musculoskeletal:         General: No swelling or deformity.      Cervical back: Neck supple.   Skin:     General: Skin is warm.      Capillary Refill: Capillary refill takes less than 2 seconds.      Findings: No rash.   Neurological:      Mental Status: He is alert and oriented to person, place, and time.      Gait: Gait abnormal.   Psychiatric:         Mood and Affect: Mood normal.         Thought Content: Thought content normal.         Judgment: Judgment normal.           Lab Results   Component Value Date    PSADIAG 0.67 01/19/2021    PSADIAG 1.4 08/06/2016        Assessment:       1. Erectile dysfunction, unspecified erectile dysfunction type     2. History of implantation of penile prosthesis        Plan:         IPP cycling demonstrated x 2 while supine and in front of mirror

## 2023-05-29 ENCOUNTER — TELEPHONE (OUTPATIENT)
Dept: OPHTHALMOLOGY | Facility: CLINIC | Age: 58
End: 2023-05-29
Payer: MEDICARE

## 2023-05-29 DIAGNOSIS — H40.41X3 GLAUCOMA OF RIGHT EYE SECONDARY TO EYE INFLAMMATION, SEVERE STAGE: Primary | ICD-10-CM

## 2023-05-29 RX ORDER — ACETAZOLAMIDE 250 MG/1
TABLET ORAL
Qty: 60 TABLET | Refills: 11 | Status: SHIPPED | OUTPATIENT
Start: 2023-05-29 | End: 2023-12-22

## 2023-07-12 ENCOUNTER — TELEPHONE (OUTPATIENT)
Dept: OPHTHALMOLOGY | Facility: CLINIC | Age: 58
End: 2023-07-12
Payer: MEDICARE

## 2023-07-17 ENCOUNTER — TELEPHONE (OUTPATIENT)
Dept: OPHTHALMOLOGY | Facility: CLINIC | Age: 58
End: 2023-07-17
Payer: MEDICARE

## 2023-07-18 ENCOUNTER — TELEPHONE (OUTPATIENT)
Dept: OPHTHALMOLOGY | Facility: CLINIC | Age: 58
End: 2023-07-18
Payer: MEDICARE

## 2023-07-18 NOTE — TELEPHONE ENCOUNTER
Spoke to patient reschedule his surgery    ----- Message from Laura Holder sent at 7/18/2023  3:10 PM CDT -----  Regarding: Surgery R/s  Pt called to f/u on rescheduling surgery.    Call back-[phone]

## 2023-07-19 ENCOUNTER — TELEPHONE (OUTPATIENT)
Dept: OPHTHALMOLOGY | Facility: CLINIC | Age: 58
End: 2023-07-19
Payer: MEDICARE

## 2023-07-19 DIAGNOSIS — H40.41X3 GLAUCOMA OF RIGHT EYE SECONDARY TO EYE INFLAMMATION, SEVERE STAGE: Primary | ICD-10-CM

## 2023-08-08 ENCOUNTER — TELEPHONE (OUTPATIENT)
Dept: OPHTHALMOLOGY | Facility: CLINIC | Age: 58
End: 2023-08-08
Payer: MEDICARE

## 2023-08-09 ENCOUNTER — TELEPHONE (OUTPATIENT)
Dept: OPHTHALMOLOGY | Facility: CLINIC | Age: 58
End: 2023-08-09
Payer: MEDICARE

## 2023-08-10 ENCOUNTER — ANESTHESIA (OUTPATIENT)
Dept: SURGERY | Facility: HOSPITAL | Age: 58
End: 2023-08-10
Payer: MEDICARE

## 2023-08-10 ENCOUNTER — HOSPITAL ENCOUNTER (OUTPATIENT)
Facility: HOSPITAL | Age: 58
Discharge: HOME | End: 2023-08-10
Attending: STUDENT IN AN ORGANIZED HEALTH CARE EDUCATION/TRAINING PROGRAM | Admitting: STUDENT IN AN ORGANIZED HEALTH CARE EDUCATION/TRAINING PROGRAM
Payer: MEDICARE

## 2023-08-10 ENCOUNTER — ANESTHESIA EVENT (OUTPATIENT)
Dept: SURGERY | Facility: HOSPITAL | Age: 58
End: 2023-08-10
Payer: MEDICARE

## 2023-08-10 VITALS
SYSTOLIC BLOOD PRESSURE: 153 MMHG | DIASTOLIC BLOOD PRESSURE: 93 MMHG | BODY MASS INDEX: 33.21 KG/M2 | HEIGHT: 67 IN | WEIGHT: 211.63 LBS | RESPIRATION RATE: 14 BRPM | OXYGEN SATURATION: 95 % | TEMPERATURE: 98 F | HEART RATE: 57 BPM

## 2023-08-10 DIAGNOSIS — H57.11 BLIND PAINFUL RIGHT EYE: Primary | ICD-10-CM

## 2023-08-10 DIAGNOSIS — H54.40 BLIND PAINFUL RIGHT EYE: Primary | ICD-10-CM

## 2023-08-10 LAB
POCT GLUCOSE: 115 MG/DL (ref 70–110)
POCT GLUCOSE: 84 MG/DL (ref 70–110)

## 2023-08-10 PROCEDURE — 25000003 PHARM REV CODE 250: Performed by: STUDENT IN AN ORGANIZED HEALTH CARE EDUCATION/TRAINING PROGRAM

## 2023-08-10 PROCEDURE — 71000015 HC POSTOP RECOV 1ST HR: Performed by: STUDENT IN AN ORGANIZED HEALTH CARE EDUCATION/TRAINING PROGRAM

## 2023-08-10 PROCEDURE — D9220A PRA ANESTHESIA: ICD-10-PCS | Mod: CRNA,,, | Performed by: STUDENT IN AN ORGANIZED HEALTH CARE EDUCATION/TRAINING PROGRAM

## 2023-08-10 PROCEDURE — 66710 PR DESTRUC,CILIARY BODY,CYCLOPHOTOCOAG: ICD-10-PCS | Mod: RT,,, | Performed by: STUDENT IN AN ORGANIZED HEALTH CARE EDUCATION/TRAINING PROGRAM

## 2023-08-10 PROCEDURE — 71000044 HC DOSC ROUTINE RECOVERY FIRST HOUR: Mod: 91 | Performed by: STUDENT IN AN ORGANIZED HEALTH CARE EDUCATION/TRAINING PROGRAM

## 2023-08-10 PROCEDURE — 71000045 HC DOSC ROUTINE RECOVERY EA ADD'L HR: Performed by: STUDENT IN AN ORGANIZED HEALTH CARE EDUCATION/TRAINING PROGRAM

## 2023-08-10 PROCEDURE — 37000008 HC ANESTHESIA 1ST 15 MINUTES: Performed by: STUDENT IN AN ORGANIZED HEALTH CARE EDUCATION/TRAINING PROGRAM

## 2023-08-10 PROCEDURE — 63600175 PHARM REV CODE 636 W HCPCS: Performed by: STUDENT IN AN ORGANIZED HEALTH CARE EDUCATION/TRAINING PROGRAM

## 2023-08-10 PROCEDURE — D9220A PRA ANESTHESIA: Mod: CRNA,,, | Performed by: STUDENT IN AN ORGANIZED HEALTH CARE EDUCATION/TRAINING PROGRAM

## 2023-08-10 PROCEDURE — 82962 GLUCOSE BLOOD TEST: CPT | Performed by: STUDENT IN AN ORGANIZED HEALTH CARE EDUCATION/TRAINING PROGRAM

## 2023-08-10 PROCEDURE — 71000016 HC POSTOP RECOV ADDL HR: Mod: 91 | Performed by: STUDENT IN AN ORGANIZED HEALTH CARE EDUCATION/TRAINING PROGRAM

## 2023-08-10 PROCEDURE — 37000009 HC ANESTHESIA EA ADD 15 MINS: Performed by: STUDENT IN AN ORGANIZED HEALTH CARE EDUCATION/TRAINING PROGRAM

## 2023-08-10 PROCEDURE — D9220A PRA ANESTHESIA: ICD-10-PCS | Mod: ANES,,, | Performed by: ANESTHESIOLOGY

## 2023-08-10 PROCEDURE — 36000706: Performed by: STUDENT IN AN ORGANIZED HEALTH CARE EDUCATION/TRAINING PROGRAM

## 2023-08-10 PROCEDURE — 25000003 PHARM REV CODE 250

## 2023-08-10 PROCEDURE — D9220A PRA ANESTHESIA: Mod: ANES,,, | Performed by: ANESTHESIOLOGY

## 2023-08-10 PROCEDURE — 36000707: Performed by: STUDENT IN AN ORGANIZED HEALTH CARE EDUCATION/TRAINING PROGRAM

## 2023-08-10 PROCEDURE — 27201423 OPTIME MED/SURG SUP & DEVICES STERILE SUPPLY: Performed by: STUDENT IN AN ORGANIZED HEALTH CARE EDUCATION/TRAINING PROGRAM

## 2023-08-10 PROCEDURE — 66710 CILIARY TRANSSLERAL THERAPY: CPT | Mod: RT,,, | Performed by: STUDENT IN AN ORGANIZED HEALTH CARE EDUCATION/TRAINING PROGRAM

## 2023-08-10 RX ORDER — TETRACAINE HYDROCHLORIDE 5 MG/ML
1 SOLUTION OPHTHALMIC
Status: DISCONTINUED | OUTPATIENT
Start: 2023-08-10 | End: 2023-08-10 | Stop reason: HOSPADM

## 2023-08-10 RX ORDER — SODIUM CHLORIDE 0.9 % (FLUSH) 0.9 %
10 SYRINGE (ML) INJECTION
Status: DISCONTINUED | OUTPATIENT
Start: 2023-08-10 | End: 2023-08-10 | Stop reason: HOSPADM

## 2023-08-10 RX ORDER — MOXIFLOXACIN 5 MG/ML
1 SOLUTION/ DROPS OPHTHALMIC
Status: DISCONTINUED | OUTPATIENT
Start: 2023-08-10 | End: 2023-08-10 | Stop reason: HOSPADM

## 2023-08-10 RX ORDER — FENTANYL CITRATE 50 UG/ML
25 INJECTION, SOLUTION INTRAMUSCULAR; INTRAVENOUS EVERY 5 MIN PRN
Status: DISCONTINUED | OUTPATIENT
Start: 2023-08-10 | End: 2023-08-10 | Stop reason: HOSPADM

## 2023-08-10 RX ORDER — LIDOCAINE HYDROCHLORIDE 10 MG/ML
1 INJECTION, SOLUTION EPIDURAL; INFILTRATION; INTRACAUDAL; PERINEURAL ONCE
Status: DISCONTINUED | OUTPATIENT
Start: 2023-08-10 | End: 2023-08-10 | Stop reason: HOSPADM

## 2023-08-10 RX ORDER — PROPOFOL 10 MG/ML
VIAL (ML) INTRAVENOUS
Status: DISCONTINUED | OUTPATIENT
Start: 2023-08-10 | End: 2023-08-10

## 2023-08-10 RX ORDER — CHLORPROMAZINE HYDROCHLORIDE 25 MG/ML
25 INJECTION INTRAMUSCULAR
Status: DISCONTINUED | OUTPATIENT
Start: 2023-08-10 | End: 2023-08-10 | Stop reason: HOSPADM

## 2023-08-10 RX ORDER — LIDOCAINE HYDROCHLORIDE 10 MG/ML
INJECTION, SOLUTION EPIDURAL; INFILTRATION; INTRACAUDAL; PERINEURAL
Status: DISCONTINUED
Start: 2023-08-10 | End: 2023-08-10 | Stop reason: HOSPADM

## 2023-08-10 RX ORDER — ACETAMINOPHEN 325 MG/1
650 TABLET ORAL EVERY 4 HOURS PRN
Status: DISCONTINUED | OUTPATIENT
Start: 2023-08-10 | End: 2023-08-10 | Stop reason: HOSPADM

## 2023-08-10 RX ORDER — ATROPINE SULFATE 10 MG/ML
SOLUTION/ DROPS OPHTHALMIC
Status: DISCONTINUED
Start: 2023-08-10 | End: 2023-08-10 | Stop reason: HOSPADM

## 2023-08-10 RX ORDER — NEOMYCIN SULFATE, POLYMYXIN B SULFATE, AND DEXAMETHASONE 3.5; 10000; 1 MG/G; [USP'U]/G; MG/G
OINTMENT OPHTHALMIC
Status: DISCONTINUED
Start: 2023-08-10 | End: 2023-08-10 | Stop reason: HOSPADM

## 2023-08-10 RX ORDER — TETRACAINE HYDROCHLORIDE 5 MG/ML
SOLUTION OPHTHALMIC
Status: DISCONTINUED
Start: 2023-08-10 | End: 2023-08-10 | Stop reason: HOSPADM

## 2023-08-10 RX ORDER — BUPIVACAINE HYDROCHLORIDE 7.5 MG/ML
INJECTION, SOLUTION EPIDURAL; RETROBULBAR
Status: DISCONTINUED
Start: 2023-08-10 | End: 2023-08-10 | Stop reason: HOSPADM

## 2023-08-10 RX ORDER — PREDNISOLONE ACETATE 10 MG/ML
SUSPENSION/ DROPS OPHTHALMIC
Status: DISCONTINUED
Start: 2023-08-10 | End: 2023-08-10 | Stop reason: HOSPADM

## 2023-08-10 RX ORDER — CHLORPROMAZINE HYDROCHLORIDE 25 MG/ML
INJECTION INTRAMUSCULAR
Status: DISCONTINUED | OUTPATIENT
Start: 2023-08-10 | End: 2023-08-10 | Stop reason: HOSPADM

## 2023-08-10 RX ORDER — PREDNISOLONE ACETATE 10 MG/ML
SUSPENSION/ DROPS OPHTHALMIC
Status: DISCONTINUED | OUTPATIENT
Start: 2023-08-10 | End: 2023-08-10 | Stop reason: HOSPADM

## 2023-08-10 RX ORDER — ATROPINE SULFATE 10 MG/ML
SOLUTION/ DROPS OPHTHALMIC
Status: DISCONTINUED | OUTPATIENT
Start: 2023-08-10 | End: 2023-08-10 | Stop reason: HOSPADM

## 2023-08-10 RX ORDER — OXYCODONE HYDROCHLORIDE 5 MG/1
5 TABLET ORAL
Status: DISCONTINUED | OUTPATIENT
Start: 2023-08-10 | End: 2023-08-10 | Stop reason: HOSPADM

## 2023-08-10 RX ORDER — NEOMYCIN SULFATE, POLYMYXIN B SULFATE, AND DEXAMETHASONE 3.5; 10000; 1 MG/G; [USP'U]/G; MG/G
OINTMENT OPHTHALMIC
Status: DISCONTINUED | OUTPATIENT
Start: 2023-08-10 | End: 2023-08-10 | Stop reason: HOSPADM

## 2023-08-10 RX ORDER — LIDOCAINE HYDROCHLORIDE 20 MG/ML
INJECTION INTRAVENOUS
Status: DISCONTINUED | OUTPATIENT
Start: 2023-08-10 | End: 2023-08-10

## 2023-08-10 RX ORDER — TETRACAINE HYDROCHLORIDE 5 MG/ML
SOLUTION OPHTHALMIC
Status: COMPLETED
Start: 2023-08-10 | End: 2023-08-10

## 2023-08-10 RX ORDER — MIDAZOLAM HYDROCHLORIDE 1 MG/ML
INJECTION, SOLUTION INTRAMUSCULAR; INTRAVENOUS
Status: DISCONTINUED | OUTPATIENT
Start: 2023-08-10 | End: 2023-08-10

## 2023-08-10 RX ADMIN — MOXIFLOXACIN 1 DROP: 5 SOLUTION/ DROPS OPHTHALMIC at 10:08

## 2023-08-10 RX ADMIN — SODIUM CHLORIDE: 0.9 INJECTION, SOLUTION INTRAVENOUS at 10:08

## 2023-08-10 RX ADMIN — PROPOFOL 30 MG: 10 INJECTION, EMULSION INTRAVENOUS at 10:08

## 2023-08-10 RX ADMIN — PROPOFOL 70 MG: 10 INJECTION, EMULSION INTRAVENOUS at 10:08

## 2023-08-10 RX ADMIN — TETRACAINE HYDROCHLORIDE 1 DROP: 5 SOLUTION OPHTHALMIC at 10:08

## 2023-08-10 RX ADMIN — LIDOCAINE HYDROCHLORIDE 75 MG: 20 INJECTION INTRAVENOUS at 10:08

## 2023-08-10 RX ADMIN — MIDAZOLAM HYDROCHLORIDE 2 MG: 1 INJECTION, SOLUTION INTRAMUSCULAR; INTRAVENOUS at 10:08

## 2023-08-10 NOTE — PLAN OF CARE
Patient discharged to home via wheelchair, escorted by nurse and daughter at side. Pt alert and talkative, vitals stable, tolerating PO intake. Discharge instructions (written and verbal) and follow-up information given to patients daughter who verbalized understanding, as well as a readiness for discharge. [facility] contact info provided for additional questions following discharge. MIRANDA

## 2023-08-10 NOTE — ANESTHESIA PREPROCEDURE EVALUATION
08/10/2023  Se Virgil Mcgraw is a 58 y.o., male.      Pre-op Assessment    I have reviewed the Patient Summary Reports.     I have reviewed the Nursing Notes.    I have reviewed the Medications.     Review of Systems  Anesthesia Hx:  No problems with previous Anesthesia  Denies Family Hx of Anesthesia complications.    Cardiovascular:   Exercise tolerance: good Hypertension Past MI CAD      Pulmonary:   COPD    Renal/:   Chronic Renal Disease, CKD    Hepatic/GI:   GERD    Musculoskeletal:   Arthritis     Neurological:   Neuromuscular Disease,    Endocrine:   Diabetes, type 2      Past Medical History:   Diagnosis Date    Cataract     Cellulitis of penis 07/06/2018    COPD (chronic obstructive pulmonary disease)     Coronary artery disease     Diabetes mellitus type II     DJD (degenerative joint disease)     Glaucoma     Gout     Hypertension     Kidney stone     MI (myocardial infarction) 2010    Obesity     JOSE (obstructive sleep apnea)     Uveitis      Past Surgical History:   Procedure Laterality Date    CARDIAC DEFIBRILLATOR PLACEMENT      CATARACT EXTRACTION W/  INTRAOCULAR LENS IMPLANT Right 2020    OUTSIDE [facility] ()    CYSTOSCOPY N/A 10/01/2020    Procedure: CYSTOSCOPY;  Surgeon: Monica Lieberman MD;  Location: [facility] OR;  Service: Urology;  Laterality: N/A;  RN PRE OP Covid screen 9-  C A    DESTRUCTION, CILIARY BODY, USING LASER Right 7/13/2023    Procedure: DESTRUCTION, CILIARY BODY, USING LASER;  Surgeon: Shaina Gan MD;  Location: [address];  Service: Ophthalmology;  Laterality: Right;    excisional biopsy left inguinal lymph node      FOOT SURGERY      right foot surgery     INSERTION OF INFLATABLE PENILE PROSTHESIS N/A 01/25/2022    Procedure: INSERTION, PENILE PROSTHESIS, INFLATABLE;  Surgeon: Monica Lieberman MD;  Location: [facility] OR;  Service:  Urology;  Laterality: N/A;  [employer] JUAN LUIS ARNOLD [phone] TEXTED HIM @ 5:32PM ON 12-  RN PRE OP 12-28-21. Covid NEGATIVE 1-3-22. [place]-----AICD-----HAS CARDS CLEARANCE    kidney stones      lithotripsy    REPAIR, SURGICAL WOUND, EYE, ANTERIOR SEGMENT Right 10/05/2021    Procedure: REPAIR, SURGICAL WOUND, EYE, ANTERIOR SEGMENT;  Surgeon: Adelaide Allen MD;  Location: [facility] OR [address] [place] [address];  Service: Ophthalmology;  Laterality: Right;  Anterior Chamber Wash Out Right Eye     Surgery for bulging disc at C7       Patient Active Problem List   Diagnosis    Diabetes mellitus type 2 in obese    Essential hypertension    Brachial neuritis or radiculitis NOS    Elevated LFTs    Sleep apnea    CKD (chronic kidney disease) stage 2, GFR 60-89 ml/min    History of gout    HLD (hyperlipidemia)    GERD (gastroesophageal reflux disease)    Edema    AICD (automatic cardioverter/defibrillator) present    Non-ischemic cardiomyopathy    Restrictive lung disease    Cervical disc disorder with radiculopathy    Inguinal lymphadenopathy    Lower urinary tract symptoms (LUTS)    Ventricular tachycardia    Chronic combined systolic and diastolic heart failure    Chronic hip pain    Insomnia    Erectile dysfunction    Blind painful right eye     Facility-Administered Medications as of 8/10/2023   Medication Dose Route Frequency Provider Last Rate Last Admin    LIDOcaine (PF) 10 mg/ml (1%) injection 10 mg  1 mL Intradermal Once Shaina Gan MD        sodium chloride 0.9% flush 10 mL  10 mL Intravenous PRN Shaina Gan MD         Outpatient Medications as of 8/10/2023   Medication Sig Dispense Refill    acetaZOLAMIDE (DIAMOX) 250 MG tablet TAKE 1 TABLET(250 MG) BY MOUTH TWICE DAILY 60 tablet 11    albuterol (PROVENTIL/VENTOLIN HFA) 90 mcg/actuation inhaler Inhale 2 puffs into the lungs every 4 (four) hours as needed for Wheezing or Shortness of Breath. Rescue 1 Inhaler 0    allopurinol  (ZYLOPRIM) 100 MG tablet Take 100 mg by mouth Daily.  5    aspirin (ECOTRIN) 81 MG EC tablet Take 81 mg by mouth once daily.      atropine 1% (ISOPTO ATROPINE) 1 % Drop Place 1 drop into the right eye 2 (two) times a day. 15 mL 11    blood sugar diagnostic Strp ACCU CHEK KEVAN PLUS TEST STRIPS      blood-glucose meter [facility] ACCU CHEK KEVAN PLUS METER: USE 4X/D      CRESTOR 20 mg tablet Take 20 mg by mouth every evening.   5    dapagliflozin (FARXIGA) 10 mg tablet Take 10 mg by mouth once daily.      dicyclomine (BENTYL) 20 mg tablet Take 20 mg by mouth 2 (two) times daily.  2    divalproex ER (DEPAKOTE ER) 250 MG 24 hr tablet Take 750 mg by mouth every evening.      docusate sodium (COLACE) 100 MG capsule Take 1 capsule (100 mg total) by mouth 2 (two) times daily. 30 capsule 0    dorzolamide-timolol 2-0.5% (COSOPT) 22.3-6.8 mg/mL ophthalmic solution Place 1 drop into both eyes 2 (two) times daily. 10 mL 11    famotidine (PEPCID) 20 MG tablet Take 20 mg by mouth 2 (two) times daily as needed.      fluticasone propionate (FLONASE) 50 mcg/actuation nasal spray 2 sprays (100 mcg total) by Each Nostril route once daily. 1 Bottle 0    furosemide (LASIX) 20 MG tablet Take 20 mg by mouth daily as needed. Takes every other day  5    gabapentin (NEURONTIN) 800 MG tablet Take 800 mg by mouth 2 (two) times a day.      hydroCHLOROthiazide (MICROZIDE) 12.5 mg capsule Take 12.5 mg by mouth once daily.      insulin glargine-lixisenatide (SOLIQUA 100/33) 100 unit-33 mcg/mL InPn pen Inject 60 Units into the skin every morning.      INVOKANA 300 mg Tab tablet Take 300 mg by mouth once daily.  5    ketoconazole (NIZORAL) 2 % cream Apply  a small amount to affected area twice a day  apply to skin for fungal infection      lancets [facility] ACCU CHEK SOFTCLIX LANCETS: USE 4X DAILY      levocetirizine (XYZAL) 5 MG tablet Take 5 mg by mouth daily as needed.      LIDOcaine (LIDODERM) 5 % Apply 1-3 patches every day prn pain    "   lisinopriL (PRINIVIL,ZESTRIL) 20 MG tablet Take 1 tablet by mouth once daily.      metoclopramide HCl (REGLAN) 10 MG tablet Take 1 tablet (10 mg total) by mouth every 6 (six) hours as needed. 30 tablet 0    metoprolol succinate (TOPROL-XL) 50 MG 24 hr tablet Take 50 mg by mouth every evening.   5    mupirocin (BACTROBAN) 2 % ointment Apply topically 3 (three) times daily.      nitroGLYCERIN (NITROSTAT) 0.4 MG SL tablet Place 0.4 mg under the tongue every 5 (five) minutes as needed for Chest pain.      omega-3 acid ethyl esters (LOVAZA) 1 gram capsule Take 2 capsules by mouth once daily.      omeprazole (PRILOSEC) 20 MG capsule TK 1 C PO QD FOR CONTROL OF STOMACH ACID BEFORE BREAKFAST  1    oxybutynin (DITROPAN-XL) 10 MG 24 hr tablet Take 1 tablet by mouth once daily.      oxyCODONE-acetaminophen (PERCOCET) 7.5-325 mg per tablet Take 1 tablet by mouth every 4 (four) hours as needed.      pen needle, diabetic 31 gauge x 5/16" Ndle Inject 1 each into the skin.      potassium chloride SA (K-DUR,KLOR-CON) 20 MEQ tablet Take 20 mEq by mouth once daily.  5    promethazine (PHENERGAN) 25 MG tablet Take 1 tablet by mouth every 4 (four) hours as needed.      sotaloL (BETAPACE) 80 MG tablet TAKE 1 TABLET BY MOUTH TWICE DAILY      spironolactone (ALDACTONE) 25 MG tablet Take 1 tablet by mouth once daily.      tamsulosin (FLOMAX) 0.4 mg Cap Take 1 capsule (0.4 mg total) by mouth once daily. 90 capsule 3    tiZANidine 4 mg Cap Take 4 mg by mouth 2 (two) times a day.      zolpidem (AMBIEN) 10 mg Tab Take 5 mg by mouth nightly as needed.       Review of patient's allergies indicates:   Allergen Reactions    Darvocet a500 [propoxyphene n-acetaminophen] Itching    Morphine Itching           Tramadol Itching         Physical Exam  General: Well nourished, Cooperative and Alert    Airway:  Mallampati: II   Mouth Opening: Normal  TM Distance: Normal  Tongue: Normal  Neck ROM: Normal ROM    Chest/Lungs:  Normal " Respiratory Rate    Heart:  Rate: Normal      Wt Readings from Last 3 Encounters:   05/15/23 96 kg (211 lb 10.3 oz)   03/31/23 97.2 kg (214 lb 4.6 oz)   01/04/23 95.3 kg (210 lb)     Temp Readings from Last 3 Encounters:   01/04/23 37.1 °C (98.7 °F) (Tympanic)   06/22/22 36.8 °C (98.3 °F)   06/20/22 36.9 °C (98.5 °F) (Oral)     BP Readings from Last 3 Encounters:   01/04/23 122/81   06/22/22 138/84   06/20/22 119/80     Pulse Readings from Last 3 Encounters:   01/04/23 84   06/22/22 87   06/20/22 91     Lab Results   Component Value Date    WBC 7.69 12/28/2021    HGB 14.2 12/28/2021    HCT 42.5 12/28/2021    MCV 96 12/28/2021     12/28/2021         Chemistry        Component Value Date/Time     12/28/2021 0943    K 3.9 12/28/2021 0943     12/28/2021 0943    CO2 25 12/28/2021 0943    BUN 21 (H) 12/28/2021 0943    CREATININE 1.4 12/28/2021 0943     (H) 12/28/2021 0943        Component Value Date/Time    CALCIUM 9.6 12/28/2021 0943    ALKPHOS 100 03/09/2019 2051    AST 17 03/09/2019 2051    ALT 16 03/09/2019 2051    BILITOT 0.4 03/09/2019 2051    ESTGFRAFRICA >60 12/28/2021 0943    EGFRNONAA 55 (A) 12/28/2021 0943        Results for orders placed or performed in visit on 12/28/21   EKG 12-lead    Collection Time: 12/28/21  9:33 AM    Narrative    Test Reason : R07.9    Vent. Rate : 079 BPM     Atrial Rate : 079 BPM     P-R Int : 188 ms          QRS Dur : 104 ms      QT Int : 398 ms       P-R-T Axes : 071 065 003 degrees     QTc Int : 456 ms    Normal sinus rhythm  Normal ECG  When compared with ECG of 11-NOV-2017 17:35,  Significant changes have occurred  Confirmed by Darren Springer MD ([id number]) on 12/29/2021 4:41:12 PM    Referred By:             Confirmed By:Darren Springer MD     Echo 4/4/23  CONCLUSIONS     Increased left ventricular wall thickness.     Left ventricular ejection fraction is estimated at 50-55 %.     Grade I diastolic dysfunction, normal to mildly elevated filling pressures.      Pacemaker wire visualized in the right ventricle.     Right ventricular systolic pressure 30.2 mmHg.      Trace mitral valve regurgitation.     Mild aortic valve sclerosis without stenosis or regurgitation.     Mild tricuspid valve regurgitation.     Trace pulmonary valve regurgitation.         Anesthesia Plan  Type of Anesthesia, risks & benefits discussed:    Anesthesia Type: Gen ETT, Gen Supraglottic Airway, Gen Natural Airway, MAC  Intra-op Monitoring Plan: Standard ASA Monitors  Post Op Pain Control Plan: multimodal analgesia and IV/PO Opioids PRN  Induction:  IV  Informed Consent: Informed consent signed with the Patient and all parties understand the risks and agree with anesthesia plan.  All questions answered.   ASA Score: 3  Day of Surgery Review of History & Physical: H&P Update referred to the surgeon/provider.    Ready For Surgery From Anesthesia Perspective.     .

## 2023-08-10 NOTE — DISCHARGE SUMMARY
[facility] - Surgery (1st Fl)  Discharge Note  Short Stay    Procedure(s) (LRB):  DESTRUCTION, CILIARY BODY, USING LASER (Right)      OUTCOME: Patient tolerated treatment/procedure well without complication and is now ready for discharge.    DISPOSITION: Home or Self Care    FINAL DIAGNOSIS:  Blind painful right eye    FOLLOWUP: In clinic    DISCHARGE INSTRUCTIONS:    Discharge Procedure Orders   Diet general     Lifting restrictions   Order Comments: No lifting over 10 pounds.     Call MD for:  temperature >100.4     Call MD for:  persistent nausea and vomiting     Call MD for:  severe uncontrolled pain     Call MD for:  redness, tenderness, or signs of infection (pain, swelling, redness, odor or green/yellow discharge around incision site)        TIME SPENT ON DISCHARGE: 10 minutes

## 2023-08-10 NOTE — H&P
Ophthalmology Preoperative History and Physical Exam     CC/Reason for Surgery: Blind painful hypertensive right eye      HPI:   Pt presents c/o persistent pain in a blind painful hypertensive right eye. He presents for planned Destruction of ciliary body by laser G-Probe transscleral cyclophotocoagulation with or without retrobulbar injection of medication, right eye       Past Medical History:  Past Medical History:   Diagnosis Date    Cataract     Cellulitis of penis 07/06/2018    COPD (chronic obstructive pulmonary disease)     Coronary artery disease     Diabetes mellitus type II     DJD (degenerative joint disease)     Glaucoma     Gout     Hypertension     Kidney stone     MI (myocardial infarction) 2010    Obesity     JOSE (obstructive sleep apnea)     Uveitis         Past Surgical History:  Past Surgical History:   Procedure Laterality Date    CARDIAC DEFIBRILLATOR PLACEMENT      CATARACT EXTRACTION W/  INTRAOCULAR LENS IMPLANT Right 2020    OUTSIDE [facility] ()    CYSTOSCOPY N/A 10/01/2020    Procedure: CYSTOSCOPY;  Surgeon: Monica Lieberman MD;  Location: [facility] OR;  Service: Urology;  Laterality: N/A;  RN PRE OP Covid screen 9-  C A    DESTRUCTION, CILIARY BODY, USING LASER Right 7/13/2023    Procedure: DESTRUCTION, CILIARY BODY, USING LASER;  Surgeon: Shaina Gan MD;  Location: [address];  Service: Ophthalmology;  Laterality: Right;    excisional biopsy left inguinal lymph node      FOOT SURGERY      right foot surgery     INSERTION OF INFLATABLE PENILE PROSTHESIS N/A 01/25/2022    Procedure: INSERTION, PENILE PROSTHESIS, INFLATABLE;  Surgeon: Monica Lieberman MD;  Location: [facility] OR;  Service: Urology;  Laterality: N/A;  [employer] JUAN LUIS ARNOLD [phone] TEXTED HIM @ 5:32PM ON 12-  RN PRE OP 12-28-21. Covid NEGATIVE 1-3-22. [place]-----AICD-----HAS CARDS CLEARANCE    kidney stones      lithotripsy    REPAIR, SURGICAL WOUND, EYE, ANTERIOR SEGMENT Right 10/05/2021     Procedure: REPAIR, SURGICAL WOUND, EYE, ANTERIOR SEGMENT;  Surgeon: Adelaide Allen MD;  Location: [facility] OR [address] [place] [address];  Service: Ophthalmology;  Laterality: Right;  Anterior Chamber Wash Out Right Eye     Surgery for bulging disc at C7          Past Ocular History:  See prior clinic note    Allergies:  Review of patient's allergies indicates:   Allergen Reactions    Darvocet a500 [propoxyphene n-acetaminophen] Itching    Morphine Itching           Tramadol Itching        Social History:  Social History     Socioeconomic History    Marital status: Single   Tobacco Use    Smoking status: Never     Passive exposure: Past    Smokeless tobacco: Never   Substance and Sexual Activity    Alcohol use: No    Drug use: No    Sexual activity: Not Currently     Partners: Female     Comment: divorce        Medications:  No current facility-administered medications for this encounter.     Family History:  Family History   Problem Relation Age of Onset    Diabetes Mother     Hypertension Mother     Heart disease Father     Diabetes Brother     Hypertension Brother     Cancer Maternal Uncle         prostate    Asthma Daughter     Cancer Cousin     Kidney disease Cousin     Amblyopia Neg Hx     Blindness Neg Hx     Cataracts Neg Hx     Glaucoma Neg Hx     Macular degeneration Neg Hx     Retinal detachment Neg Hx     Strabismus Neg Hx         ROS:   Constitutional: WNL   Eyes: See HPI   Ears: WNL   CV: WNL   Resp: WNL   Gastro: WNL    Musculo: WNL   Skin: WNL   Neuro: WNL     Physical Exam:  See nursing intake for vitals    General: No acute distress  HEENT: NC/AT  CV: Pulses strong and equal bilaterally  Resp: Breathing comfortably on room air  Musculoskeletal: WNL, able to lay flat    Lab Results:  CBC w/Diff   Lab Results   Component Value Date/Time    WBC 7.69 12/28/2021 09:43 AM    RBC 4.41 (L) 12/28/2021 09:43 AM    HGB 14.2 12/28/2021 09:43 AM    HCT 42.5 12/28/2021 09:43 AM    HCT 37 09/30/2021 12:05 PM    MCV 96 12/28/2021  "09:43 AM    MCH 32.2 (H) 12/28/2021 09:43 AM    MCHC 33.4 12/28/2021 09:43 AM    RDW 11.5 12/28/2021 09:43 AM    MPV 11.6 12/28/2021 09:43 AM    No components found for: "NEUT", "ANC", "LYMA", "ALC", "ZAHRA", "AMC", "EOSA", "AEC", "BASA", "ABC"     Imaging:  None    Assessment:  1: Blind painful hypertensive right eye        Plan:  To operating room for Destruction of ciliary body by laser G-Probe transscleral cyclophotocoagulation with or without retrobulbar injection of medication, right eye     An extensive discussion took place with the patient concerning the risks, benefits and alternatives to the above procedure. The patient was given the opportunity to have all questions answered. At the conclusion of our discussion, signed informed consent was obtained.     Shaina Gan M.D., M.S.  Department of Ophthalmology   Division of Glaucoma Surgery  [facility]    "

## 2023-08-10 NOTE — OP NOTE
Operative Date:  08/10/2023    Discharge Date:  08/10/2023    Discharge Patient Home    SURGEON: Shaina Gan MD MS    ASSISTANT: Shay Barrios MD    PREOPERATIVE DIAGNOSIS: Blind painful right eye    POSTOPERATIVE DIAGNOSIS: Blind painful right eye    PROCEDURE PERFORMED: G-Probe Laser Ciliary Body Destruction 34790 Treatment  to Superior Temporal / Superior Nasal / Inferior Temporal  / Inferior Nasal quadrants of the right eye and retrobulbar injection of medication     COMPLICATIONS: None.    ESTIMATED BLOOD LOSS: Minimal.    ANESTHESIA: Topical with MAC / retrobulbar injection    PROCEDURE IN DETIAL: The patient is a 58 year old with poorly controlled glaucoma.  The intraocular pressure was deemed too high for the health the optic nerve and visual field status. Discussions have been carried out with this patient regarding the pertinent options, surgical risks and benefits of the procedure and expectations.   The patient  voiced good understanding and wished to proceed with the above procedure.    The patient and correct eye to be operated on were identified by the operating surgeon and surgical team and the patient was brought into the operating room. The patient received topical anesthesia and IV sedation. A block of 0.5 ml of 2% lidocane and 0.75% bupivicane was placed in the retrobulbar space. Two minutes of pressure was applied thereafter. Following this, a speculum was placed and Maxitrol solution was applied to the eye surface.    The G-Probe handpiece was positioned on the eye in proper location using the calibrated guides provided on the probe and treatment was begun with 29 timed spots of 2000 ms at 2000 mW through 360 degrees of treatment.  Care was taken to skip the 3 & 9 o'clock locations. The anterior chamber was well formed throughout the case and there were no complications.  Following the procedure, a drop of atropine 1% along with maxitrol ointment was placed on the cornea.  Following  this 1.5 ml of thorazine 25mg/ml was injected in the retrobulbar space. The eye was closed & patched.  The patient was taken to the recovery room in good and stable condition.  The patient tolerated the procedure well.  The patient  was instructed to refrain from any heavy lifting, bending, stooping, or straining activities, discharged Home and to follow up in the morning for routine postoperative care with Dr. Shaina Gan.

## 2023-08-10 NOTE — PATIENT INSTRUCTIONS
If patched  Keep patch on eye until it is removed in clinic tomorrow  No lifting over 10 pounds  No bending, no straining  OK to shower, but do not get patch wet  If mild pain, take Tylenol 1000 mg  (do not exceed 4000 mg per day)  Call clinic if severe pain [phone] or  [phone]

## 2023-08-10 NOTE — TRANSFER OF CARE
"Anesthesia Transfer of Care Note    Patient: Se Virgil Mcgraw    Procedure(s) Performed: Procedure(s) (LRB):  DESTRUCTION, CILIARY BODY, USING LASER (Right)    Patient location: [facility]    Anesthesia Type: MAC    Transport from OR: Transported from OR on 2-3 L/min O2 by NC with adequate spontaneous ventilation    Post pain: adequate analgesia    Post assessment: no apparent anesthetic complications and tolerated procedure well    Post vital signs: stable    Level of consciousness: sedated    Nausea/Vomiting: no nausea/vomiting    Complications: none    Transfer of care protocol was followed      Last vitals:   Visit Vitals  /82   Pulse 65   Temp 36.8 °C (98.3 °F) (Skin)   Resp 16   Ht 5' 7" (1.702 m)   Wt 96 kg (211 lb 10.3 oz)   SpO2 99%   BMI 33.15 kg/m²     "

## 2023-08-11 ENCOUNTER — OFFICE VISIT (OUTPATIENT)
Dept: OPHTHALMOLOGY | Facility: CLINIC | Age: 58
End: 2023-08-11
Payer: MEDICARE

## 2023-08-11 DIAGNOSIS — H54.40 BLIND PAINFUL RIGHT EYE: Primary | ICD-10-CM

## 2023-08-11 DIAGNOSIS — H57.11 BLIND PAINFUL RIGHT EYE: Primary | ICD-10-CM

## 2023-08-11 PROCEDURE — 99024 PR POST-OP FOLLOW-UP VISIT: ICD-10-PCS | Mod: S$GLB,,, | Performed by: STUDENT IN AN ORGANIZED HEALTH CARE EDUCATION/TRAINING PROGRAM

## 2023-08-11 PROCEDURE — 1159F PR MEDICATION LIST DOCUMENTED IN MEDICAL RECORD: ICD-10-PCS | Mod: CPTII,S$GLB,, | Performed by: STUDENT IN AN ORGANIZED HEALTH CARE EDUCATION/TRAINING PROGRAM

## 2023-08-11 PROCEDURE — 1159F MED LIST DOCD IN RCRD: CPT | Mod: CPTII,S$GLB,, | Performed by: STUDENT IN AN ORGANIZED HEALTH CARE EDUCATION/TRAINING PROGRAM

## 2023-08-11 PROCEDURE — 4010F ACE/ARB THERAPY RXD/TAKEN: CPT | Mod: CPTII,S$GLB,, | Performed by: STUDENT IN AN ORGANIZED HEALTH CARE EDUCATION/TRAINING PROGRAM

## 2023-08-11 PROCEDURE — 4010F PR ACE/ARB THEARPY RXD/TAKEN: ICD-10-PCS | Mod: CPTII,S$GLB,, | Performed by: STUDENT IN AN ORGANIZED HEALTH CARE EDUCATION/TRAINING PROGRAM

## 2023-08-11 PROCEDURE — 99024 POSTOP FOLLOW-UP VISIT: CPT | Mod: S$GLB,,, | Performed by: STUDENT IN AN ORGANIZED HEALTH CARE EDUCATION/TRAINING PROGRAM

## 2023-08-11 PROCEDURE — 99999 PR PBB SHADOW E&M-EST. PATIENT-LVL III: CPT | Mod: PBBFAC,,, | Performed by: STUDENT IN AN ORGANIZED HEALTH CARE EDUCATION/TRAINING PROGRAM

## 2023-08-11 PROCEDURE — 99999 PR PBB SHADOW E&M-EST. PATIENT-LVL III: ICD-10-PCS | Mod: PBBFAC,,, | Performed by: STUDENT IN AN ORGANIZED HEALTH CARE EDUCATION/TRAINING PROGRAM

## 2023-08-11 NOTE — ANESTHESIA POSTPROCEDURE EVALUATION
Anesthesia Post Evaluation    Patient: Se Virgil Mcgraw    Procedure(s) Performed: Procedure(s) (LRB):  DESTRUCTION, CILIARY BODY, USING LASER (Right)    Final Anesthesia Type: general      Patient location during evaluation: PACU  Patient participation: Yes- Able to Participate  Level of consciousness: awake and alert  Post-procedure vital signs: reviewed and stable  Pain management: adequate  Airway patency: patent    PONV status at discharge: No PONV  Anesthetic complications: no      Cardiovascular status: blood pressure returned to baseline  Respiratory status: unassisted  Hydration status: euvolemic  Follow-up not needed.          Vitals Value Taken Time   /93 08/10/23 1301   Temp 36.5 °C (97.7 °F) 08/10/23 1300   Pulse 62 08/10/23 1336   Resp 14 08/10/23 1115   SpO2 95 % 08/10/23 1336   Vitals shown include unvalidated device data.      No case tracking events are documented in the log.      Pain/Laurie Score: Laurie Score: 9 (8/10/2023  1:00 PM)

## 2023-08-11 NOTE — PROGRESS NOTES
Subjective:       Patient ID: Se Virgil Mcgraw is a 58 y.o. male.    Chief Complaint: Post-op Evaluation (Patient Claudette Mcgraw is a 58 year old male./S/p DESTRUCTION, CILIARY BODY, USING LASER OD: 08/10/2023 )    HPI     Post-op Evaluation            Comments: Patient Claudette Mcgraw is a 58 year old male.  S/p DESTRUCTION, CILIARY BODY, USING LASER OD: 08/10/2023           Comments    Pt here for 1 day post-op OD visit. Pt states that OD feels sore and   irritated but denies any eye pain. Pt states that he had 10/10 headache   last night but took a Tylenol and it went away.    DLS: 04/14/2023 with Dr. Gan    Gtts: Dorzolamide-Timolol BID OU     POHx:  1. Blind painful right eye  2. 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 Open angle with borderline findings and high glaucoma risk in left eye  4. 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 Blind painful eye OD  6. Pseudophakia OD  7. Hx Infectious keratitis OD   8. Monocular          Last edited by Kym Marcelo on 8/11/2023  8:43 AM.               Assessment & Plan   Blind painful right eye       POD#1 dts-CPC G-probe, retrobulbar block with thorazine right eye  Expected edema and inflammation  Start PF QID, Atropine BID, Maxitrol QID  RTC next week  Call if worsening         Shaina Gan M.D., M.S.  Department of Ophthalmology   Division of Glaucoma Surgery  [facility]

## 2023-08-15 NOTE — PROGRESS NOTES
Subjective:       Patient ID: Se Virgil Mcgraw is a 58 y.o. male.    Chief Complaint: Post-op Evaluation       HPI    DLS: 8/11/2023    Pt here for 1 week post destruction, ciliary body, using laser OD-   8/10/2023  Pt states yesterday he was having some eye pain to the OD but today his OD   feels better. Pain was 10/10 OD before laser/injection and today he   reports it is 5/10.     Meds;  PF QID OD  Atropine BID OD  Maxitrol QID OD    POHx:   1. Blind painful right eye   2. 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 Open angle with borderline findings and high glaucoma risk in left eye   4. 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 Blind painful eye OD   6. Pseudophakia OD   7. Hx Infectious keratitis OD   8. Monocular    Last edited by Shaina Gan MD on 8/16/2023 11:56 AM.            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, severe stage    Open angle with borderline findings and high glaucoma risk in left eye       POW#1 dts-CPC G-probe, retrobulbar block with thorazine right eye  Improving   PF 4/3/2/1/0 taper weekly  Atropine PRN        Blind painful eye LP OD // GS OS  -Drops: Dorzolamide-timolol BID OS  -Drop intolerance/contraindication:  -Laser:  -Surgeries:  CE IOL OD, AC washout OD, Tap/inject OD // dts-CPC OD  -CCT: 813 // 573  -Gonio: unable OD // CBB OS  IOP max: 32 // 40  IOP goal: comfort OD // <20 OS    2/22 HVF unable OD // LID artifact poor reliability high FN OS (fell asleep)    OD - Goal is pain control -- improved   OS - Poor HVF taker - fell asleep --- looks like a big SALT but just eyelid. RNFL WNL.   Glaucoma suspect high risk - use  Dorzolamide-timolol BID OS    Monocular  Discussed monocular status with patient and increased risk of damage to well-seeing eye  Recommend protective eyewear at all times  Any MRx should be written with  polycarbonate lenses  Saw Dr Treviño 4/22 - Mrx written with polycarbonate       RTC 1 month  Call if worsening         Shaina Gan M.D., M.S.  Department of Ophthalmology   Division of Glaucoma Surgery  [facility]

## 2023-08-16 ENCOUNTER — OFFICE VISIT (OUTPATIENT)
Dept: OPHTHALMOLOGY | Facility: CLINIC | Age: 58
End: 2023-08-16
Payer: MEDICARE

## 2023-08-16 DIAGNOSIS — H57.11 BLIND PAINFUL RIGHT EYE: Primary | ICD-10-CM

## 2023-08-16 DIAGNOSIS — H54.40 BLIND PAINFUL RIGHT EYE: Primary | ICD-10-CM

## 2023-08-16 DIAGNOSIS — H40.022 OPEN ANGLE WITH BORDERLINE FINDINGS AND HIGH GLAUCOMA RISK IN LEFT EYE: ICD-10-CM

## 2023-08-16 DIAGNOSIS — H40.41X3 GLAUCOMA OF RIGHT EYE SECONDARY TO EYE INFLAMMATION, SEVERE STAGE: ICD-10-CM

## 2023-08-16 PROCEDURE — 4010F ACE/ARB THERAPY RXD/TAKEN: CPT | Mod: CPTII,S$GLB,, | Performed by: STUDENT IN AN ORGANIZED HEALTH CARE EDUCATION/TRAINING PROGRAM

## 2023-08-16 PROCEDURE — 4010F PR ACE/ARB THEARPY RXD/TAKEN: ICD-10-PCS | Mod: CPTII,S$GLB,, | Performed by: STUDENT IN AN ORGANIZED HEALTH CARE EDUCATION/TRAINING PROGRAM

## 2023-08-16 PROCEDURE — 99999 PR PBB SHADOW E&M-EST. PATIENT-LVL III: ICD-10-PCS | Mod: PBBFAC,,, | Performed by: STUDENT IN AN ORGANIZED HEALTH CARE EDUCATION/TRAINING PROGRAM

## 2023-08-16 PROCEDURE — 1159F PR MEDICATION LIST DOCUMENTED IN MEDICAL RECORD: ICD-10-PCS | Mod: CPTII,S$GLB,, | Performed by: STUDENT IN AN ORGANIZED HEALTH CARE EDUCATION/TRAINING PROGRAM

## 2023-08-16 PROCEDURE — 1159F MED LIST DOCD IN RCRD: CPT | Mod: CPTII,S$GLB,, | Performed by: STUDENT IN AN ORGANIZED HEALTH CARE EDUCATION/TRAINING PROGRAM

## 2023-08-16 PROCEDURE — 99999 PR PBB SHADOW E&M-EST. PATIENT-LVL III: CPT | Mod: PBBFAC,,, | Performed by: STUDENT IN AN ORGANIZED HEALTH CARE EDUCATION/TRAINING PROGRAM

## 2023-08-16 PROCEDURE — 99024 POSTOP FOLLOW-UP VISIT: CPT | Mod: S$GLB,,, | Performed by: STUDENT IN AN ORGANIZED HEALTH CARE EDUCATION/TRAINING PROGRAM

## 2023-08-16 PROCEDURE — 1160F RVW MEDS BY RX/DR IN RCRD: CPT | Mod: CPTII,S$GLB,, | Performed by: STUDENT IN AN ORGANIZED HEALTH CARE EDUCATION/TRAINING PROGRAM

## 2023-08-16 PROCEDURE — 99024 PR POST-OP FOLLOW-UP VISIT: ICD-10-PCS | Mod: S$GLB,,, | Performed by: STUDENT IN AN ORGANIZED HEALTH CARE EDUCATION/TRAINING PROGRAM

## 2023-08-16 PROCEDURE — 1160F PR REVIEW ALL MEDS BY PRESCRIBER/CLIN PHARMACIST DOCUMENTED: ICD-10-PCS | Mod: CPTII,S$GLB,, | Performed by: STUDENT IN AN ORGANIZED HEALTH CARE EDUCATION/TRAINING PROGRAM

## 2023-09-02 DIAGNOSIS — H40.9 GLAUCOMA OF RIGHT EYE, UNSPECIFIED GLAUCOMA TYPE: ICD-10-CM

## 2023-09-02 DIAGNOSIS — H40.41X3 GLAUCOMA OF RIGHT EYE SECONDARY TO EYE INFLAMMATION, SEVERE STAGE: ICD-10-CM

## 2023-09-05 RX ORDER — DORZOLAMIDE HYDROCHLORIDE AND TIMOLOL MALEATE 20; 5 MG/ML; MG/ML
1 SOLUTION/ DROPS OPHTHALMIC 2 TIMES DAILY
Qty: 10 ML | Refills: 11 | Status: SHIPPED | OUTPATIENT
Start: 2023-09-05

## 2023-10-11 ENCOUNTER — TELEPHONE (OUTPATIENT)
Dept: OPHTHALMOLOGY | Facility: CLINIC | Age: 58
End: 2023-10-11
Payer: MEDICAID

## 2023-10-11 NOTE — TELEPHONE ENCOUNTER
----- Message from Laura Holder sent at 10/11/2023  3:28 PM CDT -----  Regarding: Post op r/s  Patient called to r/s 1 month post op appt. Patient asked for a early afternoon time.    Pts call back- [phone]

## 2023-10-18 ENCOUNTER — OFFICE VISIT (OUTPATIENT)
Dept: OPHTHALMOLOGY | Facility: CLINIC | Age: 58
End: 2023-10-18
Payer: MEDICARE

## 2023-10-18 DIAGNOSIS — H40.022 OPEN ANGLE WITH BORDERLINE FINDINGS AND HIGH GLAUCOMA RISK IN LEFT EYE: ICD-10-CM

## 2023-10-18 DIAGNOSIS — H40.41X3 GLAUCOMA OF RIGHT EYE SECONDARY TO EYE INFLAMMATION, SEVERE STAGE: ICD-10-CM

## 2023-10-18 DIAGNOSIS — H57.11 BLIND PAINFUL RIGHT EYE: Primary | ICD-10-CM

## 2023-10-18 DIAGNOSIS — H54.40 BLIND PAINFUL RIGHT EYE: Primary | ICD-10-CM

## 2023-10-18 PROCEDURE — 4010F PR ACE/ARB THEARPY RXD/TAKEN: ICD-10-PCS | Mod: CPTII,S$GLB,, | Performed by: STUDENT IN AN ORGANIZED HEALTH CARE EDUCATION/TRAINING PROGRAM

## 2023-10-18 PROCEDURE — 3044F HG A1C LEVEL LT 7.0%: CPT | Mod: CPTII,S$GLB,, | Performed by: STUDENT IN AN ORGANIZED HEALTH CARE EDUCATION/TRAINING PROGRAM

## 2023-10-18 PROCEDURE — 1160F RVW MEDS BY RX/DR IN RCRD: CPT | Mod: CPTII,S$GLB,, | Performed by: STUDENT IN AN ORGANIZED HEALTH CARE EDUCATION/TRAINING PROGRAM

## 2023-10-18 PROCEDURE — 99024 PR POST-OP FOLLOW-UP VISIT: ICD-10-PCS | Mod: S$GLB,,, | Performed by: STUDENT IN AN ORGANIZED HEALTH CARE EDUCATION/TRAINING PROGRAM

## 2023-10-18 PROCEDURE — 99024 POSTOP FOLLOW-UP VISIT: CPT | Mod: S$GLB,,, | Performed by: STUDENT IN AN ORGANIZED HEALTH CARE EDUCATION/TRAINING PROGRAM

## 2023-10-18 PROCEDURE — 1159F PR MEDICATION LIST DOCUMENTED IN MEDICAL RECORD: ICD-10-PCS | Mod: CPTII,S$GLB,, | Performed by: STUDENT IN AN ORGANIZED HEALTH CARE EDUCATION/TRAINING PROGRAM

## 2023-10-18 PROCEDURE — 4010F ACE/ARB THERAPY RXD/TAKEN: CPT | Mod: CPTII,S$GLB,, | Performed by: STUDENT IN AN ORGANIZED HEALTH CARE EDUCATION/TRAINING PROGRAM

## 2023-10-18 PROCEDURE — 99999 PR PBB SHADOW E&M-EST. PATIENT-LVL III: CPT | Mod: PBBFAC,,, | Performed by: STUDENT IN AN ORGANIZED HEALTH CARE EDUCATION/TRAINING PROGRAM

## 2023-10-18 PROCEDURE — 1159F MED LIST DOCD IN RCRD: CPT | Mod: CPTII,S$GLB,, | Performed by: STUDENT IN AN ORGANIZED HEALTH CARE EDUCATION/TRAINING PROGRAM

## 2023-10-18 PROCEDURE — 99999 PR PBB SHADOW E&M-EST. PATIENT-LVL III: ICD-10-PCS | Mod: PBBFAC,,, | Performed by: STUDENT IN AN ORGANIZED HEALTH CARE EDUCATION/TRAINING PROGRAM

## 2023-10-18 PROCEDURE — 3044F PR MOST RECENT HEMOGLOBIN A1C LEVEL <7.0%: ICD-10-PCS | Mod: CPTII,S$GLB,, | Performed by: STUDENT IN AN ORGANIZED HEALTH CARE EDUCATION/TRAINING PROGRAM

## 2023-10-18 PROCEDURE — 1160F PR REVIEW ALL MEDS BY PRESCRIBER/CLIN PHARMACIST DOCUMENTED: ICD-10-PCS | Mod: CPTII,S$GLB,, | Performed by: STUDENT IN AN ORGANIZED HEALTH CARE EDUCATION/TRAINING PROGRAM

## 2023-10-18 NOTE — PROGRESS NOTES
Subjective:       Patient ID: Se Virgil Mcgraw is a 58 y.o. male.    Chief Complaint: Post-op Evaluation    HPI    DLS: 0816/2023  S/P G-Probe  Pt reports 8/10 pain in the right eye and heaviness in the right eye. He   reports the right eye is improved in pain.     1. Blind painful right eye   2. 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 Open angle with borderline findings and high glaucoma risk in left eye   4. Uvei tic glaucoma LP OD // GS OS       -Surgeries:  CE IOL OD, A C washout OD, Tap/inject OD   5. Blind painful eye OD   6. Pseudophakia OD   7. Hx Infectious keratitis OD   8. Monocular     Combigan BID OU   Latanoprost Q HS OU     Last edited by Shaina Gan MD on 10/18/2023  2:17 PM.               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, severe stage    Open angle with borderline findings and high glaucoma risk in left eye       POM#1 dts-CPC G-probe, retrobulbar block with thorazine right eye  Pain has improved but still has pain    Discussed gtts, repeat CPC, surgery  Pt prefers to repeat the CPC        Blind painful eye LP OD // GS OS  -Drops: Dorzolamide-timolol BID OU  -Drop intolerance/contraindication:  -Laser:  -Surgeries:  CE IOL OD, AC washout OD, Tap/inject OD // dts-CPC OD  -CCT: 813 // 573  -Gonio: unable OD // CBB OS  IOP max: 32 // 40  IOP goal: comfort OD // <20 OS    2/22 HVF unable OD // LID artifact poor reliability high FN OS (fell asleep)    OD - Goal is pain control -- improved after laser   OS - Poor HVF taker - fell asleep --- looks like a big SALT but just eyelid. RNFL WNL.   Glaucoma suspect high risk - use  Dorzolamide-timolol BID OS    Monocular  Discussed monocular status with patient and increased risk of damage to well-seeing eye  Recommend protective eyewear at all times  Any MRx should be written with polycarbonate lenses  Saw Dr Treviño 4/22 - Mrx written with polycarbonate        PLAN  Cont cosopt BID OU    Book for dTS-CPC OD - to OR with retrobulbar block    RTC 3 months with OCT and HVF 24-2, DFE OS          Shaina Gan M.D., M.S.  Department of Ophthalmology   Division of Glaucoma Surgery  [facility]

## 2023-10-20 ENCOUNTER — TELEPHONE (OUTPATIENT)
Dept: OPHTHALMOLOGY | Facility: CLINIC | Age: 58
End: 2023-10-20
Payer: MEDICAID

## 2023-10-24 ENCOUNTER — TELEPHONE (OUTPATIENT)
Dept: OPHTHALMOLOGY | Facility: CLINIC | Age: 58
End: 2023-10-24
Payer: MEDICAID

## 2023-11-02 DIAGNOSIS — H54.40 BLIND PAINFUL RIGHT EYE: ICD-10-CM

## 2023-11-02 DIAGNOSIS — H57.11 BLIND PAINFUL RIGHT EYE: ICD-10-CM

## 2023-11-06 ENCOUNTER — TELEPHONE (OUTPATIENT)
Dept: OPHTHALMOLOGY | Facility: CLINIC | Age: 58
End: 2023-11-06
Payer: MEDICAID

## 2023-11-06 DIAGNOSIS — H40.41X3 GLAUCOMA OF RIGHT EYE SECONDARY TO EYE INFLAMMATION, SEVERE STAGE: Primary | ICD-10-CM

## 2023-11-09 RX ORDER — ATROPINE SULFATE 10 MG/ML
1 SOLUTION/ DROPS OPHTHALMIC 2 TIMES DAILY
Qty: 5 ML | Refills: 1 | Status: SHIPPED | OUTPATIENT
Start: 2023-11-09 | End: 2024-01-24

## 2023-11-15 ENCOUNTER — OFFICE VISIT (OUTPATIENT)
Dept: NEUROLOGY | Facility: CLINIC | Age: 58
End: 2023-11-15
Payer: MEDICARE

## 2023-11-15 VITALS — BODY MASS INDEX: 34.53 KG/M2 | WEIGHT: 220 LBS | HEIGHT: 67 IN

## 2023-11-15 DIAGNOSIS — I42.8 NON-ISCHEMIC CARDIOMYOPATHY: ICD-10-CM

## 2023-11-15 DIAGNOSIS — I10 ESSENTIAL HYPERTENSION: ICD-10-CM

## 2023-11-15 DIAGNOSIS — M50.10 CERVICAL DISC DISORDER WITH RADICULOPATHY: ICD-10-CM

## 2023-11-15 DIAGNOSIS — G44.86 CERVICOGENIC HEADACHE: ICD-10-CM

## 2023-11-15 DIAGNOSIS — E66.9 DIABETES MELLITUS TYPE 2 IN OBESE: ICD-10-CM

## 2023-11-15 DIAGNOSIS — E11.69 DIABETES MELLITUS TYPE 2 IN OBESE: ICD-10-CM

## 2023-11-15 DIAGNOSIS — M54.81 OCCIPITAL NEURALGIA OF RIGHT SIDE: Primary | ICD-10-CM

## 2023-11-15 DIAGNOSIS — E78.5 HYPERLIPIDEMIA, UNSPECIFIED HYPERLIPIDEMIA TYPE: ICD-10-CM

## 2023-11-15 PROCEDURE — 4010F PR ACE/ARB THEARPY RXD/TAKEN: ICD-10-PCS | Mod: CPTII,S$GLB,, | Performed by: STUDENT IN AN ORGANIZED HEALTH CARE EDUCATION/TRAINING PROGRAM

## 2023-11-15 PROCEDURE — 4010F ACE/ARB THERAPY RXD/TAKEN: CPT | Mod: CPTII,S$GLB,, | Performed by: STUDENT IN AN ORGANIZED HEALTH CARE EDUCATION/TRAINING PROGRAM

## 2023-11-15 PROCEDURE — 99204 PR OFFICE/OUTPT VISIT, NEW, LEVL IV, 45-59 MIN: ICD-10-PCS | Mod: S$GLB,,, | Performed by: STUDENT IN AN ORGANIZED HEALTH CARE EDUCATION/TRAINING PROGRAM

## 2023-11-15 PROCEDURE — 99999 PR PBB SHADOW E&M-EST. PATIENT-LVL IV: ICD-10-PCS | Mod: PBBFAC,,, | Performed by: STUDENT IN AN ORGANIZED HEALTH CARE EDUCATION/TRAINING PROGRAM

## 2023-11-15 PROCEDURE — 99999 PR PBB SHADOW E&M-EST. PATIENT-LVL IV: CPT | Mod: PBBFAC,,, | Performed by: STUDENT IN AN ORGANIZED HEALTH CARE EDUCATION/TRAINING PROGRAM

## 2023-11-15 PROCEDURE — 3051F PR MOST RECENT HEMOGLOBIN A1C LEVEL 7.0 - < 8.0%: ICD-10-PCS | Mod: CPTII,S$GLB,, | Performed by: STUDENT IN AN ORGANIZED HEALTH CARE EDUCATION/TRAINING PROGRAM

## 2023-11-15 PROCEDURE — 99204 OFFICE O/P NEW MOD 45 MIN: CPT | Mod: S$GLB,,, | Performed by: STUDENT IN AN ORGANIZED HEALTH CARE EDUCATION/TRAINING PROGRAM

## 2023-11-15 PROCEDURE — 3008F BODY MASS INDEX DOCD: CPT | Mod: CPTII,S$GLB,, | Performed by: STUDENT IN AN ORGANIZED HEALTH CARE EDUCATION/TRAINING PROGRAM

## 2023-11-15 PROCEDURE — 1159F PR MEDICATION LIST DOCUMENTED IN MEDICAL RECORD: ICD-10-PCS | Mod: CPTII,S$GLB,, | Performed by: STUDENT IN AN ORGANIZED HEALTH CARE EDUCATION/TRAINING PROGRAM

## 2023-11-15 PROCEDURE — 3051F HG A1C>EQUAL 7.0%<8.0%: CPT | Mod: CPTII,S$GLB,, | Performed by: STUDENT IN AN ORGANIZED HEALTH CARE EDUCATION/TRAINING PROGRAM

## 2023-11-15 PROCEDURE — 3008F PR BODY MASS INDEX (BMI) DOCUMENTED: ICD-10-PCS | Mod: CPTII,S$GLB,, | Performed by: STUDENT IN AN ORGANIZED HEALTH CARE EDUCATION/TRAINING PROGRAM

## 2023-11-15 PROCEDURE — 1159F MED LIST DOCD IN RCRD: CPT | Mod: CPTII,S$GLB,, | Performed by: STUDENT IN AN ORGANIZED HEALTH CARE EDUCATION/TRAINING PROGRAM

## 2023-11-15 RX ORDER — AMITRIPTYLINE HYDROCHLORIDE 10 MG/1
10 TABLET, FILM COATED ORAL NIGHTLY
Qty: 60 TABLET | Refills: 3 | Status: SHIPPED | OUTPATIENT
Start: 2023-11-15 | End: 2024-02-23

## 2023-11-15 NOTE — PROGRESS NOTES
Neurology Clinic Note      Date: 11/15/23  Patient Name: Se Virgil Mcgraw   MRN: [id number]   PCP: Coreen Anderson  Referring Provider: Self, Aaareferral    Assessment and Plan:   Se Virgil Mcgraw is a 58 y.o. male with a history of cervical radiculopathy s/p C3-C4 ACDF (11/23/2015) who presents for evaluation of right-sided occipital pain x 1yr.  He does have tenderness to palpation in the right occipital region.  Suspect a combination of cervicogenic headache with underlying occipital neuralgia.  He is already on a reasonably high dose of gabapentin and duloxetine for diabetic neuropathy but this has had no effect on his headaches.  Will schedule him for a right occipital nerve block, which would be diagnostic and therapeutic.  Stop Depakote and Cymbalta.  Recommend a trial of amitriptyline 10 mg nightly.    Continue gabapentin 800 mg thrice daily for now. Will consider switching to Lyrica at his next visit.    Likely etiology of his stroke is small-vessel disease.  Has multiple vascular risk factors which are poorly controlled.  Continue ASA 81 mg daily and Crestor 20 mg daily.  Target LDL<70 and BP<130/80  Discussed Mediterranean Diet recommendations (Adopted from Ignacio et al, NEJ, 2018.)  -- Abundant use of Olive Oil for cooking and dressing dishes. Use herbs and spices instead to salt flavored foods.  -- Consumption of ?2 daily servings of vegetables (at least 1 of them as fresh vegetables in a salad), discounting side dishes.  -- ?2-3 daily servings of fresh fruits, ?3 weekly servings of legumes  -- ?3 weekly servings of fish or seafood (at least 1 serving of fatty fish).  Limit red meat and processed meats to no more than few times a month.  -- ?1 weekly serving of nuts or seeds  -- Limit consumption of cream, butter, margarine, processed foods, refined carbohydrates, sugary sodas and sweets      RTC 3 months.    Problem List Items Addressed This Visit          Neuro    Cervical disc disorder with  radiculopathy       Cardiac/Vascular    Essential hypertension    HLD (hyperlipidemia)    Non-ischemic cardiomyopathy       Endocrine    Diabetes mellitus type 2 in obese     Other Visit Diagnoses       Occipital neuralgia of right side    -  Primary    Relevant Medications    amitriptyline (ELAVIL) 10 MG tablet    Other Relevant Orders    Nerve block    Cervicogenic headache        Relevant Medications    amitriptyline (ELAVIL) 10 MG tablet              Subjective:          HPI:   Mr. Se Virgil Mcgraw is a 58 y.o. male with a history of HTN, HLD, nonischemic cardiomyopathy, s/p AICD, CHF, CKD, T2DM w/ diabetic neuropathy, sleep apnea, cervical radiculopathy s/p C3-C4 ACDF (11/23/2015), brachial neuritis presenting for evaluation of headache    Complains of headaches that started around a year ago.  Localized to the right occipital region with occasional neck pain that radiates into the right occipital region.  Mainly occurs at night and wakes him up from sleep.  During the day, symptoms are triggered by loud noises.  Denies any nausea/vomiting, photophobia.  Denies any radiating pain down the upper extremities.    Was previously followed by Neurology at [facility].   Reviewed his medication list with him.  Started on Depakote around 6-7 months ago which has had no benefit.  Also states that he has developed tremors since starting this medication.  He is also on gabapentin 800 mg thrice daily and Cymbalta 60 mg nightly for diabetic neuropathy.  No improvement in the occipital headaches.    Has an AICD that is not compatible with MRI    Reportedly had a stroke around 6 months ago.  Symptoms unclear; states that he was shaking all over.  No imaging in PACS.  Per Care Everywhere, CT scans have shown chronic infarcts in both basal ganglia.  Currently on aspirin 81 mg daily and 20 mg daily.  Carotid ultrasound from April 2023 showed <50% bilaterally        PAST MEDICAL HISTORY:  Past Medical History:   Diagnosis Date     Cataract     Cellulitis of penis 07/06/2018    COPD (chronic obstructive pulmonary disease)     Coronary artery disease     Diabetes mellitus type II     DJD (degenerative joint disease)     Glaucoma     Gout     Hypertension     Kidney stone     MI (myocardial infarction) 2010    Obesity     JOSE (obstructive sleep apnea)     Uveitis        PAST SURGICAL HISTORY:  Past Surgical History:   Procedure Laterality Date    CARDIAC DEFIBRILLATOR PLACEMENT      CATARACT EXTRACTION W/  INTRAOCULAR LENS IMPLANT Right 2020    OUTSIDE [facility] ()    CYSTOSCOPY N/A 10/01/2020    Procedure: CYSTOSCOPY;  Surgeon: Monica Lieberman MD;  Location: [facility] OR;  Service: Urology;  Laterality: N/A;  RN PRE OP Covid screen 9-  C A    DESTRUCTION, CILIARY BODY, USING LASER Right 7/13/2023    Procedure: DESTRUCTION, CILIARY BODY, USING LASER;  Surgeon: Shaina Gan MD;  Location: [facility] OR [address] [place] [address];  Service: Ophthalmology;  Laterality: Right;    DESTRUCTION, CILIARY BODY, USING LASER Right 8/10/2023    Procedure: DESTRUCTION, CILIARY BODY, USING LASER;  Surgeon: Shaina Gan MD;  Location: [facility] OR [address] [place] [address];  Service: Ophthalmology;  Laterality: Right;    excisional biopsy left inguinal lymph node      FOOT SURGERY      right foot surgery     INSERTION OF INFLATABLE PENILE PROSTHESIS N/A 01/25/2022    Procedure: INSERTION, PENILE PROSTHESIS, INFLATABLE;  Surgeon: Monica Lieberman MD;  Location: [facility] OR;  Service: Urology;  Laterality: N/A;  [employer] JUAN LUIS ARNOLD [phone] TEXTED HIM @ 5:32PM ON 12-  RN PRE OP 12-28-21. Covid NEGATIVE 1-3-22. CA-----AICD-----HAS CARDS CLEARANCE    kidney stones      lithotripsy    REPAIR, SURGICAL WOUND, EYE, ANTERIOR SEGMENT Right 10/05/2021    Procedure: REPAIR, SURGICAL WOUND, EYE, ANTERIOR SEGMENT;  Surgeon: Adelaide Allen MD;  Location: [facility] OR [address] [place] [address];  Service: Ophthalmology;  Laterality: Right;  Anterior Chamber Wash Out Right Eye     Surgery for bulging disc  at C7         CURRENT MEDS:  Current Outpatient Medications   Medication Sig Dispense Refill    acetaZOLAMIDE (DIAMOX) 250 MG tablet TAKE 1 TABLET(250 MG) BY MOUTH TWICE DAILY 60 tablet 11    albuterol (PROVENTIL/VENTOLIN HFA) 90 mcg/actuation inhaler Inhale 2 puffs into the lungs every 4 (four) hours as needed for Wheezing or Shortness of Breath. Rescue 1 Inhaler 0    allopurinol (ZYLOPRIM) 100 MG tablet Take 100 mg by mouth Daily.  5    amitriptyline (ELAVIL) 10 MG tablet Take 1 tablet (10 mg total) by mouth every evening. 60 tablet 3    aspirin (ECOTRIN) 81 MG EC tablet Take 81 mg by mouth once daily.      atropine 1% (ISOPTO ATROPINE) 1 % Drop INSTILL 1 DROP INTO LEFT EYE TWICE DAILY 5 mL 1    blood sugar diagnostic Strp ACCU CHEK KEVAN PLUS TEST STRIPS      blood-glucose meter Misc ACCU CHEK KEVAN PLUS METER: USE 4X/D      CRESTOR 20 mg tablet Take 20 mg by mouth every evening.   5    dapagliflozin (FARXIGA) 10 mg tablet Take 10 mg by mouth once daily.      dicyclomine (BENTYL) 20 mg tablet Take 20 mg by mouth 2 (two) times daily.  2    docusate sodium (COLACE) 100 MG capsule Take 1 capsule (100 mg total) by mouth 2 (two) times daily. 30 capsule 0    dorzolamide-timolol 2-0.5% (COSOPT) 22.3-6.8 mg/mL ophthalmic solution Place 1 drop into the left eye 2 (two) times daily. 10 mL 11    famotidine (PEPCID) 20 MG tablet Take 20 mg by mouth 2 (two) times daily as needed.      fluticasone propionate (FLONASE) 50 mcg/actuation nasal spray 2 sprays (100 mcg total) by Each Nostril route once daily. 1 Bottle 0    furosemide (LASIX) 20 MG tablet Take 20 mg by mouth daily as needed. Takes every other day  5    gabapentin (NEURONTIN) 800 MG tablet Take 800 mg by mouth 2 (two) times a day.      hydroCHLOROthiazide (MICROZIDE) 12.5 mg capsule Take 12.5 mg by mouth once daily.      insulin glargine-lixisenatide (SOLIQUA 100/33) 100 unit-33 mcg/mL InPn pen Inject 60 Units into the skin every morning.      INVOKANA 300 mg Tab  "tablet Take 300 mg by mouth once daily.  5    ketoconazole (NIZORAL) 2 % cream Apply  a small amount to affected area twice a day  apply to skin for fungal infection      lancets [facility] ACCU CHEK SOFTCLIX LANCETS: USE 4X DAILY      levocetirizine (XYZAL) 5 MG tablet Take 5 mg by mouth daily as needed.      LIDOcaine (LIDODERM) 5 % Apply 1-3 patches every day prn pain      lisinopriL (PRINIVIL,ZESTRIL) 20 MG tablet Take 1 tablet by mouth once daily.      metoclopramide HCl (REGLAN) 10 MG tablet Take 1 tablet (10 mg total) by mouth every 6 (six) hours as needed. 30 tablet 0    metoprolol succinate (TOPROL-XL) 50 MG 24 hr tablet Take 50 mg by mouth every evening.   5    mupirocin (BACTROBAN) 2 % ointment Apply topically 3 (three) times daily.      nitroGLYCERIN (NITROSTAT) 0.4 MG SL tablet Place 0.4 mg under the tongue every 5 (five) minutes as needed for Chest pain.      omega-3 acid ethyl esters (LOVAZA) 1 gram capsule Take 2 capsules by mouth once daily.      omeprazole (PRILOSEC) 20 MG capsule TK 1 C PO QD FOR CONTROL OF STOMACH ACID BEFORE BREAKFAST  1    oxybutynin (DITROPAN-XL) 10 MG 24 hr tablet Take 1 tablet by mouth once daily.      oxyCODONE-acetaminophen (PERCOCET) 7.5-325 mg per tablet Take 1 tablet by mouth every 4 (four) hours as needed.      pen needle, diabetic 31 gauge x 5/16" Ndle Inject 1 each into the skin.      potassium chloride SA (K-DUR,KLOR-CON) 20 MEQ tablet Take 20 mEq by mouth once daily.  5    promethazine (PHENERGAN) 25 MG tablet Take 1 tablet by mouth every 4 (four) hours as needed.      sotaloL (BETAPACE) 80 MG tablet TAKE 1 TABLET BY MOUTH TWICE DAILY      spironolactone (ALDACTONE) 25 MG tablet Take 1 tablet by mouth once daily.      tamsulosin (FLOMAX) 0.4 mg Cap Take 1 capsule (0.4 mg total) by mouth once daily. 90 capsule 3    tiZANidine 4 mg Cap Take 4 mg by mouth 2 (two) times a day.      zolpidem (AMBIEN) 10 mg Tab Take 5 mg by mouth nightly as needed.       No current " "facility-administered medications for this visit.       ALLERGIES:  Review of patient's allergies indicates:   Allergen Reactions    Darvocet a500 [propoxyphene n-acetaminophen] Itching    Morphine Itching           Tramadol Itching       FAMILY HISTORY:  Family History   Problem Relation Age of Onset    Diabetes Mother     Hypertension Mother     Heart disease Father     Diabetes Brother     Hypertension Brother     Cancer Maternal Uncle         prostate    Asthma Daughter     Cancer Cousin     Kidney disease Cousin     Amblyopia Neg Hx     Blindness Neg Hx     Cataracts Neg Hx     Glaucoma Neg Hx     Macular degeneration Neg Hx     Retinal detachment Neg Hx     Strabismus Neg Hx        SOCIAL HISTORY:  Social History     Tobacco Use    Smoking status: Never     Passive exposure: Past    Smokeless tobacco: Never   Substance Use Topics    Alcohol use: No    Drug use: No       Review of Systems:  12 system review of systems is negative except for the symptoms mentioned in HPI.      Objective:     Vitals:    11/15/23 0944   Weight: 99.8 kg (220 lb)   Height: 5' 7" (1.702 m)     General: NAD, well nourished   Eyes: no tearing, discharge, no erythema   Neck: Supple, full range of motion  Cardiovascular: Warm and well perfused  Lungs: Normal work of breathing  Skin: No rash, lesions, or breakdown on exposed skin  Psychiatry: Mood and affect are appropriate     Physical Exam  HENT:      Head:      Comments: Tenderness over the right occipital region.  Eyes:      Extraocular Movements: EOM normal.   Musculoskeletal:      Cervical back: Pain with movement present. No spinous process tenderness or muscular tenderness. Normal range of motion.   Neurological:      Mental Status: He is oriented to person, place, and time.      Coordination: Finger-Nose-Finger Test normal.      Gait: Gait is intact.      Deep Tendon Reflexes:      Reflex Scores:       Bicep reflexes are 2+ on the right side and 2+ on the left side.       " Brachioradialis reflexes are 2+ on the right side and 2+ on the left side.       Patellar reflexes are 2+ on the right side and 2+ on the left side.  Psychiatric:         Speech: Speech normal.           NEUROLOGICAL EXAMINATION:     MENTAL STATUS   Oriented to person, place, and time.   Follows 2 step commands.   Speech: speech is normal   Level of consciousness: alert    CRANIAL NERVES     CN II   Visual fields full to confrontation.     CN III, IV, VI   Extraocular motions are normal.   Nystagmus: none   Ophthalmoparesis: none    CN V   Facial sensation intact.     CN VII   Facial expression full, symmetric.     CN IX, X   Palate: symmetric    CN XI   CN XI normal.     CN XII   CN XII normal.     MOTOR EXAM   Right arm pronator drift: absent  Left arm pronator drift: absent    Strength   Right deltoid: 5/5  Left deltoid: 5/5  Right biceps: 5/5  Left biceps: 5/5  Right triceps: 5/5  Left triceps: 5/5  Right wrist flexion: 5/5  Left wrist flexion: 5/5  Right wrist extension: 5/5  Left wrist extension: 5/5  Right interossei: 5/5  Left interossei: 5/5  Right iliopsoas: 5/5  Left iliopsoas: 5/5  Right quadriceps: 5/5  Left quadriceps: 5/5  Right hamstrin/5  Left hamstrin/5  Right glutei: 5/5  Left glutei: 5/5  Right anterior tibial: 5/5  Left anterior tibial: 5/5  Right gastroc: 5/5  Left gastroc: 5/5    REFLEXES     Reflexes   Right brachioradialis: 2+  Left brachioradialis: 2+  Right biceps: 2+  Left biceps: 2+  Right patellar: 2+  Left patellar: 2+  Right plantar: normal  Left plantar: normal  Right Noriega: absent  Left Noriega: absent  Right ankle clonus: absent  Left ankle clonus: absent    SENSORY EXAM   Light touch normal.     GAIT AND COORDINATION     Gait  Gait: normal     Coordination   Finger to nose coordination: normal    Tremor   Intention tremor: absent      CT Head (2023):     No detrimental changes with evidence of underlying chronic microvascular ischemia along with tiny chronic  lacunar infarcts.     No acute intracranial hemorrhage or developing cytotoxic edema.         CT cervical spine (07/14/2016):     Findings:  Patient is status post anterior fusion at C3-C4.  The cervical spine demonstrates proper alignment.  There is mild degenerative change of the cervical spine without evidence of fracture, dislocation, or osseous lytic or blastic process.  Focal degenerative changes are described below.     C2 -- C3: No spinal canal stenosis.  No significant neuroforaminal narrowing.     C3 -- C4: Anterior screws are in good position within the vertebral bodies of C3 and C4.  Disc spacer is in place and in good position.  There is postoperative residual spurring, without significant spinal stenosis.  No significant neuroforaminal narrowing.     C4 -- C5: No spinal canal stenosis.  No significant neuroforaminal narrowing.     C5 -- C6: No spinal canal stenosis.  No significant neuroforaminal narrowing.     C6 -- C7: No spinal canal stenosis.  No significant neuroforaminal narrowing.     C7 -- T1: No spinal canal stenosis.  No significant neuroforaminal narrowing.     The spinal canal otherwise appears unremarkable.  No intradural abnormalities are identified.  Evaluation of the surrounding soft tissues is unremarkable.  The airway is patent and lung apices are unremarkable.        IMPRESSION:      1.  Postsurgical changes consistent with anterior fusion at C3-C4.  Hardware is in good position with adequate alignment of the cervical spine.  No significant spinal canal stenosis or neuroforaminal narrowing is noted.            Franky Elmore MD  Department of Neurology  [facility]

## 2023-11-16 ENCOUNTER — TELEPHONE (OUTPATIENT)
Dept: NEUROLOGY | Facility: CLINIC | Age: 58
End: 2023-11-16
Payer: MEDICAID

## 2023-11-16 NOTE — TELEPHONE ENCOUNTER
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 Feel free to take over a 5 or 7 day change/NP slot. It's ok if its longer than 4 wks from now. I know this will be a crazy time.   ----- Message -----  From: Franky Elmore MD  Sent: 11/15/2023  10:32 AM CST  To: Maia Perry PA-C  Subject: Nerve block                                      Brenda Carvalho    Could you please schedule this patient in your clinic for a right occipital nerve block?  The order is in.    Thanks  Franky

## 2023-11-29 ENCOUNTER — OFFICE VISIT (OUTPATIENT)
Dept: UROLOGY | Facility: CLINIC | Age: 58
End: 2023-11-29
Payer: MEDICARE

## 2023-11-29 VITALS — BODY MASS INDEX: 34.56 KG/M2 | WEIGHT: 220.69 LBS

## 2023-11-29 DIAGNOSIS — N39.41 URGE INCONTINENCE OF URINE: Primary | ICD-10-CM

## 2023-11-29 DIAGNOSIS — R35.1 NOCTURIA: ICD-10-CM

## 2023-11-29 DIAGNOSIS — R39.89 SUSPECTED UTI: ICD-10-CM

## 2023-11-29 DIAGNOSIS — Z12.5 SCREENING FOR PROSTATE CANCER: ICD-10-CM

## 2023-11-29 PROCEDURE — 99214 OFFICE O/P EST MOD 30 MIN: CPT | Mod: S$PBB,,, | Performed by: STUDENT IN AN ORGANIZED HEALTH CARE EDUCATION/TRAINING PROGRAM

## 2023-11-29 PROCEDURE — 1160F RVW MEDS BY RX/DR IN RCRD: CPT | Mod: CPTII,S$PBB,, | Performed by: STUDENT IN AN ORGANIZED HEALTH CARE EDUCATION/TRAINING PROGRAM

## 2023-11-29 PROCEDURE — 3051F HG A1C>EQUAL 7.0%<8.0%: CPT | Mod: CPTII,S$PBB,, | Performed by: STUDENT IN AN ORGANIZED HEALTH CARE EDUCATION/TRAINING PROGRAM

## 2023-11-29 PROCEDURE — 99999 PR PBB SHADOW E&M-EST. PATIENT-LVL IV: CPT | Mod: PBBFAC,,, | Performed by: STUDENT IN AN ORGANIZED HEALTH CARE EDUCATION/TRAINING PROGRAM

## 2023-11-29 PROCEDURE — 4010F PR ACE/ARB THEARPY RXD/TAKEN: ICD-10-PCS | Mod: CPTII,S$PBB,, | Performed by: STUDENT IN AN ORGANIZED HEALTH CARE EDUCATION/TRAINING PROGRAM

## 2023-11-29 PROCEDURE — 99214 PR OFFICE/OUTPT VISIT, EST, LEVL IV, 30-39 MIN: ICD-10-PCS | Mod: S$PBB,,, | Performed by: STUDENT IN AN ORGANIZED HEALTH CARE EDUCATION/TRAINING PROGRAM

## 2023-11-29 PROCEDURE — 1159F MED LIST DOCD IN RCRD: CPT | Mod: CPTII,S$PBB,, | Performed by: STUDENT IN AN ORGANIZED HEALTH CARE EDUCATION/TRAINING PROGRAM

## 2023-11-29 PROCEDURE — 1159F PR MEDICATION LIST DOCUMENTED IN MEDICAL RECORD: ICD-10-PCS | Mod: CPTII,S$PBB,, | Performed by: STUDENT IN AN ORGANIZED HEALTH CARE EDUCATION/TRAINING PROGRAM

## 2023-11-29 PROCEDURE — 3051F PR MOST RECENT HEMOGLOBIN A1C LEVEL 7.0 - < 8.0%: ICD-10-PCS | Mod: CPTII,S$PBB,, | Performed by: STUDENT IN AN ORGANIZED HEALTH CARE EDUCATION/TRAINING PROGRAM

## 2023-11-29 PROCEDURE — 4010F ACE/ARB THERAPY RXD/TAKEN: CPT | Mod: CPTII,S$PBB,, | Performed by: STUDENT IN AN ORGANIZED HEALTH CARE EDUCATION/TRAINING PROGRAM

## 2023-11-29 PROCEDURE — 1160F PR REVIEW ALL MEDS BY PRESCRIBER/CLIN PHARMACIST DOCUMENTED: ICD-10-PCS | Mod: CPTII,S$PBB,, | Performed by: STUDENT IN AN ORGANIZED HEALTH CARE EDUCATION/TRAINING PROGRAM

## 2023-11-29 PROCEDURE — 99999 PR PBB SHADOW E&M-EST. PATIENT-LVL IV: ICD-10-PCS | Mod: PBBFAC,,, | Performed by: STUDENT IN AN ORGANIZED HEALTH CARE EDUCATION/TRAINING PROGRAM

## 2023-11-29 PROCEDURE — 3008F PR BODY MASS INDEX (BMI) DOCUMENTED: ICD-10-PCS | Mod: CPTII,S$PBB,, | Performed by: STUDENT IN AN ORGANIZED HEALTH CARE EDUCATION/TRAINING PROGRAM

## 2023-11-29 PROCEDURE — 87086 URINE CULTURE/COLONY COUNT: CPT | Performed by: STUDENT IN AN ORGANIZED HEALTH CARE EDUCATION/TRAINING PROGRAM

## 2023-11-29 PROCEDURE — 3008F BODY MASS INDEX DOCD: CPT | Mod: CPTII,S$PBB,, | Performed by: STUDENT IN AN ORGANIZED HEALTH CARE EDUCATION/TRAINING PROGRAM

## 2023-11-29 NOTE — PROGRESS NOTES
Patient ID: Se Virgil Mcgraw is a 58 y.o. male.    Chief Complaint: Follow-up (Pt states he is still feeling discomfort in his penis.)    Referral: No referring provider defined for this encounter.     HPI  58 y.o. who presents to the Urology clinic for evaluation of ***. Patient notes symptoms of *** started ***. Patient has *** received prior treatment of this condition which has *** improved their condition.*** history of gross hematuria, UTIs, kidney stones, constipation.  *** drinks *** cups/bottles of water daily, including *** coffee, tea, juice, carbonated beverages. *** pain or discomfort associated with intercourse. *** pelvic procedure in the past.  *** incontinence of urine or feces.     Prior /abdominal surgeries include: ***  Possible medications effecting patient's condition include ***  Anticoagulant usage? Under the care of a cardiologist?  History  DVT, MI, CVA.     Medically Necessary ROS documented in HPI    Past Medical History  Active Ambulatory Problems     Diagnosis Date Noted    Diabetes mellitus type 2 in obese 08/30/2015    Essential hypertension 08/30/2015    Brachial neuritis or radiculitis NOS 09/15/2015    Elevated LFTs 11/17/2015    Sleep apnea 11/17/2015    CKD (chronic kidney disease) stage 2, GFR 60-89 ml/min 11/17/2015    History of gout 11/17/2015    HLD (hyperlipidemia) 11/17/2015    GERD (gastroesophageal reflux disease) 11/17/2015    Edema 11/17/2015    AICD (automatic cardioverter/defibrillator) present 11/17/2015    Non-ischemic cardiomyopathy 11/19/2015    Restrictive lung disease 11/20/2015    Cervical disc disorder with radiculopathy 11/23/2015    Inguinal lymphadenopathy 06/02/2017    Lower urinary tract symptoms (LUTS) 10/01/2020    Ventricular tachycardia 09/30/2021    Chronic combined systolic and diastolic heart failure 09/30/2021    Chronic hip pain 09/30/2021    Insomnia 09/30/2021    Erectile dysfunction 01/25/2022    Blind painful right eye 04/14/2023      Resolved Ambulatory Problems     Diagnosis Date Noted    Abscess of right leg 09/30/2012    Pain in the chest 08/30/2015    Acute pancreatitis 08/05/2016    Acute kidney injury 08/06/2016    Cellulitis of penis 07/06/2018    Corneal ulcer 09/30/2021    Glaucoma, right eye 09/30/2021    Endophthalmitis, acute, right 10/02/2021     Past Medical History:   Diagnosis Date    Cataract     COPD (chronic obstructive pulmonary disease)     Coronary artery disease     Diabetes mellitus type II     DJD (degenerative joint disease)     Glaucoma     Gout     Hypertension     Kidney stone     MI (myocardial infarction) 2010    Obesity     JOSE (obstructive sleep apnea)     Uveitis          Past Surgical History  Past Surgical History:   Procedure Laterality Date    CARDIAC DEFIBRILLATOR PLACEMENT      CATARACT EXTRACTION W/  INTRAOCULAR LENS IMPLANT Right 2020    OUTSIDE [facility] ()    CYSTOSCOPY N/A 10/01/2020    Procedure: CYSTOSCOPY;  Surgeon: Monica Lieberman MD;  Location: [facility] OR;  Service: Urology;  Laterality: N/A;  RN PRE OP Covid screen 9-  C A    DESTRUCTION, CILIARY BODY, USING LASER Right 7/13/2023    Procedure: DESTRUCTION, CILIARY BODY, USING LASER;  Surgeon: Shaina Gan MD;  Location: [facility] OR [address] [place] [address];  Service: Ophthalmology;  Laterality: Right;    DESTRUCTION, CILIARY BODY, USING LASER Right 8/10/2023    Procedure: DESTRUCTION, CILIARY BODY, USING LASER;  Surgeon: Shaina Gan MD;  Location: [facility] OR [address] [place] [address];  Service: Ophthalmology;  Laterality: Right;    excisional biopsy left inguinal lymph node      FOOT SURGERY      right foot surgery     INSERTION OF INFLATABLE PENILE PROSTHESIS N/A 01/25/2022    Procedure: INSERTION, PENILE PROSTHESIS, INFLATABLE;  Surgeon: Monica Lieberman MD;  Location: [facility] OR;  Service: Urology;  Laterality: N/A;  [employer] JUAN LUIS ARNOLD [phone] TEXTED HIM @ 5:32PM ON 12-  RN PRE OP 12-28-21. Covid NEGATIVE 1-3-22. CA-----AICD-----HAS  CARDS CLEARANCE    kidney stones      lithotripsy    REPAIR, SURGICAL WOUND, EYE, ANTERIOR SEGMENT Right 10/05/2021    Procedure: REPAIR, SURGICAL WOUND, EYE, ANTERIOR SEGMENT;  Surgeon: Adelaide Allen MD;  Location: [facility] OR [address] [place] [address];  Service: Ophthalmology;  Laterality: Right;  Anterior Chamber Wash Out Right Eye     Surgery for bulging disc at C7         Social History  Social Connections: Not on file       Medications    Current Outpatient Medications:     acetaZOLAMIDE (DIAMOX) 250 MG tablet, TAKE 1 TABLET(250 MG) BY MOUTH TWICE DAILY, Disp: 60 tablet, Rfl: 11    albuterol (PROVENTIL/VENTOLIN HFA) 90 mcg/actuation inhaler, Inhale 2 puffs into the lungs every 4 (four) hours as needed for Wheezing or Shortness of Breath. Rescue, Disp: 1 Inhaler, Rfl: 0    allopurinol (ZYLOPRIM) 100 MG tablet, Take 100 mg by mouth Daily., Disp: , Rfl: 5    amitriptyline (ELAVIL) 10 MG tablet, Take 1 tablet (10 mg total) by mouth every evening., Disp: 60 tablet, Rfl: 3    aspirin (ECOTRIN) 81 MG EC tablet, Take 81 mg by mouth once daily., Disp: , Rfl:     atropine 1% (ISOPTO ATROPINE) 1 % Drop, INSTILL 1 DROP INTO LEFT EYE TWICE DAILY, Disp: 5 mL, Rfl: 1    blood sugar diagnostic Strp, ACCU CHEK KEVAN PLUS TEST STRIPS, Disp: , Rfl:     blood-glucose meter Misc, ACCU CHEK KEVAN PLUS METER: USE 4X/D, Disp: , Rfl:     CRESTOR 20 mg tablet, Take 20 mg by mouth every evening. , Disp: , Rfl: 5    dapagliflozin (FARXIGA) 10 mg tablet, Take 10 mg by mouth once daily., Disp: , Rfl:     dicyclomine (BENTYL) 20 mg tablet, Take 20 mg by mouth 2 (two) times daily., Disp: , Rfl: 2    docusate sodium (COLACE) 100 MG capsule, Take 1 capsule (100 mg total) by mouth 2 (two) times daily., Disp: 30 capsule, Rfl: 0    dorzolamide-timolol 2-0.5% (COSOPT) 22.3-6.8 mg/mL ophthalmic solution, Place 1 drop into the left eye 2 (two) times daily., Disp: 10 mL, Rfl: 11    famotidine (PEPCID) 20 MG tablet, Take 20 mg by mouth 2 (two) times daily as needed.,  Disp: , Rfl:     fluticasone propionate (FLONASE) 50 mcg/actuation nasal spray, 2 sprays (100 mcg total) by Each Nostril route once daily., Disp: 1 Bottle, Rfl: 0    furosemide (LASIX) 20 MG tablet, Take 20 mg by mouth daily as needed. Takes every other day, Disp: , Rfl: 5    gabapentin (NEURONTIN) 800 MG tablet, Take 800 mg by mouth 2 (two) times a day., Disp: , Rfl:     hydroCHLOROthiazide (MICROZIDE) 12.5 mg capsule, Take 12.5 mg by mouth once daily., Disp: , Rfl:     insulin glargine-lixisenatide (SOLIQUA 100/33) 100 unit-33 mcg/mL InPn pen, Inject 60 Units into the skin every morning., Disp: , Rfl:     INVOKANA 300 mg Tab tablet, Take 300 mg by mouth once daily., Disp: , Rfl: 5    ketoconazole (NIZORAL) 2 % cream, Apply  a small amount to affected area twice a day  apply to skin for fungal infection, Disp: , Rfl:     lancets Misc, ACCU CHEK SOFTCLIX LANCETS: USE 4X DAILY, Disp: , Rfl:     levocetirizine (XYZAL) 5 MG tablet, Take 5 mg by mouth daily as needed., Disp: , Rfl:     LIDOcaine (LIDODERM) 5 %, Apply 1-3 patches every day prn pain, Disp: , Rfl:     lisinopriL (PRINIVIL,ZESTRIL) 20 MG tablet, Take 1 tablet by mouth once daily., Disp: , Rfl:     metoclopramide HCl (REGLAN) 10 MG tablet, Take 1 tablet (10 mg total) by mouth every 6 (six) hours as needed., Disp: 30 tablet, Rfl: 0    metoprolol succinate (TOPROL-XL) 50 MG 24 hr tablet, Take 50 mg by mouth every evening. , Disp: , Rfl: 5    mupirocin (BACTROBAN) 2 % ointment, Apply topically 3 (three) times daily., Disp: , Rfl:     nitroGLYCERIN (NITROSTAT) 0.4 MG SL tablet, Place 0.4 mg under the tongue every 5 (five) minutes as needed for Chest pain., Disp: , Rfl:     omega-3 acid ethyl esters (LOVAZA) 1 gram capsule, Take 2 capsules by mouth once daily., Disp: , Rfl:     omeprazole (PRILOSEC) 20 MG capsule, TK 1 C PO QD FOR CONTROL OF STOMACH ACID BEFORE BREAKFAST, Disp: , Rfl: 1    oxybutynin (DITROPAN-XL) 10 MG 24 hr tablet, Take 1 tablet by mouth once  "daily., Disp: , Rfl:     oxyCODONE-acetaminophen (PERCOCET) 7.5-325 mg per tablet, Take 1 tablet by mouth every 4 (four) hours as needed., Disp: , Rfl:     pen needle, diabetic 31 gauge x 5/16" Ndle, Inject 1 each into the skin., Disp: , Rfl:     potassium chloride SA (K-DUR,KLOR-CON) 20 MEQ tablet, Take 20 mEq by mouth once daily., Disp: , Rfl: 5    promethazine (PHENERGAN) 25 MG tablet, Take 1 tablet by mouth every 4 (four) hours as needed., Disp: , Rfl:     sotaloL (BETAPACE) 80 MG tablet, TAKE 1 TABLET BY MOUTH TWICE DAILY, Disp: , Rfl:     spironolactone (ALDACTONE) 25 MG tablet, Take 1 tablet by mouth once daily., Disp: , Rfl:     tamsulosin (FLOMAX) 0.4 mg Cap, Take 1 capsule (0.4 mg total) by mouth once daily., Disp: 90 capsule, Rfl: 3    tiZANidine 4 mg Cap, Take 4 mg by mouth 2 (two) times a day., Disp: , Rfl:     zolpidem (AMBIEN) 10 mg Tab, Take 5 mg by mouth nightly as needed., Disp: , Rfl:     Allergies  Review of patient's allergies indicates:   Allergen Reactions    Darvocet a500 [propoxyphene n-acetaminophen] Itching    Morphine Itching           Tramadol Itching       Patient's PMH, FH, Social hx, Medications, allergies reviewed and updated as pertinent to today's visit    Objective:      Physical Exam        Lab Results   Component Value Date    PSADIAG 0.67 01/19/2021    PSADIAG 1.4 08/06/2016      Imaging results: personally reviewed imaging with the following findings***  Assessment:       No diagnosis found.    Plan:           Discussed *** with patient  Discussed pathophysiology and risk factors  Plan of care to include    Can consider ***    RTC ***    "

## 2023-11-29 NOTE — PROGRESS NOTES
Patient ID: Se Virgil Mcgraw is a 58 y.o. male.    Chief Complaint: Incontinence    Referral: No referring provider defined for this encounter.     HPI  58 y.o. who presents to the Urology clinic for evaluation of incontinence. Patient w/ incontinence for past 3 y ears, wears 3 pads per day, drinks 6 bottles of water daily. Denies caffeine intake.   Has regular bowel movements. Has not used IPP since implantation, has had several teaching sessions.   Medically Necessary ROS documented in HPI    Past Medical History  Active Ambulatory Problems     Diagnosis Date Noted    Diabetes mellitus type 2 in obese 08/30/2015    Essential hypertension 08/30/2015    Brachial neuritis or radiculitis NOS 09/15/2015    Elevated LFTs 11/17/2015    Sleep apnea 11/17/2015    CKD (chronic kidney disease) stage 2, GFR 60-89 ml/min 11/17/2015    History of gout 11/17/2015    HLD (hyperlipidemia) 11/17/2015    GERD (gastroesophageal reflux disease) 11/17/2015    Edema 11/17/2015    AICD (automatic cardioverter/defibrillator) present 11/17/2015    Non-ischemic cardiomyopathy 11/19/2015    Restrictive lung disease 11/20/2015    Cervical disc disorder with radiculopathy 11/23/2015    Inguinal lymphadenopathy 06/02/2017    Lower urinary tract symptoms (LUTS) 10/01/2020    Ventricular tachycardia 09/30/2021    Chronic combined systolic and diastolic heart failure 09/30/2021    Chronic hip pain 09/30/2021    Insomnia 09/30/2021    Erectile dysfunction 01/25/2022    Blind painful right eye 04/14/2023     Resolved Ambulatory Problems     Diagnosis Date Noted    Abscess of right leg 09/30/2012    Pain in the chest 08/30/2015    Acute pancreatitis 08/05/2016    Acute kidney injury 08/06/2016    Cellulitis of penis 07/06/2018    Corneal ulcer 09/30/2021    Glaucoma, right eye 09/30/2021    Endophthalmitis, acute, right 10/02/2021     Past Medical History:   Diagnosis Date    Cataract     COPD (chronic obstructive pulmonary disease)     Coronary  artery disease     Diabetes mellitus type II     DJD (degenerative joint disease)     Glaucoma     Gout     Hypertension     Kidney stone     MI (myocardial infarction) 2010    Obesity     JOSE (obstructive sleep apnea)     Uveitis          Past Surgical History  Past Surgical History:   Procedure Laterality Date    CARDIAC DEFIBRILLATOR PLACEMENT      CATARACT EXTRACTION W/  INTRAOCULAR LENS IMPLANT Right 2020    OUTSIDE [facility] ()    CYSTOSCOPY N/A 10/01/2020    Procedure: CYSTOSCOPY;  Surgeon: Monica Lieberman MD;  Location: [facility] OR;  Service: Urology;  Laterality: N/A;  RN PRE OP Covid screen 9-  C A    DESTRUCTION, CILIARY BODY, USING LASER Right 7/13/2023    Procedure: DESTRUCTION, CILIARY BODY, USING LASER;  Surgeon: Shaina Gan MD;  Location: [facility] OR [address] [place] [address];  Service: Ophthalmology;  Laterality: Right;    DESTRUCTION, CILIARY BODY, USING LASER Right 8/10/2023    Procedure: DESTRUCTION, CILIARY BODY, USING LASER;  Surgeon: Shaina Gan MD;  Location: [facility] OR [address] [place] [address];  Service: Ophthalmology;  Laterality: Right;    excisional biopsy left inguinal lymph node      FOOT SURGERY      right foot surgery     INSERTION OF INFLATABLE PENILE PROSTHESIS N/A 01/25/2022    Procedure: INSERTION, PENILE PROSTHESIS, INFLATABLE;  Surgeon: Monica Lieberman MD;  Location: [facility] OR;  Service: Urology;  Laterality: N/A;  [employer] JUAN LUIS ARNOLD [phone] TEXTED HIM @ 5:32PM ON 12-  RN PRE OP 12-28-21. Covid NEGATIVE 1-3-22. [place]-----AICD-----HAS CARDS CLEARANCE    kidney stones      lithotripsy    REPAIR, SURGICAL WOUND, EYE, ANTERIOR SEGMENT Right 10/05/2021    Procedure: REPAIR, SURGICAL WOUND, EYE, ANTERIOR SEGMENT;  Surgeon: Adelaide Allen MD;  Location: [facility] OR [address] [place] [address];  Service: Ophthalmology;  Laterality: Right;  Anterior Chamber Wash Out Right Eye     Surgery for bulging disc at C7         Social History  Social Connections: Not on file       Medications    Current Outpatient  Medications:     acetaZOLAMIDE (DIAMOX) 250 MG tablet, TAKE 1 TABLET(250 MG) BY MOUTH TWICE DAILY, Disp: 60 tablet, Rfl: 11    albuterol (PROVENTIL/VENTOLIN HFA) 90 mcg/actuation inhaler, Inhale 2 puffs into the lungs every 4 (four) hours as needed for Wheezing or Shortness of Breath. Rescue, Disp: 1 Inhaler, Rfl: 0    allopurinol (ZYLOPRIM) 100 MG tablet, Take 100 mg by mouth Daily., Disp: , Rfl: 5    amitriptyline (ELAVIL) 10 MG tablet, Take 1 tablet (10 mg total) by mouth every evening., Disp: 60 tablet, Rfl: 3    aspirin (ECOTRIN) 81 MG EC tablet, Take 81 mg by mouth once daily., Disp: , Rfl:     atropine 1% (ISOPTO ATROPINE) 1 % Drop, INSTILL 1 DROP INTO LEFT EYE TWICE DAILY, Disp: 5 mL, Rfl: 1    blood sugar diagnostic Strp, ACCU CHEK KEVAN PLUS TEST STRIPS, Disp: , Rfl:     blood-glucose meter Misc, ACCU CHEK KEVAN PLUS METER: USE 4X/D, Disp: , Rfl:     CRESTOR 20 mg tablet, Take 20 mg by mouth every evening. , Disp: , Rfl: 5    dapagliflozin (FARXIGA) 10 mg tablet, Take 10 mg by mouth once daily., Disp: , Rfl:     dicyclomine (BENTYL) 20 mg tablet, Take 20 mg by mouth 2 (two) times daily., Disp: , Rfl: 2    docusate sodium (COLACE) 100 MG capsule, Take 1 capsule (100 mg total) by mouth 2 (two) times daily., Disp: 30 capsule, Rfl: 0    dorzolamide-timolol 2-0.5% (COSOPT) 22.3-6.8 mg/mL ophthalmic solution, Place 1 drop into the left eye 2 (two) times daily., Disp: 10 mL, Rfl: 11    famotidine (PEPCID) 20 MG tablet, Take 20 mg by mouth 2 (two) times daily as needed., Disp: , Rfl:     fluticasone propionate (FLONASE) 50 mcg/actuation nasal spray, 2 sprays (100 mcg total) by Each Nostril route once daily., Disp: 1 Bottle, Rfl: 0    furosemide (LASIX) 20 MG tablet, Take 20 mg by mouth daily as needed. Takes every other day, Disp: , Rfl: 5    gabapentin (NEURONTIN) 800 MG tablet, Take 800 mg by mouth 2 (two) times a day., Disp: , Rfl:     hydroCHLOROthiazide (MICROZIDE) 12.5 mg capsule, Take 12.5 mg by mouth once  "daily., Disp: , Rfl:     insulin glargine-lixisenatide (SOLIQUA 100/33) 100 unit-33 mcg/mL InPn pen, Inject 60 Units into the skin every morning., Disp: , Rfl:     INVOKANA 300 mg Tab tablet, Take 300 mg by mouth once daily., Disp: , Rfl: 5    ketoconazole (NIZORAL) 2 % cream, Apply  a small amount to affected area twice a day  apply to skin for fungal infection, Disp: , Rfl:     lancets Misc, ACCU CHEK SOFTCLIX LANCETS: USE 4X DAILY, Disp: , Rfl:     levocetirizine (XYZAL) 5 MG tablet, Take 5 mg by mouth daily as needed., Disp: , Rfl:     LIDOcaine (LIDODERM) 5 %, Apply 1-3 patches every day prn pain, Disp: , Rfl:     lisinopriL (PRINIVIL,ZESTRIL) 20 MG tablet, Take 1 tablet by mouth once daily., Disp: , Rfl:     metoclopramide HCl (REGLAN) 10 MG tablet, Take 1 tablet (10 mg total) by mouth every 6 (six) hours as needed., Disp: 30 tablet, Rfl: 0    metoprolol succinate (TOPROL-XL) 50 MG 24 hr tablet, Take 50 mg by mouth every evening. , Disp: , Rfl: 5    mupirocin (BACTROBAN) 2 % ointment, Apply topically 3 (three) times daily., Disp: , Rfl:     nitroGLYCERIN (NITROSTAT) 0.4 MG SL tablet, Place 0.4 mg under the tongue every 5 (five) minutes as needed for Chest pain., Disp: , Rfl:     omega-3 acid ethyl esters (LOVAZA) 1 gram capsule, Take 2 capsules by mouth once daily., Disp: , Rfl:     omeprazole (PRILOSEC) 20 MG capsule, TK 1 C PO QD FOR CONTROL OF STOMACH ACID BEFORE BREAKFAST, Disp: , Rfl: 1    oxyCODONE-acetaminophen (PERCOCET) 7.5-325 mg per tablet, Take 1 tablet by mouth every 4 (four) hours as needed., Disp: , Rfl:     pen needle, diabetic 31 gauge x 5/16" Ndle, Inject 1 each into the skin., Disp: , Rfl:     potassium chloride SA (K-DUR,KLOR-CON) 20 MEQ tablet, Take 20 mEq by mouth once daily., Disp: , Rfl: 5    promethazine (PHENERGAN) 25 MG tablet, Take 1 tablet by mouth every 4 (four) hours as needed., Disp: , Rfl:     sotaloL (BETAPACE) 80 MG tablet, TAKE 1 TABLET BY MOUTH TWICE DAILY, Disp: , Rfl:     " spironolactone (ALDACTONE) 25 MG tablet, Take 1 tablet by mouth once daily., Disp: , Rfl:     tiZANidine 4 mg Cap, Take 4 mg by mouth 2 (two) times a day., Disp: , Rfl:     zolpidem (AMBIEN) 10 mg Tab, Take 5 mg by mouth nightly as needed., Disp: , Rfl:     vibegron 75 mg Tab, Take 1 tablet by mouth once daily., Disp: 30 tablet, Rfl: 3    Allergies  Review of patient's allergies indicates:   Allergen Reactions    Darvocet a500 [propoxyphene n-acetaminophen] Itching    Morphine Itching           Tramadol Itching       Patient's PMH, FH, Social hx, Medications, allergies reviewed and updated as pertinent to today's visit    Objective:      Physical Exam  Constitutional:       General: He is not in acute distress.     Appearance: He is well-developed. He is not ill-appearing, toxic-appearing or diaphoretic.   HENT:      Head: Normocephalic and atraumatic.      Mouth/Throat:      Mouth: Mucous membranes are moist.   Eyes:      Conjunctiva/sclera: Conjunctivae normal.   Pulmonary:      Effort: Pulmonary effort is normal. No respiratory distress.   Abdominal:      General: Abdomen is flat. There is no distension.      Palpations: Abdomen is soft.   Musculoskeletal:         General: No swelling or deformity.      Cervical back: Neck supple.   Skin:     General: Skin is warm.      Findings: No rash.   Neurological:      Mental Status: He is alert and oriented to person, place, and time.      Gait: Gait normal.   Psychiatric:         Mood and Affect: Mood normal.         Thought Content: Thought content normal.         Judgment: Judgment normal.       YOLANDE WNL      Lab Results   Component Value Date    PSADIAG 0.67 01/19/2021    PSADIAG 1.4 08/06/2016      Assessment:       1. Urge incontinence of urine    2. Suspected UTI    3. Screening for prostate cancer    4. Nocturia        Plan:         Urge incontinence  Stop oxybutynin and flomax  Start gemtesa, follow up in 6 weeks  Urine culture  Discussed if meds do not work can  consider botox vs SNM    Suspected UTI  Urine culture    Screening for prostate cancer  Shared decision making  PSA due

## 2023-12-01 LAB — BACTERIA UR CULT: NORMAL

## 2023-12-20 ENCOUNTER — ANESTHESIA EVENT (OUTPATIENT)
Dept: SURGERY | Facility: HOSPITAL | Age: 58
End: 2023-12-20
Payer: MEDICARE

## 2023-12-20 ENCOUNTER — TELEPHONE (OUTPATIENT)
Dept: OPHTHALMOLOGY | Facility: CLINIC | Age: 58
End: 2023-12-20
Payer: MEDICAID

## 2023-12-21 ENCOUNTER — ANESTHESIA (OUTPATIENT)
Dept: SURGERY | Facility: HOSPITAL | Age: 58
End: 2023-12-21
Payer: MEDICARE

## 2023-12-21 ENCOUNTER — HOSPITAL ENCOUNTER (OUTPATIENT)
Facility: HOSPITAL | Age: 58
Discharge: HOME | End: 2023-12-21
Attending: STUDENT IN AN ORGANIZED HEALTH CARE EDUCATION/TRAINING PROGRAM | Admitting: STUDENT IN AN ORGANIZED HEALTH CARE EDUCATION/TRAINING PROGRAM
Payer: MEDICARE

## 2023-12-21 VITALS
WEIGHT: 210 LBS | HEIGHT: 67 IN | OXYGEN SATURATION: 96 % | HEART RATE: 55 BPM | DIASTOLIC BLOOD PRESSURE: 84 MMHG | TEMPERATURE: 98 F | RESPIRATION RATE: 16 BRPM | SYSTOLIC BLOOD PRESSURE: 182 MMHG | BODY MASS INDEX: 32.96 KG/M2

## 2023-12-21 DIAGNOSIS — H54.40 BLIND PAINFUL RIGHT EYE: Primary | ICD-10-CM

## 2023-12-21 DIAGNOSIS — H57.11 BLIND PAINFUL RIGHT EYE: Primary | ICD-10-CM

## 2023-12-21 LAB
POCT GLUCOSE: 69 MG/DL (ref 70–110)
POCT GLUCOSE: 69 MG/DL (ref 70–110)
POCT GLUCOSE: 77 MG/DL (ref 70–110)
POCT GLUCOSE: 81 MG/DL (ref 70–110)

## 2023-12-21 PROCEDURE — 71000016 HC POSTOP RECOV ADDL HR: Performed by: STUDENT IN AN ORGANIZED HEALTH CARE EDUCATION/TRAINING PROGRAM

## 2023-12-21 PROCEDURE — 36000706: Performed by: STUDENT IN AN ORGANIZED HEALTH CARE EDUCATION/TRAINING PROGRAM

## 2023-12-21 PROCEDURE — 37000009 HC ANESTHESIA EA ADD 15 MINS: Performed by: STUDENT IN AN ORGANIZED HEALTH CARE EDUCATION/TRAINING PROGRAM

## 2023-12-21 PROCEDURE — 66710 CILIARY TRANSSLERAL THERAPY: CPT | Mod: RT,,, | Performed by: STUDENT IN AN ORGANIZED HEALTH CARE EDUCATION/TRAINING PROGRAM

## 2023-12-21 PROCEDURE — D9220A PRA ANESTHESIA: ICD-10-PCS | Mod: CRNA,,, | Performed by: NURSE ANESTHETIST, CERTIFIED REGISTERED

## 2023-12-21 PROCEDURE — 63600175 PHARM REV CODE 636 W HCPCS: Performed by: NURSE ANESTHETIST, CERTIFIED REGISTERED

## 2023-12-21 PROCEDURE — 82962 GLUCOSE BLOOD TEST: CPT | Mod: 91 | Performed by: STUDENT IN AN ORGANIZED HEALTH CARE EDUCATION/TRAINING PROGRAM

## 2023-12-21 PROCEDURE — 82962 GLUCOSE BLOOD TEST: CPT | Performed by: STUDENT IN AN ORGANIZED HEALTH CARE EDUCATION/TRAINING PROGRAM

## 2023-12-21 PROCEDURE — 63600175 PHARM REV CODE 636 W HCPCS

## 2023-12-21 PROCEDURE — 71000044 HC DOSC ROUTINE RECOVERY FIRST HOUR: Performed by: STUDENT IN AN ORGANIZED HEALTH CARE EDUCATION/TRAINING PROGRAM

## 2023-12-21 PROCEDURE — 63600175 PHARM REV CODE 636 W HCPCS: Performed by: INTERNAL MEDICINE

## 2023-12-21 PROCEDURE — 27201423 OPTIME MED/SURG SUP & DEVICES STERILE SUPPLY: Performed by: STUDENT IN AN ORGANIZED HEALTH CARE EDUCATION/TRAINING PROGRAM

## 2023-12-21 PROCEDURE — D9220A PRA ANESTHESIA: Mod: CRNA,,, | Performed by: NURSE ANESTHETIST, CERTIFIED REGISTERED

## 2023-12-21 PROCEDURE — 71000015 HC POSTOP RECOV 1ST HR: Performed by: STUDENT IN AN ORGANIZED HEALTH CARE EDUCATION/TRAINING PROGRAM

## 2023-12-21 PROCEDURE — 36000707: Performed by: STUDENT IN AN ORGANIZED HEALTH CARE EDUCATION/TRAINING PROGRAM

## 2023-12-21 PROCEDURE — 71000045 HC DOSC ROUTINE RECOVERY EA ADD'L HR: Performed by: STUDENT IN AN ORGANIZED HEALTH CARE EDUCATION/TRAINING PROGRAM

## 2023-12-21 PROCEDURE — 66710 PR DESTRUC,CILIARY BODY,CYCLOPHOTOCOAG: ICD-10-PCS | Mod: RT,,, | Performed by: STUDENT IN AN ORGANIZED HEALTH CARE EDUCATION/TRAINING PROGRAM

## 2023-12-21 PROCEDURE — 25000003 PHARM REV CODE 250: Performed by: STUDENT IN AN ORGANIZED HEALTH CARE EDUCATION/TRAINING PROGRAM

## 2023-12-21 PROCEDURE — D9220A PRA ANESTHESIA: Mod: ANES,,, | Performed by: INTERNAL MEDICINE

## 2023-12-21 PROCEDURE — 25000003 PHARM REV CODE 250: Performed by: NURSE ANESTHETIST, CERTIFIED REGISTERED

## 2023-12-21 PROCEDURE — 37000008 HC ANESTHESIA 1ST 15 MINUTES: Performed by: STUDENT IN AN ORGANIZED HEALTH CARE EDUCATION/TRAINING PROGRAM

## 2023-12-21 PROCEDURE — D9220A PRA ANESTHESIA: ICD-10-PCS | Mod: ANES,,, | Performed by: INTERNAL MEDICINE

## 2023-12-21 PROCEDURE — 25000003 PHARM REV CODE 250

## 2023-12-21 RX ORDER — NEOMYCIN SULFATE, POLYMYXIN B SULFATE, AND DEXAMETHASONE 3.5; 10000; 1 MG/G; [USP'U]/G; MG/G
OINTMENT OPHTHALMIC
Status: DISCONTINUED
Start: 2023-12-21 | End: 2023-12-22 | Stop reason: HOSPADM

## 2023-12-21 RX ORDER — LIDOCAINE HYDROCHLORIDE 20 MG/ML
INJECTION, SOLUTION EPIDURAL; INFILTRATION; INTRACAUDAL; PERINEURAL
Status: DISCONTINUED
Start: 2023-12-21 | End: 2023-12-22 | Stop reason: HOSPADM

## 2023-12-21 RX ORDER — BUPIVACAINE HYDROCHLORIDE 7.5 MG/ML
INJECTION, SOLUTION EPIDURAL; RETROBULBAR
Status: DISCONTINUED
Start: 2023-12-21 | End: 2023-12-22 | Stop reason: HOSPADM

## 2023-12-21 RX ORDER — HYDROMORPHONE HYDROCHLORIDE 1 MG/ML
0.2 INJECTION, SOLUTION INTRAMUSCULAR; INTRAVENOUS; SUBCUTANEOUS EVERY 5 MIN PRN
Status: COMPLETED | OUTPATIENT
Start: 2023-12-21 | End: 2023-12-21

## 2023-12-21 RX ORDER — OXYCODONE HYDROCHLORIDE 5 MG/1
TABLET ORAL
Status: COMPLETED
Start: 2023-12-21 | End: 2023-12-21

## 2023-12-21 RX ORDER — SODIUM CHLORIDE 0.9 % (FLUSH) 0.9 %
10 SYRINGE (ML) INJECTION
Status: DISCONTINUED | OUTPATIENT
Start: 2023-12-21 | End: 2023-12-22 | Stop reason: HOSPADM

## 2023-12-21 RX ORDER — LIDOCAINE HYDROCHLORIDE 20 MG/ML
INJECTION, SOLUTION EPIDURAL; INFILTRATION; INTRACAUDAL; PERINEURAL
Status: DISCONTINUED | OUTPATIENT
Start: 2023-12-21 | End: 2023-12-21

## 2023-12-21 RX ORDER — LIDOCAINE HYDROCHLORIDE 10 MG/ML
1 INJECTION, SOLUTION EPIDURAL; INFILTRATION; INTRACAUDAL; PERINEURAL ONCE
Status: DISCONTINUED | OUTPATIENT
Start: 2023-12-21 | End: 2023-12-22 | Stop reason: HOSPADM

## 2023-12-21 RX ORDER — PROPOFOL 10 MG/ML
VIAL (ML) INTRAVENOUS
Status: DISCONTINUED | OUTPATIENT
Start: 2023-12-21 | End: 2023-12-21

## 2023-12-21 RX ORDER — ATROPINE SULFATE 10 MG/ML
SOLUTION/ DROPS OPHTHALMIC
Status: DISCONTINUED
Start: 2023-12-21 | End: 2023-12-22 | Stop reason: HOSPADM

## 2023-12-21 RX ORDER — ACETAMINOPHEN 325 MG/1
650 TABLET ORAL EVERY 4 HOURS PRN
Status: DISCONTINUED | OUTPATIENT
Start: 2023-12-21 | End: 2023-12-22 | Stop reason: HOSPADM

## 2023-12-21 RX ORDER — HALOPERIDOL 5 MG/ML
0.5 INJECTION INTRAMUSCULAR EVERY 10 MIN PRN
Status: DISCONTINUED | OUTPATIENT
Start: 2023-12-21 | End: 2023-12-22 | Stop reason: HOSPADM

## 2023-12-21 RX ORDER — OXYCODONE HYDROCHLORIDE 5 MG/1
5 TABLET ORAL
Status: DISCONTINUED | OUTPATIENT
Start: 2023-12-21 | End: 2023-12-22 | Stop reason: HOSPADM

## 2023-12-21 RX ORDER — NEOMYCIN SULFATE, POLYMYXIN B SULFATE, AND DEXAMETHASONE 3.5; 10000; 1 MG/G; [USP'U]/G; MG/G
OINTMENT OPHTHALMIC
Status: DISCONTINUED | OUTPATIENT
Start: 2023-12-21 | End: 2023-12-21 | Stop reason: HOSPADM

## 2023-12-21 RX ORDER — MIDAZOLAM HYDROCHLORIDE 1 MG/ML
INJECTION, SOLUTION INTRAMUSCULAR; INTRAVENOUS
Status: DISCONTINUED | OUTPATIENT
Start: 2023-12-21 | End: 2023-12-21

## 2023-12-21 RX ORDER — HYDROMORPHONE HYDROCHLORIDE 1 MG/ML
INJECTION, SOLUTION INTRAMUSCULAR; INTRAVENOUS; SUBCUTANEOUS
Status: COMPLETED
Start: 2023-12-21 | End: 2023-12-21

## 2023-12-21 RX ORDER — PREDNISOLONE ACETATE 10 MG/ML
SUSPENSION/ DROPS OPHTHALMIC
Status: DISCONTINUED | OUTPATIENT
Start: 2023-12-21 | End: 2023-12-21 | Stop reason: HOSPADM

## 2023-12-21 RX ORDER — PREDNISOLONE ACETATE 10 MG/ML
SUSPENSION/ DROPS OPHTHALMIC
Status: DISCONTINUED
Start: 2023-12-21 | End: 2023-12-22 | Stop reason: HOSPADM

## 2023-12-21 RX ORDER — FENTANYL CITRATE 50 UG/ML
INJECTION, SOLUTION INTRAMUSCULAR; INTRAVENOUS
Status: DISCONTINUED | OUTPATIENT
Start: 2023-12-21 | End: 2023-12-21

## 2023-12-21 RX ORDER — ATROPINE SULFATE 10 MG/ML
SOLUTION/ DROPS OPHTHALMIC
Status: DISCONTINUED | OUTPATIENT
Start: 2023-12-21 | End: 2023-12-21 | Stop reason: HOSPADM

## 2023-12-21 RX ADMIN — HYDROMORPHONE HYDROCHLORIDE 0.2 MG: 1 INJECTION, SOLUTION INTRAMUSCULAR; INTRAVENOUS; SUBCUTANEOUS at 02:12

## 2023-12-21 RX ADMIN — LIDOCAINE HYDROCHLORIDE 20 MG: 20 INJECTION, SOLUTION EPIDURAL; INFILTRATION; INTRACAUDAL; PERINEURAL at 01:12

## 2023-12-21 RX ADMIN — MIDAZOLAM HYDROCHLORIDE 2 MG: 1 INJECTION, SOLUTION INTRAMUSCULAR; INTRAVENOUS at 01:12

## 2023-12-21 RX ADMIN — OXYCODONE HYDROCHLORIDE 5 MG: 5 TABLET ORAL at 02:12

## 2023-12-21 RX ADMIN — FENTANYL CITRATE 25 MCG: 50 INJECTION, SOLUTION INTRAMUSCULAR; INTRAVENOUS at 01:12

## 2023-12-21 RX ADMIN — PROPOFOL 40 MG: 10 INJECTION, EMULSION INTRAVENOUS at 01:12

## 2023-12-21 RX ADMIN — PROPOFOL 20 MG: 10 INJECTION, EMULSION INTRAVENOUS at 01:12

## 2023-12-21 NOTE — OP NOTE
Operative Date:  12/21/2023    Discharge Date:  12/21/2023    Discharge Patient Home    SURGEON: Shaina Gan MD MS    ASSISTANT: Caesar Castillo MD    PREOPERATIVE DIAGNOSIS: Blind painful right eye    POSTOPERATIVE DIAGNOSIS: Blind painful right eye    PROCEDURE PERFORMED: Trans-scleral diode G-probe cyclophotocoagulation Laser Ciliary Body Destruction 91327 Treatment  to Superior Temporal / Superior Nasal / Inferior Temporal  / Inferior Nasal quadrants of the right eye    COMPLICATIONS: None.    ESTIMATED BLOOD LOSS: Minimal.    ANESTHESIA: Topical with MAC / RBB    PROCEDURE IN DETIAL: The patient is a 58 year old with poorly controlled glaucoma.  The intraocular pressure was deemed too high coupled with a blind painful eye.  Discussions have been carried out with this patient regarding the pertinent options, surgical risks and benefits of the procedure and expectations.   The patient voiced good understanding and wished to proceed with the above procedure.    The patient and correct eye to be operated on were identified by the operating surgeon and surgical team and the patient was brought into the operating room. The patient received topical anesthesia, then 5 ml of a 50/50 mix of 0.75% bupivicane and 2% lidocaine were injected in the retrobulbar space. Pressure was applied for two minutes afterward. An eyelid speculum was placed and maxitrol gel was applied to the eye surface.    The G-probe handpiece was positioned on the eye in proper location using the calibrated guides provided on the probe and treatment was begun with 21 pulses of 2000 ms duration at 2000 mW through 180 degrees of treatment.  Care was taken to skip the 3 & 9 o'clock locations. The anterior chamber was well formed throughout the case and there were no complications.  Following the procedure, a drop of atropine 1%, prednisolone 1% along with maxitrol ointment was placed on the cornea.  The eye was closed & patched.  The patient was  taken to the recovery room in good and stable condition.  The patient tolerated the procedure well.  The patient  was instructed to refrain from any heavy lifting, bending, stooping, or straining activities, discharged Home and to follow up in the morning for routine postoperative care with Dr. Shaina Gan.

## 2023-12-21 NOTE — NURSING TRANSFER
Spoke with anesthesia, Dr. Grijalva and Dr. Gan that I have contacted the device  clinic as well as cardiology on call and have not been responded too. Both physicians agreed to proceed with a mutual acknowledgement that there should be no interference between the procedure and the ICD. ICD interrogated 2 weeks ago.

## 2023-12-21 NOTE — ANESTHESIA PREPROCEDURE EVALUATION
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 male who  has a past medical history of Cataract, Cellulitis of penis (07/06/2018), COPD (chronic obstructive pulmonary disease), Coronary artery disease, Diabetes mellitus type II, DJD (degenerative joint disease), Glaucoma, Gout, Hypertension, Kidney stone, MI (myocardial infarction) (2010), Obesity, JOSE (obstructive sleep apnea), and Uveitis. No notes on file    No current facility-administered medications for this encounter.       he has a current medication list which includes the following long-term medication(s): acetazolamide, albuterol, amitriptyline, blood-glucose meter, crestor, dorzolamide-timolol 2-0.5%, famotidine, fluticasone propionate, furosemide, gabapentin, hydrochlorothiazide, ketoconazole, levocetirizine, metoprolol succinate, nitroglycerin, sotalol, and zolpidem.       AICD interrogation from 12/2023  714 VT episodes with the viewable appearing to be 1:1. Likely SVT. The longest episode lasted 14 seconds at 178 bpm. 1 AF episode that does not appear to be real AF.    ALLERGIES:     Review of patient's allergies indicates:   Allergen Reactions    Darvocet a500 [propoxyphene n-acetaminophen] Itching    Morphine Itching           Tramadol Itching     LDA:        Intubation     Date/Time: 1/25/2022 11:47 AM  Performed by: Luis Patterson CRNA  Authorized by: Rogers Hein MD      Intubation:     Induction:  Intravenous    Intubated:  Postinduction    Mask  Ventilation:  Easy with oral airway    Attempts:  1    Attempted By:  Other (see comments) (ER resident)    Method of Intubation:  Video laryngoscopy    Blade:  Payne 4    Laryngeal View Grade: Grade I - full view of cords      Difficult Airway Encountered?: No      Complications:  None    Airway Device:  Oral endotracheal tube    Airway Device Size:  7.5    Style/Cuff Inflation:  Cuffed (inflated to minimal occlusive pressure)    Tube secured:  23    Secured at:  The lips    Placement Verified By:  Capnometry and Revisualization with laryngoscopy    Complicating Factors:  None    Findings Post-Intubation:  BS equal bilateral and atraumatic/condition of teeth unchanged  Lines/Drains/Airways       None                 MEDICATIONS:     Antibiotics (From admission, onward)      None          VTE Risk Mitigation (From admission, onward)      None          No current facility-administered medications for this encounter.          History:   There are no hospital problems to display for this patient.    Surgical History:    has a past surgical history that includes Foot surgery; kidney stones; Cardiac defibrillator placement; Surgery for bulging disc at C7; excisional biopsy left inguinal lymph node; Cystoscopy (N/A, 10/01/2020); Cataract extraction w/  intraocular lens implant (Right, 2020); repair, surgical wound, eye, anterior segment (Right, 10/05/2021); Insertion of inflatable penile prosthesis (N/A, 01/25/2022); destruction, ciliary body, using laser (Right, 7/13/2023); and destruction, ciliary body, using laser (Right, 8/10/2023).   Social History:    reports that he is not currently sexually active and has had partner(s) who are female.  reports that he has never smoked. He has been exposed to tobacco smoke. He has never used smokeless tobacco. He reports that he does not drink alcohol and does not use drugs.     OBJECTIVE:     Vital Signs (Most Recent):    Vital Signs Range (Last 24H):          There is no  height or weight on file to calculate BMI.   Wt Readings from Last 4 Encounters:   11/29/23 100.1 kg (220 lb 10.9 oz)   11/15/23 99.8 kg (220 lb)   08/10/23 96 kg (211 lb 10.3 oz)   05/15/23 96 kg (211 lb 10.3 oz)     Significant Labs:  Lab Results   Component Value Date    WBC 7.69 12/28/2021    HGB 14.2 12/28/2021    HCT 42.5 12/28/2021     12/28/2021     04/18/2023    K 4.4 04/18/2023     12/28/2021    CREATININE 1.43 (H) 04/18/2023    BUN 17.8 04/18/2023    CO2 24 04/18/2023     (H) 12/28/2021    CALCIUM 8.7 04/18/2023    MG 1.9 08/06/2016    PHOS 2.4 (L) 08/06/2016    ALKPHOS 100 03/09/2019    ALT 28 04/18/2023    AST 14 (L) 04/18/2023    ALBUMIN 3.6 04/18/2023    INR 0.9 05/04/2021    APTT 23.6 05/04/2021    HGBA1C 7.5 (H) 11/16/2023     03/23/2020    TROPONINI 0.017 08/05/2016    BNP <10 08/05/2016     No LMP for male patient.  No results found for this or any previous visit (from the past 72 hour(s)).    EKG:   Results for orders placed or performed in visit on 12/28/21   EKG 12-lead    Collection Time: 12/28/21  9:33 AM    Narrative    Test Reason : R07.9    Vent. Rate : 079 BPM     Atrial Rate : 079 BPM     P-R Int : 188 ms          QRS Dur : 104 ms      QT Int : 398 ms       P-R-T Axes : 071 065 003 degrees     QTc Int : 456 ms    Normal sinus rhythm  Normal ECG  When compared with ECG of 11-NOV-2017 17:35,  Significant changes have occurred  Confirmed by Darren Springer MD ([id number]) on 12/29/2021 4:41:12 PM    Referred By:             Confirmed By:Darren Springer MD       TTE:  \                                                                                                                    08/10/2023  Se Virgil Mcgraw is a 58 y.o., male.        Pre-op Assessment    I have reviewed the Patient Summary Reports.    I have reviewed the Nursing Notes.  I have reviewed the Medications.      Review of Systems  Anesthesia Hx:  No problems with previous Anesthesia  Denies Family  Hx of Anesthesia complications.    Cardiovascular:   Exercise tolerance: good Hypertension Past MI CAD      Pulmonary:   COPD    Renal/:   Chronic Renal Disease, CKD    Hepatic/GI:   GERD    Musculoskeletal:   Arthritis     Neurological:   Neuromuscular Disease,    Endocrine:   Diabetes, type 2            Past Medical History:   Diagnosis Date    Cataract      Cellulitis of penis 07/06/2018    COPD (chronic obstructive pulmonary disease)      Coronary artery disease      Diabetes mellitus type II      DJD (degenerative joint disease)      Glaucoma      Gout      Hypertension      Kidney stone      MI (myocardial infarction) 2010    Obesity      JOSE (obstructive sleep apnea)      Uveitis              Past Surgical History:   Procedure Laterality Date    CARDIAC DEFIBRILLATOR PLACEMENT        CATARACT EXTRACTION W/  INTRAOCULAR LENS IMPLANT Right 2020     OUTSIDE [facility] ()    CYSTOSCOPY N/A 10/01/2020     Procedure: CYSTOSCOPY;  Surgeon: Monica Lieberman MD;  Location: [facility] OR;  Service: Urology;  Laterality: N/A;  RN PRE OP Covid screen 9-  C A    DESTRUCTION, CILIARY BODY, USING LASER Right 7/13/2023     Procedure: DESTRUCTION, CILIARY BODY, USING LASER;  Surgeon: Shaina Gan MD;  Location: [address];  Service: Ophthalmology;  Laterality: Right;    excisional biopsy left inguinal lymph node        FOOT SURGERY         right foot surgery     INSERTION OF INFLATABLE PENILE PROSTHESIS N/A 01/25/2022     Procedure: INSERTION, PENILE PROSTHESIS, INFLATABLE;  Surgeon: Monica Lieberman MD;  Location: [facility] OR;  Service: Urology;  Laterality: N/A;  [employer] JUAN LUIS ARNOLD [phone] TEXTED HIM @ 5:32PM ON 12-  RN PRE OP 12-28-21. Covid NEGATIVE 1-3-22. CA-----AICD-----HAS CARDS CLEARANCE    kidney stones         lithotripsy    REPAIR, SURGICAL WOUND, EYE, ANTERIOR SEGMENT Right 10/05/2021     Procedure: REPAIR, SURGICAL WOUND, EYE, ANTERIOR SEGMENT;  Surgeon: Adelaide Allen,  MD;  Location: [facility] OR [address] [place] [address];  Service: Ophthalmology;  Laterality: Right;  Anterior Chamber Wash Out Right Eye     Surgery for bulging disc at C7              Patient Active Problem List   Diagnosis    Diabetes mellitus type 2 in obese    Essential hypertension    Brachial neuritis or radiculitis NOS    Elevated LFTs    Sleep apnea    CKD (chronic kidney disease) stage 2, GFR 60-89 ml/min    History of gout    HLD (hyperlipidemia)    GERD (gastroesophageal reflux disease)    Edema    AICD (automatic cardioverter/defibrillator) present    Non-ischemic cardiomyopathy    Restrictive lung disease    Cervical disc disorder with radiculopathy    Inguinal lymphadenopathy    Lower urinary tract symptoms (LUTS)    Ventricular tachycardia    Chronic combined systolic and diastolic heart failure    Chronic hip pain    Insomnia    Erectile dysfunction    Blind painful right eye                Facility-Administered Medications as of 8/10/2023   Medication Dose Route Frequency Provider Last Rate Last Admin    LIDOcaine (PF) 10 mg/ml (1%) injection 10 mg  1 mL Intradermal Once Shaina Gan MD        sodium chloride 0.9% flush 10 mL  10 mL Intravenous PRN Shaina Gan MD                 Outpatient Medications as of 8/10/2023   Medication Sig Dispense Refill    acetaZOLAMIDE (DIAMOX) 250 MG tablet TAKE 1 TABLET(250 MG) BY MOUTH TWICE DAILY 60 tablet 11    albuterol (PROVENTIL/VENTOLIN HFA) 90 mcg/actuation inhaler Inhale 2 puffs into the lungs every 4 (four) hours as needed for Wheezing or Shortness of Breath. Rescue 1 Inhaler 0    allopurinol (ZYLOPRIM) 100 MG tablet Take 100 mg by mouth Daily.   5    aspirin (ECOTRIN) 81 MG EC tablet Take 81 mg by mouth once daily.        atropine 1% (ISOPTO ATROPINE) 1 % Drop Place 1 drop into the right eye 2 (two) times a day. 15 mL 11    blood sugar diagnostic Strp ACCU CHEK KEVAN PLUS TEST STRIPS        blood-glucose meter Misc ACCU CHEK KEVAN PLUS METER: USE 4X/D        CRESTOR  20 mg tablet Take 20 mg by mouth every evening.    5    dapagliflozin (FARXIGA) 10 mg tablet Take 10 mg by mouth once daily.        dicyclomine (BENTYL) 20 mg tablet Take 20 mg by mouth 2 (two) times daily.   2    divalproex ER (DEPAKOTE ER) 250 MG 24 hr tablet Take 750 mg by mouth every evening.        docusate sodium (COLACE) 100 MG capsule Take 1 capsule (100 mg total) by mouth 2 (two) times daily. 30 capsule 0    dorzolamide-timolol 2-0.5% (COSOPT) 22.3-6.8 mg/mL ophthalmic solution Place 1 drop into both eyes 2 (two) times daily. 10 mL 11    famotidine (PEPCID) 20 MG tablet Take 20 mg by mouth 2 (two) times daily as needed.        fluticasone propionate (FLONASE) 50 mcg/actuation nasal spray 2 sprays (100 mcg total) by Each Nostril route once daily. 1 Bottle 0    furosemide (LASIX) 20 MG tablet Take 20 mg by mouth daily as needed. Takes every other day   5    gabapentin (NEURONTIN) 800 MG tablet Take 800 mg by mouth 2 (two) times a day.        hydroCHLOROthiazide (MICROZIDE) 12.5 mg capsule Take 12.5 mg by mouth once daily.        insulin glargine-lixisenatide (SOLIQUA 100/33) 100 unit-33 mcg/mL InPn pen Inject 60 Units into the skin every morning.        INVOKANA 300 mg Tab tablet Take 300 mg by mouth once daily.   5    ketoconazole (NIZORAL) 2 % cream Apply  a small amount to affected area twice a day  apply to skin for fungal infection        lancets [facility] ACCU CHEK SOFTCLIX LANCETS: USE 4X DAILY        levocetirizine (XYZAL) 5 MG tablet Take 5 mg by mouth daily as needed.        LIDOcaine (LIDODERM) 5 % Apply 1-3 patches every day prn pain        lisinopriL (PRINIVIL,ZESTRIL) 20 MG tablet Take 1 tablet by mouth once daily.        metoclopramide HCl (REGLAN) 10 MG tablet Take 1 tablet (10 mg total) by mouth every 6 (six) hours as needed. 30 tablet 0    metoprolol succinate (TOPROL-XL) 50 MG 24 hr tablet Take 50 mg by mouth every evening.    5    mupirocin (BACTROBAN) 2 % ointment Apply topically 3 (three)  "times daily.        nitroGLYCERIN (NITROSTAT) 0.4 MG SL tablet Place 0.4 mg under the tongue every 5 (five) minutes as needed for Chest pain.        omega-3 acid ethyl esters (LOVAZA) 1 gram capsule Take 2 capsules by mouth once daily.        omeprazole (PRILOSEC) 20 MG capsule TK 1 C PO QD FOR CONTROL OF STOMACH ACID BEFORE BREAKFAST   1    oxybutynin (DITROPAN-XL) 10 MG 24 hr tablet Take 1 tablet by mouth once daily.        oxyCODONE-acetaminophen (PERCOCET) 7.5-325 mg per tablet Take 1 tablet by mouth every 4 (four) hours as needed.        pen needle, diabetic 31 gauge x 5/16" Ndle Inject 1 each into the skin.        potassium chloride SA (K-DUR,KLOR-CON) 20 MEQ tablet Take 20 mEq by mouth once daily.   5    promethazine (PHENERGAN) 25 MG tablet Take 1 tablet by mouth every 4 (four) hours as needed.        sotaloL (BETAPACE) 80 MG tablet TAKE 1 TABLET BY MOUTH TWICE DAILY        spironolactone (ALDACTONE) 25 MG tablet Take 1 tablet by mouth once daily.        tamsulosin (FLOMAX) 0.4 mg Cap Take 1 capsule (0.4 mg total) by mouth once daily. 90 capsule 3    tiZANidine 4 mg Cap Take 4 mg by mouth 2 (two) times a day.        zolpidem (AMBIEN) 10 mg Tab Take 5 mg by mouth nightly as needed.                Review of patient's allergies indicates:   Allergen Reactions    Darvocet a500 [propoxyphene n-acetaminophen] Itching    Morphine Itching               Tramadol Itching            Physical Exam  General: Well nourished, Cooperative and Alert   Airway:  Mallampati: II   Mouth Opening: Normal  TM Distance: Normal  Tongue: Normal  Neck ROM: Normal ROM   Chest/Lungs:  Normal Respiratory Rate   Heart:  Rate: Normal          Wt Readings from Last 3 Encounters:   05/15/23 96 kg (211 lb 10.3 oz)   03/31/23 97.2 kg (214 lb 4.6 oz)   01/04/23 95.3 kg (210 lb)          Temp Readings from Last 3 Encounters:   01/04/23 37.1 °C (98.7 °F) (Tympanic)   06/22/22 36.8 °C (98.3 °F)   06/20/22 36.9 °C (98.5 °F) (Oral)          BP " Readings from Last 3 Encounters:   01/04/23 122/81   06/22/22 138/84   06/20/22 119/80          Pulse Readings from Last 3 Encounters:   01/04/23 84   06/22/22 87   06/20/22 91            Lab Results   Component Value Date     WBC 7.69 12/28/2021     HGB 14.2 12/28/2021     HCT 42.5 12/28/2021     MCV 96 12/28/2021      12/28/2021           Chemistry               Component Value Date/Time      12/28/2021 0943     K 3.9 12/28/2021 0943      12/28/2021 0943     CO2 25 12/28/2021 0943     BUN 21 (H) 12/28/2021 0943     CREATININE 1.4 12/28/2021 0943      (H) 12/28/2021 0943              Component Value Date/Time     CALCIUM 9.6 12/28/2021 0943     ALKPHOS 100 03/09/2019 2051     AST 17 03/09/2019 2051     ALT 16 03/09/2019 2051     BILITOT 0.4 03/09/2019 2051     ESTGFRAFRICA >60 12/28/2021 0943     EGFRNONAA 55 (A) 12/28/2021 0943             Results for orders placed or performed in visit on 12/28/21   EKG 12-lead     Collection Time: 12/28/21  9:33 AM     Narrative     Test Reason : R07.9     Vent. Rate : 079 BPM     Atrial Rate : 079 BPM     P-R Int : 188 ms          QRS Dur : 104 ms      QT Int : 398 ms       P-R-T Axes : 071 065 003 degrees     QTc Int : 456 ms     Normal sinus rhythm  Normal ECG  When compared with ECG of 11-NOV-2017 17:35,  Significant changes have occurred  Confirmed by Darren Springer MD ([id number]) on 12/29/2021 4:41:12 PM     Referred By:             Confirmed By:Darren Springer MD      Echo 4/4/23  CONCLUSIONS     Increased left ventricular wall thickness.     Left ventricular ejection fraction is estimated at 50-55 %.     Grade I diastolic dysfunction, normal to mildly elevated filling pressures.     Pacemaker wire visualized in the right ventricle.     Right ventricular systolic pressure 30.2 mmHg.      Trace mitral valve regurgitation.     Mild aortic valve sclerosis without stenosis or regurgitation.     Mild tricuspid valve regurgitation.     Trace pulmonary  "valve regurgitation.            Stress Test:  2015  Impression:   1..Left ventricular ejection fraction of 51%   2..Possible very mild transient ischemic dilatation of the left   ventricle during stress. No other left ventricular wall motion   abnormalities.   3..Small reversible ischemia defect is noted on stress imaging and   relieved on rest imaging and the apical anterior segment of the   anterior wall of the left ventricle in the distribution of the   anterior descending branch of the left coronary artery.      LHC:  No results found for this or any previous visit.     PFT:  No results found for: "FEV1", "FVC", "CUF4MWN", "TLC", "DLCO"   ASSESSMENT/PLAN:          Pre-op Assessment    I have reviewed the Patient Summary Reports.     I have reviewed the Nursing Notes. I have reviewed the NPO Status.   I have reviewed the Medications.     Review of Systems  Anesthesia Hx:  No problems with previous Anesthesia                Social:  Non-Smoker, No Alcohol Use       Cardiovascular:       Past MI CAD                                        Pulmonary:   COPD                     Hepatic/GI:     GERD, well controlled             Neurological:  Neurology Normal                                          Physical Exam  General: Well nourished, Cooperative, Alert and Oriented    Airway:  Mallampati: III / II  Mouth Opening: Normal  TM Distance: Normal  Tongue: Normal    Dental:  Intact    Chest/Lungs:  Clear to auscultation, Normal Respiratory Rate    Heart:  Rate: Normal  Rhythm: Regular Rhythm        Anesthesia Plan  Type of Anesthesia, risks & benefits discussed:    Anesthesia Type: MAC  Intra-op Monitoring Plan: Standard ASA Monitors  Induction:  IV  ASA Score: 3  Anesthesia Plan Notes: Pt with high SVT burden per AICD interrogation done 2 weeks ago. No bovie to be used during the procedure. No need for magnet over the device. Discussed w surgical team     Ready For Surgery From Anesthesia Perspective.     .      "

## 2023-12-21 NOTE — TRANSFER OF CARE
"Anesthesia Transfer of Care Note    Patient: Se Virgil Mcgraw    Procedure(s) Performed: Procedure(s) (LRB):  DESTRUCTION, CILIARY BODY, USING LASER (Right)    Patient location: [facility]    Anesthesia Type: MAC    Transport from OR: Transported from OR on 2-3 L/min O2 by NC with adequate spontaneous ventilation    Post pain: adequate analgesia    Post assessment: no apparent anesthetic complications    Post vital signs: stable    Level of consciousness: awake    Nausea/Vomiting: no nausea/vomiting    Complications: none    Transfer of care protocol was followed      Last vitals: Visit Vitals  BP (!) 184/94 (BP Location: Left arm, Patient Position: Lying)   Pulse (!) 56   Temp 36.7 °C (98 °F) (Skin)   Resp 16   Ht 5' 7" (1.702 m)   Wt 95.3 kg (210 lb)   SpO2 100%   BMI 32.89 kg/m²     "

## 2023-12-21 NOTE — H&P
Ophthalmology Preoperative History and Physical Exam     CC/Reason for Surgery:  blind painful right eye    HPI:   Pt presents c/o continued pain in his eye that he does not see with.      Past Medical History:  Past Medical History:   Diagnosis Date    Cataract     Cellulitis of penis 07/06/2018    COPD (chronic obstructive pulmonary disease)     Coronary artery disease     Diabetes mellitus type II     DJD (degenerative joint disease)     Glaucoma     Gout     Hypertension     Kidney stone     MI (myocardial infarction) 2010    Obesity     JOSE (obstructive sleep apnea)     Uveitis         Past Surgical History:  Past Surgical History:   Procedure Laterality Date    CARDIAC DEFIBRILLATOR PLACEMENT      CATARACT EXTRACTION W/  INTRAOCULAR LENS IMPLANT Right 2020    OUTSIDE [facility] ()    CYSTOSCOPY N/A 10/01/2020    Procedure: CYSTOSCOPY;  Surgeon: Monica Lieberman MD;  Location: [facility] OR;  Service: Urology;  Laterality: N/A;  RN PRE OP Covid screen 9-  C A    DESTRUCTION, CILIARY BODY, USING LASER Right 7/13/2023    Procedure: DESTRUCTION, CILIARY BODY, USING LASER;  Surgeon: Shaina Gan MD;  [facility] [address] [place] [address];  Service: Ophthalmology;  Laterality: Right;    DESTRUCTION, CILIARY BODY, USING LASER Right 8/10/2023    Procedure: DESTRUCTION, CILIARY BODY, USING LASER;  Surgeon: Shaina Gan MD;  [facility];  Service: Ophthalmology;  Laterality: Right;    excisional biopsy left inguinal lymph node      FOOT SURGERY      right foot surgery     INSERTION OF INFLATABLE PENILE PROSTHESIS N/A 01/25/2022    Procedure: INSERTION, PENILE PROSTHESIS, INFLATABLE;  Surgeon: Monica Lieberman MD;  Location: [facility] OR;  Service: Urology;  Laterality: N/A;  [email or web address] JUAN LUIS ARNOLD [phone] TEXTED HIM @ 5:32PM ON 12-  RN PRE OP 12-28-21. Covid NEGATIVE 1-3-22. CA-----AICD-----HAS CARDS CLEARANCE    kidney stones      lithotripsy    REPAIR, SURGICAL WOUND, EYE, ANTERIOR  SEGMENT Right 10/05/2021    Procedure: REPAIR, SURGICAL WOUND, EYE, ANTERIOR SEGMENT;  Surgeon: Adelaide Allen MD;  Location: [facility] OR [address] [place] [address];  Service: Ophthalmology;  Laterality: Right;  Anterior Chamber Wash Out Right Eye     Surgery for bulging disc at C7          Past Ocular History:  Cataracts  See prior note for details      Allergies:  Review of patient's allergies indicates:   Allergen Reactions    Darvocet a500 [propoxyphene n-acetaminophen] Itching    Morphine Itching           Tramadol Itching        Social History:  Social History     Socioeconomic History    Marital status: Single   Tobacco Use    Smoking status: Never     Passive exposure: Past    Smokeless tobacco: Never   Substance and Sexual Activity    Alcohol use: No    Drug use: No    Sexual activity: Not Currently     Partners: Female     Comment: divorce        Medications:    Current Facility-Administered Medications:     LIDOcaine (PF) 10 mg/ml (1%) injection 10 mg, 1 mL, Intradermal, Once, Shaina Gan MD    sodium chloride 0.9% flush 10 mL, 10 mL, Intravenous, PRN, Shaina Gan MD     Family History:  Family History   Problem Relation Age of Onset    Diabetes Mother     Hypertension Mother     Heart disease Father     Diabetes Brother     Hypertension Brother     Cancer Maternal Uncle         prostate    Asthma Daughter     Cancer Cousin     Kidney disease Cousin     Amblyopia Neg Hx     Blindness Neg Hx     Cataracts Neg Hx     Glaucoma Neg Hx     Macular degeneration Neg Hx     Retinal detachment Neg Hx     Strabismus Neg Hx         ROS:   Constitutional: WNL   Eyes: See HPI   Ears: WNL   CV: WNL   Resp: WNL   Gastro: WNL    Musculo: WNL   Skin: WNL   Neuro: WNL     Physical Exam:  See nursing intake for vitals    General: No acute distress  HEENT: NC/AT  CV: Pulses strong and equal bilaterally  Resp: Breathing comfortably on room air  Musculoskeletal: WNL, able to lay flat    Lab Results:  CBC w/Diff   Lab Results  "  Component Value Date/Time    WBC 7.69 12/28/2021 09:43 AM    RBC 4.41 (L) 12/28/2021 09:43 AM    HGB 14.2 12/28/2021 09:43 AM    HCT 42.5 12/28/2021 09:43 AM    HCT 37 09/30/2021 12:05 PM    MCV 96 12/28/2021 09:43 AM    MCH 32.2 (H) 12/28/2021 09:43 AM    MCHC 33.4 12/28/2021 09:43 AM    RDW 11.5 12/28/2021 09:43 AM    MPV 11.6 12/28/2021 09:43 AM    No components found for: "NEUT", "ANC", "LYMA", "ALC", "ZAHRA", "AMC", "EOSA", "AEC", "BASA", "ABC"     Imaging:  None    Assessment: blind painful right eye    Plan:  To operating room for diode transcleral cyclophotocoagulation right eye    An extensive discussion took place with the patient concerning the risks, benefits and alternatives to the above procedure. The patient was given the opportunity to have all questions answered. At the conclusion of our discussion, signed informed consent was obtained.     Shaina Gan M.D., M.S.  Department of Ophthalmology   Division of Glaucoma Surgery  [facility]    "

## 2023-12-21 NOTE — DISCHARGE INSTRUCTIONS
If patched  Keep patch on eye until it is removed in clinic tomorrow  No lifting over 10 pounds  No bending, no straining  OK to shower, but do not get patch wet  If mild pain, take Tylenol 1000 mg  (do not exceed 4000 mg per day)  Call clinic if severe pain [phone] or  [phone]

## 2023-12-22 ENCOUNTER — OFFICE VISIT (OUTPATIENT)
Dept: OPHTHALMOLOGY | Facility: CLINIC | Age: 58
End: 2023-12-22
Payer: MEDICARE

## 2023-12-22 DIAGNOSIS — H54.40 BLIND PAINFUL RIGHT EYE: ICD-10-CM

## 2023-12-22 DIAGNOSIS — H40.022 OPEN ANGLE WITH BORDERLINE FINDINGS AND HIGH GLAUCOMA RISK IN LEFT EYE: ICD-10-CM

## 2023-12-22 DIAGNOSIS — H57.11 BLIND PAINFUL RIGHT EYE: ICD-10-CM

## 2023-12-22 DIAGNOSIS — H40.41X3 GLAUCOMA OF RIGHT EYE SECONDARY TO EYE INFLAMMATION, SEVERE STAGE: Primary | ICD-10-CM

## 2023-12-22 PROCEDURE — 1160F PR REVIEW ALL MEDS BY PRESCRIBER/CLIN PHARMACIST DOCUMENTED: ICD-10-PCS | Mod: CPTII,S$GLB,, | Performed by: STUDENT IN AN ORGANIZED HEALTH CARE EDUCATION/TRAINING PROGRAM

## 2023-12-22 PROCEDURE — 4010F PR ACE/ARB THEARPY RXD/TAKEN: ICD-10-PCS | Mod: CPTII,S$GLB,, | Performed by: STUDENT IN AN ORGANIZED HEALTH CARE EDUCATION/TRAINING PROGRAM

## 2023-12-22 PROCEDURE — 99999 PR PBB SHADOW E&M-EST. PATIENT-LVL III: ICD-10-PCS | Mod: PBBFAC,,, | Performed by: STUDENT IN AN ORGANIZED HEALTH CARE EDUCATION/TRAINING PROGRAM

## 2023-12-22 PROCEDURE — 1160F RVW MEDS BY RX/DR IN RCRD: CPT | Mod: CPTII,S$GLB,, | Performed by: STUDENT IN AN ORGANIZED HEALTH CARE EDUCATION/TRAINING PROGRAM

## 2023-12-22 PROCEDURE — 3051F PR MOST RECENT HEMOGLOBIN A1C LEVEL 7.0 - < 8.0%: ICD-10-PCS | Mod: CPTII,S$GLB,, | Performed by: STUDENT IN AN ORGANIZED HEALTH CARE EDUCATION/TRAINING PROGRAM

## 2023-12-22 PROCEDURE — 1159F MED LIST DOCD IN RCRD: CPT | Mod: CPTII,S$GLB,, | Performed by: STUDENT IN AN ORGANIZED HEALTH CARE EDUCATION/TRAINING PROGRAM

## 2023-12-22 PROCEDURE — 3051F HG A1C>EQUAL 7.0%<8.0%: CPT | Mod: CPTII,S$GLB,, | Performed by: STUDENT IN AN ORGANIZED HEALTH CARE EDUCATION/TRAINING PROGRAM

## 2023-12-22 PROCEDURE — 1159F PR MEDICATION LIST DOCUMENTED IN MEDICAL RECORD: ICD-10-PCS | Mod: CPTII,S$GLB,, | Performed by: STUDENT IN AN ORGANIZED HEALTH CARE EDUCATION/TRAINING PROGRAM

## 2023-12-22 PROCEDURE — 4010F ACE/ARB THERAPY RXD/TAKEN: CPT | Mod: CPTII,S$GLB,, | Performed by: STUDENT IN AN ORGANIZED HEALTH CARE EDUCATION/TRAINING PROGRAM

## 2023-12-22 PROCEDURE — 99024 POSTOP FOLLOW-UP VISIT: CPT | Mod: S$GLB,,, | Performed by: STUDENT IN AN ORGANIZED HEALTH CARE EDUCATION/TRAINING PROGRAM

## 2023-12-22 PROCEDURE — 99024 PR POST-OP FOLLOW-UP VISIT: ICD-10-PCS | Mod: S$GLB,,, | Performed by: STUDENT IN AN ORGANIZED HEALTH CARE EDUCATION/TRAINING PROGRAM

## 2023-12-22 PROCEDURE — 99999 PR PBB SHADOW E&M-EST. PATIENT-LVL III: CPT | Mod: PBBFAC,,, | Performed by: STUDENT IN AN ORGANIZED HEALTH CARE EDUCATION/TRAINING PROGRAM

## 2023-12-22 RX ORDER — ACETAZOLAMIDE 250 MG/1
TABLET ORAL
Qty: 60 TABLET | Refills: 11 | Status: SHIPPED | OUTPATIENT
Start: 2023-12-22

## 2023-12-22 NOTE — ANESTHESIA POSTPROCEDURE EVALUATION
Anesthesia Post Evaluation    Patient: Se Virgil Mcgraw    Procedure(s) Performed: Procedure(s) (LRB):  DESTRUCTION, CILIARY BODY, USING LASER (Right)    Final Anesthesia Type: MAC      Patient location during evaluation: PACU  Patient participation: Yes- Able to Participate  Level of consciousness: awake and alert  Post-procedure vital signs: reviewed and stable  Pain management: adequate  Airway patency: patent    PONV status at discharge: No PONV  Anesthetic complications: no      Cardiovascular status: blood pressure returned to baseline  Respiratory status: unassisted  Hydration status: euvolemic  Follow-up not needed.              Vitals Value Taken Time   /84 12/21/23 1601   Temp 36.7 °C (98.1 °F) 12/21/23 1600   Pulse 55 12/21/23 1608   Resp 16 12/21/23 1600   SpO2 97 % 12/21/23 1608   Vitals shown include unvalidated device data.      No case tracking events are documented in the log.      Pain/Laurie Score: Pain Rating Prior to Med Admin: 10 (12/21/2023  3:30 PM)  Pain Rating Post Med Admin: 5 (12/21/2023  3:30 PM)  Laurie Score: 9 (12/21/2023  3:15 PM)

## 2023-12-22 NOTE — PROGRESS NOTES
Subjective:       Patient ID: Se Virgil Mcgraw is a 58 y.o. male.    Chief Complaint: Post-op Evaluation    HPI    58 y.o. male presents for Post Op Exam s/p CPC OD. Patient complains of   pain. Had headaches above OD after procedure, resolved with tylenol. No   trouble sleeping after procedure. Had nausea and vomiting shortly after   procedure, trouble eating yesterday..    Gtts:  Atropine   Prednisolone    Jared/poly/dex ayesha  Last edited by Natalie Bunch on 12/22/2023  8:28 AM.               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  Department of Ophthalmology   Division of Glaucoma Surgery  [facility]

## 2023-12-26 ENCOUNTER — TELEPHONE (OUTPATIENT)
Dept: OPHTHALMOLOGY | Facility: CLINIC | Age: 58
End: 2023-12-26
Payer: MEDICAID

## 2023-12-26 NOTE — TELEPHONE ENCOUNTER
----- Message from Laquita Marcelo sent at 12/26/2023  1:56 PM CST -----  Regarding: Pt Advice  Contact: Pt @[phone]  Pt is returning a missed call from someone in the office and is asking for a return call back soon. Thanks.     Reason for call:miss call           Patient requesting call back or [facility] msg: Please call Pt @ [phone]

## 2023-12-29 ENCOUNTER — OFFICE VISIT (OUTPATIENT)
Dept: OPHTHALMOLOGY | Facility: CLINIC | Age: 58
End: 2023-12-29
Payer: MEDICARE

## 2023-12-29 DIAGNOSIS — H57.11 BLIND PAINFUL RIGHT EYE: ICD-10-CM

## 2023-12-29 DIAGNOSIS — H40.022 OPEN ANGLE WITH BORDERLINE FINDINGS AND HIGH GLAUCOMA RISK IN LEFT EYE: ICD-10-CM

## 2023-12-29 DIAGNOSIS — H40.41X3 GLAUCOMA OF RIGHT EYE SECONDARY TO EYE INFLAMMATION, SEVERE STAGE: Primary | ICD-10-CM

## 2023-12-29 DIAGNOSIS — H54.40 BLIND PAINFUL RIGHT EYE: ICD-10-CM

## 2023-12-29 PROCEDURE — 99024 POSTOP FOLLOW-UP VISIT: CPT | Mod: S$GLB,,, | Performed by: STUDENT IN AN ORGANIZED HEALTH CARE EDUCATION/TRAINING PROGRAM

## 2023-12-29 PROCEDURE — 3051F PR MOST RECENT HEMOGLOBIN A1C LEVEL 7.0 - < 8.0%: ICD-10-PCS | Mod: CPTII,S$GLB,, | Performed by: STUDENT IN AN ORGANIZED HEALTH CARE EDUCATION/TRAINING PROGRAM

## 2023-12-29 PROCEDURE — 1159F PR MEDICATION LIST DOCUMENTED IN MEDICAL RECORD: ICD-10-PCS | Mod: CPTII,S$GLB,, | Performed by: STUDENT IN AN ORGANIZED HEALTH CARE EDUCATION/TRAINING PROGRAM

## 2023-12-29 PROCEDURE — 1160F PR REVIEW ALL MEDS BY PRESCRIBER/CLIN PHARMACIST DOCUMENTED: ICD-10-PCS | Mod: CPTII,S$GLB,, | Performed by: STUDENT IN AN ORGANIZED HEALTH CARE EDUCATION/TRAINING PROGRAM

## 2023-12-29 PROCEDURE — 4010F PR ACE/ARB THEARPY RXD/TAKEN: ICD-10-PCS | Mod: CPTII,S$GLB,, | Performed by: STUDENT IN AN ORGANIZED HEALTH CARE EDUCATION/TRAINING PROGRAM

## 2023-12-29 PROCEDURE — 1159F MED LIST DOCD IN RCRD: CPT | Mod: CPTII,S$GLB,, | Performed by: STUDENT IN AN ORGANIZED HEALTH CARE EDUCATION/TRAINING PROGRAM

## 2023-12-29 PROCEDURE — 99024 PR POST-OP FOLLOW-UP VISIT: ICD-10-PCS | Mod: S$GLB,,, | Performed by: STUDENT IN AN ORGANIZED HEALTH CARE EDUCATION/TRAINING PROGRAM

## 2023-12-29 PROCEDURE — 99999 PR PBB SHADOW E&M-EST. PATIENT-LVL III: CPT | Mod: PBBFAC,,, | Performed by: STUDENT IN AN ORGANIZED HEALTH CARE EDUCATION/TRAINING PROGRAM

## 2023-12-29 PROCEDURE — 3051F HG A1C>EQUAL 7.0%<8.0%: CPT | Mod: CPTII,S$GLB,, | Performed by: STUDENT IN AN ORGANIZED HEALTH CARE EDUCATION/TRAINING PROGRAM

## 2023-12-29 PROCEDURE — 4010F ACE/ARB THERAPY RXD/TAKEN: CPT | Mod: CPTII,S$GLB,, | Performed by: STUDENT IN AN ORGANIZED HEALTH CARE EDUCATION/TRAINING PROGRAM

## 2023-12-29 PROCEDURE — 99999 PR PBB SHADOW E&M-EST. PATIENT-LVL III: ICD-10-PCS | Mod: PBBFAC,,, | Performed by: STUDENT IN AN ORGANIZED HEALTH CARE EDUCATION/TRAINING PROGRAM

## 2023-12-29 PROCEDURE — 1160F RVW MEDS BY RX/DR IN RCRD: CPT | Mod: CPTII,S$GLB,, | Performed by: STUDENT IN AN ORGANIZED HEALTH CARE EDUCATION/TRAINING PROGRAM

## 2023-12-29 NOTE — PROGRESS NOTES
Subjective:       Patient ID: Se Virgil Mcgraw is a 58 y.o. male.    Chief Complaint: Post-op Evaluation    HPI    58 y.o. male presents for Post Op Evaluation s/p CPC OD 12/21/23. Patient   complains of pain and headaches OD, tylenol provides some comfort. Pain   had been improving but has returned. Believes headaches can be due to   elevated BP. No changes to VA. Compliant with gtt directions.    Gtts:  Prednisolone QID OD  Atropine BID OD  Last edited by Natalie Bunch on 12/29/2023  1:18 PM.               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, severe stage    Blind painful right eye    Open angle with borderline findings and high glaucoma risk in left eye      POW#1 dts-CPC G-probe  Decrease PF to BID  Cont Atropine BID      Blind painful eye LP OD // GS OS  -Drops: Dorzolamide-timolol BID OS  -Drop intolerance/contraindication:  -Laser:  -Surgeries:  CE IOL OD, AC washout OD, Tap/inject OD // dts-CPC OD  -CCT: 813 // 573  -Gonio: unable OD // CBB OS  IOP max: 32 // 40  IOP goal: comfort OD // <20 OS    2/22 HVF unable OD // LID artifact poor reliability high FN OS (fell asleep)    OD - Goal is pain control -- improved   OS - Poor HVF taker - fell asleep --- looks like a big SALT but just eyelid. RNFL WNL.   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  Department of Ophthalmology   Division of Glaucoma Surgery  [facility]

## 2024-01-23 ENCOUNTER — TELEPHONE (OUTPATIENT)
Dept: NEUROLOGY | Facility: CLINIC | Age: 59
End: 2024-01-23
Payer: MEDICARE

## 2024-01-24 DIAGNOSIS — H54.40 BLIND PAINFUL RIGHT EYE: ICD-10-CM

## 2024-01-24 DIAGNOSIS — H57.11 BLIND PAINFUL RIGHT EYE: ICD-10-CM

## 2024-01-24 RX ORDER — ATROPINE SULFATE 10 MG/ML
1 SOLUTION/ DROPS OPHTHALMIC 2 TIMES DAILY
Qty: 5 ML | Refills: 10 | Status: SHIPPED | OUTPATIENT
Start: 2024-01-24

## 2024-01-26 ENCOUNTER — OFFICE VISIT (OUTPATIENT)
Dept: URGENT CARE | Facility: CLINIC | Age: 59
End: 2024-01-26
Payer: MEDICARE

## 2024-01-26 VITALS
RESPIRATION RATE: 17 BRPM | DIASTOLIC BLOOD PRESSURE: 83 MMHG | TEMPERATURE: 97 F | HEART RATE: 100 BPM | OXYGEN SATURATION: 95 % | WEIGHT: 210 LBS | SYSTOLIC BLOOD PRESSURE: 125 MMHG | BODY MASS INDEX: 32.96 KG/M2 | HEIGHT: 67 IN

## 2024-01-26 DIAGNOSIS — R73.9 ELEVATED BLOOD SUGAR: ICD-10-CM

## 2024-01-26 DIAGNOSIS — R31.9 HEMATURIA, UNSPECIFIED TYPE: ICD-10-CM

## 2024-01-26 DIAGNOSIS — R05.1 ACUTE COUGH: ICD-10-CM

## 2024-01-26 DIAGNOSIS — R73.9 HYPERGLYCEMIA: Primary | ICD-10-CM

## 2024-01-26 LAB
BILIRUB UR QL STRIP: NEGATIVE
CTP QC/QA: YES
CTP QC/QA: YES
GLUCOSE SERPL-MCNC: 430 MG/DL (ref 70–110)
GLUCOSE SERPL-MCNC: 442 MG/DL (ref 70–110)
GLUCOSE UR QL STRIP: POSITIVE
KETONES UR QL STRIP: NEGATIVE
LEUKOCYTE ESTERASE UR QL STRIP: NEGATIVE
PH, POC UA: 6
POC BLOOD, URINE: POSITIVE
POC MOLECULAR INFLUENZA A AGN: NEGATIVE
POC MOLECULAR INFLUENZA B AGN: NEGATIVE
POC NITRATES, URINE: NEGATIVE
PROT UR QL STRIP: POSITIVE
SARS-COV-2 AG RESP QL IA.RAPID: NEGATIVE
SP GR UR STRIP: 1 (ref 1–1.03)
UROBILINOGEN UR STRIP-ACNC: 1 (ref 0.3–2.2)

## 2024-01-26 PROCEDURE — 87811 SARS-COV-2 COVID19 W/OPTIC: CPT | Mod: QW,S$GLB,, | Performed by: FAMILY MEDICINE

## 2024-01-26 PROCEDURE — 99214 OFFICE O/P EST MOD 30 MIN: CPT | Mod: S$GLB,,, | Performed by: FAMILY MEDICINE

## 2024-01-26 PROCEDURE — 81003 URINALYSIS AUTO W/O SCOPE: CPT | Mod: QW,S$GLB,, | Performed by: FAMILY MEDICINE

## 2024-01-26 PROCEDURE — 87502 INFLUENZA DNA AMP PROBE: CPT | Mod: QW,S$GLB,, | Performed by: FAMILY MEDICINE

## 2024-01-26 PROCEDURE — 82962 GLUCOSE BLOOD TEST: CPT | Mod: S$GLB,,, | Performed by: FAMILY MEDICINE

## 2024-01-26 RX ORDER — DIVALPROEX SODIUM 250 MG/1
750 TABLET, FILM COATED, EXTENDED RELEASE ORAL NIGHTLY
COMMUNITY
Start: 2024-01-24

## 2024-01-26 RX ORDER — SODIUM CHLORIDE 9 MG/ML
INJECTION, SOLUTION INTRAVENOUS
Status: COMPLETED | OUTPATIENT
Start: 2024-01-26 | End: 2024-01-26

## 2024-01-26 RX ADMIN — SODIUM CHLORIDE: 9 INJECTION, SOLUTION INTRAVENOUS at 10:01

## 2024-01-26 NOTE — PROGRESS NOTES
"Subjective:      Patient ID: Se Virgil Mcgraw is a 58 y.o. male.    Vitals:  height is 5' 7" (1.702 m) and weight is 95.3 kg (210 lb). His tympanic temperature is 97 °F (36.1 °C). His blood pressure is 125/83 and his pulse is 100. His respiration is 17 and oxygen saturation is 95%.     Chief Complaint: Blood Sugar Problem    Patient states that all last night his glucose patch was going off all night and when he woke up and took his blood sugar this morning it was 400mg/dL. Pt states, "That is the farthest the machine will track and then started to say high."   Pt states that he has not ate anything this morning.   Provider note begins below:  Past Medical History:  No date: Cataract  07/06/2018: Cellulitis of penis  No date: COPD (chronic obstructive pulmonary disease)  No date: Coronary artery disease  No date: Diabetes mellitus type II  No date: DJD (degenerative joint disease)  No date: Glaucoma  No date: Gout  No date: Hypertension  No date: Kidney stone  2010: MI (myocardial infarction)  No date: Obesity  No date: JOSE (obstructive sleep apnea)  No date: Uveitis   Cbg yesterday 300s, cbg wed 300s, last night cbg in the 400s. Pt reports he   Started a cough yesterday with body aches.  He reports for the past week sugars have been elevated.  Reports compliance with insulin.  Denies any increase in urine output. He denies any fever or chills.  Denies any history of DKA. A1c 2 mos ago was 7.5. he denies any NVD.     Fatigue  This is a new problem. The current episode started today. The problem occurs constantly. The problem has been unchanged. Associated symptoms include coughing, fatigue, myalgias and weakness. Pertinent negatives include no abdominal pain, anorexia, arthralgias, change in bowel habit, chest pain, chills, congestion, diaphoresis, fever, headaches, joint swelling, nausea, neck pain, numbness, rash, sore throat, swollen glands, urinary symptoms, vertigo, visual change or vomiting.     Constitution: " Positive for fatigue. Negative for activity change, appetite change, chills, sweating, fever, unexpected weight change and generalized weakness.   HENT:  Negative for congestion, nosebleeds, foreign body in nose and sore throat.    Neck: Negative for neck pain.   Cardiovascular:  Negative for chest pain.   Respiratory:  Positive for cough.    Gastrointestinal:  Negative for abdominal pain, abdominal bloating, history of abdominal surgery, nausea and vomiting.   Musculoskeletal:  Positive for muscle cramps and muscle ache. Negative for joint pain and joint swelling.   Skin:  Negative for rash.   Neurological:  Negative for dizziness, history of vertigo, light-headedness, headaches and numbness.      Objective:     Physical Exam   Constitutional: He is oriented to person, place, and time. He appears well-developed.  Non-toxic appearance. He does not appear ill. No distress. overweight  HENT:   Head: Normocephalic and atraumatic.   Ears:   Right Ear: External ear normal.   Left Ear: External ear normal.   Nose: Nose normal.   Mouth/Throat: Oropharynx is clear and moist.   Eyes: Conjunctivae, EOM and lids are normal. Pupils are equal, round, and reactive to light.   Neck: Trachea normal and phonation normal. Neck supple.   Pulmonary/Chest: Effort normal and breath sounds normal.   Musculoskeletal: Normal range of motion.         General: Normal range of motion.   Neurological: He is alert and oriented to person, place, and time.   Skin: Skin is warm, dry, intact and not diaphoretic.   Psychiatric: His speech is normal and behavior is normal. Judgment and thought content normal.   Nursing note and vitals reviewed.      Assessment:     1. Hyperglycemia    2. Elevated blood sugar    3. Acute cough    4. Hematuria, unspecified type      Results for orders placed or performed in visit on 01/26/24   POCT Glucose, Hand-Held Device   Result Value Ref Range    POC Glucose 430 (A) 70 - 110 MG/DL   POCT Urinalysis, Dipstick,  Automated, W/O Scope   Result Value Ref Range    POC Blood, Urine Positive (A) Negative    POC Bilirubin, Urine Negative Negative    POC Urobilinogen, Urine 1.0 0.3 - 2.2    POC Ketones, Urine Negative Negative    POC Protein, Urine Positive (A) Negative    POC Nitrates, Urine Negative Negative    POC Glucose, Urine Positive (A) Negative    pH, UA 6.0     POC Specific Gravity, Urine 1.005 1.003 - 1.029    POC Leukocytes, Urine Negative Negative   POCT Influenza A/B MOLECULAR   Result Value Ref Range    POC Molecular Influenza A Ag Negative Negative, Not Reported    POC Molecular Influenza B Ag Negative Negative, Not Reported     Acceptable Yes    SARS Coronavirus 2 Antigen, POCT Manual Read   Result Value Ref Range    SARS Coronavirus 2 Antigen Negative Negative     Acceptable Yes    POCT Glucose, Hand-Held Device   Result Value Ref Range    POC Glucose 442 (A) 70 - 110 MG/DL      Plan:     1 L NS complete, cbg after 1lns now 442  IV dc from left ac    After  1 L of normal saline in clinic, cbg  now for 442, discussed with Dr. Mcleod send to ED for higher level of care.  Report given to [facility], patient would like to go to [facility].  Pt refused EMS    Discussed results/diagnosis/plan with patient in clinic. Advised to follow up with PCP or specialist.    Patient voiced understanding and in agreement with current treatment plan. Patient exits the exam room in no acute distress. Conversant and engaged during discharge discussion, verbalized understanding.      Hyperglycemia  -     0.9%  NaCl infusion  -     Nursing communication  -     SARS Coronavirus 2 Antigen, POCT Manual Read  -     POCT Glucose, Hand-Held Device  -     Refer to Emergency Dept.    Elevated blood sugar  -     POCT Glucose, Hand-Held Device  -     POCT Urinalysis, Dipstick, Automated, W/O Scope  -     0.9%  NaCl infusion  -     Nursing communication  -     Refer to Emergency Dept.    Acute cough  -     POCT Influenza  A/B MOLECULAR    Hematuria, unspecified type  Comments:  please f/u with your PCP and have your urine rechecked monday          Medical Decision Making:   History:   Old Medical Records: I decided to obtain old medical records.  Old Records Summarized: records from clinic visits, other records and records from another hospital.  Clinical Tests:   Lab Tests: Ordered and Reviewed  The following lab test(s) were unremarkable: Urinalysis       <> Summary of Lab: Cv and flu neg  Other:   I have discussed this case with another health care provider.       <> Summary of the Discussion: Case d/w with Dr. Mcleod       Patient Instructions   ER Referral   Your condition is serious and requires immediate attention and evaluation in an ER setting.  You were referred to [place] [facility]      GO STRAIGHT TO THE ER AND DO NOT EAT OR DRINK ANYTHING UNLESS A HEALTHCARE PROVIDER GIVES IT TO YOU.     Please follow up with your PCP when you are discharged from  regarding hyperglycemia and hematuria.

## 2024-01-26 NOTE — PATIENT INSTRUCTIONS
ER Referral   Your condition is serious and requires immediate attention and evaluation in an ER setting.  You were referred to [place] [facility]      GO STRAIGHT TO THE ER AND DO NOT EAT OR DRINK ANYTHING UNLESS A HEALTHCARE PROVIDER GIVES IT TO YOU.     Please follow up with your PCP when you are discharged from  regarding hyperglycemia and hematuria.

## 2024-02-07 ENCOUNTER — OFFICE VISIT (OUTPATIENT)
Dept: URGENT CARE | Facility: CLINIC | Age: 59
End: 2024-02-07
Payer: MEDICARE

## 2024-02-07 VITALS
HEART RATE: 76 BPM | RESPIRATION RATE: 17 BRPM | HEIGHT: 67 IN | WEIGHT: 210 LBS | OXYGEN SATURATION: 95 % | DIASTOLIC BLOOD PRESSURE: 92 MMHG | BODY MASS INDEX: 32.96 KG/M2 | SYSTOLIC BLOOD PRESSURE: 147 MMHG | TEMPERATURE: 98 F

## 2024-02-07 DIAGNOSIS — M25.551 RIGHT HIP PAIN: Primary | ICD-10-CM

## 2024-02-07 PROCEDURE — 99213 OFFICE O/P EST LOW 20 MIN: CPT | Mod: 25,S$GLB,, | Performed by: FAMILY MEDICINE

## 2024-02-07 RX ORDER — DEXAMETHASONE SODIUM PHOSPHATE 4 MG/ML
4 INJECTION, SOLUTION INTRA-ARTICULAR; INTRALESIONAL; INTRAMUSCULAR; INTRAVENOUS; SOFT TISSUE
Status: DISCONTINUED | OUTPATIENT
Start: 2024-02-07 | End: 2024-02-07

## 2024-02-07 RX ORDER — DEXAMETHASONE SODIUM PHOSPHATE 100 MG/10ML
10 INJECTION INTRAMUSCULAR; INTRAVENOUS
Status: DISCONTINUED | OUTPATIENT
Start: 2024-02-07 | End: 2024-02-07

## 2024-02-07 RX ORDER — DEXAMETHASONE SODIUM PHOSPHATE 100 MG/10ML
5 INJECTION INTRAMUSCULAR; INTRAVENOUS
Status: COMPLETED | OUTPATIENT
Start: 2024-02-07 | End: 2024-02-07

## 2024-02-07 RX ADMIN — DEXAMETHASONE SODIUM PHOSPHATE 5 MG: 100 INJECTION INTRAMUSCULAR; INTRAVENOUS at 02:02

## 2024-02-07 NOTE — PATIENT INSTRUCTIONS
You received a steroid shot today - this can   elevate your blood pressure,   elevate your blood sugar,   water weight gain,   nervous energy,   redness to the face and   dimpling of the skin where the shot goes in.

## 2024-02-07 NOTE — PROGRESS NOTES
"Subjective:      Patient ID: Se Virgil Mcgraw is a 58 y.o. male.    Vitals:  height is 5' 7" (1.702 m) and weight is 95.3 kg (210 lb). His oral temperature is 98.1 °F (36.7 °C). His blood pressure is 147/92 (abnormal) and his pulse is 76. His respiration is 17 and oxygen saturation is 95%.     Chief Complaint: Hip Pain    Pt is here for right hip pain, and a boil under his left foot. Pt pain started over the weekend , the boil started 2 weeks ago. Pt hasn't taken any medicine. Pt mentioned he is having pain in his left foot also, pt says the boil is located in the middle of his foot. Pt left foot is wrapped up. Foot was already treated.    Hip Pain   The incident occurred 3 to 5 days ago. The incident occurred at home. There was no injury mechanism. The pain is present in the right hip. The quality of the pain is described as aching. The pain is at a severity of 8/10. The pain is severe. The pain has been Constant since onset. He reports no foreign bodies present. Nothing aggravates the symptoms. He has tried nothing for the symptoms. The treatment provided no relief.   ROS   Objective:     Physical Exam   Constitutional: He is oriented to person, place, and time.   HENT:   Head: Normocephalic.   Ears:   Right Ear: External ear normal.   Left Ear: External ear normal.   Nose: Nose normal.   Mouth/Throat: Mucous membranes are moist.   Eyes: Conjunctivae are normal.   Cardiovascular: Normal rate.   Pulmonary/Chest: Effort normal.   Musculoskeletal: Normal range of motion.         General: Tenderness present. Normal range of motion.      Comments: Right ttp of right hip area.    Neurological: He is alert and oriented to person, place, and time.   Skin: Skin is dry.   Psychiatric: His behavior is normal.       Assessment:     1. Right hip pain        Plan:       Right hip pain  -     Discontinue: dexAMETHasone injection 10 mg  -     Discontinue: dexAMETHasone injection 4 mg  -     dexAMETHasone injection 5 mg    Other " orders  -     Discontinue: dexAMETHasone injection 4 mg      58-year-old male poorly controlled diabetes chronic right hip pain comes in today because he was unable to get into see his ortho surgeon to get a right hip injection.  States that he needs a steroid injection because he can not take the pain anymore.  Discussed the fact that steroids are not good for his diabetes and/or other medical conditions.  States he has had them before and he tolerates them well and that he accepts the risks associated with the steroid shot if it will give him some relief.  I will give him a 5 mg dexamethasone injection and recommend that he follow up with his ortho surgeon.

## 2024-02-22 DIAGNOSIS — M54.81 OCCIPITAL NEURALGIA OF RIGHT SIDE: ICD-10-CM

## 2024-02-22 DIAGNOSIS — G44.86 CERVICOGENIC HEADACHE: ICD-10-CM

## 2024-02-23 RX ORDER — AMITRIPTYLINE HYDROCHLORIDE 10 MG/1
10 TABLET, FILM COATED ORAL NIGHTLY
Qty: 60 TABLET | Refills: 3 | Status: SHIPPED | OUTPATIENT
Start: 2024-02-23

## 2024-03-16 ENCOUNTER — OFFICE VISIT (OUTPATIENT)
Dept: URGENT CARE | Facility: CLINIC | Age: 59
End: 2024-03-16
Payer: MEDICARE

## 2024-03-16 VITALS
OXYGEN SATURATION: 95 % | SYSTOLIC BLOOD PRESSURE: 116 MMHG | HEART RATE: 85 BPM | DIASTOLIC BLOOD PRESSURE: 76 MMHG | BODY MASS INDEX: 34.53 KG/M2 | HEIGHT: 67 IN | RESPIRATION RATE: 18 BRPM | TEMPERATURE: 98 F | WEIGHT: 220 LBS

## 2024-03-16 DIAGNOSIS — S73.102A HIP SPRAIN, LEFT, INITIAL ENCOUNTER: Primary | ICD-10-CM

## 2024-03-16 DIAGNOSIS — M25.552 LEFT HIP PAIN: ICD-10-CM

## 2024-03-16 PROBLEM — N40.0 BENIGN PROSTATIC HYPERPLASIA: Status: ACTIVE | Noted: 2021-06-15

## 2024-03-16 PROBLEM — I47.10 SUPRAVENTRICULAR TACHYCARDIA: Status: ACTIVE | Noted: 2021-02-19

## 2024-03-16 PROBLEM — J44.9 CHRONIC OBSTRUCTIVE PULMONARY DISEASE, UNSPECIFIED: Status: ACTIVE | Noted: 2024-03-07

## 2024-03-16 PROBLEM — E11.51 TYPE 2 DIABETES MELLITUS WITH DIABETIC PERIPHERAL ANGIOPATHY WITHOUT GANGRENE: Status: ACTIVE | Noted: 2024-03-07

## 2024-03-16 PROBLEM — I25.5 ISCHEMIC CARDIOMYOPATHY: Chronic | Status: ACTIVE | Noted: 2021-06-15

## 2024-03-16 PROBLEM — I25.119 ATHEROSCLEROTIC HEART DISEASE OF NATIVE CORONARY ARTERY WITH UNSPECIFIED ANGINA PECTORIS: Status: ACTIVE | Noted: 2024-03-07

## 2024-03-16 PROBLEM — R10.2 SUPRAPUBIC PAIN: Status: ACTIVE | Noted: 2019-08-28

## 2024-03-16 PROBLEM — E26.1 SECONDARY HYPERALDOSTERONISM: Status: ACTIVE | Noted: 2024-03-16

## 2024-03-16 PROBLEM — F41.9 ANXIETY: Status: ACTIVE | Noted: 2021-06-15

## 2024-03-16 PROBLEM — M19.90 OSTEOARTHRITIS: Status: ACTIVE | Noted: 2021-06-15

## 2024-03-16 PROBLEM — E11.9 TYPE 2 DIABETES MELLITUS WITHOUT COMPLICATION: Status: ACTIVE | Noted: 2019-05-27

## 2024-03-16 PROBLEM — I12.9 HYPERTENSIVE RENAL DISEASE: Status: ACTIVE | Noted: 2021-06-15

## 2024-03-16 PROBLEM — F11.20 OPIOID DEPENDENCE, UNCOMPLICATED: Status: ACTIVE | Noted: 2024-03-07

## 2024-03-16 PROBLEM — M10.9 GOUT: Status: ACTIVE | Noted: 2021-06-15

## 2024-03-16 PROBLEM — E66.9 OBESITY: Status: ACTIVE | Noted: 2024-03-16

## 2024-03-16 PROBLEM — E78.5 DYSLIPIDEMIA: Status: ACTIVE | Noted: 2024-03-16

## 2024-03-16 PROBLEM — A15.0 PULMONARY TUBERCULOSIS: Status: ACTIVE | Noted: 2024-03-16

## 2024-03-16 PROBLEM — H81.10 BENIGN POSITIONAL VERTIGO: Status: ACTIVE | Noted: 2024-03-16

## 2024-03-16 PROCEDURE — 99213 OFFICE O/P EST LOW 20 MIN: CPT | Mod: 25,S$GLB,,

## 2024-03-16 PROCEDURE — 96372 THER/PROPH/DIAG INJ SC/IM: CPT | Mod: S$GLB,,,

## 2024-03-16 PROCEDURE — 73502 X-RAY EXAM HIP UNI 2-3 VIEWS: CPT | Mod: LT,S$GLB,, | Performed by: RADIOLOGY

## 2024-03-16 RX ORDER — BLOOD-GLUCOSE SENSOR
EACH MISCELLANEOUS
COMMUNITY
Start: 2023-08-04

## 2024-03-16 RX ORDER — CICLOPIROX 7.7 MG/G
GEL TOPICAL
COMMUNITY
Start: 2024-02-06

## 2024-03-16 RX ORDER — CICLOPIROX OLAMINE 7.7 MG/G
CREAM TOPICAL 2 TIMES DAILY
COMMUNITY
Start: 2024-01-30

## 2024-03-16 RX ORDER — IBUPROFEN 800 MG/1
800 TABLET ORAL 3 TIMES DAILY
Qty: 15 TABLET | Refills: 0 | Status: SHIPPED | OUTPATIENT
Start: 2024-03-16 | End: 2024-03-21

## 2024-03-16 RX ORDER — OXYBUTYNIN CHLORIDE 10 MG/1
1 TABLET, EXTENDED RELEASE ORAL DAILY
COMMUNITY
Start: 2024-02-19

## 2024-03-16 RX ORDER — ERGOCALCIFEROL 1.25 MG/1
1 CAPSULE ORAL
COMMUNITY
Start: 2024-01-29

## 2024-03-16 RX ORDER — DULOXETIN HYDROCHLORIDE 60 MG/1
1 CAPSULE, DELAYED RELEASE ORAL DAILY
Status: ON HOLD | COMMUNITY
Start: 2023-08-04 | End: 2024-05-29 | Stop reason: HOSPADM

## 2024-03-16 RX ORDER — COLCHICINE 0.6 MG/1
TABLET ORAL
COMMUNITY
Start: 2023-04-18

## 2024-03-16 RX ORDER — KETOROLAC TROMETHAMINE 30 MG/ML
15 INJECTION, SOLUTION INTRAMUSCULAR; INTRAVENOUS
Status: COMPLETED | OUTPATIENT
Start: 2024-03-16 | End: 2024-03-16

## 2024-03-16 RX ORDER — BLOOD-GLUCOSE,RECEIVER,CONT
EACH MISCELLANEOUS
COMMUNITY
Start: 2023-08-04

## 2024-03-16 RX ORDER — AMLODIPINE BESYLATE 5 MG/1
1 TABLET ORAL DAILY
COMMUNITY
Start: 2023-04-21 | End: 2024-08-03

## 2024-03-16 RX ADMIN — KETOROLAC TROMETHAMINE 15 MG: 30 INJECTION, SOLUTION INTRAMUSCULAR; INTRAVENOUS at 03:03

## 2024-03-16 NOTE — PROGRESS NOTES
"Subjective:      Patient ID: Se Virgil Mcgraw is a 59 y.o. male.    Vitals:  height is 5' 7" (1.702 m) and weight is 99.8 kg (220 lb). His oral temperature is 97.9 °F (36.6 °C). His blood pressure is 116/76 and his pulse is 85. His respiration is 18 and oxygen saturation is 95%.     Chief Complaint: Hip Pain    Patient presents with left hip pain. He states that he fell out of bed last night. He states that he hit his left side of his head, his arm, and his hip. He states that he is now having some left hip pain. He denies headache, nausea, vomiting, vision changes, numbness, tingling, facial drooping, slurred speech.    Hip Pain   The incident occurred 6 to 12 hours ago. The incident occurred at home. The injury mechanism was a fall. The pain is present in the left hip. The quality of the pain is described as stabbing (sharp pain). The pain is at a severity of 10/10. The pain is moderate. The pain has been Fluctuating (while moving/walking) since onset. Pertinent negatives include no inability to bear weight, loss of motion, loss of sensation, muscle weakness, numbness or tingling. He reports no foreign bodies present. The symptoms are aggravated by movement and weight bearing. He has tried acetaminophen for the symptoms. The treatment provided no relief.       Constitution: Negative for chills and fever.   Cardiovascular:  Negative for chest pain and sob on exertion.   Eyes:  Negative for photophobia, vision loss, double vision and blurred vision.   Respiratory:  Negative for shortness of breath.    Musculoskeletal:  Positive for pain, trauma (fall from bed), joint pain (left hip), abnormal ROM of joint (decreased due to pain) and pain with walking. Negative for joint swelling.   Neurological:  Negative for dizziness, light-headedness, passing out, facial drooping, speech difficulty, headaches, numbness and tingling.      Objective:     Physical Exam   Constitutional: He is oriented to person, place, and time.  " Non-toxic appearance. He does not appear ill. No distress.      Comments:Patient is in no acute distress, patient is non-toxic appearing, patient is ox3, patient is answering question appropriately.     HENT:   Head: Normocephalic and atraumatic. Head is without raccoon's eyes, without Alegria's sign, without abrasion, without contusion, without laceration, without right periorbital erythema and without left periorbital erythema. Hair is normal.   There is no scalp tenderness.      Comments: There is no scalp tenderness.  Eyes: Conjunctivae and lids are normal. Pupils are equal, round, and reactive to light. Right eye exhibits no chemosis and no discharge. Left eye exhibits no chemosis, no discharge and no exudate. Right conjunctiva is not injected. Right conjunctiva has no hemorrhage. Left conjunctiva is not injected. Left conjunctiva has no hemorrhage. No scleral icterus. Right eye exhibits normal extraocular motion and no nystagmus. Left eye exhibits normal extraocular motion and no nystagmus. Left pupil is round, reactive and not sluggish. Extraocular movement intact vision grossly intact gaze aligned appropriately      Comments: No swelling, erythema, warmth, or tenderness to the periorbital region. Opacity appreciated with in the right eye, patient has a history of blindness in this eye.   Abdominal: Normal appearance.   Neurological: no focal deficit. He is alert, oriented to person, place, and time and at baseline. He has normal motor skills, normal sensation and intact cranial nerves (2-12). He displays no weakness, no atrophy, facial symmetry, normal reflexes and no dysarthria. No cranial nerve deficit or sensory deficit. He exhibits normal muscle tone. He has a normal Finger-Nose-Finger Test. He shows no pronator drift. Gait and coordination normal. Coordination and gait normal. GCS eye subscore is 4. GCS verbal subscore is 5. GCS motor subscore is 6.   Reflex Scores:       Patellar reflexes are 2+ on the  right side and 2+ on the left side.     Comments: Good  strength.   Skin: Skin is not diaphoretic.   Nursing note and vitals reviewed.    X-Ray Hip 2 or 3 views Left (with Pelvis when performed)    Result Date: 3/16/2024  EXAMINATION: XR HIP WITH PELVIS WHEN PERFORMED, 2 OR 3 VIEWS LEFT CLINICAL HISTORY: Pain in left hip TECHNIQUE: AP view of the pelvis and frog leg lateral view of the left hip were performed. COMPARISON: None FINDINGS: Three views left hip. The bilateral sacroiliac joints are intact.  The pubic symphysis is intact.  Right hip arthroplasty is intact.  The left femoroacetabular joint is intact.  There is vascular calcification.  There is penile prosthesis.     1. No acute displaced fracture or dislocation of the left hip. Electronically signed by: Loc Hartman MD Date:    03/16/2024 Time:    15:41     Assessment:     1. Hip sprain, left, initial encounter    2. Left hip pain      Plan:   Previous notes reviewed.  Vital signs reviewed.  Labs ordered. Labs reviewed. GFR is 70 from 01/26/2024  Discussed left hip sprain, home care, tx options, and given follow up precautions.  Patient was briefed on my thought process and diagnosis.   Patient involved with the treatment plan and agreed to the plan.    Will treat for left-sided hip sprain due to fall at this time, patient is stable and nontoxic appearing, physical exam shows tenderness to palpation appreciated throughout the left hip, patient is ambulatory, there is no tenderness to palpation appreciated to the midline/paraspinal region, there is discomfort appreciated throughout left hip flexion/extension/abduction/adduction/internal rotation/external rotation, patient is neurovascularly intact, there are no neurological deficits/red flag findings on exam, there is a low suspicion for hip fracture/ epidural/subdural hematoma/subarachnoid hemorrhage, recent GFR from 01/26/2024 was 70, will continue to treat symptoms symptomatically with  anti-inflammatories for left hip sprain at this time, pcp follow up encouraged, strict ER precautions given.    Patient informed on warning signs, patient understood warning signs and to go to urgent care or ER if warning signs appear.  Please excuse grammatical/spelling errors appreciated throughout this visit encounter for a remote dictation device was used during this encounter.    Patient Instructions   Please drink plenty of fluids.  Please get plenty of rest.    Please return here or go to the Emergency Department for any concerns or worsening of condition.    You were given a TORADOL injection today, please do not take anymore anti-inflammatory medications today. Please start taking anti-inflammatory medications tomorrow.     Please take IBUPROFEN every 8 hours as needed for pain/inflammation, you may decrease to every 12 hour, or once daily, or discontinue as your symptoms improve.     Please consider using over the counter VOLTAREN GEL for pain relief.   Please consider using over the counter LIDOCAINE PATCHES for pain relief.   Please consider applying a heating pad to the affected area.    Rest, ice, compression and elevation to the affected joint or limb as needed.  Please follow up with your primary care doctor or specialist as needed.    If you  smoke, please stop smoking.    Hip sprain, left, initial encounter  -     ketorolac injection 15 mg  -     ibuprofen (ADVIL,MOTRIN) 800 MG tablet; Take 1 tablet (800 mg total) by mouth 3 (three) times daily. for 5 days  Dispense: 15 tablet; Refill: 0    Left hip pain  -     X-Ray Hip 2 or 3 views Left (with Pelvis when performed); Future; Expected date: 03/16/2024      Maria Esther Koehler PA-C

## 2024-03-16 NOTE — PATIENT INSTRUCTIONS
Please drink plenty of fluids.  Please get plenty of rest.    Please return here or go to the Emergency Department for any concerns or worsening of condition.    You were given a TORADOL injection today, please do not take anymore anti-inflammatory medications today. Please start taking anti-inflammatory medications tomorrow.     Please take IBUPROFEN every 8 hours as needed for pain/inflammation, you may decrease to every 12 hour, or once daily, or discontinue as your symptoms improve.     Please consider using over the counter VOLTAREN GEL for pain relief.   Please consider using over the counter LIDOCAINE PATCHES for pain relief.   Please consider applying a heating pad to the affected area.    Rest, ice, compression and elevation to the affected joint or limb as needed.  Please follow up with your primary care doctor or specialist as needed.    If you  smoke, please stop smoking.

## 2024-05-28 ENCOUNTER — HOSPITAL ENCOUNTER (OUTPATIENT)
Facility: HOSPITAL | Age: 59
Discharge: HOME | End: 2024-05-29
Attending: STUDENT IN AN ORGANIZED HEALTH CARE EDUCATION/TRAINING PROGRAM | Admitting: INTERNAL MEDICINE
Payer: MEDICARE

## 2024-05-28 DIAGNOSIS — R07.9 CHEST PAIN AT REST: ICD-10-CM

## 2024-05-28 DIAGNOSIS — J20.9 ACUTE PURULENT BRONCHITIS: ICD-10-CM

## 2024-05-28 DIAGNOSIS — R79.89 ELEVATED TROPONIN: ICD-10-CM

## 2024-05-28 DIAGNOSIS — H57.11 BLIND PAINFUL RIGHT EYE: ICD-10-CM

## 2024-05-28 DIAGNOSIS — I69.359 HEMIPLEGIA AS LATE EFFECT OF CEREBROVASCULAR ACCIDENT (CVA): ICD-10-CM

## 2024-05-28 DIAGNOSIS — R07.9 CHEST PAIN: ICD-10-CM

## 2024-05-28 DIAGNOSIS — R03.0 ELEVATED BLOOD PRESSURE READING: Primary | ICD-10-CM

## 2024-05-28 DIAGNOSIS — H54.40 BLIND PAINFUL RIGHT EYE: ICD-10-CM

## 2024-05-28 LAB
ALBUMIN SERPL BCP-MCNC: 3.5 G/DL (ref 3.5–5.2)
ALP SERPL-CCNC: 67 U/L (ref 55–135)
ALT SERPL W/O P-5'-P-CCNC: 9 U/L (ref 10–44)
ANION GAP SERPL CALC-SCNC: 10 MMOL/L (ref 8–16)
ASCENDING AORTA: 3.26 CM
AST SERPL-CCNC: 13 U/L (ref 10–40)
AV INDEX (PROSTH): 0.45
AV MEAN GRADIENT: 4 MMHG
AV PEAK GRADIENT: 6 MMHG
AV VALVE AREA BY VELOCITY RATIO: 2.03 CM²
AV VALVE AREA: 1.77 CM²
AV VELOCITY RATIO: 0.52
BASOPHILS # BLD AUTO: 0.06 K/UL (ref 0–0.2)
BASOPHILS NFR BLD: 1 % (ref 0–1.9)
BILIRUB SERPL-MCNC: 0.3 MG/DL (ref 0.1–1)
BNP SERPL-MCNC: 26 PG/ML (ref 0–99)
BUN SERPL-MCNC: 12 MG/DL (ref 6–20)
CALCIUM SERPL-MCNC: 9.8 MG/DL (ref 8.7–10.5)
CHLORIDE SERPL-SCNC: 106 MMOL/L (ref 95–110)
CHOLEST SERPL-MCNC: 155 MG/DL (ref 120–199)
CHOLEST/HDLC SERPL: 4.2 {RATIO} (ref 2–5)
CO2 SERPL-SCNC: 25 MMOL/L (ref 23–29)
CREAT SERPL-MCNC: 1.1 MG/DL (ref 0.5–1.4)
CV ECHO LV RWT: 0.58 CM
DIFFERENTIAL METHOD BLD: ABNORMAL
DOP CALC AO PEAK VEL: 1.23 M/S
DOP CALC AO VTI: 26.5 CM
DOP CALC LVOT AREA: 3.9 CM2
DOP CALC LVOT DIAMETER: 2.23 CM
DOP CALC LVOT PEAK VEL: 0.64 M/S
DOP CALC LVOT STROKE VOLUME: 46.84 CM3
DOP CALCLVOT PEAK VEL VTI: 12 CM
E WAVE DECELERATION TIME: 235.8 MSEC
E/A RATIO: 0.99
E/E' RATIO: 13.4 M/S
ECHO LV POSTERIOR WALL: 1.41 CM (ref 0.6–1.1)
EOSINOPHIL # BLD AUTO: 0.4 K/UL (ref 0–0.5)
EOSINOPHIL NFR BLD: 6.9 % (ref 0–8)
ERYTHROCYTE [DISTWIDTH] IN BLOOD BY AUTOMATED COUNT: 13.2 % (ref 11.5–14.5)
EST. GFR  (NO RACE VARIABLE): >60 ML/MIN/1.73 M^2
FRACTIONAL SHORTENING: 16 % (ref 28–44)
GLUCOSE SERPL-MCNC: 156 MG/DL (ref 70–110)
HCT VFR BLD AUTO: 39.7 % (ref 40–54)
HDLC SERPL-MCNC: 37 MG/DL (ref 40–75)
HDLC SERPL: 23.9 % (ref 20–50)
HGB BLD-MCNC: 12.5 G/DL (ref 14–18)
IMM GRANULOCYTES # BLD AUTO: 0.03 K/UL (ref 0–0.04)
IMM GRANULOCYTES NFR BLD AUTO: 0.5 % (ref 0–0.5)
INTERVENTRICULAR SEPTUM: 1.42 CM (ref 0.6–1.1)
IVC DIAMETER: 1.14 CM
LA MAJOR: 4.35 CM
LA MINOR: 5.33 CM
LA WIDTH: 4.2 CM
LDLC SERPL CALC-MCNC: 87 MG/DL (ref 63–159)
LEFT ATRIUM SIZE: 3.41 CM
LEFT ATRIUM VOLUME: 58.32 CM3
LEFT INTERNAL DIMENSION IN SYSTOLE: 4.08 CM (ref 2.1–4)
LEFT VENTRICLE DIASTOLIC VOLUME: 111.57 ML
LEFT VENTRICLE SYSTOLIC VOLUME: 73.3 ML
LEFT VENTRICULAR INTERNAL DIMENSION IN DIASTOLE: 4.88 CM (ref 3.5–6)
LEFT VENTRICULAR MASS: 285.23 G
LV LATERAL E/E' RATIO: 13.4 M/S
LV SEPTAL E/E' RATIO: 13.4 M/S
LVOT MG: 0.95 MMHG
LVOT MV: 0.46 CM/S
LYMPHOCYTES # BLD AUTO: 2.5 K/UL (ref 1–4.8)
LYMPHOCYTES NFR BLD: 41.7 % (ref 18–48)
MAGNESIUM SERPL-MCNC: 1.8 MG/DL (ref 1.6–2.6)
MCH RBC QN AUTO: 31.2 PG (ref 27–31)
MCHC RBC AUTO-ENTMCNC: 31.5 G/DL (ref 32–36)
MCV RBC AUTO: 99 FL (ref 82–98)
MONOCYTES # BLD AUTO: 0.5 K/UL (ref 0.3–1)
MONOCYTES NFR BLD: 7.9 % (ref 4–15)
MV PEAK A VEL: 0.68 M/S
MV PEAK E VEL: 0.67 M/S
MV STENOSIS PRESSURE HALF TIME: 68.38 MS
MV VALVE AREA P 1/2 METHOD: 3.22 CM2
NEUTROPHILS # BLD AUTO: 2.5 K/UL (ref 1.8–7.7)
NEUTROPHILS NFR BLD: 42 % (ref 38–73)
NONHDLC SERPL-MCNC: 118 MG/DL
NRBC BLD-RTO: 0 /100 WBC
OHS CV RV/LV RATIO: 0.68 CM
OHS LV EJECTION FRACTION SIMPSONS BIPLANE MOD: 37 %
PHOSPHATE SERPL-MCNC: 4.3 MG/DL (ref 2.7–4.5)
PISA TR MAX VEL: 2.78 M/S
PLATELET # BLD AUTO: 204 K/UL (ref 150–450)
PMV BLD AUTO: 10.7 FL (ref 9.2–12.9)
POCT GLUCOSE: 121 MG/DL (ref 70–110)
POCT GLUCOSE: 82 MG/DL (ref 70–110)
POTASSIUM SERPL-SCNC: 4 MMOL/L (ref 3.5–5.1)
PROT SERPL-MCNC: 6.9 G/DL (ref 6–8.4)
PULM VEIN S/D RATIO: 1.52
PV PEAK D VEL: 0.25 M/S
PV PEAK GRADIENT: 3 MMHG
PV PEAK S VEL: 0.38 M/S
PV PEAK VELOCITY: 0.9 M/S
RA MAJOR: 3.76 CM
RA PRESSURE ESTIMATED: 3 MMHG
RA WIDTH: 3.2 CM
RBC # BLD AUTO: 4.01 M/UL (ref 4.6–6.2)
RIGHT VENTRICULAR END-DIASTOLIC DIMENSION: 3.33 CM
RV TB RVSP: 6 MMHG
RV TISSUE DOPPLER FREE WALL SYSTOLIC VELOCITY 1 (APICAL 4 CHAMBER VIEW): 15.76 CM/S
SINUS: 3.17 CM
SODIUM SERPL-SCNC: 141 MMOL/L (ref 136–145)
STJ: 3.52 CM
TDI LATERAL: 0.05 M/S
TDI SEPTAL: 0.05 M/S
TDI: 0.05 M/S
TR MAX PG: 31 MMHG
TRICUSPID ANNULAR PLANE SYSTOLIC EXCURSION: 2.13 CM
TRIGL SERPL-MCNC: 155 MG/DL (ref 30–150)
TROPONIN I SERPL DL<=0.01 NG/ML-MCNC: 0.06 NG/ML (ref 0–0.03)
TROPONIN I SERPL DL<=0.01 NG/ML-MCNC: 0.07 NG/ML (ref 0–0.03)
TSH SERPL DL<=0.005 MIU/L-ACNC: 1.58 UIU/ML (ref 0.4–4)
TV REST PULMONARY ARTERY PRESSURE: 34 MMHG
WBC # BLD AUTO: 5.93 K/UL (ref 3.9–12.7)

## 2024-05-28 PROCEDURE — 25000003 PHARM REV CODE 250: Performed by: PHYSICIAN ASSISTANT

## 2024-05-28 PROCEDURE — 85025 COMPLETE CBC W/AUTO DIFF WBC: CPT | Performed by: STUDENT IN AN ORGANIZED HEALTH CARE EDUCATION/TRAINING PROGRAM

## 2024-05-28 PROCEDURE — 96372 THER/PROPH/DIAG INJ SC/IM: CPT | Performed by: NURSE PRACTITIONER

## 2024-05-28 PROCEDURE — 83735 ASSAY OF MAGNESIUM: CPT | Performed by: STUDENT IN AN ORGANIZED HEALTH CARE EDUCATION/TRAINING PROGRAM

## 2024-05-28 PROCEDURE — 25000003 PHARM REV CODE 250: Performed by: NURSE PRACTITIONER

## 2024-05-28 PROCEDURE — 80061 LIPID PANEL: CPT | Performed by: STUDENT IN AN ORGANIZED HEALTH CARE EDUCATION/TRAINING PROGRAM

## 2024-05-28 PROCEDURE — G0378 HOSPITAL OBSERVATION PER HR: HCPCS

## 2024-05-28 PROCEDURE — 80053 COMPREHEN METABOLIC PANEL: CPT | Performed by: STUDENT IN AN ORGANIZED HEALTH CARE EDUCATION/TRAINING PROGRAM

## 2024-05-28 PROCEDURE — 99285 EMERGENCY DEPT VISIT HI MDM: CPT | Mod: 25

## 2024-05-28 PROCEDURE — 83036 HEMOGLOBIN GLYCOSYLATED A1C: CPT | Performed by: NURSE PRACTITIONER

## 2024-05-28 PROCEDURE — 84443 ASSAY THYROID STIM HORMONE: CPT | Performed by: STUDENT IN AN ORGANIZED HEALTH CARE EDUCATION/TRAINING PROGRAM

## 2024-05-28 PROCEDURE — 99205 OFFICE O/P NEW HI 60 MIN: CPT | Mod: 25,,, | Performed by: INTERNAL MEDICINE

## 2024-05-28 PROCEDURE — 84100 ASSAY OF PHOSPHORUS: CPT | Performed by: STUDENT IN AN ORGANIZED HEALTH CARE EDUCATION/TRAINING PROGRAM

## 2024-05-28 PROCEDURE — 63600175 PHARM REV CODE 636 W HCPCS: Performed by: NURSE PRACTITIONER

## 2024-05-28 PROCEDURE — 83880 ASSAY OF NATRIURETIC PEPTIDE: CPT | Performed by: STUDENT IN AN ORGANIZED HEALTH CARE EDUCATION/TRAINING PROGRAM

## 2024-05-28 PROCEDURE — 93010 ELECTROCARDIOGRAM REPORT: CPT | Mod: ,,, | Performed by: INTERNAL MEDICINE

## 2024-05-28 PROCEDURE — 93005 ELECTROCARDIOGRAM TRACING: CPT

## 2024-05-28 PROCEDURE — 84484 ASSAY OF TROPONIN QUANT: CPT | Performed by: STUDENT IN AN ORGANIZED HEALTH CARE EDUCATION/TRAINING PROGRAM

## 2024-05-28 PROCEDURE — 82962 GLUCOSE BLOOD TEST: CPT

## 2024-05-28 RX ORDER — LISINOPRIL 20 MG/1
20 TABLET ORAL DAILY
Status: DISCONTINUED | OUTPATIENT
Start: 2024-05-28 | End: 2024-05-29 | Stop reason: HOSPADM

## 2024-05-28 RX ORDER — SOTALOL HYDROCHLORIDE 80 MG/1
80 TABLET ORAL 2 TIMES DAILY
Status: DISCONTINUED | OUTPATIENT
Start: 2024-05-28 | End: 2024-05-28

## 2024-05-28 RX ORDER — INSULIN LISPRO 100 [IU]/ML
INJECTION, SOLUTION INTRAVENOUS; SUBCUTANEOUS
COMMUNITY

## 2024-05-28 RX ORDER — INSULIN GLARGINE 100 [IU]/ML
25 INJECTION, SOLUTION SUBCUTANEOUS DAILY
COMMUNITY
Start: 2024-04-23 | End: 2025-04-23

## 2024-05-28 RX ORDER — ATORVASTATIN CALCIUM 40 MG/1
80 TABLET, FILM COATED ORAL DAILY
Status: DISCONTINUED | OUTPATIENT
Start: 2024-05-28 | End: 2024-05-29 | Stop reason: HOSPADM

## 2024-05-28 RX ORDER — ASPIRIN 81 MG/1
81 TABLET ORAL DAILY
Status: DISCONTINUED | OUTPATIENT
Start: 2024-05-28 | End: 2024-05-29 | Stop reason: HOSPADM

## 2024-05-28 RX ORDER — IBUPROFEN 200 MG
24 TABLET ORAL
Status: DISCONTINUED | OUTPATIENT
Start: 2024-05-28 | End: 2024-05-29 | Stop reason: HOSPADM

## 2024-05-28 RX ORDER — TAMSULOSIN HYDROCHLORIDE 0.4 MG/1
0.4 CAPSULE ORAL NIGHTLY
COMMUNITY

## 2024-05-28 RX ORDER — PILOCARPINE HYDROCHLORIDE 10 MG/ML
1 SOLUTION/ DROPS OPHTHALMIC 4 TIMES DAILY
COMMUNITY

## 2024-05-28 RX ORDER — CARVEDILOL 3.12 MG/1
3.12 TABLET ORAL 2 TIMES DAILY
COMMUNITY
Start: 2024-04-24 | End: 2025-04-24

## 2024-05-28 RX ORDER — SODIUM CHLORIDE 0.9 % (FLUSH) 0.9 %
10 SYRINGE (ML) INJECTION
Status: DISCONTINUED | OUTPATIENT
Start: 2024-05-28 | End: 2024-05-29 | Stop reason: HOSPADM

## 2024-05-28 RX ORDER — METOPROLOL SUCCINATE 50 MG/1
50 TABLET, EXTENDED RELEASE ORAL NIGHTLY
Status: DISCONTINUED | OUTPATIENT
Start: 2024-05-28 | End: 2024-05-28

## 2024-05-28 RX ORDER — IBUPROFEN 200 MG
16 TABLET ORAL
Status: DISCONTINUED | OUTPATIENT
Start: 2024-05-28 | End: 2024-05-29 | Stop reason: HOSPADM

## 2024-05-28 RX ORDER — GLUCAGON 1 MG
1 KIT INJECTION
Status: DISCONTINUED | OUTPATIENT
Start: 2024-05-28 | End: 2024-05-29 | Stop reason: HOSPADM

## 2024-05-28 RX ORDER — MULTIVITAMIN
1 TABLET ORAL DAILY
COMMUNITY
Start: 2024-04-24 | End: 2025-04-24

## 2024-05-28 RX ORDER — ACETAMINOPHEN 325 MG/1
650 TABLET ORAL EVERY 6 HOURS PRN
Status: DISCONTINUED | OUTPATIENT
Start: 2024-05-28 | End: 2024-05-29 | Stop reason: HOSPADM

## 2024-05-28 RX ORDER — METHOCARBAMOL 500 MG/1
1 TABLET, FILM COATED ORAL 4 TIMES DAILY
COMMUNITY

## 2024-05-28 RX ORDER — ENOXAPARIN SODIUM 100 MG/ML
40 INJECTION SUBCUTANEOUS EVERY 24 HOURS
Status: DISCONTINUED | OUTPATIENT
Start: 2024-05-28 | End: 2024-05-29 | Stop reason: HOSPADM

## 2024-05-28 RX ORDER — CARVEDILOL 3.12 MG/1
3.12 TABLET ORAL 2 TIMES DAILY
Status: DISCONTINUED | OUTPATIENT
Start: 2024-05-28 | End: 2024-05-29 | Stop reason: HOSPADM

## 2024-05-28 RX ORDER — ROSUVASTATIN CALCIUM 20 MG/1
1 TABLET, COATED ORAL DAILY
COMMUNITY
Start: 2023-08-12

## 2024-05-28 RX ADMIN — CARVEDILOL 3.12 MG: 3.12 TABLET, FILM COATED ORAL at 08:05

## 2024-05-28 RX ADMIN — LISINOPRIL 20 MG: 20 TABLET ORAL at 04:05

## 2024-05-28 RX ADMIN — ACETAMINOPHEN 650 MG: 325 TABLET ORAL at 08:05

## 2024-05-28 RX ADMIN — ASPIRIN 81 MG: 81 TABLET, COATED ORAL at 08:05

## 2024-05-28 RX ADMIN — ATORVASTATIN CALCIUM 80 MG: 40 TABLET, FILM COATED ORAL at 04:05

## 2024-05-28 RX ADMIN — ENOXAPARIN SODIUM 40 MG: 40 INJECTION SUBCUTANEOUS at 04:05

## 2024-05-28 NOTE — H&P
[facility] [place] [facility]  [facility] Medicine  History & Physical    Patient Name: Se Virgil Mcgraw  MRN: [id number]  Patient Class: OP- Observation  Admission Date: 5/28/2024  Attending Physician: Janes Soria MD   Primary Care Provider: Coreen Anderson MD         Patient information was obtained from patient and ER records.     Subjective:     Principal Problem:Coronary artery disease involving native coronary artery with angina pectoris    Chief Complaint:   Chief Complaint   Patient presents with    Chest Pain     Sharp intermittent left sided chest for an hour with pacemaker, and a stroke last month, BIB [facility] from [facility]        HPI: Se Virgil Mcgraw is a 60 yo male with significant history for hypertension, hyperlipidemia, diabetes type 2, right eye blind, CAD stent 2006, SVT sp AICD 2012, chronic systolic diastolic heart failure, COPD and recent R MCA stroke (March 2024 [facility]) started on Eliquis who presents to the ED from [facility] for left sided sharp chest pain nonradiating that woke him up at 1:00 a.m.  Patient was given Tylenol and by the time patient arrived to ED pain resolved.  No shortness a breath nausea vomiting or abdominal pain. No aggravating factors.   Currently, wheelchair bound at SNF.   EKG NSR atrial pace and PAC. Qtc 413. Elevated troponin trend flat pattern.     Recent metronic AICD interrogation 3/27/24   In clinic device check. Normal device function.  Estimated battery longevity 6.4 years.  Threshold and impedance WNL.  22 VT & SVT episodes with the viewable appearing to be 1:1. Likely SVT.  The longest episode lasted 3 minutes at 176 bpm.  All HV therapies changed to B>AX per [employer]'s May 2023 advisory.  Next transmission in 3 months.     Past Medical History:   Diagnosis Date    Cataract     Cellulitis of penis 07/06/2018    COPD (chronic obstructive pulmonary disease)     Coronary artery disease     Diabetes mellitus type II     DJD (degenerative  joint disease)     Glaucoma     Gout     Hypertension     Kidney stone     MI (myocardial infarction) 2010    Obesity     JOSE (obstructive sleep apnea)     Uveitis        Past Surgical History:   Procedure Laterality Date    CARDIAC DEFIBRILLATOR PLACEMENT      CATARACT EXTRACTION W/  INTRAOCULAR LENS IMPLANT Right 2020    OUTSIDE [facility] ()    CYSTOSCOPY N/A 10/01/2020    Procedure: CYSTOSCOPY;  Surgeon: Monica Lieberman MD;  Location: [facility] OR;  Service: Urology;  Laterality: N/A;  RN PRE OP Covid screen 9-  C A    DESTRUCTION, CILIARY BODY, USING LASER Right 7/13/2023    Procedure: DESTRUCTION, CILIARY BODY, USING LASER;  Surgeon: Shaina Gan MD;  Location: [facility] OR [address] [place] [address];  Service: Ophthalmology;  Laterality: Right;    DESTRUCTION, CILIARY BODY, USING LASER Right 8/10/2023    Procedure: DESTRUCTION, CILIARY BODY, USING LASER;  Surgeon: Shaina Gan MD;  Location: [facility] OR [address] [place] [address];  Service: Ophthalmology;  Laterality: Right;    DESTRUCTION, CILIARY BODY, USING LASER Right 12/21/2023    Procedure: DESTRUCTION, CILIARY BODY, USING LASER;  Surgeon: Shaina Gan MD;  Location: [facility] OR [address] [place] [address];  Service: Ophthalmology;  Laterality: Right;  g-probe    excisional biopsy left inguinal lymph node      FOOT SURGERY      right foot surgery     INSERTION OF INFLATABLE PENILE PROSTHESIS N/A 01/25/2022    Procedure: INSERTION, PENILE PROSTHESIS, INFLATABLE;  Surgeon: Monica Lieberman MD;  Location: [facility] OR;  Service: Urology;  Laterality: N/A;  [employer] JUAN LUIS ARNOLD [phone] TEXTED HIM @ 5:32PM ON 12-  RN PRE OP 12-28-21. Covid NEGATIVE 1-3-22. CA-----AICD-----HAS CARDS CLEARANCE    kidney stones      lithotripsy    REPAIR, SURGICAL WOUND, EYE, ANTERIOR SEGMENT Right 10/05/2021    Procedure: REPAIR, SURGICAL WOUND, EYE, ANTERIOR SEGMENT;  Surgeon: Adelaide Allen MD;  Location: [facility] OR [address] [place] [address];  Service: Ophthalmology;  Laterality: Right;  Anterior Chamber Wash Out Right  Eye     Surgery for bulging disc at C7         Review of patient's allergies indicates:   Allergen Reactions    Cephalexin Other (See Comments)     vomitting    Darvocet a500 [propoxyphene n-acetaminophen] Itching    Morphine Itching           Tramadol Itching       No current facility-administered medications on file prior to encounter.     Current Outpatient Medications on File Prior to Encounter   Medication Sig    albuterol (PROVENTIL/VENTOLIN HFA) 90 mcg/actuation inhaler Inhale 2 puffs into the lungs every 4 (four) hours as needed for Wheezing or Shortness of Breath. Rescue (Patient taking differently: Inhale 2 puffs into the lungs every 3 (three) hours. Rescue)    allopurinol (ZYLOPRIM) 100 MG tablet Take 100 mg by mouth Daily.    amitriptyline (ELAVIL) 10 MG tablet TAKE 1 TABLET(10 MG) BY MOUTH EVERY EVENING (Patient taking differently: Take 10 mg by mouth once daily.)    amLODIPine (NORVASC) 5 MG tablet Take 1 tablet by mouth once daily.    apixaban (ELIQUIS) 2.5 mg Tab 2.5 mg by FEEDING TUBE route 2 (two) times daily.    aspirin (ECOTRIN) 81 MG EC tablet Take 81 mg by mouth once daily.    blood sugar diagnostic Strp ACCU CHEK KEVAN PLUS TEST STRIPS    blood-glucose meter Misc ACCU CHEK KEVAN PLUS METER: USE 4X/D    carvediloL (COREG) 3.125 MG tablet 3.125 mg by Nasogastric route 2 (two) times daily.    colchicine (COLCRYS) 0.6 mg tablet 2 po every day prn gout attack    dapagliflozin (FARXIGA) 10 mg tablet Take 10 mg by mouth once daily.    dicyclomine (BENTYL) 20 mg tablet Take 20 mg by mouth 2 (two) times daily.    divalproex ER (DEPAKOTE ER) 250 MG 24 hr tablet Take 750 mg by mouth nightly.    ergocalciferol (ERGOCALCIFEROL) 50,000 unit Cap Take 1 capsule by mouth every 7 days.    gabapentin (NEURONTIN) 800 MG tablet Take 800 mg by mouth 3 (three) times daily.    insulin glargine U-100, Lantus, 100 unit/mL injection Inject 25 Units into the skin once daily.    insulin lispro (HUMALOG KWIKPEN INSULIN)  100 unit/mL pen Inject into the skin. 0-69=0 insulin give glucose;  = 0 insulin ; 181-200= 3 units; 201-250= 6 units; 251-300 = 9 units; 301-350= 12 units; 351-400= 15 units; 401-700= 18 units subcutaneously before meals and at bedtime for diabetes.    methocarbamoL (ROBAXIN) 500 MG Tab Take 1 tablet by mouth 4 (four) times daily.    multivitamin with folic acid 400 mcg Tab Take 1 tablet by mouth once daily.    pilocarpine HCL 1% (PILOCAR) 1 % ophthalmic solution Place 1 drop into both eyes 4 (four) times daily.    rosuvastatin (CRESTOR) 20 MG tablet Take 1 tablet by mouth once daily.    tamsulosin (FLOMAX) 0.4 mg Cap Take 0.4 mg by mouth every evening.    acetaZOLAMIDE (DIAMOX) 250 MG tablet TAKE 1 TABLET(250 MG) BY MOUTH TWICE DAILY (Patient not taking: Reported on 5/28/2024)    atropine 1% (ISOPTO ATROPINE) 1 % Drop INSTILL 1 DROP IN LEFT EYE TWICE DAILY (Patient not taking: Reported on 5/28/2024)    blood-glucose meter,continuous (DEXCOM G7 ) Misc CONTINUES GLUCOSE MONITORING    blood-glucose sensor (DEXCOM G7 SENSOR) Kathy CONTINUOUS GLUCOSE MONITORING: CHANGE EVERY 10 DAYS.    ciclopirox (LOPROX) 0.77 % Crea Apply topically 2 (two) times daily. (Patient not taking: Reported on 5/28/2024)    ciclopirox 0.77 % Gel SMARTSIG:Sparingly Topical Twice Daily (Patient not taking: Reported on 5/28/2024)    docusate sodium (COLACE) 100 MG capsule Take 1 capsule (100 mg total) by mouth 2 (two) times daily. (Patient not taking: Reported on 5/28/2024)    dorzolamide-timolol 2-0.5% (COSOPT) 22.3-6.8 mg/mL ophthalmic solution Place 1 drop into the left eye 2 (two) times daily. (Patient not taking: Reported on 5/28/2024)    DULoxetine (CYMBALTA) 60 MG capsule Take 1 capsule by mouth once daily. (Patient not taking: Reported on 5/28/2024)    famotidine (PEPCID) 20 MG tablet Take 20 mg by mouth 2 (two) times daily as needed. (Patient not taking: Reported on 5/28/2024)    fluticasone propionate (FLONASE) 50  "mcg/actuation nasal spray 2 sprays (100 mcg total) by Each Nostril route once daily. (Patient not taking: Reported on 5/28/2024)    furosemide (LASIX) 20 MG tablet Take 20 mg by mouth daily as needed. Takes every other day (Patient not taking: Reported on 5/28/2024)    hydroCHLOROthiazide (MICROZIDE) 12.5 mg capsule Take 12.5 mg by mouth once daily. (Patient not taking: Reported on 5/28/2024)    INVOKANA 300 mg Tab tablet Take 300 mg by mouth once daily. (Patient not taking: Reported on 5/28/2024)    ketoconazole (NIZORAL) 2 % cream Apply  a small amount to affected area twice a day  apply to skin for fungal infection (Patient not taking: Reported on 5/28/2024)    lancets Misc ACCU CHEK SOFTCLIX LANCETS: USE 4X DAILY    levocetirizine (XYZAL) 5 MG tablet Take 5 mg by mouth daily as needed.    LIDOcaine (LIDODERM) 5 % Apply 1-3 patches every day prn pain    lisinopriL (PRINIVIL,ZESTRIL) 20 MG tablet Take 1 tablet by mouth once daily.    metoclopramide HCl (REGLAN) 10 MG tablet Take 1 tablet (10 mg total) by mouth every 6 (six) hours as needed. (Patient not taking: Reported on 5/28/2024)    mupirocin (BACTROBAN) 2 % ointment Apply topically 3 (three) times daily. (Patient not taking: Reported on 5/28/2024)    nitroGLYCERIN (NITROSTAT) 0.4 MG SL tablet Place 0.4 mg under the tongue every 5 (five) minutes as needed for Chest pain.    omega-3 acid ethyl esters (LOVAZA) 1 gram capsule Take 2 capsules by mouth once daily. (Patient not taking: Reported on 5/28/2024)    omeprazole (PRILOSEC) 20 MG capsule TK 1 C PO QD FOR CONTROL OF STOMACH ACID BEFORE BREAKFAST    oxybutynin (DITROPAN-XL) 10 MG 24 hr tablet Take 1 tablet by mouth once daily. (Patient not taking: Reported on 5/28/2024)    oxyCODONE-acetaminophen (PERCOCET) 7.5-325 mg per tablet Take 1 tablet by mouth every 4 (four) hours as needed. (Patient not taking: Reported on 5/28/2024)    pen needle, diabetic 31 gauge x 5/16" Ndle Inject 1 each into the skin.    " potassium chloride SA (K-DUR,KLOR-CON) 20 MEQ tablet Take 20 mEq by mouth once daily. (Patient not taking: Reported on 5/28/2024)    promethazine (PHENERGAN) 25 MG tablet Take 1 tablet by mouth every 4 (four) hours as needed. (Patient not taking: Reported on 5/28/2024)    spironolactone (ALDACTONE) 25 MG tablet Take 1 tablet by mouth once daily. (Patient not taking: Reported on 5/28/2024)    zolpidem (AMBIEN) 10 mg Tab Take 5 mg by mouth nightly as needed. (Patient not taking: Reported on 5/28/2024)    [DISCONTINUED] CRESTOR 20 mg tablet Take 20 mg by mouth every evening.     [DISCONTINUED] insulin glargine-lixisenatide (SOLIQUA 100/33) 100 unit-33 mcg/mL InPn pen Inject 60 Units into the skin every morning.    [DISCONTINUED] metoprolol succinate (TOPROL-XL) 50 MG 24 hr tablet Take 50 mg by mouth every evening.     [DISCONTINUED] sotaloL (BETAPACE) 80 MG tablet TAKE 1 TABLET BY MOUTH TWICE DAILY    [DISCONTINUED] tiZANidine 4 mg Cap Take 4 mg by mouth 2 (two) times a day.     Family History       Problem Relation (Age of Onset)    Asthma Daughter    Cancer Maternal Uncle, Cousin    Diabetes Mother, Brother    Heart disease Father    Hypertension Mother, Brother    Kidney disease Cousin          Tobacco Use    Smoking status: Never     Passive exposure: Past    Smokeless tobacco: Never   Substance and Sexual Activity    Alcohol use: No    Drug use: No    Sexual activity: Not Currently     Partners: Female     Comment: divorce     Review of Systems   Constitutional:  Positive for activity change and fatigue. Negative for appetite change and diaphoresis.   HENT: Negative.     Eyes: Negative.    Respiratory:  Positive for chest tightness. Negative for shortness of breath and wheezing.    Gastrointestinal: Negative.    Genitourinary: Negative.    Musculoskeletal:  Positive for arthralgias and gait problem.   Skin: Negative.    Neurological:  Positive for weakness (left side weakness , left arm paralysis).  "  Hematological: Negative.      Objective:     Vital Signs (Most Recent):  Temp: 97.9 °F (36.6 °C) (05/28/24 1000)  Pulse: 73 (05/28/24 1000)  Resp: 15 (05/28/24 1000)  BP: (!) 177/86 (05/28/24 1000)  SpO2: 99 % (05/28/24 1000) Vital Signs (24h Range):  Temp:  [97.7 °F (36.5 °C)-98.8 °F (37.1 °C)] 97.9 °F (36.6 °C)  Pulse:  [62-90] 73  Resp:  [14-26] 15  SpO2:  [99 %-100 %] 99 %  BP: (140-177)/(78-88) 177/86     Weight: 108.9 kg (240 lb)  Body mass index is 37.59 kg/m².     Physical Exam  Constitutional:       Appearance: Normal appearance. He is not ill-appearing.   HENT:      Nose: Nose normal.      Mouth/Throat:      Mouth: Mucous membranes are dry.   Eyes:      Extraocular Movements: Extraocular movements intact.   Cardiovascular:      Rate and Rhythm: Normal rate and regular rhythm.      Heart sounds: Normal heart sounds.   Pulmonary:      Breath sounds: Normal breath sounds.   Musculoskeletal:         General: No deformity.      Cervical back: Normal range of motion.      Right lower leg: No edema.      Left lower leg: No edema.   Skin:     General: Skin is dry.   Neurological:      Mental Status: He is alert and oriented to person, place, and time. Mental status is at baseline.      Cranial Nerves: Cranial nerve deficit: right sided hemiplegia.      Motor: Weakness present.                Significant Labs: All pertinent labs within the past 24 hours have been reviewed.    Significant Imaging: I have reviewed all pertinent imaging results/findings within the past 24 hours.  Assessment/Plan:     * Coronary artery disease involving native coronary artery with angina pectoris  Presents to the hospital from [facility] for left-sided chest pain that woke him up at 1:00 a.m. today.  Patient denies shortness or breath.  History MI 2006 with stent??  1/11/2019 C via radial artery ·" Normal coronary anatomy and lvfunction."   EKG normal sinus rhythm/atrial paced PAC no evidence of ischemia.  Troponin trend " flat pattern 0.062 to 0.068  History of SVT on last AICD interrogation in March  Currently patient is chest pain-free  Obtain AICD interrogation  2D echo  Continue ASA/statin  Hold Eliquis  Cardiology following for stress test tomorrow    Hemiplegia as late effect of cerebrovascular accident (CVA)  Recent stroke right MCA with residual left-sided hemiplegia RUE >LLE  Was started on Eliquis 2.5 mg b.i.d. at [facility] [place] pending stress test  Continue statin      Chronic combined systolic and diastolic heart failure  No evidence of volume excess  Hold home Lasix   Repeat 2D echo as above      AICD (automatic cardioverter/defibrillator) present  AICD interrogation      HLD (hyperlipidemia)    Lab Results   Component Value Date    LDLCALC 87.0 05/28/2024   Home statin      CKD (chronic kidney disease) stage 2, GFR 60-89 ml/min  Creatine stable for now. BMP reviewed- noted Estimated Creatinine Clearance: 84 mL/min (based on SCr of 1.1 mg/dL). according to latest data. Based on current GFR, CKD stage is stage 2 - GFR 60-89.  Monitor UOP and serial BMP and adjust therapy as needed. Renally dose meds. Avoid nephrotoxic medications and procedures.  At baseline    Essential hypertension  Chronic, uncontrolled. Latest blood pressure and vitals reviewed-     Temp:  [97.7 °F (36.5 °C)-98.8 °F (37.1 °C)]   Pulse:  [62-90]   Resp:  [14-26]   BP: (140-177)/(78-88)   SpO2:  [99 %-100 %] .   Home meds for hypertension were reviewed and noted below.   Hypertension Medications               amLODIPine (NORVASC) 5 MG tablet Take 1 tablet by mouth once daily.    carvediloL (COREG) 3.125 MG tablet 3.125 mg by Nasogastric route 2 (two) times daily.    furosemide (LASIX) 20 MG tablet Take 20 mg by mouth daily as needed. Takes every other day    hydroCHLOROthiazide (MICROZIDE) 12.5 mg capsule Take 12.5 mg by mouth once daily.    lisinopriL (PRINIVIL,ZESTRIL) 20 MG tablet Take 1 tablet by mouth once daily.    nitroGLYCERIN (NITROSTAT) 0.4 MG  "SL tablet Place 0.4 mg under the tongue every 5 (five) minutes as needed for Chest pain.    spironolactone (ALDACTONE) 25 MG tablet Take 1 tablet by mouth once daily.            While in the hospital, will manage blood pressure as follows; Continue home antihypertensive regimen    Will utilize p.r.n. blood pressure medication only if patient's blood pressure greater than 180/110 and he develops symptoms such as worsening chest pain or shortness of breath.    Diabetes mellitus type 2 in obese  Patient's FSGs are uncontrolled due to hyperglycemia on current medication regimen.  Last A1c reviewed-   Lab Results   Component Value Date    HGBA1C 9.8 (H) 02/06/2024     Most recent fingerstick glucose reviewed- No results for input(s): "POCTGLUCOSE" in the last 24 hours.  Current correctional scale  Low  Maintain anti-hyperglycemic dose as follows-   Antihyperglycemics (From admission, onward)      None          Hold Oral hypoglycemics while patient is in the hospital.      VTE Risk Mitigation (From admission, onward)           Ordered     enoxaparin injection 40 mg  Every 24 hours         05/28/24 1419                         On 05/28/2024, patient should be placed in hospital observation services under my care in collaboration with Janes Soria MD.      AdmissionCare    Guideline: Chest Pain - OBS, Observation    Based on the indications selected for the patient, the bed status of Observation was determined to be MET    The following indications were selected as present at the time of evaluation of the patient:      - Chest pain (or other anginal equivalent) not classified as low risk for acute coronary syndrome, as indicated by 1 or more of the following:    - Patient classified as intermediate risk or high risk for acute coronary syndrome (eg, via use of a clinical decision tool or risk calculator (eg, HEART score greater than 3))    - Troponin results indeterminant or otherwise indicate need for monitoring and " retesting beyond emergency department treatment time frame    AdmissionCare documentation entered by: DEEPTHI Melo    [facility], 27th edition, Copyright © 2023 [facility] [employer], [facility] All Rights Reserved.  [id number]:23:12-05:00    Cecily Anderson NP  Department of Hospital Medicine  [facility] - [place] - Emergency Dept

## 2024-05-28 NOTE — HPI
Se Virgil Mcgraw is a 60 yo male with significant history for hypertension, hyperlipidemia, diabetes type 2, right eye blind, CAD stent 2006, SVT sp AICD 2012, chronic systolic diastolic heart failure, COPD and recent R MCA stroke (March 2024 [facility]) started on Eliquis who presents to the ED from [facility] for left sided sharp chest pain nonradiating that woke him up at 1:00 a.m.  Patient was given Tylenol and by the time patient arrived to ED pain resolved.  No shortness a breath nausea vomiting or abdominal pain. No aggravating factors.   Currently, wheelchair bound at SNF.   EKG NSR atrial pace and PAC. Qtc 413. Elevated troponin trend flat pattern.     Recent metronic AICD interrogation 3/27/24   In clinic device check. Normal device function.  Estimated battery longevity 6.4 years.  Threshold and impedance WNL.  22 VT & SVT episodes with the viewable appearing to be 1:1. Likely SVT.  The longest episode lasted 3 minutes at 176 bpm.  All HV therapies changed to B>AX per [employer]'s May 2023 advisory.  Next transmission in 3 months.

## 2024-05-28 NOTE — NURSING
Patient arrived to floor via stretcher via transporter from ED.  Patient transferred to bed via x2 person assist. AAOX4.  Patient was oriented to room, information on whiteboard, and medication regimen.  Bed low, adequate lighting provided, side rails x2 up, call bell within reach. VSS.  Patient denied having any acute distress at this time.  None observed.  Will continue to monitor and follow treatment plan.   [facility] - [place], [place]  Nurses Note -- 4 Eyes      5/28/2024       Skin assessed on: Admit      [x] No Pressure Injuries Present    [x]Prevention Measures Documented    [] Yes LDA  for Pressure Injury Previously documented     [] Yes New Pressure Injury Discovered   [] LDA for New Pressure Injury Added      Attending RN:  Arlene Perez RN     Second RN:  MAK Real

## 2024-05-28 NOTE — ASSESSMENT & PLAN NOTE
"Patient's FSGs are uncontrolled due to hyperglycemia on current medication regimen.  Last A1c reviewed-   Lab Results   Component Value Date    HGBA1C 9.8 (H) 02/06/2024     Most recent fingerstick glucose reviewed- No results for input(s): "POCTGLUCOSE" in the last 24 hours.  Current correctional scale  Low  Maintain anti-hyperglycemic dose as follows-   Antihyperglycemics (From admission, onward)      None          Hold Oral hypoglycemics while patient is in the hospital.  "

## 2024-05-28 NOTE — ASSESSMENT & PLAN NOTE
Creatine stable for now. BMP reviewed- noted Estimated Creatinine Clearance: 84 mL/min (based on SCr of 1.1 mg/dL). according to latest data. Based on current GFR, CKD stage is stage 2 - GFR 60-89.  Monitor UOP and serial BMP and adjust therapy as needed. Renally dose meds. Avoid nephrotoxic medications and procedures.  At baseline

## 2024-05-28 NOTE — ADMISSIONCARE
AdmissionCare    Guideline: Chest Pain - OBS, Observation    Based on the indications selected for the patient, the bed status of Observation was determined to be MET    The following indications were selected as present at the time of evaluation of the patient:      - Chest pain (or other anginal equivalent) not classified as low risk for acute coronary syndrome, as indicated by 1 or more of the following:    - Patient classified as intermediate risk or high risk for acute coronary syndrome (eg, via use of a clinical decision tool or risk calculator (eg, HEART score greater than 3))    - Troponin results indeterminant or otherwise indicate need for monitoring and retesting beyond emergency department treatment time frame    AdmissionCare documentation entered by: DEEPTHI Melo    [facility] [employer], 27th edition, Copyright © 2023 [facility] [employer], [facility] All Rights Reserved.  [id number]:23:12-05:00

## 2024-05-28 NOTE — ED PROVIDER NOTES
Encounter Date: 5/28/2024       History     Chief Complaint   Patient presents with    Chest Pain     Sharp intermittent left sided chest for an hour with pacemaker, and a stroke last month, BIB [facility] from [facility]    Patient is a 59-year-old male past medical history of CAD, COPD, hypertension, CVA presenting to the emergency department for evaluation of left-sided chest pain.  Patient notes that awoke him from sleep.  Described as sharp.  He notes that was intermittent over the last lower but denies radiation.  He denies diaphoresis, nausea, vomiting, fever, chills, cough, shortness of breath, dysuria, hematuria, worsening leg swelling, leg pain, syncope, headache.  Patient has pain is improved significantly since he has been in the emergency department.  He notes ASA III 25 given by EMS prior to arrival.  Review of patient's allergies indicates:   Allergen Reactions    Cephalexin Other (See Comments)     vomitting    Darvocet a500 [propoxyphene n-acetaminophen] Itching    Morphine Itching           Tramadol Itching     Past Medical History:   Diagnosis Date    Cataract     Cellulitis of penis 07/06/2018    COPD (chronic obstructive pulmonary disease)     Coronary artery disease     Diabetes mellitus type II     DJD (degenerative joint disease)     Glaucoma     Gout     Hypertension     Kidney stone     MI (myocardial infarction) 2010    Obesity     JOSE (obstructive sleep apnea)     Uveitis      Past Surgical History:   Procedure Laterality Date    CARDIAC DEFIBRILLATOR PLACEMENT      CATARACT EXTRACTION W/  INTRAOCULAR LENS IMPLANT Right 2020    OUTSIDE [facility] ()    CYSTOSCOPY N/A 10/01/2020    Procedure: CYSTOSCOPY;  Surgeon: Monica Lieberman MD;  Location: [facility];  Service: Urology;  Laterality: N/A;  RN PRE OP Covid screen 9-  C A    DESTRUCTION, CILIARY BODY, USING LASER Right 7/13/2023    Procedure: DESTRUCTION, CILIARY BODY, USING LASER;  Surgeon: Shaina Gan MD;   Location: [facility] OR [address] [place] [address];  Service: Ophthalmology;  Laterality: Right;    DESTRUCTION, CILIARY BODY, USING LASER Right 8/10/2023    Procedure: DESTRUCTION, CILIARY BODY, USING LASER;  Surgeon: Shaina Gan MD;  [facility];  Service: Ophthalmology;  Laterality: Right;    DESTRUCTION, CILIARY BODY, USING LASER Right 12/21/2023    Procedure: DESTRUCTION, CILIARY BODY, USING LASER;  Surgeon: Shaina Gan MD;  Location: [facility] OR [address] [place] [address];  Service: Ophthalmology;  Laterality: Right;  g-probe    excisional biopsy left inguinal lymph node      FOOT SURGERY      right foot surgery     INSERTION OF INFLATABLE PENILE PROSTHESIS N/A 01/25/2022    Procedure: INSERTION, PENILE PROSTHESIS, INFLATABLE;  Surgeon: Monica Lieberman MD;  Location: [facility] OR;  Service: Urology;  Laterality: N/A;  [employer] JUNA LUIS ARNOLD [phone] TEXTED HIM @ 5:32PM ON 12-  RN PRE OP 12-28-21. Covid NEGATIVE 1-3-22. CA-----AICD-----HAS CARDS CLEARANCE    kidney stones      lithotripsy    REPAIR, SURGICAL WOUND, EYE, ANTERIOR SEGMENT Right 10/05/2021    Procedure: REPAIR, SURGICAL WOUND, EYE, ANTERIOR SEGMENT;  Surgeon: Adelaide Allen MD;  Location: [facility] OR [address] [place] [address];  Service: Ophthalmology;  Laterality: Right;  Anterior Chamber Wash Out Right Eye     Surgery for bulging disc at C7       Family History   Problem Relation Name Age of Onset    Diabetes Mother      Hypertension Mother      Heart disease Father      Diabetes Brother      Hypertension Brother      Cancer Maternal Uncle          prostate    Asthma Daughter      Cancer Cousin      Kidney disease Cousin      Amblyopia Neg Hx      Blindness Neg Hx      Cataracts Neg Hx      Glaucoma Neg Hx      Macular degeneration Neg Hx      Retinal detachment Neg Hx      Strabismus Neg Hx       Social History     Tobacco Use    Smoking status: Never     Passive exposure: Past    Smokeless tobacco: Never   Substance Use Topics    Alcohol use: No    Drug use: No      Review of Systems  See HPI  Physical Exam     Initial Vitals [05/28/24 0423]   BP Pulse Resp Temp SpO2   (!) 140/88 90 18 98.8 °F (37.1 °C) 100 %      MAP       --         Physical Exam    Nursing note and vitals reviewed.  Constitutional: He appears well-developed and well-nourished. He is not diaphoretic. No distress.   HENT:   Head: Normocephalic and atraumatic.   Right Ear: External ear normal.   Left Ear: External ear normal.   Nose: Nose normal.   Eyes: Conjunctivae are normal. No scleral icterus.   Neck: Neck supple. No tracheal deviation present.   Cardiovascular:  Normal rate, regular rhythm, normal heart sounds and intact distal pulses.     Exam reveals no gallop and no friction rub.       No murmur heard.  Pulmonary/Chest: Breath sounds normal. No respiratory distress. He exhibits no tenderness.   Abdominal: Abdomen is soft. Bowel sounds are normal. There is no abdominal tenderness.   Musculoskeletal:         General: Edema present. No tenderness.      Cervical back: Neck supple.      Comments: Mild 1+ bilateral pitting edema.  No pain calf squeeze, Homans sign negative bilaterally.     Neurological: He is alert and oriented to person, place, and time. GCS score is 15.   Skin: Skin is warm and dry.   Psychiatric: He has a normal mood and affect. His behavior is normal. Thought content normal.         ED Course   Procedures  Labs Reviewed   CBC W/ AUTO DIFFERENTIAL - Abnormal; Notable for the following components:       Result Value    RBC 4.01 (*)     Hemoglobin 12.5 (*)     Hematocrit 39.7 (*)     MCV 99 (*)     MCH 31.2 (*)     MCHC 31.5 (*)     All other components within normal limits   COMPREHENSIVE METABOLIC PANEL - Abnormal; Notable for the following components:    Glucose 156 (*)     ALT 9 (*)     All other components within normal limits   TROPONIN I - Abnormal; Notable for the following components:    Troponin I 0.062 (*)     All other components within normal limits   B-TYPE NATRIURETIC  PEPTIDE   TROPONIN I          Imaging Results              X-Ray Chest AP Portable (In process)  Result time 05/28/24 05:44:46   Procedure changed from X-Ray Chest PA And Lateral                    Medications - No data to display  Medical Decision Making  Amount and/or Complexity of Data Reviewed  Labs: ordered.  Radiology: ordered.      Additional MDM:   Heart Score:    History:          Slightly suspicious.  ECG:             Normal  Age:               45-65 years  Risk factors: >= 3 risk factors or history of atherosclerotic disease  Troponin:       1-2x normal limit  Heart Score = 4                ED Course as of 05/28/24 0627   Tue May 28, 2024   0608 [employer]: [employer] representative called.  No episodes today.  Patient did not receive a defibrillation. [CC]   0614 Patient is a 59-year-old male presenting to the emergency department for evaluation of chest pain.  Patient has a history of CAD and MI.  He has a history of CVA and he resides in nursing home with chronic left-sided neuro deficits.  Patient has a heart score of 4.  EKG unchanged from previous.  Paced rhythm.  Pacer/defibrillator interrogated without recent events.  No defibrillation noted.  His elevated troponin at 0.062.  Patient notes pain improving in the emergency department.  Moderately hypertensive in the emergency department.  BNP is normal, chest x-ray appears on change from previous and without obvious pneumothorax, pneumonia, pulmonary edema.  He denies shortness of breath does not tachycardic.  No evidence of DVT on exam.  I do not believe this to be pulmonary embolism.  Hemoglobin hematocrit mildly decreased.  No indication for transfusion.  Patient afebrile, no leukocytosis, doubt sepsis or pneumonia.  Platelet count is normal.  Plan to admit for continued ACS rule out. [CC]   0626 Differential Diagnosis includes, but is not limited to:  ACS/MI, PE, aortic dissection, pneumothorax, cardiac tamponade, pericarditis/myocarditis, pneumonia,  infection/abscess, lung mass, trauma/fracture, costochondritis/pleurisy, MSK pain/contusion, GERD, biliary disease, pancreatitis, anemia  [CC]   0673 Spoke with hospital medicine for admission, plan is to place patient in observation for continued workup. [CC]      ED Course User Index  [CC] Kostas Rodriguez MD                           Clinical Impression:  Final diagnoses:  [R07.9] Chest pain  [R03.0] Elevated blood pressure reading (Primary)  [R79.89] Elevated troponin          ED Disposition Condition    Observation                 Kostas Rodriguez MD  05/28/24 5839

## 2024-05-28 NOTE — PLAN OF CARE
Problem: Adult Inpatient Plan of Care  Goal: Plan of Care Review  Outcome: Not Progressing  Goal: Patient-Specific Goal (Individualized)  Outcome: Not Progressing  Goal: Absence of Hospital-Acquired Illness or Injury  Outcome: Not Progressing  Goal: Optimal Comfort and Wellbeing  Outcome: Not Progressing  Goal: Readiness for Transition of Care  Outcome: Not Progressing     Problem: Fall Injury Risk  Goal: Absence of Fall and Fall-Related Injury  Outcome: Not Progressing     Problem: Skin Injury Risk Increased  Goal: Skin Health and Integrity  Outcome: Not Progressing

## 2024-05-28 NOTE — ASSESSMENT & PLAN NOTE
Recent stroke right MCA with residual left-sided hemiplegia RUE >LLE  Was started on Eliquis 2.5 mg b.i.d. at [facility] pending stress test  Continue statin

## 2024-05-28 NOTE — ED TRIAGE NOTES
Se Pradhanlara Mcgraw, a 59 y.o. male presents to the ED w/ complaint of stabbing intermittent chest pain since 1 am. Hx of Pacemaker , CVA last month with minor residual deficits. Speeech is clear. AAOx4.    Triage note:  Chief Complaint   Patient presents with    Chest Pain     Sharp intermittent left sided chest for an hour with pacemaker, and a stroke last month, BIB [facility] from [facility]     Review of patient's allergies indicates:   Allergen Reactions    Cephalexin Other (See Comments)     vomitting    Darvocet a500 [propoxyphene n-acetaminophen] Itching    Morphine Itching           Tramadol Itching     Past Medical History:   Diagnosis Date    Cataract     Cellulitis of penis 07/06/2018    COPD (chronic obstructive pulmonary disease)     Coronary artery disease     Diabetes mellitus type II     DJD (degenerative joint disease)     Glaucoma     Gout     Hypertension     Kidney stone     MI (myocardial infarction) 2010    Obesity     JOSE (obstructive sleep apnea)     Uveitis

## 2024-05-28 NOTE — ASSESSMENT & PLAN NOTE
Chronic, uncontrolled. Latest blood pressure and vitals reviewed-     Temp:  [97.7 °F (36.5 °C)-98.8 °F (37.1 °C)]   Pulse:  [62-90]   Resp:  [14-26]   BP: (140-177)/(78-88)   SpO2:  [99 %-100 %] .   Home meds for hypertension were reviewed and noted below.   Hypertension Medications               amLODIPine (NORVASC) 5 MG tablet Take 1 tablet by mouth once daily.    carvediloL (COREG) 3.125 MG tablet 3.125 mg by Nasogastric route 2 (two) times daily.    furosemide (LASIX) 20 MG tablet Take 20 mg by mouth daily as needed. Takes every other day    hydroCHLOROthiazide (MICROZIDE) 12.5 mg capsule Take 12.5 mg by mouth once daily.    lisinopriL (PRINIVIL,ZESTRIL) 20 MG tablet Take 1 tablet by mouth once daily.    nitroGLYCERIN (NITROSTAT) 0.4 MG SL tablet Place 0.4 mg under the tongue every 5 (five) minutes as needed for Chest pain.    spironolactone (ALDACTONE) 25 MG tablet Take 1 tablet by mouth once daily.            While in the hospital, will manage blood pressure as follows; Continue home antihypertensive regimen    Will utilize p.r.n. blood pressure medication only if patient's blood pressure greater than 180/110 and he develops symptoms such as worsening chest pain or shortness of breath.

## 2024-05-28 NOTE — ASSESSMENT & PLAN NOTE
Presents to the hospital from [facility] for left-sided chest pain that woke him up at 1:00 a.m. today.  Patient denies shortness or breath.  EKG normal sinus rhythm/atrial paced PAC no evidence of ischemia.  Troponin trend flat pattern 0.062 to 0.068  History of SVT on last AICD interrogation in March  Currently patient is chest pain-free  Obtain AICD interrogation  2D echo  Continue ASA/statin  Hold [facility]  Cardiology following for stress test tomorrow

## 2024-05-28 NOTE — SUBJECTIVE & OBJECTIVE
Past Medical History:   Diagnosis Date    Cataract     Cellulitis of penis 07/06/2018    COPD (chronic obstructive pulmonary disease)     Coronary artery disease     Diabetes mellitus type II     DJD (degenerative joint disease)     Glaucoma     Gout     Hypertension     Kidney stone     MI (myocardial infarction) 2010    Obesity     JOSE (obstructive sleep apnea)     Uveitis        Past Surgical History:   Procedure Laterality Date    CARDIAC DEFIBRILLATOR PLACEMENT      CATARACT EXTRACTION W/  INTRAOCULAR LENS IMPLANT Right 2020    OUTSIDE [facility] ()    CYSTOSCOPY N/A 10/01/2020    Procedure: CYSTOSCOPY;  Surgeon: Monica Lieberman MD;  Location: [facility] OR;  Service: Urology;  Laterality: N/A;  RN PRE OP Covid screen 9-  C A    DESTRUCTION, CILIARY BODY, USING LASER Right 7/13/2023    Procedure: DESTRUCTION, CILIARY BODY, USING LASER;  Surgeon: Shaina Gan MD;  Location: [facility] OR 1ST FLR;  Service: Ophthalmology;  Laterality: Right;    DESTRUCTION, CILIARY BODY, USING LASER Right 8/10/2023    Procedure: DESTRUCTION, CILIARY BODY, USING LASER;  Surgeon: Shaina Gan MD;  Location: [facility] OR 1ST FLR;  Service: Ophthalmology;  Laterality: Right;    DESTRUCTION, CILIARY BODY, USING LASER Right 12/21/2023    Procedure: DESTRUCTION, CILIARY BODY, USING LASER;  Surgeon: Shaina Gan MD;  Location: [facility] OR 1ST FLR;  Service: Ophthalmology;  Laterality: Right;  g-probe    excisional biopsy left inguinal lymph node      FOOT SURGERY      right foot surgery     INSERTION OF INFLATABLE PENILE PROSTHESIS N/A 01/25/2022    Procedure: INSERTION, PENILE PROSTHESIS, INFLATABLE;  Surgeon: Monica Lieberman MD;  Location: [facility] OR;  Service: Urology;  Laterality: N/A;  [employer] JUAN LUIS ARNOLD [phone] TEXTED HIM @ 5:32PM ON 12-  RN PRE OP 12-28-21. Covid NEGATIVE 1-3-22. CA-----AICD-----HAS CARDS CLEARANCE    kidney stones      lithotripsy    REPAIR, SURGICAL WOUND, EYE, ANTERIOR SEGMENT  Right 10/05/2021    Procedure: REPAIR, SURGICAL WOUND, EYE, ANTERIOR SEGMENT;  Surgeon: Adelaide Allen MD;  Location: [facility] OR [address] [place] [address];  Service: Ophthalmology;  Laterality: Right;  Anterior Chamber Wash Out Right Eye     Surgery for bulging disc at C7         Review of patient's allergies indicates:   Allergen Reactions    Cephalexin Other (See Comments)     vomitting    Darvocet a500 [propoxyphene n-acetaminophen] Itching    Morphine Itching           Tramadol Itching       No current facility-administered medications on file prior to encounter.     Current Outpatient Medications on File Prior to Encounter   Medication Sig    albuterol (PROVENTIL/VENTOLIN HFA) 90 mcg/actuation inhaler Inhale 2 puffs into the lungs every 4 (four) hours as needed for Wheezing or Shortness of Breath. Rescue (Patient taking differently: Inhale 2 puffs into the lungs every 3 (three) hours. Rescue)    allopurinol (ZYLOPRIM) 100 MG tablet Take 100 mg by mouth Daily.    amitriptyline (ELAVIL) 10 MG tablet TAKE 1 TABLET(10 MG) BY MOUTH EVERY EVENING (Patient taking differently: Take 10 mg by mouth once daily.)    amLODIPine (NORVASC) 5 MG tablet Take 1 tablet by mouth once daily.    apixaban (ELIQUIS) 2.5 mg Tab 2.5 mg by FEEDING TUBE route 2 (two) times daily.    aspirin (ECOTRIN) 81 MG EC tablet Take 81 mg by mouth once daily.    blood sugar diagnostic Strp ACCU CHEK KEVAN PLUS TEST STRIPS    blood-glucose meter Misc ACCU CHEK KEVAN PLUS METER: USE 4X/D    carvediloL (COREG) 3.125 MG tablet 3.125 mg by Nasogastric route 2 (two) times daily.    colchicine (COLCRYS) 0.6 mg tablet 2 po every day prn gout attack    dapagliflozin (FARXIGA) 10 mg tablet Take 10 mg by mouth once daily.    dicyclomine (BENTYL) 20 mg tablet Take 20 mg by mouth 2 (two) times daily.    divalproex ER (DEPAKOTE ER) 250 MG 24 hr tablet Take 750 mg by mouth nightly.    ergocalciferol (ERGOCALCIFEROL) 50,000 unit Cap Take 1 capsule by mouth every 7 days.    gabapentin  (NEURONTIN) 800 MG tablet Take 800 mg by mouth 3 (three) times daily.    insulin glargine U-100, Lantus, 100 unit/mL injection Inject 25 Units into the skin once daily.    insulin lispro (HUMALOG KWIKPEN INSULIN) 100 unit/mL pen Inject into the skin. 0-69=0 insulin give glucose;  = 0 insulin ; 181-200= 3 units; 201-250= 6 units; 251-300 = 9 units; 301-350= 12 units; 351-400= 15 units; 401-700= 18 units subcutaneously before meals and at bedtime for diabetes.    methocarbamoL (ROBAXIN) 500 MG Tab Take 1 tablet by mouth 4 (four) times daily.    multivitamin with folic acid 400 mcg Tab Take 1 tablet by mouth once daily.    pilocarpine HCL 1% (PILOCAR) 1 % ophthalmic solution Place 1 drop into both eyes 4 (four) times daily.    rosuvastatin (CRESTOR) 20 MG tablet Take 1 tablet by mouth once daily.    tamsulosin (FLOMAX) 0.4 mg Cap Take 0.4 mg by mouth every evening.    acetaZOLAMIDE (DIAMOX) 250 MG tablet TAKE 1 TABLET(250 MG) BY MOUTH TWICE DAILY (Patient not taking: Reported on 5/28/2024)    atropine 1% (ISOPTO ATROPINE) 1 % Drop INSTILL 1 DROP IN LEFT EYE TWICE DAILY (Patient not taking: Reported on 5/28/2024)    blood-glucose meter,continuous (DEXCOM G7 ) Misc CONTINUES GLUCOSE MONITORING    blood-glucose sensor (DEXCOM G7 SENSOR) Kathy CONTINUOUS GLUCOSE MONITORING: CHANGE EVERY 10 DAYS.    ciclopirox (LOPROX) 0.77 % Crea Apply topically 2 (two) times daily. (Patient not taking: Reported on 5/28/2024)    ciclopirox 0.77 % Gel SMARTSIG:Sparingly Topical Twice Daily (Patient not taking: Reported on 5/28/2024)    docusate sodium (COLACE) 100 MG capsule Take 1 capsule (100 mg total) by mouth 2 (two) times daily. (Patient not taking: Reported on 5/28/2024)    dorzolamide-timolol 2-0.5% (COSOPT) 22.3-6.8 mg/mL ophthalmic solution Place 1 drop into the left eye 2 (two) times daily. (Patient not taking: Reported on 5/28/2024)    DULoxetine (CYMBALTA) 60 MG capsule Take 1 capsule by mouth once daily. (Patient  not taking: Reported on 5/28/2024)    famotidine (PEPCID) 20 MG tablet Take 20 mg by mouth 2 (two) times daily as needed. (Patient not taking: Reported on 5/28/2024)    fluticasone propionate (FLONASE) 50 mcg/actuation nasal spray 2 sprays (100 mcg total) by Each Nostril route once daily. (Patient not taking: Reported on 5/28/2024)    furosemide (LASIX) 20 MG tablet Take 20 mg by mouth daily as needed. Takes every other day (Patient not taking: Reported on 5/28/2024)    hydroCHLOROthiazide (MICROZIDE) 12.5 mg capsule Take 12.5 mg by mouth once daily. (Patient not taking: Reported on 5/28/2024)    INVOKANA 300 mg Tab tablet Take 300 mg by mouth once daily. (Patient not taking: Reported on 5/28/2024)    ketoconazole (NIZORAL) 2 % cream Apply  a small amount to affected area twice a day  apply to skin for fungal infection (Patient not taking: Reported on 5/28/2024)    lancets Misc ACCU CHEK SOFTCLIX LANCETS: USE 4X DAILY    levocetirizine (XYZAL) 5 MG tablet Take 5 mg by mouth daily as needed.    LIDOcaine (LIDODERM) 5 % Apply 1-3 patches every day prn pain    lisinopriL (PRINIVIL,ZESTRIL) 20 MG tablet Take 1 tablet by mouth once daily.    metoclopramide HCl (REGLAN) 10 MG tablet Take 1 tablet (10 mg total) by mouth every 6 (six) hours as needed. (Patient not taking: Reported on 5/28/2024)    mupirocin (BACTROBAN) 2 % ointment Apply topically 3 (three) times daily. (Patient not taking: Reported on 5/28/2024)    nitroGLYCERIN (NITROSTAT) 0.4 MG SL tablet Place 0.4 mg under the tongue every 5 (five) minutes as needed for Chest pain.    omega-3 acid ethyl esters (LOVAZA) 1 gram capsule Take 2 capsules by mouth once daily. (Patient not taking: Reported on 5/28/2024)    omeprazole (PRILOSEC) 20 MG capsule TK 1 C PO QD FOR CONTROL OF STOMACH ACID BEFORE BREAKFAST    oxybutynin (DITROPAN-XL) 10 MG 24 hr tablet Take 1 tablet by mouth once daily. (Patient not taking: Reported on 5/28/2024)    oxyCODONE-acetaminophen (PERCOCET)  "7.5-325 mg per tablet Take 1 tablet by mouth every 4 (four) hours as needed. (Patient not taking: Reported on 5/28/2024)    pen needle, diabetic 31 gauge x 5/16" Ndle Inject 1 each into the skin.    potassium chloride SA (K-DUR,KLOR-CON) 20 MEQ tablet Take 20 mEq by mouth once daily. (Patient not taking: Reported on 5/28/2024)    promethazine (PHENERGAN) 25 MG tablet Take 1 tablet by mouth every 4 (four) hours as needed. (Patient not taking: Reported on 5/28/2024)    spironolactone (ALDACTONE) 25 MG tablet Take 1 tablet by mouth once daily. (Patient not taking: Reported on 5/28/2024)    zolpidem (AMBIEN) 10 mg Tab Take 5 mg by mouth nightly as needed. (Patient not taking: Reported on 5/28/2024)    [DISCONTINUED] CRESTOR 20 mg tablet Take 20 mg by mouth every evening.     [DISCONTINUED] insulin glargine-lixisenatide (SOLIQUA 100/33) 100 unit-33 mcg/mL InPn pen Inject 60 Units into the skin every morning.    [DISCONTINUED] metoprolol succinate (TOPROL-XL) 50 MG 24 hr tablet Take 50 mg by mouth every evening.     [DISCONTINUED] sotaloL (BETAPACE) 80 MG tablet TAKE 1 TABLET BY MOUTH TWICE DAILY    [DISCONTINUED] tiZANidine 4 mg Cap Take 4 mg by mouth 2 (two) times a day.     Family History       Problem Relation (Age of Onset)    Asthma Daughter    Cancer Maternal Uncle, Cousin    Diabetes Mother, Brother    Heart disease Father    Hypertension Mother, Brother    Kidney disease Cousin          Tobacco Use    Smoking status: Never     Passive exposure: Past    Smokeless tobacco: Never   Substance and Sexual Activity    Alcohol use: No    Drug use: No    Sexual activity: Not Currently     Partners: Female     Comment: divorce     Review of Systems   Constitutional:  Positive for activity change and fatigue. Negative for appetite change and diaphoresis.   HENT: Negative.     Eyes: Negative.    Respiratory:  Positive for chest tightness. Negative for shortness of breath and wheezing.    Gastrointestinal: Negative.  "   Genitourinary: Negative.    Musculoskeletal:  Positive for arthralgias and gait problem.   Skin: Negative.    Neurological:  Positive for weakness (left side weakness , left arm paralysis).   Hematological: Negative.      Objective:     Vital Signs (Most Recent):  Temp: 97.9 °F (36.6 °C) (05/28/24 1000)  Pulse: 73 (05/28/24 1000)  Resp: 15 (05/28/24 1000)  BP: (!) 177/86 (05/28/24 1000)  SpO2: 99 % (05/28/24 1000) Vital Signs (24h Range):  Temp:  [97.7 °F (36.5 °C)-98.8 °F (37.1 °C)] 97.9 °F (36.6 °C)  Pulse:  [62-90] 73  Resp:  [14-26] 15  SpO2:  [99 %-100 %] 99 %  BP: (140-177)/(78-88) 177/86     Weight: 108.9 kg (240 lb)  Body mass index is 37.59 kg/m².     Physical Exam  Constitutional:       Appearance: Normal appearance. He is not ill-appearing.   HENT:      Nose: Nose normal.      Mouth/Throat:      Mouth: Mucous membranes are dry.   Eyes:      Extraocular Movements: Extraocular movements intact.   Cardiovascular:      Rate and Rhythm: Normal rate and regular rhythm.      Heart sounds: Normal heart sounds.   Pulmonary:      Breath sounds: Normal breath sounds.   Musculoskeletal:         General: No deformity.      Cervical back: Normal range of motion.      Right lower leg: No edema.      Left lower leg: No edema.   Skin:     General: Skin is dry.   Neurological:      Mental Status: He is alert and oriented to person, place, and time. Mental status is at baseline.      Cranial Nerves: Cranial nerve deficit: right sided hemiplegia.      Motor: Weakness present.                Significant Labs: All pertinent labs within the past 24 hours have been reviewed.    Significant Imaging: I have reviewed all pertinent imaging results/findings within the past 24 hours.

## 2024-05-29 VITALS
BODY MASS INDEX: 37.67 KG/M2 | TEMPERATURE: 98 F | OXYGEN SATURATION: 95 % | WEIGHT: 240 LBS | RESPIRATION RATE: 16 BRPM | DIASTOLIC BLOOD PRESSURE: 86 MMHG | SYSTOLIC BLOOD PRESSURE: 162 MMHG | HEIGHT: 67 IN | HEART RATE: 69 BPM

## 2024-05-29 LAB
ALBUMIN SERPL BCP-MCNC: 3.3 G/DL (ref 3.5–5.2)
ALP SERPL-CCNC: 67 U/L (ref 55–135)
ALT SERPL W/O P-5'-P-CCNC: 9 U/L (ref 10–44)
ANION GAP SERPL CALC-SCNC: 7 MMOL/L (ref 8–16)
AST SERPL-CCNC: 13 U/L (ref 10–40)
BASOPHILS # BLD AUTO: 0.04 K/UL (ref 0–0.2)
BASOPHILS NFR BLD: 0.8 % (ref 0–1.9)
BILIRUB SERPL-MCNC: 0.5 MG/DL (ref 0.1–1)
BUN SERPL-MCNC: 11 MG/DL (ref 6–20)
CALCIUM SERPL-MCNC: 9.6 MG/DL (ref 8.7–10.5)
CHLORIDE SERPL-SCNC: 106 MMOL/L (ref 95–110)
CO2 SERPL-SCNC: 25 MMOL/L (ref 23–29)
CREAT SERPL-MCNC: 0.9 MG/DL (ref 0.5–1.4)
CV STRESS BASE HR: 56 BPM
DIASTOLIC BLOOD PRESSURE: 76 MMHG
DIFFERENTIAL METHOD BLD: ABNORMAL
EOSINOPHIL # BLD AUTO: 0.3 K/UL (ref 0–0.5)
EOSINOPHIL NFR BLD: 6.4 % (ref 0–8)
ERYTHROCYTE [DISTWIDTH] IN BLOOD BY AUTOMATED COUNT: 13 % (ref 11.5–14.5)
EST. GFR  (NO RACE VARIABLE): >60 ML/MIN/1.73 M^2
ESTIMATED AVG GLUCOSE: 120 MG/DL (ref 68–131)
GLUCOSE SERPL-MCNC: 106 MG/DL (ref 70–110)
HBA1C MFR BLD: 5.8 % (ref 4–5.6)
HCT VFR BLD AUTO: 39.3 % (ref 40–54)
HGB BLD-MCNC: 12.5 G/DL (ref 14–18)
IMM GRANULOCYTES # BLD AUTO: 0.01 K/UL (ref 0–0.04)
IMM GRANULOCYTES NFR BLD AUTO: 0.2 % (ref 0–0.5)
LYMPHOCYTES # BLD AUTO: 2.1 K/UL (ref 1–4.8)
LYMPHOCYTES NFR BLD: 40.2 % (ref 18–48)
MCH RBC QN AUTO: 30.9 PG (ref 27–31)
MCHC RBC AUTO-ENTMCNC: 31.8 G/DL (ref 32–36)
MCV RBC AUTO: 97 FL (ref 82–98)
MONOCYTES # BLD AUTO: 0.4 K/UL (ref 0.3–1)
MONOCYTES NFR BLD: 8.1 % (ref 4–15)
NEUTROPHILS # BLD AUTO: 2.3 K/UL (ref 1.8–7.7)
NEUTROPHILS NFR BLD: 44.3 % (ref 38–73)
NRBC BLD-RTO: 0 /100 WBC
NUC REST EJECTION FRACTION: 47
OHS CV CPX 85 PERCENT MAX PREDICTED HEART RATE MALE: 136
OHS CV CPX MAX PREDICTED HEART RATE: 160
OHS CV CPX PATIENT IS FEMALE: 0
OHS CV CPX PATIENT IS MALE: 1
OHS CV CPX PEAK DIASTOLIC BLOOD PRESSURE: 6 MMHG
OHS CV CPX PEAK HEAR RATE: 90 BPM
OHS CV CPX PEAK RATE PRESSURE PRODUCT: NORMAL
OHS CV CPX PEAK SYSTOLIC BLOOD PRESSURE: 122 MMHG
OHS CV CPX PERCENT MAX PREDICTED HEART RATE ACHIEVED: 56
OHS CV CPX RATE PRESSURE PRODUCT PRESENTING: 8792
PLATELET # BLD AUTO: 197 K/UL (ref 150–450)
PMV BLD AUTO: 10.9 FL (ref 9.2–12.9)
POCT GLUCOSE: 116 MG/DL (ref 70–110)
POCT GLUCOSE: 95 MG/DL (ref 70–110)
POTASSIUM SERPL-SCNC: 3.9 MMOL/L (ref 3.5–5.1)
PROT SERPL-MCNC: 6.4 G/DL (ref 6–8.4)
RBC # BLD AUTO: 4.05 M/UL (ref 4.6–6.2)
SODIUM SERPL-SCNC: 138 MMOL/L (ref 136–145)
SYSTOLIC BLOOD PRESSURE: 157 MMHG
WBC # BLD AUTO: 5.28 K/UL (ref 3.9–12.7)

## 2024-05-29 PROCEDURE — A9502 TC99M TETROFOSMIN: HCPCS | Performed by: HOSPITALIST

## 2024-05-29 PROCEDURE — 99215 OFFICE O/P EST HI 40 MIN: CPT | Mod: 25,,, | Performed by: INTERNAL MEDICINE

## 2024-05-29 PROCEDURE — G0378 HOSPITAL OBSERVATION PER HR: HCPCS

## 2024-05-29 PROCEDURE — 80053 COMPREHEN METABOLIC PANEL: CPT | Performed by: NURSE PRACTITIONER

## 2024-05-29 PROCEDURE — 85025 COMPLETE CBC W/AUTO DIFF WBC: CPT | Performed by: NURSE PRACTITIONER

## 2024-05-29 PROCEDURE — 36415 COLL VENOUS BLD VENIPUNCTURE: CPT | Performed by: NURSE PRACTITIONER

## 2024-05-29 PROCEDURE — 25000003 PHARM REV CODE 250: Performed by: NURSE PRACTITIONER

## 2024-05-29 PROCEDURE — 25000003 PHARM REV CODE 250: Performed by: PHYSICIAN ASSISTANT

## 2024-05-29 PROCEDURE — 63600175 PHARM REV CODE 636 W HCPCS: Performed by: INTERNAL MEDICINE

## 2024-05-29 RX ORDER — REGADENOSON 0.08 MG/ML
0.4 INJECTION, SOLUTION INTRAVENOUS ONCE
Status: COMPLETED | OUTPATIENT
Start: 2024-05-29 | End: 2024-05-29

## 2024-05-29 RX ADMIN — TETROFOSMIN 10.5 MILLICURIE: 1.38 INJECTION, POWDER, LYOPHILIZED, FOR SOLUTION INTRAVENOUS at 07:05

## 2024-05-29 RX ADMIN — ACETAMINOPHEN 650 MG: 325 TABLET ORAL at 12:05

## 2024-05-29 RX ADMIN — ASPIRIN 81 MG: 81 TABLET, COATED ORAL at 11:05

## 2024-05-29 RX ADMIN — CARVEDILOL 3.12 MG: 3.12 TABLET, FILM COATED ORAL at 11:05

## 2024-05-29 RX ADMIN — LISINOPRIL 20 MG: 20 TABLET ORAL at 11:05

## 2024-05-29 RX ADMIN — TETROFOSMIN 30.6 MILLICURIE: 1.38 INJECTION, POWDER, LYOPHILIZED, FOR SOLUTION INTRAVENOUS at 08:05

## 2024-05-29 RX ADMIN — REGADENOSON 0.4 MG: 0.08 INJECTION, SOLUTION INTRAVENOUS at 08:05

## 2024-05-29 RX ADMIN — ATORVASTATIN CALCIUM 80 MG: 40 TABLET, FILM COATED ORAL at 11:05

## 2024-05-29 NOTE — ASSESSMENT & PLAN NOTE
Atypical chest pain.  Stress test did not show any significant ischemia.  Continue medical management.  Follow-up in Cardiology Clinic    Results for orders placed during the hospital encounter of 05/28/24    Nuclear Stress - Cardiology Interpreted    Interpretation Summary    Equivocal myocardial perfusion scan.    moderate to severe intensity, large sized, equivocal  in the inferoapical wall(s). This finding is equivocal due to diaphragm shadow.    There are no other significant perfusion abnormalities.    The gated perfusion images showed an ejection fraction of 47% at rest.    There is mild global hypokinesis at rest and stress.    The ECG portion of the study is negative for ischemia.    The patient reported no chest pain during the stress test.    There were no arrhythmias during stress.

## 2024-05-29 NOTE — PLAN OF CARE
Case Management Final Discharge Note      Discharge Disposition: Return to [facility] alf NH    New DME ordered / company name: none    Relevant SDOH / Transition of Care Barriers:  none    Primary Caretaker and contact information: Facility resident     Scheduled followup appointment: na    Referrals placed: none    Transportation: ADT 30 order placed for stretcher Transportation.  Requested  time: 3:30p  If transportation does not arrive at ETA time nurse will be instructed to follow protocol for transportation below:  How can I get in touch directly with dispatch, if needed?                 Non-emergent dispatch: [phone] opt [phone]    +++NURSING:  If stretcher  does not arrive at requested time please call the above Non Emergent Dispatcher.  If issue not resolved please escalate to your charge nurse for further instructions.    Transport to [facility]    Patient and family educated on discharge services and updated on DC plan. Bedside RN notified, patient clear to discharge from Case Management Perspective.      05/29/24 1537   Final Note   Assessment Type Final Discharge Note   Anticipated Discharge Disposition [facility] Resources/Appts/Education Provided Provided patient/caregiver with written discharge plan information   Post-Acute Status   Discharge Delays None known at this time

## 2024-05-29 NOTE — ASSESSMENT & PLAN NOTE
Patient's FSGs are uncontrolled due to hyperglycemia on current medication regimen.  Last A1c reviewed-   Lab Results   Component Value Date    HGBA1C 9.8 (H) 02/06/2024     Most recent fingerstick glucose reviewed-   Recent Labs   Lab 05/28/24  1624   POCTGLUCOSE 82

## 2024-05-29 NOTE — NURSING
[facility]  Nurses Note -- 4 Eyes      5/28/2024       Skin assessed on: Q Shift      [x] No Pressure Injuries Present    []Prevention Measures Documented    [] Yes LDA  for Pressure Injury Previously documented     [] Yes New Pressure Injury Discovered   [] LDA for New Pressure Injury Added      Attending RN:  Heather Holland RN     Second RN:  Arlene RN

## 2024-05-29 NOTE — HPI
Patient is a pleasant 59-year-old man.  Multiple medical issues as listed below.  Came in with left upper chest pain.  At the site of his defibrillator.  Describes it as sharp.      Results for orders placed or performed in visit on 12/28/21   EKG 12-lead    Collection Time: 12/28/21  9:33 AM    Narrative    Test Reason : R07.9    Vent. Rate : 079 BPM     Atrial Rate : 079 BPM     P-R Int : 188 ms          QRS Dur : 104 ms      QT Int : 398 ms       P-R-T Axes : 071 065 003 degrees     QTc Int : 456 ms    Normal sinus rhythm  Normal ECG  When compared with ECG of 11-NOV-2017 17:35,  Significant changes have occurred  Confirmed by Darren Springer MD ([id number]) on 12/29/2021 4:41:12 PM    Referred By:             Confirmed By:Darren Springer MD       Results for orders placed during the hospital encounter of 05/28/24    Echo    Interpretation Summary    Left Ventricle: The left ventricle is normal in size. Mildly increased wall thickness. There is moderately reduced systolic function with a visually estimated ejection fraction of 35 %. Grade I diastolic dysfunction.    Right Ventricle: Normal right ventricular cavity size. Systolic function is normal. Pacemaker lead present in the ventricle.    Left Atrium: Left atrium is mildly dilated.    Aortic Valve: There is mild stenosis. Aortic valve area by VTI is 1.77 cm². Aortic valve peak velocity is 1.23 m/s. Mean gradient is 4 mmHg. The dimensionless index is 0.45.    Mitral Valve: There is mild regurgitation.    Pulmonary Artery: The estimated pulmonary artery systolic pressure is 34 mmHg.    IVC/SVC: Normal venous pressure at 3 mmHg.      No results found for this or any previous visit.      No results found for this or any previous visit.                  HPI: Se Virgil Mcgraw is a 58 yo male with significant history for hypertension, hyperlipidemia, diabetes type 2, right eye blind, CAD stent 2006, SVT sp AICD 2012, chronic systolic diastolic heart failure, COPD and  recent R MCA stroke (March 2024 [facility]) started on Eliquis who presents to the ED from [facility] for left sided sharp chest pain nonradiating that woke him up at 1:00 a.m.  Patient was given Tylenol and by the time patient arrived to ED pain resolved.  No shortness a breath nausea vomiting or abdominal pain. No aggravating factors.   Currently, wheelchair bound at SNF.   EKG NSR atrial pace and PAC. Qtc 413. Elevated troponin trend flat pattern.      Recent metronic AICD interrogation 3/27/24   In clinic device check. Normal device function.  Estimated battery longevity 6.4 years.  Threshold and impedance WNL.  22 VT & SVT episodes with the viewable appearing to be 1:1. Likely SVT.  The longest episode lasted 3 minutes at 176 bpm.  All HV therapies changed to B>AX per [employer]'s May 2023 advisory.  Next transmission in 3 months.

## 2024-05-29 NOTE — PLAN OF CARE
Problem: Adult Inpatient Plan of Care  Goal: Absence of Hospital-Acquired Illness or Injury  Outcome: Progressing  Intervention: Identify and Manage Fall Risk  Flowsheets (Taken 5/29/2024 0423)  Safety Promotion/Fall Prevention:   assistive device/personal item within reach   bed alarm set   lighting adjusted   medications reviewed   side rails raised x 2  Intervention: Prevent Skin Injury  Flowsheets (Taken 5/29/2024 0423)  Body Position: turned     Problem: Fall Injury Risk  Goal: Absence of Fall and Fall-Related Injury  Outcome: Progressing  Intervention: Identify and Manage Contributors  Flowsheets (Taken 5/29/2024 0423)  Medication Review/Management: medications reviewed     Problem: Diabetes Comorbidity  Goal: Blood Glucose Level Within Targeted Range  Outcome: Progressing  Intervention: Monitor and Manage Glycemia  Flowsheets (Taken 5/29/2024 0423)  Glycemic Management: blood glucose monitored

## 2024-05-29 NOTE — NURSING
Pt back to unit from nuclear med by bed via transport. IV access in place, saline locked. NAD or pain noted.

## 2024-05-29 NOTE — ASSESSMENT & PLAN NOTE
Presents to the hospital from [facility] for left-sided chest pain that woke him up at 1:00 a.m. today.  Patient denies shortness or breath.  EKG normal sinus rhythm/atrial paced PAC no evidence of ischemia.  Troponin trend flat pattern 0.062 to 0.068  History of SVT on last AICD interrogation in March  Currently patient is chest pain-free  Obtain AICD interrogation  2D echo  Continue ASA/statin  Hold [facility]  Cardiology following for stress test tomorrow

## 2024-05-29 NOTE — SUBJECTIVE & OBJECTIVE
Interval History:  stress test did not show any significant ischemia    Review of Systems   Constitutional: Negative.   HENT: Negative.     Eyes: Negative.    Cardiovascular: Negative.    Respiratory: Negative.     Endocrine: Negative.    Hematologic/Lymphatic: Negative.    Skin: Negative.    Musculoskeletal: Negative.    Gastrointestinal: Negative.    Genitourinary: Negative.    Neurological: Negative.    Psychiatric/Behavioral: Negative.     Allergic/Immunologic: Negative.      Objective:     Vital Signs (Most Recent):  Temp: 97.9 °F (36.6 °C) (05/29/24 1059)  Pulse: 79 (05/29/24 1518)  Resp: 19 (05/29/24 1059)  BP: (!) 166/77 (05/29/24 1059)  SpO2: 97 % (05/29/24 1059) Vital Signs (24h Range):  Temp:  [97.7 °F (36.5 °C)-98.6 °F (37 °C)] 97.9 °F (36.6 °C)  Pulse:  [65-84] 79  Resp:  [17-20] 19  SpO2:  [94 %-100 %] 97 %  BP: (140-181)/(68-95) 166/77     Weight: 108.9 kg (240 lb)  Body mass index is 37.59 kg/m².     SpO2: 97 %         Intake/Output Summary (Last 24 hours) at 5/29/2024 1555  Last data filed at 5/29/2024 1358  Gross per 24 hour   Intake 360 ml   Output 900 ml   Net -540 ml       Lines/Drains/Airways       Drain  Duration             Male External Urinary Catheter 05/29/24 0214 <1 day              Peripheral Intravenous Line  Duration                  Peripheral IV - Single Lumen 05/28/24 0500 20 G Right Antecubital 1 day                       Physical Exam  Vitals reviewed.   Constitutional:       Appearance: He is well-developed.   HENT:      Head: Normocephalic.   Eyes:      Conjunctiva/sclera: Conjunctivae normal.      Pupils: Pupils are equal, round, and reactive to light.   Cardiovascular:      Rate and Rhythm: Normal rate and regular rhythm.      Heart sounds: Normal heart sounds.   Pulmonary:      Effort: Pulmonary effort is normal.      Breath sounds: Normal breath sounds.   Abdominal:      General: Bowel sounds are normal.      Palpations: Abdomen is soft.   Musculoskeletal:      Cervical  back: Normal range of motion and neck supple.   Skin:     General: Skin is warm.   Neurological:      Mental Status: He is alert and oriented to person, place, and time.            Significant Labs:     DATA:     Laboratory:  CBC:  Recent Labs   Lab 12/28/21  0943 05/28/24  0432 05/29/24  0503   WBC 7.69 5.93 5.28   Hemoglobin 14.2 12.5 L 12.5 L   Hematocrit 42.5 39.7 L 39.3 L   Platelets 194 204 197       CHEMISTRIES:  Recent Labs   Lab 10/06/21  0323 10/07/21  0510 12/28/21  0943 03/08/23  1548 04/05/23  0457 04/06/23  0659 04/18/23  1038 05/28/24  0432 05/29/24  0503   Glucose 358 H 161 H 146 H  --   --   --   --  156 H 106   Sodium 137 137 141   < > 139 137 138 141 138   Potassium 4.7 4.4 3.9   < > 4.2 4.2 4.4 4.0 3.9   BUN 29 H 28 H 21 H   < > 24.1 H 19.1 H 17.8 12 11   Creatinine 1.6 H 1.3 1.4   < > 1.34 H 1.06 1.43 H 1.1 0.9   eGFR if African American 54.5 A >60.0 >60  --   --   --   --   --   --    eGFR   --   --   --    < > 61 L 81 L 56 L  --   --    eGFR if non  47.1 A >60.0 55 A  --   --   --   --   --   --    Calcium 9.5 9.1 9.6   < > 8.8 9.0 8.7 9.8 9.6   Magnesium  --   --   --   --   --   --   --  1.8  --     < > = values in this interval not displayed.       CARDIAC BIOMARKERS:  Recent Labs   Lab 03/29/24 2051 05/28/24  0432 05/28/24  0933   CK 90  --   --    Troponin I  --  0.062 H 0.068 H       COAGS:  Recent Labs   Lab 03/25/24  1051 03/29/24 2051   INR 1.0 1.0       LIPIDS/LFTS:  Recent Labs   Lab 04/18/23  1038 05/28/24  0432 05/29/24  0503   Cholesterol  --  155  --    Triglycerides  --  155 H  --    HDL  --  37 L  --    LDL Cholesterol  --  87.0  --    Non-HDL Cholesterol  --  118  --    AST 14 L 13 13   ALT 28 9 L 9 L       Hemoglobin A1C   Date Value Ref Range Status   05/28/2024 5.8 (H) 4.0 - 5.6 % Final     Comment:     ADA Screening Guidelines:  5.7-6.4%  Consistent with prediabetes  >or=6.5%  Consistent with diabetes    High levels of fetal hemoglobin  interfere with the HbA1C  assay. Heterozygous hemoglobin variants (HbS, HgC, etc)do  not significantly interfere with this assay.   However, presence of multiple variants may affect accuracy.     02/06/2024 9.8 (H) <5.7 % Final   11/16/2023 7.5 (H) <5.7 % Final   10/24/2023 7.9 (H) <5.7 % Final   01/26/2022 7.5 (H) 4.0 - 5.6 % Final     Comment:     ADA Screening Guidelines:  5.7-6.4%  Consistent with prediabetes  >or=6.5%  Consistent with diabetes    High levels of fetal hemoglobin interfere with the HbA1C  assay. Heterozygous hemoglobin variants (HbS, HgC, etc)do  not significantly interfere with this assay.   However, presence of multiple variants may affect accuracy.     10/01/2021 7.5 (H) 4.0 - 5.6 % Final     Comment:     ADA Screening Guidelines:  5.7-6.4%  Consistent with prediabetes  >or=6.5%  Consistent with diabetes    High levels of fetal hemoglobin interfere with the HbA1C  assay. Heterozygous hemoglobin variants (HbS, HgC, etc)do  not significantly interfere with this assay.   However, presence of multiple variants may affect accuracy.         TSH  Recent Labs   Lab 05/28/24  0432   TSH 1.580       The 10-year ASCVD risk score (Hair SANDOVAL, et al., 2019) is: 36.8%    Values used to calculate the score:      Age: 59 years      Sex: Male      Is Non- : Yes      Diabetic: Yes      Tobacco smoker: No      Systolic Blood Pressure: 166 mmHg      Is BP treated: Yes      HDL Cholesterol: 37 mg/dL      Total Cholesterol: 155 mg/dL       BNP    Lab Results   Component Value Date/Time    BNP 26 05/28/2024 04:32 AM    BNP <10 08/05/2016 04:30 PM    BNP <10 08/29/2015 11:56 PM            ECHO    Results for orders placed during the hospital encounter of 05/28/24    Echo    Interpretation Summary    Left Ventricle: The left ventricle is normal in size. Mildly increased wall thickness. There is moderately reduced systolic function with a visually estimated ejection fraction of 35 %. Grade I  diastolic dysfunction.    Right Ventricle: Normal right ventricular cavity size. Systolic function is normal. Pacemaker lead present in the ventricle.    Left Atrium: Left atrium is mildly dilated.    Aortic Valve: There is mild stenosis. Aortic valve area by VTI is 1.77 cm². Aortic valve peak velocity is 1.23 m/s. Mean gradient is 4 mmHg. The dimensionless index is 0.45.    Mitral Valve: There is mild regurgitation.    Pulmonary Artery: The estimated pulmonary artery systolic pressure is 34 mmHg.    IVC/SVC: Normal venous pressure at 3 mmHg.      STRESS TEST    Results for orders placed during the hospital encounter of 05/28/24    Nuclear Stress - Cardiology Interpreted    Interpretation Summary    Equivocal myocardial perfusion scan.    moderate to severe intensity, large sized, equivocal  in the inferoapical wall(s). This finding is equivocal due to diaphragm shadow.    There are no other significant perfusion abnormalities.    The gated perfusion images showed an ejection fraction of 47% at rest.    There is mild global hypokinesis at rest and stress.    The ECG portion of the study is negative for ischemia.    The patient reported no chest pain during the stress test.    There were no arrhythmias during stress.        CATH    No results found for this or any previous visit.

## 2024-05-29 NOTE — PLAN OF CARE
[facility]     Department of Hospital Medicine     [address] [place] [address]     [phone] after hours  [phone] fax       NURSING HOME ORDERS    05/29/2024    Admit to Nursing Home:        Skilled Bed                                              Diagnoses:  Active Hospital Problems    Diagnosis  POA    *Coronary artery disease involving native coronary artery with angina pectoris [I25.119]  Yes     Priority: 1 - High     Noted by Balzo  last documented on 20230108  Formatting of this note might be different from the original.   Noted by Balzo  last documented on 20230108      Hemiplegia as late effect of cerebrovascular accident (CVA) [I69.359]  Not Applicable     Priority: 2     Chronic combined systolic and diastolic heart failure [I50.42]  Yes    CKD (chronic kidney disease) stage 2, GFR 60-89 ml/min [N18.2]  Yes    HLD (hyperlipidemia) [E78.5]  Yes    AICD (automatic cardioverter/defibrillator) present [Z95.810]  Yes    Diabetes mellitus type 2 in obese [E11.69, E66.9]  Yes    Essential hypertension [I10]  Yes      Resolved Hospital Problems   No resolved problems to display.       Patient is homebound due to:  Coronary artery disease involving native coronary artery with angina pectoris    Allergies:  Review of patient's allergies indicates:   Allergen Reactions    Cephalexin Other (See Comments)     vomitting    Darvocet a500 [propoxyphene n-acetaminophen] Itching    Morphine Itching           Tramadol Itching       Vitals:        Every shift (Skilled Nursing patients)    Diet: Cardiac and Diabetic Diet.          Acitivities:     As prior to admission     LABS:  Per facility protocol    Nursing Precautions:     - Aspiration precautions:             - Total assistance with meals            -  Upright 90 degrees befor during and after meals             -  Suction at bedside          - Fall precautions per nursing home  protocol   - Seizure precaution per halfway protocol   - Decubitus precautions:        -  for positioning   - Pressure reducing foam mattress   - Turn patient every two hours. Use wedge pillows to anchor patient    CONSULTS:     Physical Therapy to evaluate and treat     Occupational Therapy to evaluate and treat     Speech Therapy  to evaluate and treat     Nutrition to evaluate and recommend diet     Psychiatry to evaluate and follow patients for delirium    MISCELLANEOUS CARE:  )            Routine Skin for Bedridden Patients:  Apply moisture barrier cream to all    skin folds and wet areas in perineal area daily and after baths and                           all bowel movements.             DIABETES CARE:        Check blood sugar:     Fingerstick blood sugar AC and HS   Fingerstick blood sugar every 6 hours if unable to eat      Report CBG < 60 or > 400 to physician.                                          Insulin Sliding Scale          Glucose  Novolog Insulin Subcutaneous        0 - 60   Orange juice or glucose tablet, hold insulin      No insulin   201-250  2 units   251-300  4 units   301-350  6 units   351-400  8 units   >400   10 units then call physician      Medications: Discontinue all previous medication orders, if any. See new list below.       Medication List        CHANGE how you take these medications      amitriptyline 10 MG tablet  Commonly known as: ELAVIL  TAKE 1 TABLET(10 MG) BY MOUTH EVERY EVENING  What changed: when to take this            CONTINUE taking these medications      albuterol 90 mcg/actuation inhaler  Commonly known as: PROVENTIL/VENTOLIN HFA  Inhale 2 puffs into the lungs every 4 (four) hours as needed for Wheezing or Shortness of Breath. Rescue     allopurinoL 100 MG tablet  Commonly known as: ZYLOPRIM  Take 100 mg by mouth Daily.     amLODIPine 5 MG tablet  Commonly known as: NORVASC  Take 1 tablet by mouth once daily.     apixaban 2.5 mg Tab  Commonly known  as: ELIQUIS  2.5 mg by FEEDING TUBE route 2 (two) times daily.     aspirin 81 MG EC tablet  Commonly known as: ECOTRIN  Take 81 mg by mouth once daily.     blood sugar diagnostic Strp  ACCU CHEK KEVAN PLUS TEST STRIPS     blood-glucose meter Mis  ACCU CHEK KEVAN PLUS METER: USE 4X/D     carvediloL 3.125 MG tablet  Commonly known as: COREG  3.125 mg by Nasogastric route 2 (two) times daily.     colchicine 0.6 mg tablet  Commonly known as: COLCRYS  2 po every day prn gout attack     DEXCOM G7  Misc  Generic drug: blood-glucose meter,continuous  CONTINUES GLUCOSE MONITORING     DEXCOM G7 SENSOR Kathy  Generic drug: blood-glucose sensor  CONTINUOUS GLUCOSE MONITORING: CHANGE EVERY 10 DAYS.     dicyclomine 20 mg tablet  Commonly known as: BENTYL  Take 20 mg by mouth 2 (two) times daily.     divalproex  MG 24 hr tablet  Commonly known as: DEPAKOTE ER  Take 750 mg by mouth nightly.     ergocalciferol 50,000 unit Cap  Commonly known as: ERGOCALCIFEROL  Take 1 capsule by mouth every 7 days.     FARXIGA 10 mg tablet  Generic drug: dapagliflozin propanediol  Take 10 mg by mouth once daily.     FLOMAX 0.4 mg Cap  Generic drug: tamsulosin  Take 0.4 mg by mouth every evening.     gabapentin 800 MG tablet  Commonly known as: NEURONTIN  Take 800 mg by mouth 3 (three) times daily.     HumaLOG KwikPen Insulin 100 unit/mL pen  Generic drug: insulin lispro  Inject into the skin. 0-69=0 insulin give glucose;  = 0 insulin ; 181-200= 3 units; 201-250= 6 units; 251-300 = 9 units; 301-350= 12 units; 351-400= 15 units; 401-700= 18 units subcutaneously before meals and at bedtime for diabetes.     insulin glargine U-100 (Lantus) 100 unit/mL injection  Inject 25 Units into the skin once daily.     lisinopriL 20 MG tablet  Commonly known as: PRINIVIL,ZESTRIL  Take 1 tablet by mouth once daily.     methocarbamoL 500 MG Tab  Commonly known as: ROBAXIN  Take 1 tablet by mouth 4 (four) times daily.     multivitamin with folic  "acid 400 mcg Tab  Take 1 tablet by mouth once daily.     nitroGLYCERIN 0.4 MG SL tablet  Commonly known as: NITROSTAT  Place 0.4 mg under the tongue every 5 (five) minutes as needed for Chest pain.     omeprazole 20 MG capsule  Commonly known as: PRILOSEC  TK 1 C PO QD FOR CONTROL OF STOMACH ACID BEFORE BREAKFAST     pilocarpine HCL 1% 1 % ophthalmic solution  Commonly known as: PILOCAR  Place 1 drop into both eyes 4 (four) times daily.     rosuvastatin 20 MG tablet  Commonly known as: CRESTOR  Take 1 tablet by mouth once daily.            STOP taking these medications      acetaZOLAMIDE 250 MG tablet  Commonly known as: DIAMOX     docusate sodium 100 MG capsule  Commonly known as: COLACE     DULoxetine 60 MG capsule  Commonly known as: CYMBALTA     famotidine 20 MG tablet  Commonly known as: PEPCID     fluticasone propionate 50 mcg/actuation nasal spray  Commonly known as: FLONASE     furosemide 20 MG tablet  Commonly known as: LASIX     hydroCHLOROthiazide 12.5 mg capsule  Commonly known as: MICROZIDE     INVOKANA 300 mg Tab tablet  Generic drug: canagliflozin     ketoconazole 2 % cream  Commonly known as: NIZORAL     lancets Misc     levocetirizine 5 MG tablet  Commonly known as: XYZAL     LIDOcaine 5 %  Commonly known as: LIDODERM     metoclopramide HCl 10 MG tablet  Commonly known as: REGLAN     mupirocin 2 % ointment  Commonly known as: BACTROBAN     omega-3 acid ethyl esters 1 gram capsule  Commonly known as: LOVAZA     oxyCODONE-acetaminophen 7.5-325 mg per tablet  Commonly known as: PERCOCET     pen needle, diabetic 31 gauge x 5/16" Ndle     potassium chloride SA 20 MEQ tablet  Commonly known as: K-DUR,KLOR-CON     promethazine 25 MG tablet  Commonly known as: PHENERGAN     spironolactone 25 MG tablet  Commonly known as: ALDACTONE     zolpidem 10 mg Tab  Commonly known as: AMBIEN            ASK your doctor about these medications      atropine 1% 1 % Drop  Commonly known as: ISOPTO ATROPINE  INSTILL 1 DROP " IN LEFT EYE TWICE DAILY     ciclopirox 0.77 % Crea  Commonly known as: LOPROX  Apply topically 2 (two) times daily.     ciclopirox 0.77 % Gel  SMARTSIG:Sparingly Topical Twice Daily     dorzolamide-timolol 2-0.5% 22.3-6.8 mg/mL ophthalmic solution  Commonly known as: COSOPT  Place 1 drop into the left eye 2 (two) times daily.     oxybutynin 10 MG 24 hr tablet  Commonly known as: DITROPAN-XL  Take 1 tablet by mouth once daily.                    _________________________________  Cecily Anderson NP  05/29/2024

## 2024-05-29 NOTE — HOSPITAL COURSE
"Admission to hospital for chest pain. Mild elevated troponin trend flat pattern. AICD interrogation showed last NSVT May 4 and May 12 and device appears to be currently functioning as programed. NST "equivocal myocardial perfusion scan." 2  D echo showed  EF 35% (previously 25%), grade I DD, and normal wall motion. No chest pain during observations stay. Continue ASA/eliquis and statin. Patient HDS for discharge back to [facility]. Update Daughter Evens via phone on discharge.   "

## 2024-05-29 NOTE — NURSING
[facility], [facility]  Nurses Note -- 4 Eyes      5/29/2024       Skin assessed on: Q Shift      [x] No Pressure Injuries Present    [x]Prevention Measures Documented    [] Yes LDA  for Pressure Injury Previously documented     [] Yes New Pressure Injury Discovered   [] LDA for New Pressure Injury Added      Attending RN:  Arlene Perez RN     Second RN:  LUL Romero

## 2024-05-29 NOTE — NURSING
Patient discharged per MD order.  IV removed.  Catheter tip intact.  No distress noted.  Discharge instructions and AVS given to [facility] by CM.  Patient verbalized understanding.  VSS. Afebrile.  No complaints of pain, N/V, diarrhea, or SOB. Patient left with belongings to Main Entrance via stretcher per [facility] transport.

## 2024-05-29 NOTE — ASSESSMENT & PLAN NOTE
Chronic, uncontrolled. Latest blood pressure and vitals reviewed-     Temp:  [97.7 °F (36.5 °C)-98.8 °F (37.1 °C)]   Pulse:  [62-90]   Resp:  [14-26]   BP: (140-177)/(78-95)   SpO2:  [99 %-100 %] .   Home meds for hypertension were reviewed and noted below.   Hypertension Medications               amLODIPine (NORVASC) 5 MG tablet Take 1 tablet by mouth once daily.    carvediloL (COREG) 3.125 MG tablet 3.125 mg by Nasogastric route 2 (two) times daily.    furosemide (LASIX) 20 MG tablet Take 20 mg by mouth daily as needed. Takes every other day    hydroCHLOROthiazide (MICROZIDE) 12.5 mg capsule Take 12.5 mg by mouth once daily.    lisinopriL (PRINIVIL,ZESTRIL) 20 MG tablet Take 1 tablet by mouth once daily.    nitroGLYCERIN (NITROSTAT) 0.4 MG SL tablet Place 0.4 mg under the tongue every 5 (five) minutes as needed for Chest pain.    spironolactone (ALDACTONE) 25 MG tablet Take 1 tablet by mouth once daily.            While in the hospital, will manage blood pressure as follows; Continue home antihypertensive regimen    Will utilize p.r.n. blood pressure medication only if patient's blood pressure greater than 160/100 and he develops symptoms such as worsening chest pain or shortness of breath.

## 2024-05-29 NOTE — SUBJECTIVE & OBJECTIVE
Past Medical History:   Diagnosis Date    Cataract     Cellulitis of penis 07/06/2018    COPD (chronic obstructive pulmonary disease)     Coronary artery disease     Diabetes mellitus type II     DJD (degenerative joint disease)     Glaucoma     Gout     Hypertension     Kidney stone     MI (myocardial infarction) 2010    Obesity     JOSE (obstructive sleep apnea)     Uveitis        Past Surgical History:   Procedure Laterality Date    CARDIAC DEFIBRILLATOR PLACEMENT      CATARACT EXTRACTION W/  INTRAOCULAR LENS IMPLANT Right 2020    OUTSIDE [facility] ()    CYSTOSCOPY N/A 10/01/2020    Procedure: CYSTOSCOPY;  Surgeon: Monica Lieberman MD;  Location: [facility] OR;  Service: Urology;  Laterality: N/A;  RN PRE OP Covid screen 9-  C A    DESTRUCTION, CILIARY BODY, USING LASER Right 7/13/2023    Procedure: DESTRUCTION, CILIARY BODY, USING LASER;  Surgeon: Shaina Gan MD;  Location: [facility] OR 1ST FLR;  Service: Ophthalmology;  Laterality: Right;    DESTRUCTION, CILIARY BODY, USING LASER Right 8/10/2023    Procedure: DESTRUCTION, CILIARY BODY, USING LASER;  Surgeon: Shaina Gan MD;  Location: [facility] OR 1ST FLR;  Service: Ophthalmology;  Laterality: Right;    DESTRUCTION, CILIARY BODY, USING LASER Right 12/21/2023    Procedure: DESTRUCTION, CILIARY BODY, USING LASER;  Surgeon: Shaina Gan MD;  Location: [facility] OR 1ST FLR;  Service: Ophthalmology;  Laterality: Right;  g-probe    excisional biopsy left inguinal lymph node      FOOT SURGERY      right foot surgery     INSERTION OF INFLATABLE PENILE PROSTHESIS N/A 01/25/2022    Procedure: INSERTION, PENILE PROSTHESIS, INFLATABLE;  Surgeon: Monica Lieberman MD;  Location: [facility] OR;  Service: Urology;  Laterality: N/A;  Beezik JUAN LUIS ARNOLD [phone] TEXTED HIM @ 5:32PM ON 12-  RN PRE OP 12-28-21. Covid NEGATIVE 1-3-22. CA-----AICD-----HAS CARDS CLEARANCE    kidney stones      lithotripsy    REPAIR, SURGICAL WOUND, EYE, ANTERIOR SEGMENT  Right 10/05/2021    Procedure: REPAIR, SURGICAL WOUND, EYE, ANTERIOR SEGMENT;  Surgeon: Adelaide Allen MD;  Location: [facility] OR [address] [place] [address];  Service: Ophthalmology;  Laterality: Right;  Anterior Chamber Wash Out Right Eye     Surgery for bulging disc at C7         Review of patient's allergies indicates:   Allergen Reactions    Cephalexin Other (See Comments)     vomitting    Darvocet a500 [propoxyphene n-acetaminophen] Itching    Morphine Itching           Tramadol Itching       No current facility-administered medications on file prior to encounter.     Current Outpatient Medications on File Prior to Encounter   Medication Sig    albuterol (PROVENTIL/VENTOLIN HFA) 90 mcg/actuation inhaler Inhale 2 puffs into the lungs every 4 (four) hours as needed for Wheezing or Shortness of Breath. Rescue (Patient taking differently: Inhale 2 puffs into the lungs every 3 (three) hours. Rescue)    allopurinol (ZYLOPRIM) 100 MG tablet Take 100 mg by mouth Daily.    amitriptyline (ELAVIL) 10 MG tablet TAKE 1 TABLET(10 MG) BY MOUTH EVERY EVENING (Patient taking differently: Take 10 mg by mouth once daily.)    amLODIPine (NORVASC) 5 MG tablet Take 1 tablet by mouth once daily.    apixaban (ELIQUIS) 2.5 mg Tab 2.5 mg by FEEDING TUBE route 2 (two) times daily.    aspirin (ECOTRIN) 81 MG EC tablet Take 81 mg by mouth once daily.    blood sugar diagnostic Strp ACCU CHEK KEVAN PLUS TEST STRIPS    blood-glucose meter Misc ACCU CHEK KEVAN PLUS METER: USE 4X/D    carvediloL (COREG) 3.125 MG tablet 3.125 mg by Nasogastric route 2 (two) times daily.    colchicine (COLCRYS) 0.6 mg tablet 2 po every day prn gout attack    dapagliflozin (FARXIGA) 10 mg tablet Take 10 mg by mouth once daily.    dicyclomine (BENTYL) 20 mg tablet Take 20 mg by mouth 2 (two) times daily.    divalproex ER (DEPAKOTE ER) 250 MG 24 hr tablet Take 750 mg by mouth nightly.    ergocalciferol (ERGOCALCIFEROL) 50,000 unit Cap Take 1 capsule by mouth every 7 days.    gabapentin  (NEURONTIN) 800 MG tablet Take 800 mg by mouth 3 (three) times daily.    insulin glargine U-100, Lantus, 100 unit/mL injection Inject 25 Units into the skin once daily.    insulin lispro (HUMALOG KWIKPEN INSULIN) 100 unit/mL pen Inject into the skin. 0-69=0 insulin give glucose;  = 0 insulin ; 181-200= 3 units; 201-250= 6 units; 251-300 = 9 units; 301-350= 12 units; 351-400= 15 units; 401-700= 18 units subcutaneously before meals and at bedtime for diabetes.    methocarbamoL (ROBAXIN) 500 MG Tab Take 1 tablet by mouth 4 (four) times daily.    multivitamin with folic acid 400 mcg Tab Take 1 tablet by mouth once daily.    pilocarpine HCL 1% (PILOCAR) 1 % ophthalmic solution Place 1 drop into both eyes 4 (four) times daily.    rosuvastatin (CRESTOR) 20 MG tablet Take 1 tablet by mouth once daily.    tamsulosin (FLOMAX) 0.4 mg Cap Take 0.4 mg by mouth every evening.    acetaZOLAMIDE (DIAMOX) 250 MG tablet TAKE 1 TABLET(250 MG) BY MOUTH TWICE DAILY (Patient not taking: Reported on 5/28/2024)    atropine 1% (ISOPTO ATROPINE) 1 % Drop INSTILL 1 DROP IN LEFT EYE TWICE DAILY (Patient not taking: Reported on 5/28/2024)    blood-glucose meter,continuous (DEXCOM G7 ) Misc CONTINUES GLUCOSE MONITORING    blood-glucose sensor (DEXCOM G7 SENSOR) Kathy CONTINUOUS GLUCOSE MONITORING: CHANGE EVERY 10 DAYS.    ciclopirox (LOPROX) 0.77 % Crea Apply topically 2 (two) times daily. (Patient not taking: Reported on 5/28/2024)    ciclopirox 0.77 % Gel SMARTSIG:Sparingly Topical Twice Daily (Patient not taking: Reported on 5/28/2024)    docusate sodium (COLACE) 100 MG capsule Take 1 capsule (100 mg total) by mouth 2 (two) times daily. (Patient not taking: Reported on 5/28/2024)    dorzolamide-timolol 2-0.5% (COSOPT) 22.3-6.8 mg/mL ophthalmic solution Place 1 drop into the left eye 2 (two) times daily. (Patient not taking: Reported on 5/28/2024)    DULoxetine (CYMBALTA) 60 MG capsule Take 1 capsule by mouth once daily. (Patient  not taking: Reported on 5/28/2024)    famotidine (PEPCID) 20 MG tablet Take 20 mg by mouth 2 (two) times daily as needed. (Patient not taking: Reported on 5/28/2024)    fluticasone propionate (FLONASE) 50 mcg/actuation nasal spray 2 sprays (100 mcg total) by Each Nostril route once daily. (Patient not taking: Reported on 5/28/2024)    furosemide (LASIX) 20 MG tablet Take 20 mg by mouth daily as needed. Takes every other day (Patient not taking: Reported on 5/28/2024)    hydroCHLOROthiazide (MICROZIDE) 12.5 mg capsule Take 12.5 mg by mouth once daily. (Patient not taking: Reported on 5/28/2024)    INVOKANA 300 mg Tab tablet Take 300 mg by mouth once daily. (Patient not taking: Reported on 5/28/2024)    ketoconazole (NIZORAL) 2 % cream Apply  a small amount to affected area twice a day  apply to skin for fungal infection (Patient not taking: Reported on 5/28/2024)    lancets Misc ACCU CHEK SOFTCLIX LANCETS: USE 4X DAILY    levocetirizine (XYZAL) 5 MG tablet Take 5 mg by mouth daily as needed.    LIDOcaine (LIDODERM) 5 % Apply 1-3 patches every day prn pain    lisinopriL (PRINIVIL,ZESTRIL) 20 MG tablet Take 1 tablet by mouth once daily.    metoclopramide HCl (REGLAN) 10 MG tablet Take 1 tablet (10 mg total) by mouth every 6 (six) hours as needed. (Patient not taking: Reported on 5/28/2024)    mupirocin (BACTROBAN) 2 % ointment Apply topically 3 (three) times daily. (Patient not taking: Reported on 5/28/2024)    nitroGLYCERIN (NITROSTAT) 0.4 MG SL tablet Place 0.4 mg under the tongue every 5 (five) minutes as needed for Chest pain.    omega-3 acid ethyl esters (LOVAZA) 1 gram capsule Take 2 capsules by mouth once daily. (Patient not taking: Reported on 5/28/2024)    omeprazole (PRILOSEC) 20 MG capsule TK 1 C PO QD FOR CONTROL OF STOMACH ACID BEFORE BREAKFAST    oxybutynin (DITROPAN-XL) 10 MG 24 hr tablet Take 1 tablet by mouth once daily. (Patient not taking: Reported on 5/28/2024)    oxyCODONE-acetaminophen (PERCOCET)  "7.5-325 mg per tablet Take 1 tablet by mouth every 4 (four) hours as needed. (Patient not taking: Reported on 5/28/2024)    pen needle, diabetic 31 gauge x 5/16" Ndle Inject 1 each into the skin.    potassium chloride SA (K-DUR,KLOR-CON) 20 MEQ tablet Take 20 mEq by mouth once daily. (Patient not taking: Reported on 5/28/2024)    promethazine (PHENERGAN) 25 MG tablet Take 1 tablet by mouth every 4 (four) hours as needed. (Patient not taking: Reported on 5/28/2024)    spironolactone (ALDACTONE) 25 MG tablet Take 1 tablet by mouth once daily. (Patient not taking: Reported on 5/28/2024)    zolpidem (AMBIEN) 10 mg Tab Take 5 mg by mouth nightly as needed. (Patient not taking: Reported on 5/28/2024)    [DISCONTINUED] CRESTOR 20 mg tablet Take 20 mg by mouth every evening.     [DISCONTINUED] insulin glargine-lixisenatide (SOLIQUA 100/33) 100 unit-33 mcg/mL InPn pen Inject 60 Units into the skin every morning.    [DISCONTINUED] metoprolol succinate (TOPROL-XL) 50 MG 24 hr tablet Take 50 mg by mouth every evening.     [DISCONTINUED] sotaloL (BETAPACE) 80 MG tablet TAKE 1 TABLET BY MOUTH TWICE DAILY    [DISCONTINUED] tiZANidine 4 mg Cap Take 4 mg by mouth 2 (two) times a day.     Family History       Problem Relation (Age of Onset)    Asthma Daughter    Cancer Maternal Uncle, Cousin    Diabetes Mother, Brother    Heart disease Father    Hypertension Mother, Brother    Kidney disease Cousin          Tobacco Use    Smoking status: Never     Passive exposure: Past    Smokeless tobacco: Never   Substance and Sexual Activity    Alcohol use: No    Drug use: No    Sexual activity: Not Currently     Partners: Female     Comment: divorce     Review of Systems   Constitutional: Negative.   HENT: Negative.     Eyes: Negative.    Cardiovascular:  Positive for chest pain.   Respiratory: Negative.     Endocrine: Negative.    Hematologic/Lymphatic: Negative.    Skin: Negative.    Musculoskeletal: Negative.    Gastrointestinal: Negative.  "   Genitourinary: Negative.    Neurological: Negative.    Psychiatric/Behavioral: Negative.     Allergic/Immunologic: Negative.      Objective:     Vital Signs (Most Recent):  Temp: 97.8 °F (36.6 °C) (05/28/24 1841)  Pulse: 68 (05/28/24 1917)  Resp: 20 (05/28/24 1841)  BP: (!) 165/80 (05/28/24 1841)  SpO2: 99 % (05/28/24 1841) Vital Signs (24h Range):  Temp:  [97.7 °F (36.5 °C)-98.8 °F (37.1 °C)] 97.8 °F (36.6 °C)  Pulse:  [62-90] 68  Resp:  [14-26] 20  SpO2:  [99 %-100 %] 99 %  BP: (140-177)/(78-95) 165/80     Weight: 108.9 kg (240 lb)  Body mass index is 37.59 kg/m².    SpO2: 99 %       No intake or output data in the 24 hours ending 05/28/24 1946    Lines/Drains/Airways       Peripheral Intravenous Line  Duration                  Peripheral IV - Single Lumen 05/28/24 0500 20 G Right Antecubital <1 day                     Physical Exam  Vitals reviewed.   Constitutional:       Appearance: He is well-developed.   HENT:      Head: Normocephalic.   Eyes:      Conjunctiva/sclera: Conjunctivae normal.      Pupils: Pupils are equal, round, and reactive to light.   Cardiovascular:      Rate and Rhythm: Normal rate and regular rhythm.      Heart sounds: Murmur heard.   Pulmonary:      Effort: Pulmonary effort is normal.      Breath sounds: Normal breath sounds.   Abdominal:      General: Bowel sounds are normal.      Palpations: Abdomen is soft.   Musculoskeletal:      Cervical back: Normal range of motion and neck supple.   Skin:     General: Skin is warm.   Neurological:      Mental Status: He is alert and oriented to person, place, and time.          Significant Labs:     DATA:     Laboratory:  CBC:  Recent Labs   Lab 10/07/21  0510 12/28/21  0943 05/28/24  0432   WBC 8.47 7.69 5.93   Hemoglobin 12.3 L 14.2 12.5 L   Hematocrit 36.1 L 42.5 39.7 L   Platelets 228 194 204       CHEMISTRIES:  Recent Labs   Lab 10/06/21  0323 10/07/21  0510 12/28/21  0943 03/08/23  1548 04/05/23  0457 04/06/23  0659 04/18/23  1038  05/28/24  0432   Glucose 358 H 161 H 146 H  --   --   --   --  156 H   Sodium 137 137 141   < > 139 137 138 141   Potassium 4.7 4.4 3.9   < > 4.2 4.2 4.4 4.0   BUN 29 H 28 H 21 H   < > 24.1 H 19.1 H 17.8 12   Creatinine 1.6 H 1.3 1.4   < > 1.34 H 1.06 1.43 H 1.1   eGFR if African American 54.5 A >60.0 >60  --   --   --   --   --    eGFR   --   --   --    < > 61 L 81 L 56 L  --    eGFR if non  47.1 A >60.0 55 A  --   --   --   --   --    Calcium 9.5 9.1 9.6   < > 8.8 9.0 8.7 9.8   Magnesium  --   --   --   --   --   --   --  1.8    < > = values in this interval not displayed.       CARDIAC BIOMARKERS:  Recent Labs   Lab 03/29/24 2051 05/28/24 0432 05/28/24  0933   CK 90  --   --    Troponin I  --  0.062 H 0.068 H       COAGS:  Recent Labs   Lab 03/25/24  1051 03/29/24 2051   INR 1.0 1.0       LIPIDS/LFTS:  Recent Labs   Lab 04/06/23  0659 04/18/23  1038 05/28/24 0432   Cholesterol  --   --  155   Triglycerides  --   --  155 H   HDL  --   --  37 L   LDL Cholesterol  --   --  87.0   Non-HDL Cholesterol  --   --  118   AST 26 14 L 13   ALT 30 28 9 L       Hemoglobin A1C   Date Value Ref Range Status   02/06/2024 9.8 (H) <5.7 % Final   11/16/2023 7.5 (H) <5.7 % Final   10/24/2023 7.9 (H) <5.7 % Final   01/26/2022 7.5 (H) 4.0 - 5.6 % Final     Comment:     ADA Screening Guidelines:  5.7-6.4%  Consistent with prediabetes  >or=6.5%  Consistent with diabetes    High levels of fetal hemoglobin interfere with the HbA1C  assay. Heterozygous hemoglobin variants (HbS, HgC, etc)do  not significantly interfere with this assay.   However, presence of multiple variants may affect accuracy.     10/01/2021 7.5 (H) 4.0 - 5.6 % Final     Comment:     ADA Screening Guidelines:  5.7-6.4%  Consistent with prediabetes  >or=6.5%  Consistent with diabetes    High levels of fetal hemoglobin interfere with the HbA1C  assay. Heterozygous hemoglobin variants (HbS, HgC, etc)do  not significantly interfere with  this assay.   However, presence of multiple variants may affect accuracy.     01/19/2021 7.5 (H) 4.0 - 5.6 % Final     Comment:     ADA Screening Guidelines:  5.7-6.4%  Consistent with prediabetes  >or=6.5%  Consistent with diabetes  High levels of fetal hemoglobin interfere with the HbA1C  assay. Heterozygous hemoglobin variants (HbS, HgC, etc)do  not significantly interfere with this assay.   However, presence of multiple variants may affect accuracy.         TSH  Recent Labs   Lab 05/28/24  0432   TSH 1.580       The 10-year ASCVD risk score (Hair SANDOVAL, et al., 2019) is: 36.4%    Values used to calculate the score:      Age: 59 years      Sex: Male      Is Non- : Yes      Diabetic: Yes      Tobacco smoker: No      Systolic Blood Pressure: 165 mmHg      Is BP treated: Yes      HDL Cholesterol: 37 mg/dL      Total Cholesterol: 155 mg/dL       BNP    Lab Results   Component Value Date/Time    BNP 26 05/28/2024 04:32 AM    BNP <10 08/05/2016 04:30 PM    BNP <10 08/29/2015 11:56 PM            ECHO    Results for orders placed during the hospital encounter of 05/28/24    Echo    Interpretation Summary    Left Ventricle: The left ventricle is normal in size. Mildly increased wall thickness. There is moderately reduced systolic function with a visually estimated ejection fraction of 35 %. Grade I diastolic dysfunction.    Right Ventricle: Normal right ventricular cavity size. Systolic function is normal. Pacemaker lead present in the ventricle.    Left Atrium: Left atrium is mildly dilated.    Aortic Valve: There is mild stenosis. Aortic valve area by VTI is 1.77 cm². Aortic valve peak velocity is 1.23 m/s. Mean gradient is 4 mmHg. The dimensionless index is 0.45.    Mitral Valve: There is mild regurgitation.    Pulmonary Artery: The estimated pulmonary artery systolic pressure is 34 mmHg.    IVC/SVC: Normal venous pressure at 3 mmHg.    2018  CONCLUSIONS   Probably normal study with normal lv  function.  Only a question of apical defect or artifact, and I think it is normal. Scan    indicates low risk for cardiac events.

## 2024-05-29 NOTE — NURSING
Pt off the unit going to [facility] by bed via transport. IV access in place, saline locked. NAD or pain noted.

## 2024-05-29 NOTE — DISCHARGE SUMMARY
"[facility] Medicine  Discharge Summary      Patient Name: Se Virgil Mcgraw  MRN: [id number]  SHEA: [id number]  Patient Class: OP- Observation  Admission Date: 5/28/2024  Hospital Length of Stay: 0 days  Discharge Date and Time:  05/29/2024 4:02 PM  Attending Physician: Janes Soria MD   Discharging Provider: Cecily Anderson NP  Primary Care Provider: Coreen Anderson MD    Primary Care Team: CECILY ANDERSON    HPI:   Se Virgil Mcgraw is a 60 yo male with significant history for hypertension, hyperlipidemia, diabetes type 2, right eye blind, CAD stent 2006, SVT sp AICD 2012, chronic systolic diastolic heart failure, COPD and recent R MCA stroke (March 2024 [facility]) started on Eliquis who presents to the ED from [facility] for left sided sharp chest pain nonradiating that woke him up at 1:00 a.m.  Patient was given Tylenol and by the time patient arrived to ED pain resolved.  No shortness a breath nausea vomiting or abdominal pain. No aggravating factors.   Currently, wheelchair bound at SNF.   EKG NSR atrial pace and PAC. Qtc 413. Elevated troponin trend flat pattern.     Recent metronic AICD interrogation 3/27/24   In clinic device check. Normal device function.  Estimated battery longevity 6.4 years.  Threshold and impedance WNL.  22 VT & SVT episodes with the viewable appearing to be 1:1. Likely SVT.  The longest episode lasted 3 minutes at 176 bpm.  All HV therapies changed to B>AX per [employer]'s May 2023 advisory.  Next transmission in 3 months.     * No surgery found *      Hospital Course:   Admission to hospital for chest pain. Mild elevated troponin trend flat pattern. AICD interrogation showed last NSVT May 4 and May 12 and device appears to be currently functioning as programed. NST "equivocal myocardial perfusion scan." 2  D echo showed  EF 35% (previously 25%), grade I DD, and normal wall motion. No chest pain during observations stay. Continue ASA/eliquis and statin. " Patient HDS for discharge back to [facility]. Update Daughter Evens via phone on discharge.      Goals of Care Treatment Preferences:  Code Status: Full Code      Consults:     Cardiac/Vascular  * Coronary artery disease involving native coronary artery with angina pectoris  Presents to the hospital from [facility] for left-sided chest pain that woke him up at 1:00 a.m. today.  Patient denies shortness or breath.  EKG normal sinus rhythm/atrial paced PAC no evidence of ischemia.  Troponin trend flat pattern 0.062 to 0.068  History of SVT on last AICD interrogation in March  Currently patient is chest pain-free  Obtain AICD interrogation  2D echo  Continue ASA/statin  Hold Eliquis  Cardiology following for stress test tomorrow      Final Active Diagnoses:    Diagnosis Date Noted POA    PRINCIPAL PROBLEM:  Coronary artery disease involving native coronary artery with angina pectoris [I25.119] 03/07/2024 Yes    Hemiplegia as late effect of cerebrovascular accident (CVA) [I69.359] 05/28/2024 Not Applicable    Chronic combined systolic and diastolic heart failure [I50.42] 09/30/2021 Yes    CKD (chronic kidney disease) stage 2, GFR 60-89 ml/min [N18.2] 11/17/2015 Yes    HLD (hyperlipidemia) [E78.5] 11/17/2015 Yes    AICD (automatic cardioverter/defibrillator) present [Z95.810] 11/17/2015 Yes    Diabetes mellitus type 2 in obese [E11.69, E66.9] 08/30/2015 Yes    Essential hypertension [I10] 08/30/2015 Yes      Problems Resolved During this Admission:       Discharged Condition: stable    Disposition: Home or Self Care    Follow Up:   Follow-up Information       Center, [facility] Follow up.    Specialties: Nursing Home Agency, SNF Agency, Skilled Nursing  Why: Nursing Home:  Returning to NH  Contact information:  [address] [place] [address] [phone]                           Patient Instructions:      Diet Cardiac     Notify your health care provider if you experience any of the  following:  temperature >100.4     Notify your health care provider if you experience any of the following:  difficulty breathing or increased cough     Notify your health care provider if you experience any of the following:  increased confusion or weakness     Activity as tolerated       Significant Diagnostic Studies: N/A    Pending Diagnostic Studies:       None           Medications:  Reconciled Home Medications:      Medication List        CHANGE how you take these medications      amitriptyline 10 MG tablet  Commonly known as: ELAVIL  TAKE 1 TABLET(10 MG) BY MOUTH EVERY EVENING  What changed: when to take this            CONTINUE taking these medications      albuterol 90 mcg/actuation inhaler  Commonly known as: PROVENTIL/VENTOLIN HFA  Inhale 2 puffs into the lungs every 4 (four) hours as needed for Wheezing or Shortness of Breath. Rescue     allopurinoL 100 MG tablet  Commonly known as: ZYLOPRIM  Take 100 mg by mouth Daily.     amLODIPine 5 MG tablet  Commonly known as: NORVASC  Take 1 tablet by mouth once daily.     apixaban 2.5 mg Tab  Commonly known as: ELIQUIS  2.5 mg by FEEDING TUBE route 2 (two) times daily.     aspirin 81 MG EC tablet  Commonly known as: ECOTRIN  Take 81 mg by mouth once daily.     blood sugar diagnostic Strp  ACCU CHEK KEVAN PLUS TEST STRIPS     blood-glucose meter Mis  ACCU CHEK KEVAN PLUS METER: USE 4X/D     carvediloL 3.125 MG tablet  Commonly known as: COREG  3.125 mg by Nasogastric route 2 (two) times daily.     colchicine 0.6 mg tablet  Commonly known as: COLCRYS  2 po every day prn gout attack     DEXCOM G7  Misc  Generic drug: blood-glucose meter,continuous  CONTINUES GLUCOSE MONITORING     DEXCOM G7 SENSOR Kathy  Generic drug: blood-glucose sensor  CONTINUOUS GLUCOSE MONITORING: CHANGE EVERY 10 DAYS.     dicyclomine 20 mg tablet  Commonly known as: BENTYL  Take 20 mg by mouth 2 (two) times daily.     divalproex  MG 24 hr tablet  Commonly known as: DEPAKOTE  ER  Take 750 mg by mouth nightly.     ergocalciferol 50,000 unit Cap  Commonly known as: ERGOCALCIFEROL  Take 1 capsule by mouth every 7 days.     FARXIGA 10 mg tablet  Generic drug: dapagliflozin propanediol  Take 10 mg by mouth once daily.     FLOMAX 0.4 mg Cap  Generic drug: tamsulosin  Take 0.4 mg by mouth every evening.     gabapentin 800 MG tablet  Commonly known as: NEURONTIN  Take 800 mg by mouth 3 (three) times daily.     HumaLOG KwikPen Insulin 100 unit/mL pen  Generic drug: insulin lispro  Inject into the skin. 0-69=0 insulin give glucose;  = 0 insulin ; 181-200= 3 units; 201-250= 6 units; 251-300 = 9 units; 301-350= 12 units; 351-400= 15 units; 401-700= 18 units subcutaneously before meals and at bedtime for diabetes.     insulin glargine U-100 (Lantus) 100 unit/mL injection  Inject 25 Units into the skin once daily.     lisinopriL 20 MG tablet  Commonly known as: PRINIVIL,ZESTRIL  Take 1 tablet by mouth once daily.     methocarbamoL 500 MG Tab  Commonly known as: ROBAXIN  Take 1 tablet by mouth 4 (four) times daily.     multivitamin with folic acid 400 mcg Tab  Take 1 tablet by mouth once daily.     nitroGLYCERIN 0.4 MG SL tablet  Commonly known as: NITROSTAT  Place 0.4 mg under the tongue every 5 (five) minutes as needed for Chest pain.     omeprazole 20 MG capsule  Commonly known as: PRILOSEC  TK 1 C PO QD FOR CONTROL OF STOMACH ACID BEFORE BREAKFAST     pilocarpine HCL 1% 1 % ophthalmic solution  Commonly known as: PILOCAR  Place 1 drop into both eyes 4 (four) times daily.     rosuvastatin 20 MG tablet  Commonly known as: CRESTOR  Take 1 tablet by mouth once daily.            STOP taking these medications      acetaZOLAMIDE 250 MG tablet  Commonly known as: DIAMOX     docusate sodium 100 MG capsule  Commonly known as: COLACE     DULoxetine 60 MG capsule  Commonly known as: CYMBALTA     famotidine 20 MG tablet  Commonly known as: PEPCID     fluticasone propionate 50 mcg/actuation nasal  "spray  Commonly known as: FLONASE     furosemide 20 MG tablet  Commonly known as: LASIX     hydroCHLOROthiazide 12.5 mg capsule  Commonly known as: MICROZIDE     INVOKANA 300 mg Tab tablet  Generic drug: canagliflozin     ketoconazole 2 % cream  Commonly known as: NIZORAL     lancets Misc     levocetirizine 5 MG tablet  Commonly known as: XYZAL     LIDOcaine 5 %  Commonly known as: LIDODERM     metoclopramide HCl 10 MG tablet  Commonly known as: REGLAN     mupirocin 2 % ointment  Commonly known as: BACTROBAN     omega-3 acid ethyl esters 1 gram capsule  Commonly known as: LOVAZA     oxyCODONE-acetaminophen 7.5-325 mg per tablet  Commonly known as: PERCOCET     pen needle, diabetic 31 gauge x 5/16" Ndle     potassium chloride SA 20 MEQ tablet  Commonly known as: K-DUR,KLOR-CON     promethazine 25 MG tablet  Commonly known as: PHENERGAN     spironolactone 25 MG tablet  Commonly known as: ALDACTONE     zolpidem 10 mg Tab  Commonly known as: AMBIEN            ASK your doctor about these medications      atropine 1% 1 % Drop  Commonly known as: ISOPTO ATROPINE  INSTILL 1 DROP IN LEFT EYE TWICE DAILY     ciclopirox 0.77 % Crea  Commonly known as: LOPROX  Apply topically 2 (two) times daily.     ciclopirox 0.77 % Gel  SMARTSIG:Sparingly Topical Twice Daily     dorzolamide-timolol 2-0.5% 22.3-6.8 mg/mL ophthalmic solution  Commonly known as: COSOPT  Place 1 drop into the left eye 2 (two) times daily.     oxybutynin 10 MG 24 hr tablet  Commonly known as: DITROPAN-XL  Take 1 tablet by mouth once daily.              Indwelling Lines/Drains at time of discharge:   Lines/Drains/Airways       Drain  Duration             Male External Urinary Catheter 05/29/24 0214 <1 day                    Time spent on the discharge of patient: 35 minutes         Cecily Anderson NP  Department of Hospital Medicine  [facility]  "

## 2024-05-29 NOTE — PROGRESS NOTES
[facility] Surg  Cardiology  Progress Note    Patient Name: Se Virgil Mcgraw  MRN: [id number]  Admission Date: 5/28/2024  Hospital Length of Stay: 0 days  Code Status: Full Code   Attending Physician: Janes Soria MD   Primary Care Physician: Coreen Anderson MD  Expected Discharge Date: 5/29/2024  Principal Problem:Coronary artery disease involving native coronary artery with angina pectoris    Subjective:       Interval History:  stress test did not show any significant ischemia    Review of Systems   Constitutional: Negative.   HENT: Negative.     Eyes: Negative.    Cardiovascular: Negative.    Respiratory: Negative.     Endocrine: Negative.    Hematologic/Lymphatic: Negative.    Skin: Negative.    Musculoskeletal: Negative.    Gastrointestinal: Negative.    Genitourinary: Negative.    Neurological: Negative.    Psychiatric/Behavioral: Negative.     Allergic/Immunologic: Negative.      Objective:     Vital Signs (Most Recent):  Temp: 97.9 °F (36.6 °C) (05/29/24 1059)  Pulse: 79 (05/29/24 1518)  Resp: 19 (05/29/24 1059)  BP: (!) 166/77 (05/29/24 1059)  SpO2: 97 % (05/29/24 1059) Vital Signs (24h Range):  Temp:  [97.7 °F (36.5 °C)-98.6 °F (37 °C)] 97.9 °F (36.6 °C)  Pulse:  [65-84] 79  Resp:  [17-20] 19  SpO2:  [94 %-100 %] 97 %  BP: (140-181)/(68-95) 166/77     Weight: 108.9 kg (240 lb)  Body mass index is 37.59 kg/m².     SpO2: 97 %         Intake/Output Summary (Last 24 hours) at 5/29/2024 1555  Last data filed at 5/29/2024 1358  Gross per 24 hour   Intake 360 ml   Output 900 ml   Net -540 ml       Lines/Drains/Airways       Drain  Duration             Male External Urinary Catheter 05/29/24 0214 <1 day              Peripheral Intravenous Line  Duration                  Peripheral IV - Single Lumen 05/28/24 0500 20 G Right Antecubital 1 day                       Physical Exam  Vitals reviewed.   Constitutional:       Appearance: He is well-developed.   HENT:      Head: Normocephalic.   Eyes:       Conjunctiva/sclera: Conjunctivae normal.      Pupils: Pupils are equal, round, and reactive to light.   Cardiovascular:      Rate and Rhythm: Normal rate and regular rhythm.      Heart sounds: Normal heart sounds.   Pulmonary:      Effort: Pulmonary effort is normal.      Breath sounds: Normal breath sounds.   Abdominal:      General: Bowel sounds are normal.      Palpations: Abdomen is soft.   Musculoskeletal:      Cervical back: Normal range of motion and neck supple.   Skin:     General: Skin is warm.   Neurological:      Mental Status: He is alert and oriented to person, place, and time.            Significant Labs:     DATA:     Laboratory:  CBC:  Recent Labs   Lab 12/28/21  0943 05/28/24  0432 05/29/24  0503   WBC 7.69 5.93 5.28   Hemoglobin 14.2 12.5 L 12.5 L   Hematocrit 42.5 39.7 L 39.3 L   Platelets 194 204 197       CHEMISTRIES:  Recent Labs   Lab 10/06/21  0323 10/07/21  0510 12/28/21  0943 03/08/23  1548 04/05/23  0457 04/06/23  0659 04/18/23  1038 05/28/24  0432 05/29/24  0503   Glucose 358 H 161 H 146 H  --   --   --   --  156 H 106   Sodium 137 137 141   < > 139 137 138 141 138   Potassium 4.7 4.4 3.9   < > 4.2 4.2 4.4 4.0 3.9   BUN 29 H 28 H 21 H   < > 24.1 H 19.1 H 17.8 12 11   Creatinine 1.6 H 1.3 1.4   < > 1.34 H 1.06 1.43 H 1.1 0.9   eGFR if African American 54.5 A >60.0 >60  --   --   --   --   --   --    eGFR   --   --   --    < > 61 L 81 L 56 L  --   --    eGFR if non  47.1 A >60.0 55 A  --   --   --   --   --   --    Calcium 9.5 9.1 9.6   < > 8.8 9.0 8.7 9.8 9.6   Magnesium  --   --   --   --   --   --   --  1.8  --     < > = values in this interval not displayed.       CARDIAC BIOMARKERS:  Recent Labs   Lab 03/29/24 2051 05/28/24 0432 05/28/24  0933   CK 90  --   --    Troponin I  --  0.062 H 0.068 H       COAGS:  Recent Labs   Lab 03/25/24  1051 03/29/24 2051   INR 1.0 1.0       LIPIDS/LFTS:  Recent Labs   Lab 04/18/23  1038 05/28/24 0432 05/29/24  0502    Cholesterol  --  155  --    Triglycerides  --  155 H  --    HDL  --  37 L  --    LDL Cholesterol  --  87.0  --    Non-HDL Cholesterol  --  118  --    AST 14 L 13 13   ALT 28 9 L 9 L       Hemoglobin A1C   Date Value Ref Range Status   05/28/2024 5.8 (H) 4.0 - 5.6 % Final     Comment:     ADA Screening Guidelines:  5.7-6.4%  Consistent with prediabetes  >or=6.5%  Consistent with diabetes    High levels of fetal hemoglobin interfere with the HbA1C  assay. Heterozygous hemoglobin variants (HbS, HgC, etc)do  not significantly interfere with this assay.   However, presence of multiple variants may affect accuracy.     02/06/2024 9.8 (H) <5.7 % Final   11/16/2023 7.5 (H) <5.7 % Final   10/24/2023 7.9 (H) <5.7 % Final   01/26/2022 7.5 (H) 4.0 - 5.6 % Final     Comment:     ADA Screening Guidelines:  5.7-6.4%  Consistent with prediabetes  >or=6.5%  Consistent with diabetes    High levels of fetal hemoglobin interfere with the HbA1C  assay. Heterozygous hemoglobin variants (HbS, HgC, etc)do  not significantly interfere with this assay.   However, presence of multiple variants may affect accuracy.     10/01/2021 7.5 (H) 4.0 - 5.6 % Final     Comment:     ADA Screening Guidelines:  5.7-6.4%  Consistent with prediabetes  >or=6.5%  Consistent with diabetes    High levels of fetal hemoglobin interfere with the HbA1C  assay. Heterozygous hemoglobin variants (HbS, HgC, etc)do  not significantly interfere with this assay.   However, presence of multiple variants may affect accuracy.         TSH  Recent Labs   Lab 05/28/24  0432   TSH 1.580       The 10-year ASCVD risk score (Hair SANDOVAL, et al., 2019) is: 36.8%    Values used to calculate the score:      Age: 59 years      Sex: Male      Is Non- : Yes      Diabetic: Yes      Tobacco smoker: No      Systolic Blood Pressure: 166 mmHg      Is BP treated: Yes      HDL Cholesterol: 37 mg/dL      Total Cholesterol: 155 mg/dL       BNP    Lab Results   Component  Value Date/Time    BNP 26 05/28/2024 04:32 AM    BNP <10 08/05/2016 04:30 PM    BNP <10 08/29/2015 11:56 PM            ECHO    Results for orders placed during the hospital encounter of 05/28/24    Echo    Interpretation Summary    Left Ventricle: The left ventricle is normal in size. Mildly increased wall thickness. There is moderately reduced systolic function with a visually estimated ejection fraction of 35 %. Grade I diastolic dysfunction.    Right Ventricle: Normal right ventricular cavity size. Systolic function is normal. Pacemaker lead present in the ventricle.    Left Atrium: Left atrium is mildly dilated.    Aortic Valve: There is mild stenosis. Aortic valve area by VTI is 1.77 cm². Aortic valve peak velocity is 1.23 m/s. Mean gradient is 4 mmHg. The dimensionless index is 0.45.    Mitral Valve: There is mild regurgitation.    Pulmonary Artery: The estimated pulmonary artery systolic pressure is 34 mmHg.    IVC/SVC: Normal venous pressure at 3 mmHg.      STRESS TEST    Results for orders placed during the hospital encounter of 05/28/24    Nuclear Stress - Cardiology Interpreted    Interpretation Summary    Equivocal myocardial perfusion scan.    moderate to severe intensity, large sized, equivocal  in the inferoapical wall(s). This finding is equivocal due to diaphragm shadow.    There are no other significant perfusion abnormalities.    The gated perfusion images showed an ejection fraction of 47% at rest.    There is mild global hypokinesis at rest and stress.    The ECG portion of the study is negative for ischemia.    The patient reported no chest pain during the stress test.    There were no arrhythmias during stress.        CATH    No results found for this or any previous visit.      Assessment and Plan:     Brief HPI:     * Coronary artery disease involving native coronary artery with angina pectoris   Atypical chest pain.  Stress test did not show any significant ischemia.  Continue medical  management.  Follow-up in Cardiology Clinic    Results for orders placed during the hospital encounter of 05/28/24    Nuclear Stress - Cardiology Interpreted    Interpretation Summary    Equivocal myocardial perfusion scan.    moderate to severe intensity, large sized, equivocal  in the inferoapical wall(s). This finding is equivocal due to diaphragm shadow.    There are no other significant perfusion abnormalities.    The gated perfusion images showed an ejection fraction of 47% at rest.    There is mild global hypokinesis at rest and stress.    The ECG portion of the study is negative for ischemia.    The patient reported no chest pain during the stress test.    There were no arrhythmias during stress.      Hemiplegia as late effect of cerebrovascular accident (CVA)        AICD (automatic cardioverter/defibrillator) present        CKD (chronic kidney disease) stage 2, GFR 60-89 ml/min  Creatine stable for now. BMP reviewed- noted Estimated Creatinine Clearance: 84 mL/min (based on SCr of 1.1 mg/dL). according to latest data. Based on current GFR, CKD stage is stage 2 - GFR 60-89.  Monitor UOP and serial BMP and adjust therapy as needed. Renally dose meds. Avoid nephrotoxic medications and procedures.    Essential hypertension  Chronic, uncontrolled. Latest blood pressure and vitals reviewed-     Temp:  [97.7 °F (36.5 °C)-98.8 °F (37.1 °C)]   Pulse:  [62-90]   Resp:  [14-26]   BP: (140-177)/(78-95)   SpO2:  [99 %-100 %] .   Home meds for hypertension were reviewed and noted below.   Hypertension Medications               amLODIPine (NORVASC) 5 MG tablet Take 1 tablet by mouth once daily.    carvediloL (COREG) 3.125 MG tablet 3.125 mg by Nasogastric route 2 (two) times daily.    furosemide (LASIX) 20 MG tablet Take 20 mg by mouth daily as needed. Takes every other day    hydroCHLOROthiazide (MICROZIDE) 12.5 mg capsule Take 12.5 mg by mouth once daily.    lisinopriL (PRINIVIL,ZESTRIL) 20 MG tablet Take 1 tablet by  mouth once daily.    nitroGLYCERIN (NITROSTAT) 0.4 MG SL tablet Place 0.4 mg under the tongue every 5 (five) minutes as needed for Chest pain.    spironolactone (ALDACTONE) 25 MG tablet Take 1 tablet by mouth once daily.            While in the hospital, will manage blood pressure as follows; Continue home antihypertensive regimen    Will utilize p.r.n. blood pressure medication only if patient's blood pressure greater than 160/100 and he develops symptoms such as worsening chest pain or shortness of breath.    Diabetes mellitus type 2 in obese  Patient's FSGs are uncontrolled due to hyperglycemia on current medication regimen.  Last A1c reviewed-   Lab Results   Component Value Date    HGBA1C 9.8 (H) 02/06/2024     Most recent fingerstick glucose reviewed-   Recent Labs   Lab 05/28/24  1624   POCTGLUCOSE 82             VTE Risk Mitigation (From admission, onward)           Ordered     enoxaparin injection 40 mg  Every 24 hours         05/28/24 4685                    Cristine Chang MD  Cardiology  [facility] [place] - Med Surg

## 2024-05-29 NOTE — ASSESSMENT & PLAN NOTE
Creatine stable for now. BMP reviewed- noted Estimated Creatinine Clearance: 84 mL/min (based on SCr of 1.1 mg/dL). according to latest data. Based on current GFR, CKD stage is stage 2 - GFR 60-89.  Monitor UOP and serial BMP and adjust therapy as needed. Renally dose meds. Avoid nephrotoxic medications and procedures.

## 2024-05-29 NOTE — CONSULTS
[facility] Surg  Cardiology  Consult Note    Patient Name: Se Virgil Mcgraw  MRN: [id number]  Admission Date: 5/28/2024  Hospital Length of Stay: 0 days  Code Status: Full Code   Attending Provider: Janes Soria MD   Consulting Provider: Cristine Chang MD  Primary Care Physician: Coreen Anderson MD  Principal Problem:Coronary artery disease involving native coronary artery with angina pectoris    Patient information was obtained from patient and ER records.     Consults  Subjective:     Chief Complaint:  cp     HPI:    Patient is a pleasant 59-year-old man.  Multiple medical issues as listed below.  Came in with left upper chest pain.  At the site of his defibrillator.  Describes it as sharp.      Results for orders placed or performed in visit on 12/28/21   EKG 12-lead    Collection Time: 12/28/21  9:33 AM    Narrative    Test Reason : R07.9    Vent. Rate : 079 BPM     Atrial Rate : 079 BPM     P-R Int : 188 ms          QRS Dur : 104 ms      QT Int : 398 ms       P-R-T Axes : 071 065 003 degrees     QTc Int : 456 ms    Normal sinus rhythm  Normal ECG  When compared with ECG of 11-NOV-2017 17:35,  Significant changes have occurred  Confirmed by Darren Springer MD ([id number]) on 12/29/2021 4:41:12 PM    Referred By:             Confirmed By:Darren Springer MD       Results for orders placed during the hospital encounter of 05/28/24    Echo    Interpretation Summary    Left Ventricle: The left ventricle is normal in size. Mildly increased wall thickness. There is moderately reduced systolic function with a visually estimated ejection fraction of 35 %. Grade I diastolic dysfunction.    Right Ventricle: Normal right ventricular cavity size. Systolic function is normal. Pacemaker lead present in the ventricle.    Left Atrium: Left atrium is mildly dilated.    Aortic Valve: There is mild stenosis. Aortic valve area by VTI is 1.77 cm². Aortic valve peak velocity is 1.23 m/s. Mean gradient is 4 mmHg. The dimensionless  index is 0.45.    Mitral Valve: There is mild regurgitation.    Pulmonary Artery: The estimated pulmonary artery systolic pressure is 34 mmHg.    IVC/SVC: Normal venous pressure at 3 mmHg.      No results found for this or any previous visit.      No results found for this or any previous visit.                  HPI: Se Virgil Mcgraw is a 60 yo male with significant history for hypertension, hyperlipidemia, diabetes type 2, right eye blind, CAD stent 2006, SVT sp AICD 2012, chronic systolic diastolic heart failure, COPD and recent R MCA stroke (March 2024 [facility]) started on Eliquis who presents to the ED [facility] for left sided sharp chest pain nonradiating that woke him up at 1:00 a.m.  Patient was given Tylenol and by the time patient arrived to ED pain resolved.  No shortness a breath nausea vomiting or abdominal pain. No aggravating factors.   Currently, wheelchair bound at SNF.   EKG NSR atrial pace and PAC. Qtc 413. Elevated troponin trend flat pattern.      Recent metronic AICD interrogation 3/27/24   In clinic device check. Normal device function.  Estimated battery longevity 6.4 years.  Threshold and impedance WNL.  22 VT & SVT episodes with the viewable appearing to be 1:1. Likely SVT.  The longest episode lasted 3 minutes at 176 bpm.  All HV therapies changed to B>AX per [employer]'s May 2023 advisory.  Next transmission in 3 months.     Past Medical History:   Diagnosis Date    Cataract     Cellulitis of penis 07/06/2018    COPD (chronic obstructive pulmonary disease)     Coronary artery disease     Diabetes mellitus type II     DJD (degenerative joint disease)     Glaucoma     Gout     Hypertension     Kidney stone     MI (myocardial infarction) 2010    Obesity     JOSE (obstructive sleep apnea)     Uveitis        Past Surgical History:   Procedure Laterality Date    CARDIAC DEFIBRILLATOR PLACEMENT      CATARACT EXTRACTION W/  INTRAOCULAR LENS IMPLANT Right 2020    [facility]     CYSTOSCOPY N/A 10/01/2020    Procedure: CYSTOSCOPY;  Surgeon: Monica Lieberman MD;  Location: [facility] OR;  Service: Urology;  Laterality: N/A;  RN PRE OP Covid screen 9-  C A    DESTRUCTION, CILIARY BODY, USING LASER Right 7/13/2023    Procedure: DESTRUCTION, CILIARY BODY, USING LASER;  Surgeon: Shaina Gan MD;  [facility];  Service: Ophthalmology;  Laterality: Right;    DESTRUCTION, CILIARY BODY, USING LASER Right 8/10/2023    Procedure: DESTRUCTION, CILIARY BODY, USING LASER;  Surgeon: Shaina Gan MD;  Location: [facility] OR [facility];  Service: Ophthalmology;  Laterality: Right;    DESTRUCTION, CILIARY BODY, USING LASER Right 12/21/2023    Procedure: DESTRUCTION, CILIARY BODY, USING LASER;  Surgeon: Shaina Gan MD;  [facility];  Service: Ophthalmology;  Laterality: Right;  g-probe    excisional biopsy left inguinal lymph node      FOOT SURGERY      right foot surgery     INSERTION OF INFLATABLE PENILE PROSTHESIS N/A 01/25/2022    Procedure: INSERTION, PENILE PROSTHESIS, INFLATABLE;  Surgeon: Monica Lieberman MD;  Location: [facility] OR;  Service: Urology;  Laterality: N/A;  Fatigue [employer] JUAN LUIS ARNOLD [phone] TEXTED HIM @ 5:32PM ON 12-  RN PRE OP 12-28-21. Covid NEGATIVE 1-3-22. CA-----AICD-----HAS CARDS CLEARANCE    kidney stones      lithotripsy    REPAIR, SURGICAL WOUND, EYE, ANTERIOR SEGMENT Right 10/05/2021    Procedure: REPAIR, SURGICAL WOUND, EYE, ANTERIOR SEGMENT;  Surgeon: Adelaide Allen MD;  Location: [facility] OR [address] [place] [address];  Service: Ophthalmology;  Laterality: Right;  Anterior Chamber Wash Out Right Eye     Surgery for bulging disc at C7         Review of patient's allergies indicates:   Allergen Reactions    Cephalexin Other (See Comments)     vomitting    Darvocet a500 [propoxyphene n-acetaminophen] Itching    Morphine Itching           Tramadol Itching       No current facility-administered medications on file prior to encounter.     Current  Outpatient Medications on File Prior to Encounter   Medication Sig    albuterol (PROVENTIL/VENTOLIN HFA) 90 mcg/actuation inhaler Inhale 2 puffs into the lungs every 4 (four) hours as needed for Wheezing or Shortness of Breath. Rescue (Patient taking differently: Inhale 2 puffs into the lungs every 3 (three) hours. Rescue)    allopurinol (ZYLOPRIM) 100 MG tablet Take 100 mg by mouth Daily.    amitriptyline (ELAVIL) 10 MG tablet TAKE 1 TABLET(10 MG) BY MOUTH EVERY EVENING (Patient taking differently: Take 10 mg by mouth once daily.)    amLODIPine (NORVASC) 5 MG tablet Take 1 tablet by mouth once daily.    apixaban (ELIQUIS) 2.5 mg Tab 2.5 mg by FEEDING TUBE route 2 (two) times daily.    aspirin (ECOTRIN) 81 MG EC tablet Take 81 mg by mouth once daily.    blood sugar diagnostic Strp ACCU CHEK KEVAN PLUS TEST STRIPS    blood-glucose meter Misc ACCU CHEK KEVAN PLUS METER: USE 4X/D    carvediloL (COREG) 3.125 MG tablet 3.125 mg by Nasogastric route 2 (two) times daily.    colchicine (COLCRYS) 0.6 mg tablet 2 po every day prn gout attack    dapagliflozin (FARXIGA) 10 mg tablet Take 10 mg by mouth once daily.    dicyclomine (BENTYL) 20 mg tablet Take 20 mg by mouth 2 (two) times daily.    divalproex ER (DEPAKOTE ER) 250 MG 24 hr tablet Take 750 mg by mouth nightly.    ergocalciferol (ERGOCALCIFEROL) 50,000 unit Cap Take 1 capsule by mouth every 7 days.    gabapentin (NEURONTIN) 800 MG tablet Take 800 mg by mouth 3 (three) times daily.    insulin glargine U-100, Lantus, 100 unit/mL injection Inject 25 Units into the skin once daily.    insulin lispro (HUMALOG KWIKPEN INSULIN) 100 unit/mL pen Inject into the skin. 0-69=0 insulin give glucose;  = 0 insulin ; 181-200= 3 units; 201-250= 6 units; 251-300 = 9 units; 301-350= 12 units; 351-400= 15 units; 401-700= 18 units subcutaneously before meals and at bedtime for diabetes.    methocarbamoL (ROBAXIN) 500 MG Tab Take 1 tablet by mouth 4 (four) times daily.    multivitamin  with folic acid 400 mcg Tab Take 1 tablet by mouth once daily.    pilocarpine HCL 1% (PILOCAR) 1 % ophthalmic solution Place 1 drop into both eyes 4 (four) times daily.    rosuvastatin (CRESTOR) 20 MG tablet Take 1 tablet by mouth once daily.    tamsulosin (FLOMAX) 0.4 mg Cap Take 0.4 mg by mouth every evening.    acetaZOLAMIDE (DIAMOX) 250 MG tablet TAKE 1 TABLET(250 MG) BY MOUTH TWICE DAILY (Patient not taking: Reported on 5/28/2024)    atropine 1% (ISOPTO ATROPINE) 1 % Drop INSTILL 1 DROP IN LEFT EYE TWICE DAILY (Patient not taking: Reported on 5/28/2024)    blood-glucose meter,continuous (DEXCOM G7 ) Misc CONTINUES GLUCOSE MONITORING    blood-glucose sensor (DEXCOM G7 SENSOR) Kathy CONTINUOUS GLUCOSE MONITORING: CHANGE EVERY 10 DAYS.    ciclopirox (LOPROX) 0.77 % Crea Apply topically 2 (two) times daily. (Patient not taking: Reported on 5/28/2024)    ciclopirox 0.77 % Gel SMARTSIG:Sparingly Topical Twice Daily (Patient not taking: Reported on 5/28/2024)    docusate sodium (COLACE) 100 MG capsule Take 1 capsule (100 mg total) by mouth 2 (two) times daily. (Patient not taking: Reported on 5/28/2024)    dorzolamide-timolol 2-0.5% (COSOPT) 22.3-6.8 mg/mL ophthalmic solution Place 1 drop into the left eye 2 (two) times daily. (Patient not taking: Reported on 5/28/2024)    DULoxetine (CYMBALTA) 60 MG capsule Take 1 capsule by mouth once daily. (Patient not taking: Reported on 5/28/2024)    famotidine (PEPCID) 20 MG tablet Take 20 mg by mouth 2 (two) times daily as needed. (Patient not taking: Reported on 5/28/2024)    fluticasone propionate (FLONASE) 50 mcg/actuation nasal spray 2 sprays (100 mcg total) by Each Nostril route once daily. (Patient not taking: Reported on 5/28/2024)    furosemide (LASIX) 20 MG tablet Take 20 mg by mouth daily as needed. Takes every other day (Patient not taking: Reported on 5/28/2024)    hydroCHLOROthiazide (MICROZIDE) 12.5 mg capsule Take 12.5 mg by mouth once daily. (Patient not  "taking: Reported on 5/28/2024)    INVOKANA 300 mg Tab tablet Take 300 mg by mouth once daily. (Patient not taking: Reported on 5/28/2024)    ketoconazole (NIZORAL) 2 % cream Apply  a small amount to affected area twice a day  apply to skin for fungal infection (Patient not taking: Reported on 5/28/2024)    lancets Misc ACCU CHEK SOFTCLIX LANCETS: USE 4X DAILY    levocetirizine (XYZAL) 5 MG tablet Take 5 mg by mouth daily as needed.    LIDOcaine (LIDODERM) 5 % Apply 1-3 patches every day prn pain    lisinopriL (PRINIVIL,ZESTRIL) 20 MG tablet Take 1 tablet by mouth once daily.    metoclopramide HCl (REGLAN) 10 MG tablet Take 1 tablet (10 mg total) by mouth every 6 (six) hours as needed. (Patient not taking: Reported on 5/28/2024)    mupirocin (BACTROBAN) 2 % ointment Apply topically 3 (three) times daily. (Patient not taking: Reported on 5/28/2024)    nitroGLYCERIN (NITROSTAT) 0.4 MG SL tablet Place 0.4 mg under the tongue every 5 (five) minutes as needed for Chest pain.    omega-3 acid ethyl esters (LOVAZA) 1 gram capsule Take 2 capsules by mouth once daily. (Patient not taking: Reported on 5/28/2024)    omeprazole (PRILOSEC) 20 MG capsule TK 1 C PO QD FOR CONTROL OF STOMACH ACID BEFORE BREAKFAST    oxybutynin (DITROPAN-XL) 10 MG 24 hr tablet Take 1 tablet by mouth once daily. (Patient not taking: Reported on 5/28/2024)    oxyCODONE-acetaminophen (PERCOCET) 7.5-325 mg per tablet Take 1 tablet by mouth every 4 (four) hours as needed. (Patient not taking: Reported on 5/28/2024)    pen needle, diabetic 31 gauge x 5/16" Ndle Inject 1 each into the skin.    potassium chloride SA (K-DUR,KLOR-CON) 20 MEQ tablet Take 20 mEq by mouth once daily. (Patient not taking: Reported on 5/28/2024)    promethazine (PHENERGAN) 25 MG tablet Take 1 tablet by mouth every 4 (four) hours as needed. (Patient not taking: Reported on 5/28/2024)    spironolactone (ALDACTONE) 25 MG tablet Take 1 tablet by mouth once daily. (Patient not taking: " Reported on 5/28/2024)    zolpidem (AMBIEN) 10 mg Tab Take 5 mg by mouth nightly as needed. (Patient not taking: Reported on 5/28/2024)    [DISCONTINUED] CRESTOR 20 mg tablet Take 20 mg by mouth every evening.     [DISCONTINUED] insulin glargine-lixisenatide (SOLIQUA 100/33) 100 unit-33 mcg/mL InPn pen Inject 60 Units into the skin every morning.    [DISCONTINUED] metoprolol succinate (TOPROL-XL) 50 MG 24 hr tablet Take 50 mg by mouth every evening.     [DISCONTINUED] sotaloL (BETAPACE) 80 MG tablet TAKE 1 TABLET BY MOUTH TWICE DAILY    [DISCONTINUED] tiZANidine 4 mg Cap Take 4 mg by mouth 2 (two) times a day.     Family History       Problem Relation (Age of Onset)    Asthma Daughter    Cancer Maternal Uncle, Cousin    Diabetes Mother, Brother    Heart disease Father    Hypertension Mother, Brother    Kidney disease Cousin          Tobacco Use    Smoking status: Never     Passive exposure: Past    Smokeless tobacco: Never   Substance and Sexual Activity    Alcohol use: No    Drug use: No    Sexual activity: Not Currently     Partners: Female     Comment: divorce     Review of Systems   Constitutional: Negative.   HENT: Negative.     Eyes: Negative.    Cardiovascular:  Positive for chest pain.   Respiratory: Negative.     Endocrine: Negative.    Hematologic/Lymphatic: Negative.    Skin: Negative.    Musculoskeletal: Negative.    Gastrointestinal: Negative.    Genitourinary: Negative.    Neurological: Negative.    Psychiatric/Behavioral: Negative.     Allergic/Immunologic: Negative.      Objective:     Vital Signs (Most Recent):  Temp: 97.8 °F (36.6 °C) (05/28/24 1841)  Pulse: 68 (05/28/24 1917)  Resp: 20 (05/28/24 1841)  BP: (!) 165/80 (05/28/24 1841)  SpO2: 99 % (05/28/24 1841) Vital Signs (24h Range):  Temp:  [97.7 °F (36.5 °C)-98.8 °F (37.1 °C)] 97.8 °F (36.6 °C)  Pulse:  [62-90] 68  Resp:  [14-26] 20  SpO2:  [99 %-100 %] 99 %  BP: (140-177)/(78-95) 165/80     Weight: 108.9 kg (240 lb)  Body mass index is 37.59  kg/m².    SpO2: 99 %       No intake or output data in the 24 hours ending 05/28/24 1946    Lines/Drains/Airways       Peripheral Intravenous Line  Duration                  Peripheral IV - Single Lumen 05/28/24 0500 20 G Right Antecubital <1 day                     Physical Exam  Vitals reviewed.   Constitutional:       Appearance: He is well-developed.   HENT:      Head: Normocephalic.   Eyes:      Conjunctiva/sclera: Conjunctivae normal.      Pupils: Pupils are equal, round, and reactive to light.   Cardiovascular:      Rate and Rhythm: Normal rate and regular rhythm.      Heart sounds: Murmur heard.   Pulmonary:      Effort: Pulmonary effort is normal.      Breath sounds: Normal breath sounds.   Abdominal:      General: Bowel sounds are normal.      Palpations: Abdomen is soft.   Musculoskeletal:      Cervical back: Normal range of motion and neck supple.   Skin:     General: Skin is warm.   Neurological:      Mental Status: He is alert and oriented to person, place, and time.          Significant Labs:     DATA:     Laboratory:  CBC:  Recent Labs   Lab 10/07/21  0510 12/28/21  0943 05/28/24  0432   WBC 8.47 7.69 5.93   Hemoglobin 12.3 L 14.2 12.5 L   Hematocrit 36.1 L 42.5 39.7 L   Platelets 228 194 204       CHEMISTRIES:  Recent Labs   Lab 10/06/21  0323 10/07/21  0510 12/28/21  0943 03/08/23  1548 04/05/23  0457 04/06/23  0659 04/18/23  1038 05/28/24  0432   Glucose 358 H 161 H 146 H  --   --   --   --  156 H   Sodium 137 137 141   < > 139 137 138 141   Potassium 4.7 4.4 3.9   < > 4.2 4.2 4.4 4.0   BUN 29 H 28 H 21 H   < > 24.1 H 19.1 H 17.8 12   Creatinine 1.6 H 1.3 1.4   < > 1.34 H 1.06 1.43 H 1.1   eGFR if African American 54.5 A >60.0 >60  --   --   --   --   --    eGFR   --   --   --    < > 61 L 81 L 56 L  --    eGFR if non  47.1 A >60.0 55 A  --   --   --   --   --    Calcium 9.5 9.1 9.6   < > 8.8 9.0 8.7 9.8   Magnesium  --   --   --   --   --   --   --  1.8    < > =  values in this interval not displayed.       CARDIAC BIOMARKERS:  Recent Labs   Lab 03/29/24 2051 05/28/24  0432 05/28/24  0933   CK 90  --   --    Troponin I  --  0.062 H 0.068 H       COAGS:  Recent Labs   Lab 03/25/24  1051 03/29/24 2051   INR 1.0 1.0       LIPIDS/LFTS:  Recent Labs   Lab 04/06/23  0659 04/18/23  1038 05/28/24  0432   Cholesterol  --   --  155   Triglycerides  --   --  155 H   HDL  --   --  37 L   LDL Cholesterol  --   --  87.0   Non-HDL Cholesterol  --   --  118   AST 26 14 L 13   ALT 30 28 9 L       Hemoglobin A1C   Date Value Ref Range Status   02/06/2024 9.8 (H) <5.7 % Final   11/16/2023 7.5 (H) <5.7 % Final   10/24/2023 7.9 (H) <5.7 % Final   01/26/2022 7.5 (H) 4.0 - 5.6 % Final     Comment:     ADA Screening Guidelines:  5.7-6.4%  Consistent with prediabetes  >or=6.5%  Consistent with diabetes    High levels of fetal hemoglobin interfere with the HbA1C  assay. Heterozygous hemoglobin variants (HbS, HgC, etc)do  not significantly interfere with this assay.   However, presence of multiple variants may affect accuracy.     10/01/2021 7.5 (H) 4.0 - 5.6 % Final     Comment:     ADA Screening Guidelines:  5.7-6.4%  Consistent with prediabetes  >or=6.5%  Consistent with diabetes    High levels of fetal hemoglobin interfere with the HbA1C  assay. Heterozygous hemoglobin variants (HbS, HgC, etc)do  not significantly interfere with this assay.   However, presence of multiple variants may affect accuracy.     01/19/2021 7.5 (H) 4.0 - 5.6 % Final     Comment:     ADA Screening Guidelines:  5.7-6.4%  Consistent with prediabetes  >or=6.5%  Consistent with diabetes  High levels of fetal hemoglobin interfere with the HbA1C  assay. Heterozygous hemoglobin variants (HbS, HgC, etc)do  not significantly interfere with this assay.   However, presence of multiple variants may affect accuracy.         TSH  Recent Labs   Lab 05/28/24 0432   TSH 1.580       The 10-year ASCVD risk score (Hair SANDOVAL, et al., 2019)  is: 36.4%    Values used to calculate the score:      Age: 59 years      Sex: Male      Is Non- : Yes      Diabetic: Yes      Tobacco smoker: No      Systolic Blood Pressure: 165 mmHg      Is BP treated: Yes      HDL Cholesterol: 37 mg/dL      Total Cholesterol: 155 mg/dL       BNP    Lab Results   Component Value Date/Time    BNP 26 05/28/2024 04:32 AM    BNP <10 08/05/2016 04:30 PM    BNP <10 08/29/2015 11:56 PM            ECHO    Results for orders placed during the hospital encounter of 05/28/24    Echo    Interpretation Summary    Left Ventricle: The left ventricle is normal in size. Mildly increased wall thickness. There is moderately reduced systolic function with a visually estimated ejection fraction of 35 %. Grade I diastolic dysfunction.    Right Ventricle: Normal right ventricular cavity size. Systolic function is normal. Pacemaker lead present in the ventricle.    Left Atrium: Left atrium is mildly dilated.    Aortic Valve: There is mild stenosis. Aortic valve area by VTI is 1.77 cm². Aortic valve peak velocity is 1.23 m/s. Mean gradient is 4 mmHg. The dimensionless index is 0.45.    Mitral Valve: There is mild regurgitation.    Pulmonary Artery: The estimated pulmonary artery systolic pressure is 34 mmHg.    IVC/SVC: Normal venous pressure at 3 mmHg.    2018  CONCLUSIONS   Probably normal study with normal lv function.  Only a question of apical defect or artifact, and I think it is normal. Scan    indicates low risk for cardiac events.       Assessment and Plan:     * Coronary artery disease involving native coronary artery with angina pectoris   Atypical chest pain.  Further evaluation with the help of stress test in a.m.    Hemiplegia as late effect of cerebrovascular accident (CVA)        AICD (automatic cardioverter/defibrillator) present        CKD (chronic kidney disease) stage 2, GFR 60-89 ml/min  Creatine stable for now. BMP reviewed- noted Estimated Creatinine  Clearance: 84 mL/min (based on SCr of 1.1 mg/dL). according to latest data. Based on current GFR, CKD stage is stage 2 - GFR 60-89.  Monitor UOP and serial BMP and adjust therapy as needed. Renally dose meds. Avoid nephrotoxic medications and procedures.    Essential hypertension  Chronic, uncontrolled. Latest blood pressure and vitals reviewed-     Temp:  [97.7 °F (36.5 °C)-98.8 °F (37.1 °C)]   Pulse:  [62-90]   Resp:  [14-26]   BP: (140-177)/(78-95)   SpO2:  [99 %-100 %] .   Home meds for hypertension were reviewed and noted below.   Hypertension Medications               amLODIPine (NORVASC) 5 MG tablet Take 1 tablet by mouth once daily.    carvediloL (COREG) 3.125 MG tablet 3.125 mg by Nasogastric route 2 (two) times daily.    furosemide (LASIX) 20 MG tablet Take 20 mg by mouth daily as needed. Takes every other day    hydroCHLOROthiazide (MICROZIDE) 12.5 mg capsule Take 12.5 mg by mouth once daily.    lisinopriL (PRINIVIL,ZESTRIL) 20 MG tablet Take 1 tablet by mouth once daily.    nitroGLYCERIN (NITROSTAT) 0.4 MG SL tablet Place 0.4 mg under the tongue every 5 (five) minutes as needed for Chest pain.    spironolactone (ALDACTONE) 25 MG tablet Take 1 tablet by mouth once daily.            While in the hospital, will manage blood pressure as follows; Continue home antihypertensive regimen    Will utilize p.r.n. blood pressure medication only if patient's blood pressure greater than 160/100 and he develops symptoms such as worsening chest pain or shortness of breath.    Diabetes mellitus type 2 in obese  Patient's FSGs are uncontrolled due to hyperglycemia on current medication regimen.  Last A1c reviewed-   Lab Results   Component Value Date    HGBA1C 9.8 (H) 02/06/2024     Most recent fingerstick glucose reviewed-   Recent Labs   Lab 05/28/24  1624   POCTGLUCOSE 82             VTE Risk Mitigation (From admission, onward)           Ordered     enoxaparin injection 40 mg  Every 24 hours         05/28/24 3127                     Thank you for your consult. I will follow-up with patient. Please contact us if you have any additional questions.    Cristine Chang MD  Cardiology   [facility] Surg

## 2024-05-29 NOTE — PLAN OF CARE
Plan for patient return to [facility] nursing home. Updated clinicals sent via care port. Will need plan of care orders.     1:30p Spoke with Fior with [facility] confirmed clinicals and poc received and pending review for call report.     1:50p Spoke with pt's daughter, Evens, updated on plan for discharge today. Pt's dtr agreeable for return to [facility]

## 2024-05-30 ENCOUNTER — PATIENT OUTREACH (OUTPATIENT)
Dept: ADMINISTRATIVE | Facility: CLINIC | Age: 59
End: 2024-05-30
Payer: MEDICARE

## 2024-06-01 LAB
OHS QRS DURATION: 100 MS
OHS QTC CALCULATION: 413 MS

## 2024-08-14 ENCOUNTER — TELEPHONE (OUTPATIENT)
Dept: OPHTHALMOLOGY | Facility: CLINIC | Age: 59
End: 2024-08-14
Payer: MEDICARE

## 2024-10-23 NOTE — PROGRESS NOTES
Subjective:  HPI    Pt sts OD is painfull about an 8 on scale. Using drops 3times a day. Pt   sts vision is not good. Can't read small print. No flashes no floaters  Last edited by Aicha Hicks on 10/24/2024  3:19 PM.        Exam:  See encounter report for full exam    Assessment:  1. 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 Open angle with borderline findings and high glaucoma risk in left eye  - Referral from: Dr. Gan. Establishing me 10/2024.  - Surg hx:    Phaco/IOL OD Manrique 2020  tsCPC OD Elvia 8/2023  tsCPC OD Elvia 12/2023  - Laser hx: none  - Glaucoma FHx: denies  -  / 573   - Gonio: unable OD,  OS (Coulon 10/2024)  - Tmax: 32/40  - Target IOP: comfort OD, <20 OS  - Med adverse effects: n/a    10/24/2024  IOP 35/16   OCT RNFL: unable OD  full, 101 OS -- worse signal/stable OS    3. Blind painful right eye  Poor VA potential with pain  Will start PF/Atropine    4. History of corneal ulcer  Initial presentation new K ulcer with large hypyopyon OD   S/p AC washout Dr. Allen   Cx grew serratia, however unresponsive to abx  S/p tap and injection OD for questionable endophthalmitis  LTFU since 2021 with Dr. Allen -- stable poor VA with no active inflammation today on exam    Monocular  Discussed monocular status with patient and increased risk of damage to well-seeing eye  Recommend protective eyewear at all times  Any MRx should be written with polycarbonate lenses  Saw Dr Treviño 4/22 - Mrx written with polycarbonate      Plan:  No view to optic nerve OD-- presumably poor VA from complex POH + IOP spike. Now goal is comfort OD.  Followed as G/S given monocular status OS-- imaging stable.  Lives in [facility] and med list does not include any drops that were previously prescribed by providers. Will have our staff call with the below instructions  - Start PF 4/0, Atropine 2/0  - Start Cosopt 2/2, Brimonidine 2/0, Xalatan qhs/0    Today's visit is  associated with current and anticipated ongoing medical care related to this patient's single serious/complex condition (glaucoma). Follow up is to be continued indefinitely to monitor the condition.    Return 3 months -- B-scan OD, Slit lamp photos/ant segment photos OD, VA, IOP    Antonia Ortega MD  [facility]

## 2024-10-24 ENCOUNTER — OFFICE VISIT (OUTPATIENT)
Dept: OPHTHALMOLOGY | Facility: CLINIC | Age: 59
End: 2024-10-24
Payer: MEDICARE

## 2024-10-24 DIAGNOSIS — Z86.69 HISTORY OF CORNEAL ULCER: ICD-10-CM

## 2024-10-24 DIAGNOSIS — H54.40 BLIND PAINFUL RIGHT EYE: ICD-10-CM

## 2024-10-24 DIAGNOSIS — H57.11 BLIND PAINFUL RIGHT EYE: ICD-10-CM

## 2024-10-24 DIAGNOSIS — H40.022 OPEN ANGLE WITH BORDERLINE FINDINGS AND HIGH GLAUCOMA RISK IN LEFT EYE: ICD-10-CM

## 2024-10-24 DIAGNOSIS — H40.41X3 GLAUCOMA OF RIGHT EYE SECONDARY TO EYE INFLAMMATION, SEVERE STAGE: Primary | ICD-10-CM

## 2024-10-24 PROCEDURE — 99214 OFFICE O/P EST MOD 30 MIN: CPT | Mod: S$GLB,,, | Performed by: STUDENT IN AN ORGANIZED HEALTH CARE EDUCATION/TRAINING PROGRAM

## 2024-10-24 PROCEDURE — G2211 COMPLEX E/M VISIT ADD ON: HCPCS | Mod: S$GLB,,, | Performed by: STUDENT IN AN ORGANIZED HEALTH CARE EDUCATION/TRAINING PROGRAM

## 2024-10-24 PROCEDURE — 92133 CPTRZD OPH DX IMG PST SGM ON: CPT | Mod: S$GLB,,, | Performed by: STUDENT IN AN ORGANIZED HEALTH CARE EDUCATION/TRAINING PROGRAM

## 2024-10-24 PROCEDURE — 1159F MED LIST DOCD IN RCRD: CPT | Mod: CPTII,S$GLB,, | Performed by: STUDENT IN AN ORGANIZED HEALTH CARE EDUCATION/TRAINING PROGRAM

## 2024-10-24 PROCEDURE — 99999 PR PBB SHADOW E&M-EST. PATIENT-LVL III: CPT | Mod: PBBFAC,,, | Performed by: STUDENT IN AN ORGANIZED HEALTH CARE EDUCATION/TRAINING PROGRAM

## 2024-10-24 PROCEDURE — 92020 GONIOSCOPY: CPT | Mod: S$GLB,,, | Performed by: STUDENT IN AN ORGANIZED HEALTH CARE EDUCATION/TRAINING PROGRAM

## 2024-10-24 PROCEDURE — 3044F HG A1C LEVEL LT 7.0%: CPT | Mod: CPTII,S$GLB,, | Performed by: STUDENT IN AN ORGANIZED HEALTH CARE EDUCATION/TRAINING PROGRAM

## 2024-11-29 ENCOUNTER — HOSPITAL ENCOUNTER (EMERGENCY)
Facility: HOSPITAL | Age: 59
Discharge: HOME | End: 2024-11-30
Attending: STUDENT IN AN ORGANIZED HEALTH CARE EDUCATION/TRAINING PROGRAM
Payer: MEDICARE
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
## 2024-11-29 DIAGNOSIS — M25.552 LEFT HIP PAIN: ICD-10-CM

## 2024-11-29 PROCEDURE — 99285 EMERGENCY DEPT VISIT HI MDM: CPT | Mod: 25

## 2024-11-29 PROCEDURE — 63600175 PHARM REV CODE 636 W HCPCS: Performed by: STUDENT IN AN ORGANIZED HEALTH CARE EDUCATION/TRAINING PROGRAM

## 2024-11-29 PROCEDURE — 80053 COMPREHEN METABOLIC PANEL: CPT | Performed by: STUDENT IN AN ORGANIZED HEALTH CARE EDUCATION/TRAINING PROGRAM

## 2024-11-29 PROCEDURE — 93005 ELECTROCARDIOGRAM TRACING: CPT

## 2024-11-29 PROCEDURE — 93010 ELECTROCARDIOGRAM REPORT: CPT | Mod: ,,, | Performed by: INTERNAL MEDICINE

## 2024-11-29 PROCEDURE — 96374 THER/PROPH/DIAG INJ IV PUSH: CPT

## 2024-11-29 PROCEDURE — 96375 TX/PRO/DX INJ NEW DRUG ADDON: CPT

## 2024-11-29 RX ORDER — DIPHENHYDRAMINE HYDROCHLORIDE 50 MG/ML
12.5 INJECTION INTRAMUSCULAR; INTRAVENOUS
Status: COMPLETED | OUTPATIENT
Start: 2024-11-29 | End: 2024-11-29

## 2024-11-29 RX ORDER — HYDROMORPHONE HYDROCHLORIDE 1 MG/ML
1 INJECTION, SOLUTION INTRAMUSCULAR; INTRAVENOUS; SUBCUTANEOUS
Status: COMPLETED | OUTPATIENT
Start: 2024-11-29 | End: 2024-11-29

## 2024-11-29 RX ADMIN — HYDROMORPHONE HYDROCHLORIDE 1 MG: 1 INJECTION, SOLUTION INTRAMUSCULAR; INTRAVENOUS; SUBCUTANEOUS at 11:11

## 2024-11-29 RX ADMIN — DIPHENHYDRAMINE HYDROCHLORIDE 12.5 MG: 50 INJECTION INTRAMUSCULAR; INTRAVENOUS at 11:11

## 2024-11-30 VITALS
DIASTOLIC BLOOD PRESSURE: 89 MMHG | HEIGHT: 67 IN | BODY MASS INDEX: 37.67 KG/M2 | SYSTOLIC BLOOD PRESSURE: 134 MMHG | HEART RATE: 70 BPM | TEMPERATURE: 98 F | RESPIRATION RATE: 20 BRPM | WEIGHT: 240 LBS | OXYGEN SATURATION: 98 %

## 2024-11-30 LAB
ALBUMIN SERPL BCP-MCNC: 3.9 G/DL (ref 3.5–5.2)
ALP SERPL-CCNC: 78 U/L (ref 40–150)
ALT SERPL W/O P-5'-P-CCNC: 14 U/L (ref 10–44)
ANION GAP SERPL CALC-SCNC: 13 MMOL/L (ref 8–16)
AST SERPL-CCNC: 27 U/L (ref 10–40)
BASOPHILS # BLD AUTO: 0.04 K/UL (ref 0–0.2)
BASOPHILS NFR BLD: 0.8 % (ref 0–1.9)
BILIRUB SERPL-MCNC: 0.3 MG/DL (ref 0.1–1)
BUN SERPL-MCNC: 19 MG/DL (ref 6–20)
CALCIUM SERPL-MCNC: 9.7 MG/DL (ref 8.7–10.5)
CHLORIDE SERPL-SCNC: 103 MMOL/L (ref 95–110)
CO2 SERPL-SCNC: 21 MMOL/L (ref 23–29)
CREAT SERPL-MCNC: 1.5 MG/DL (ref 0.5–1.4)
DIFFERENTIAL METHOD BLD: ABNORMAL
EOSINOPHIL # BLD AUTO: 0.2 K/UL (ref 0–0.5)
EOSINOPHIL NFR BLD: 3.9 % (ref 0–8)
ERYTHROCYTE [DISTWIDTH] IN BLOOD BY AUTOMATED COUNT: 11.9 % (ref 11.5–14.5)
EST. GFR  (NO RACE VARIABLE): 53 ML/MIN/1.73 M^2
GLUCOSE SERPL-MCNC: 307 MG/DL (ref 70–110)
HCT VFR BLD AUTO: 42.8 % (ref 40–54)
HGB BLD-MCNC: 14.6 G/DL (ref 14–18)
IMM GRANULOCYTES # BLD AUTO: 0.02 K/UL (ref 0–0.04)
IMM GRANULOCYTES NFR BLD AUTO: 0.4 % (ref 0–0.5)
LYMPHOCYTES # BLD AUTO: 1.9 K/UL (ref 1–4.8)
LYMPHOCYTES NFR BLD: 36 % (ref 18–48)
MCH RBC QN AUTO: 32.6 PG (ref 27–31)
MCHC RBC AUTO-ENTMCNC: 34.1 G/DL (ref 32–36)
MCV RBC AUTO: 96 FL (ref 82–98)
MONOCYTES # BLD AUTO: 0.5 K/UL (ref 0.3–1)
MONOCYTES NFR BLD: 9.6 % (ref 4–15)
NEUTROPHILS # BLD AUTO: 2.6 K/UL (ref 1.8–7.7)
NEUTROPHILS NFR BLD: 49.3 % (ref 38–73)
NRBC BLD-RTO: 0 /100 WBC
PLATELET # BLD AUTO: 177 K/UL (ref 150–450)
PMV BLD AUTO: 11.7 FL (ref 9.2–12.9)
POTASSIUM SERPL-SCNC: 4.7 MMOL/L (ref 3.5–5.1)
PROT SERPL-MCNC: 8.1 G/DL (ref 6–8.4)
RBC # BLD AUTO: 4.48 M/UL (ref 4.6–6.2)
SODIUM SERPL-SCNC: 137 MMOL/L (ref 136–145)
WBC # BLD AUTO: 5.33 K/UL (ref 3.9–12.7)

## 2024-11-30 PROCEDURE — 85025 COMPLETE CBC W/AUTO DIFF WBC: CPT | Performed by: STUDENT IN AN ORGANIZED HEALTH CARE EDUCATION/TRAINING PROGRAM

## 2024-11-30 PROCEDURE — 94761 N-INVAS EAR/PLS OXIMETRY MLT: CPT

## 2024-11-30 NOTE — DISCHARGE INSTRUCTIONS

## 2024-11-30 NOTE — ED PROVIDER NOTES
Encounter Date: 11/29/2024       History     Chief Complaint   Patient presents with    Fall     Pt BIB EMS, c/o left hip pain, due to an unwitnessed fall. Pt is from [facility]. Pt denies any LOC or head, neck, or back pain.Pt is on blood thinners. Facility placed pt in pelvic binder. EMS placed pt in c-collar and LSB. Pt denies any other complaints. Pt has left sided luis paresis from previous CVA     59-year-old male with history of prior stroke with left-sided hemiparesis was brought in by EMS, c/o left hip pain, due to an unwitnessed fall. Pt is from [facility]. Pt denies any LOC or head, neck, or back pain.Pt is on blood thinners. Facility placed pt in pelvic binder. EMS placed pt in c-collar and LSB. Pt denies any other complaints. Pt has left sided luis paresis from previous CVA        Review of patient's allergies indicates:   Allergen Reactions    Cephalexin Other (See Comments)     vomitting    Darvocet a500 [propoxyphene n-acetaminophen] Itching    Morphine Itching           Tramadol Itching     Past Medical History:   Diagnosis Date    Cataract     Cellulitis of penis 07/06/2018    COPD (chronic obstructive pulmonary disease)     Coronary artery disease     Diabetes mellitus type II     DJD (degenerative joint disease)     Glaucoma     Gout     Hypertension     Kidney stone     MI (myocardial infarction) 2010    Obesity     JOSE (obstructive sleep apnea)     Uveitis      Past Surgical History:   Procedure Laterality Date    CARDIAC DEFIBRILLATOR PLACEMENT      CATARACT EXTRACTION W/  INTRAOCULAR LENS IMPLANT Right 2020    OUTSIDE [facility] ()    CYSTOSCOPY N/A 10/01/2020    Procedure: CYSTOSCOPY;  Surgeon: Monica Lieberman MD;  Location: [facility] OR;  Service: Urology;  Laterality: N/A;  RN PRE OP Covid screen 9-  C A    DESTRUCTION, CILIARY BODY, USING LASER Right 7/13/2023    Procedure: DESTRUCTION, CILIARY BODY, USING LASER;  Surgeon: Shaina Gan MD;  [facility];  Service: Ophthalmology;  Laterality: Right;    DESTRUCTION, CILIARY BODY, USING LASER Right 8/10/2023    Procedure: DESTRUCTION, CILIARY BODY, USING LASER;  Surgeon: Shaina Gan MD;  Location: [facility] OR [address] [place] [address];  Service: Ophthalmology;  Laterality: Right;    DESTRUCTION, CILIARY BODY, USING LASER Right 12/21/2023    Procedure: DESTRUCTION, CILIARY BODY, USING LASER;  Surgeon: Shaina Gan MD;  Location: [facility] OR [address] [place] [address];  Service: Ophthalmology;  Laterality: Right;  g-probe    excisional biopsy left inguinal lymph node      FOOT SURGERY      right foot surgery     INSERTION OF INFLATABLE PENILE PROSTHESIS N/A 01/25/2022    Procedure: INSERTION, PENILE PROSTHESIS, INFLATABLE;  Surgeon: Monica Lieberman MD;  Location: [facility] OR;  Service: Urology;  Laterality: N/A;  [facility] JUAN LUIS ARNOLD [phone] TEXTED HIM @ 5:32PM ON 12-  RN PRE OP 12-28-21. Covid NEGATIVE 1-3-22. CA-----AICD-----HAS CARDS CLEARANCE    kidney stones      lithotripsy    REPAIR, SURGICAL WOUND, EYE, ANTERIOR SEGMENT Right 10/05/2021    Procedure: REPAIR, SURGICAL WOUND, EYE, ANTERIOR SEGMENT;  Surgeon: Adelaide Allen MD;  Location: [address];  Service: Ophthalmology;  Laterality: Right;  Anterior Chamber Wash Out Right Eye     Surgery for bulging disc at C7       Family History   Problem Relation Name Age of Onset    Diabetes Mother      Hypertension Mother      Heart disease Father      Diabetes Brother      Hypertension Brother      Cancer Maternal Uncle          prostate    Asthma Daughter      Cancer Cousin      Kidney disease Cousin      Amblyopia Neg Hx      Blindness Neg Hx      Cataracts Neg Hx      Glaucoma Neg Hx      Macular degeneration Neg Hx      Retinal detachment Neg Hx      Strabismus Neg Hx       Social History     Tobacco Use    Smoking status: Never     Passive exposure: Past    Smokeless tobacco: Never   Substance Use Topics    Alcohol use: No    Drug use: No     Review of Systems    Musculoskeletal:  Positive for arthralgias.       Physical Exam     Initial Vitals   BP Pulse Resp Temp SpO2   11/29/24 2330 11/29/24 2330 11/29/24 2330 11/30/24 0002 11/29/24 2330   (!) 157/74 75 16 98.2 °F (36.8 °C) 95 %      MAP       --                Physical Exam    Nursing note and vitals reviewed.  Constitutional: He appears well-developed and well-nourished.   HENT:   Head: Normocephalic and atraumatic. Mouth/Throat: Oropharynx is clear and moist.   Eyes: Conjunctivae and EOM are normal. Pupils are equal, round, and reactive to light.   Neck: No thyromegaly present.   Normal range of motion.  Cardiovascular:  Normal rate, regular rhythm and intact distal pulses.           Pulmonary/Chest: Breath sounds normal. No respiratory distress. He has no wheezes.   Abdominal: Abdomen is soft. Bowel sounds are normal. He exhibits no distension. There is no abdominal tenderness.   Musculoskeletal:         General: Tenderness present. No edema.      Cervical back: Normal range of motion.      Comments: Left hip tender to palpation     Neurological: He is alert and oriented to person, place, and time. He has normal strength. No cranial nerve deficit.   Skin: Skin is warm and dry. No rash noted.   Psychiatric: He has a normal mood and affect. His behavior is normal. Thought content normal.         ED Course   Procedures  Labs Reviewed   COMPREHENSIVE METABOLIC PANEL - Abnormal       Result Value    Sodium 137      Potassium 4.7      Chloride 103      CO2 21 (*)     Glucose 307 (*)     BUN 19      Creatinine 1.5 (*)     Calcium 9.7      Total Protein 8.1      Albumin 3.9      Total Bilirubin 0.3      Alkaline Phosphatase 78      AST 27      ALT 14      eGFR 53 (*)     Anion Gap 13     CBC W/ AUTO DIFFERENTIAL - Abnormal    WBC 5.33      RBC 4.48 (*)     Hemoglobin 14.6      Hematocrit 42.8      MCV 96      MCH 32.6 (*)     MCHC 34.1      RDW 11.9      Platelets 177      MPV 11.7      Immature Granulocytes 0.4      Gran #  (ANC) 2.6      Immature Grans (Abs) 0.02      Lymph # 1.9      Mono # 0.5      Eos # 0.2      Baso # 0.04      nRBC 0      Gran % 49.3      Lymph % 36.0      Mono % 9.6      Eosinophil % 3.9      Basophil % 0.8      Differential Method Automated            Imaging Results              X-Ray Femur Ap/Lat Left (Final result)  Result time 11/30/24 01:54:11      Final result by Mich Rodriguez MD (11/30/24 01:54:11)                   Impression:      No acute displaced fracture seen.      Electronically signed by: Mich Rodriguez MD  Date:    11/30/2024  Time:    01:54               Narrative:    EXAMINATION:  XR FEMUR 2 VIEW LEFT    CLINICAL HISTORY:  Pain in left hip    TECHNIQUE:  AP and lateral views of the left femur were performed.    COMPARISON:  CT of the pelvis from the same date.    FINDINGS:  No evidence of acute displaced fracture, dislocation, or osseous destructive process.                                       CT Pelvis Without Contrast (Final result)  Result time 11/30/24 00:26:23      Final result by Mich Rodriguez MD (11/30/24 00:26:23)                   Impression:      No acute displaced fracture seen.      Electronically signed by: Mich Rodriguez MD  Date:    11/30/2024  Time:    00:26               Narrative:    EXAMINATION:  CT PELVIS WITHOUT CONTRAST    CLINICAL HISTORY:  Pelvic trauma;    TECHNIQUE:  CT of the pelvis was obtained without the use of IV contrast.  Sagittal and coronal reformats were obtained.    COMPARISON:  No recent radiographs for comparison.  Previous CT from August 2016.    FINDINGS:  No acute displaced pelvic fracture or proximal femur fracture seen.  Postsurgical changes of right total hip arthroplasty are seen.  No hardware complication is identified.  Degenerative changes are seen at the pubic symphysis.  Visualized sacrum, coccyx, and lower lumbar spine show no acute abnormalities.    Partially visualized lower abdominal and intrapelvic structures show no acute  abnormalities.  Prostate is mildly enlarged.  Penile pump prosthesis is seen with reservoir in the left lower quadrant.                                       Medications   HYDROmorphone injection 1 mg (1 mg Intravenous Given 11/29/24 7954)   diphenhydrAMINE injection 12.5 mg (12.5 mg Intravenous Given 11/29/24 8201)     Medical Decision Making  59-year-old male presents for left hip pain after mechanical fall.  Vitals normal.  Left hip tender to palpation, has mildly decreased range of motion.  Differential diagnosis includes bruise, femur fracture, hip fracture.  CT of the pelvis without acute abnormality.  Left femur x-ray normal.  Stable for discharge with outpatient follow up.    Amount and/or Complexity of Data Reviewed  Labs: ordered.  Radiology: ordered.    Risk  Prescription drug management.                                      Clinical Impression:  Final diagnoses:  [M25.552] Left hip pain  [W19.XXXA] Fall, initial encounter (Primary)          ED Disposition Condition    Discharge Stable          ED Prescriptions    None       Follow-up Information       Follow up With Specialties Details Why Contact Info    Coreen Anderson MD Internal Medicine Call on 12/2/2024 To set up a follow-up appointment, To recheck today's symptoms [id number]  [place] [id number]  [phone]               Terry Rodriguez MD  11/30/24 7616

## 2024-11-30 NOTE — ED NOTES
Dr. Rodriguez at bedside to logroll Pt d/t spineboard and c-spine precautions in place by EMS. C-collar removed by MD at this time.

## 2024-12-01 LAB
OHS QRS DURATION: 108 MS
OHS QTC CALCULATION: 460 MS

## 2025-01-24 ENCOUNTER — OFFICE VISIT (OUTPATIENT)
Dept: OPHTHALMOLOGY | Facility: CLINIC | Age: 60
End: 2025-01-24
Payer: MEDICARE

## 2025-01-24 DIAGNOSIS — H40.022 OPEN ANGLE WITH BORDERLINE FINDINGS AND HIGH GLAUCOMA RISK IN LEFT EYE: ICD-10-CM

## 2025-01-24 DIAGNOSIS — Z86.69 HISTORY OF CORNEAL ULCER: ICD-10-CM

## 2025-01-24 DIAGNOSIS — H40.41X3 GLAUCOMA OF RIGHT EYE SECONDARY TO EYE INFLAMMATION, SEVERE STAGE: Primary | ICD-10-CM

## 2025-01-24 DIAGNOSIS — H57.11 BLIND PAINFUL RIGHT EYE: ICD-10-CM

## 2025-01-24 DIAGNOSIS — H54.40 BLIND PAINFUL RIGHT EYE: ICD-10-CM

## 2025-01-24 PROCEDURE — 92285 EXTERNAL OCULAR PHOTOGRAPHY: CPT | Mod: S$GLB,,, | Performed by: STUDENT IN AN ORGANIZED HEALTH CARE EDUCATION/TRAINING PROGRAM

## 2025-01-24 PROCEDURE — 76512 OPH US DX B-SCAN: CPT | Mod: RT,S$GLB,, | Performed by: STUDENT IN AN ORGANIZED HEALTH CARE EDUCATION/TRAINING PROGRAM

## 2025-01-24 PROCEDURE — G2211 COMPLEX E/M VISIT ADD ON: HCPCS | Mod: S$GLB,,, | Performed by: STUDENT IN AN ORGANIZED HEALTH CARE EDUCATION/TRAINING PROGRAM

## 2025-01-24 PROCEDURE — 1159F MED LIST DOCD IN RCRD: CPT | Mod: CPTII,S$GLB,, | Performed by: STUDENT IN AN ORGANIZED HEALTH CARE EDUCATION/TRAINING PROGRAM

## 2025-01-24 PROCEDURE — 99214 OFFICE O/P EST MOD 30 MIN: CPT | Mod: S$GLB,,, | Performed by: STUDENT IN AN ORGANIZED HEALTH CARE EDUCATION/TRAINING PROGRAM

## 2025-01-24 PROCEDURE — 99999 PR PBB SHADOW E&M-EST. PATIENT-LVL III: CPT | Mod: PBBFAC,,, | Performed by: STUDENT IN AN ORGANIZED HEALTH CARE EDUCATION/TRAINING PROGRAM

## 2025-01-24 NOTE — PROGRESS NOTES
Subjective:  HPI     Glaucoma     Additional comments: 3 mo B scan, slit lamp photo OD           Comments    Patient here today with c/o worsening VA ou, no pain ou and denies   floaters or flashes.    PF 4/0, Atropine 2/0  Cosopt 2/2, Brimonidine 2/0, Xalatan qhs/0            Last edited by Josi Wilson on 1/24/2025  2:33 PM.        Exam:  See encounter report for full exam    Assessment:  1. 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 Open angle with borderline findings and high glaucoma risk in left eye  - Referral from: Dr. Gan. Establishing me 10/2024.  - Surg hx:    Phaco/IOL OD Manrique 2020  tsCPC OD Elvia 8/2023  tsCPC OD Elvia 12/2023  - Laser hx: none  - Glaucoma FHx: denies  -  / 573   - Gonio: unable OD,  OS (Coulon 10/2024)  - Tmax: 32/40  - Target IOP: comfort OD, <20 OS  - Med adverse effects: n/a    Last:  OCT RNFL: unable OD  full, 101 OS -- worse signal/stable OS    01/24/2025  IOP 16/16 on regimen below  No pain OD, complains of ocular surface type pain OS  B-scan OD: retina flat, no masses, some vitreous debris  SL photo OD done today    3. Blind painful right eye  Poor VA potential with pain  Will start PF/Atropine    4. History of corneal ulcer  Initial presentation new K ulcer with large hypyopyon OD   S/p AC washout Dr. Allen   Cx grew serratia, however unresponsive to abx  S/p tap and injection OD for questionable endophthalmitis  LTFU since 2021 with Dr. Allen -- stable poor VA with no active inflammation today on exam    Monocular  Discussed monocular status with patient and increased risk of damage to well-seeing eye  Recommend protective eyewear at all times  Any MRx should be written with polycarbonate lenses  Saw Dr Treviño 4/22 - Mrx written with polycarbonate      Plan:  No view to optic nerve OD-- presumably poor VA from complex POH + IOP spike. Now goal is comfort OD.  Followed as G/S given monocular status OS-- imaging stable.  Lives  in [facility]  - Continue PF 4/0, Atropine 2/0 -- (resolved pain)  - Continue Cosopt 2/2, Brimonidine 2/0, Xalatan qhs/0  - Start ATs QID OS    Today's visit is associated with current and anticipated ongoing medical care related to this patient's single serious/complex condition (glaucoma). Follow up is to be continued indefinitely to monitor the condition.    Return 6 months -- OCT OS, VA, IOP    Antonia Ortega MD  [facility]

## 2025-02-06 ENCOUNTER — HOSPITAL ENCOUNTER (EMERGENCY)
Facility: HOSPITAL | Age: 60
Discharge: HOME | End: 2025-02-07
Attending: STUDENT IN AN ORGANIZED HEALTH CARE EDUCATION/TRAINING PROGRAM
Payer: MEDICARE

## 2025-02-06 DIAGNOSIS — R10.9 FLANK PAIN: Primary | ICD-10-CM

## 2025-02-06 PROCEDURE — 99285 EMERGENCY DEPT VISIT HI MDM: CPT | Mod: 25

## 2025-02-06 RX ORDER — KETOROLAC TROMETHAMINE 30 MG/ML
15 INJECTION, SOLUTION INTRAMUSCULAR; INTRAVENOUS
Status: COMPLETED | OUTPATIENT
Start: 2025-02-07 | End: 2025-02-07

## 2025-02-06 RX ORDER — FENTANYL CITRATE 50 UG/ML
50 INJECTION, SOLUTION INTRAMUSCULAR; INTRAVENOUS
Status: COMPLETED | OUTPATIENT
Start: 2025-02-07 | End: 2025-02-07

## 2025-02-07 VITALS
TEMPERATURE: 98 F | HEIGHT: 67 IN | OXYGEN SATURATION: 99 % | HEART RATE: 74 BPM | BODY MASS INDEX: 37.67 KG/M2 | DIASTOLIC BLOOD PRESSURE: 80 MMHG | WEIGHT: 240 LBS | RESPIRATION RATE: 18 BRPM | SYSTOLIC BLOOD PRESSURE: 156 MMHG

## 2025-02-07 LAB
ALBUMIN SERPL BCP-MCNC: 3.7 G/DL (ref 3.5–5.2)
ALP SERPL-CCNC: 77 U/L (ref 40–150)
ALT SERPL W/O P-5'-P-CCNC: 15 U/L (ref 10–44)
ANION GAP SERPL CALC-SCNC: 11 MMOL/L (ref 8–16)
AST SERPL-CCNC: 18 U/L (ref 10–40)
BACTERIA #/AREA URNS HPF: NORMAL /HPF
BASOPHILS # BLD AUTO: 0.03 K/UL (ref 0–0.2)
BASOPHILS NFR BLD: 0.4 % (ref 0–1.9)
BILIRUB SERPL-MCNC: 0.4 MG/DL (ref 0.1–1)
BILIRUB UR QL STRIP: NEGATIVE
BUN SERPL-MCNC: 25 MG/DL (ref 6–20)
CALCIUM SERPL-MCNC: 9.5 MG/DL (ref 8.7–10.5)
CHLORIDE SERPL-SCNC: 104 MMOL/L (ref 95–110)
CK SERPL-CCNC: 401 U/L (ref 20–200)
CLARITY UR: CLEAR
CO2 SERPL-SCNC: 26 MMOL/L (ref 23–29)
COLOR UR: COLORLESS
CREAT SERPL-MCNC: 1.5 MG/DL (ref 0.5–1.4)
DIFFERENTIAL METHOD BLD: NORMAL
EOSINOPHIL # BLD AUTO: 0.4 K/UL (ref 0–0.5)
EOSINOPHIL NFR BLD: 5.2 % (ref 0–8)
ERYTHROCYTE [DISTWIDTH] IN BLOOD BY AUTOMATED COUNT: 12 % (ref 11.5–14.5)
EST. GFR  (NO RACE VARIABLE): 53 ML/MIN/1.73 M^2
GLUCOSE SERPL-MCNC: 242 MG/DL (ref 70–110)
GLUCOSE UR QL STRIP: ABNORMAL
HCT VFR BLD AUTO: 43.3 % (ref 40–54)
HGB BLD-MCNC: 14.3 G/DL (ref 14–18)
HGB UR QL STRIP: NEGATIVE
IMM GRANULOCYTES # BLD AUTO: 0.02 K/UL (ref 0–0.04)
IMM GRANULOCYTES NFR BLD AUTO: 0.3 % (ref 0–0.5)
KETONES UR QL STRIP: NEGATIVE
LEUKOCYTE ESTERASE UR QL STRIP: NEGATIVE
LIPASE SERPL-CCNC: 83 U/L (ref 4–60)
LYMPHOCYTES # BLD AUTO: 2 K/UL (ref 1–4.8)
LYMPHOCYTES NFR BLD: 28.6 % (ref 18–48)
MCH RBC QN AUTO: 31 PG (ref 27–31)
MCHC RBC AUTO-ENTMCNC: 33 G/DL (ref 32–36)
MCV RBC AUTO: 94 FL (ref 82–98)
MICROSCOPIC COMMENT: NORMAL
MONOCYTES # BLD AUTO: 0.8 K/UL (ref 0.3–1)
MONOCYTES NFR BLD: 12.2 % (ref 4–15)
NEUTROPHILS # BLD AUTO: 3.7 K/UL (ref 1.8–7.7)
NEUTROPHILS NFR BLD: 53.3 % (ref 38–73)
NITRITE UR QL STRIP: NEGATIVE
NRBC BLD-RTO: 0 /100 WBC
PH UR STRIP: 6 [PH] (ref 5–8)
PLATELET # BLD AUTO: 172 K/UL (ref 150–450)
PMV BLD AUTO: 11.1 FL (ref 9.2–12.9)
POTASSIUM SERPL-SCNC: 4 MMOL/L (ref 3.5–5.1)
PROT SERPL-MCNC: 7.3 G/DL (ref 6–8.4)
PROT UR QL STRIP: NEGATIVE
RBC # BLD AUTO: 4.61 M/UL (ref 4.6–6.2)
SODIUM SERPL-SCNC: 141 MMOL/L (ref 136–145)
SP GR UR STRIP: 1.02 (ref 1–1.03)
URN SPEC COLLECT METH UR: ABNORMAL
UROBILINOGEN UR STRIP-ACNC: NEGATIVE EU/DL
WBC # BLD AUTO: 6.88 K/UL (ref 3.9–12.7)
YEAST URNS QL MICRO: NORMAL

## 2025-02-07 PROCEDURE — 85025 COMPLETE CBC W/AUTO DIFF WBC: CPT | Performed by: STUDENT IN AN ORGANIZED HEALTH CARE EDUCATION/TRAINING PROGRAM

## 2025-02-07 PROCEDURE — 80053 COMPREHEN METABOLIC PANEL: CPT | Performed by: STUDENT IN AN ORGANIZED HEALTH CARE EDUCATION/TRAINING PROGRAM

## 2025-02-07 PROCEDURE — 96375 TX/PRO/DX INJ NEW DRUG ADDON: CPT

## 2025-02-07 PROCEDURE — 81000 URINALYSIS NONAUTO W/SCOPE: CPT | Performed by: STUDENT IN AN ORGANIZED HEALTH CARE EDUCATION/TRAINING PROGRAM

## 2025-02-07 PROCEDURE — 82550 ASSAY OF CK (CPK): CPT | Performed by: STUDENT IN AN ORGANIZED HEALTH CARE EDUCATION/TRAINING PROGRAM

## 2025-02-07 PROCEDURE — 96361 HYDRATE IV INFUSION ADD-ON: CPT

## 2025-02-07 PROCEDURE — 25000003 PHARM REV CODE 250: Performed by: STUDENT IN AN ORGANIZED HEALTH CARE EDUCATION/TRAINING PROGRAM

## 2025-02-07 PROCEDURE — 63600175 PHARM REV CODE 636 W HCPCS: Mod: JZ,TB | Performed by: STUDENT IN AN ORGANIZED HEALTH CARE EDUCATION/TRAINING PROGRAM

## 2025-02-07 PROCEDURE — 25500020 PHARM REV CODE 255: Performed by: STUDENT IN AN ORGANIZED HEALTH CARE EDUCATION/TRAINING PROGRAM

## 2025-02-07 PROCEDURE — 83690 ASSAY OF LIPASE: CPT | Performed by: STUDENT IN AN ORGANIZED HEALTH CARE EDUCATION/TRAINING PROGRAM

## 2025-02-07 PROCEDURE — 96374 THER/PROPH/DIAG INJ IV PUSH: CPT

## 2025-02-07 RX ORDER — METHOCARBAMOL 500 MG/1
500 TABLET, FILM COATED ORAL 3 TIMES DAILY
Qty: 30 TABLET | Refills: 0 | Status: SHIPPED | OUTPATIENT
Start: 2025-02-07 | End: 2025-02-17

## 2025-02-07 RX ORDER — DICLOFENAC SODIUM 10 MG/G
2 GEL TOPICAL 4 TIMES DAILY
Qty: 50 G | Refills: 0 | Status: SHIPPED | OUTPATIENT
Start: 2025-02-07

## 2025-02-07 RX ORDER — METHOCARBAMOL 500 MG/1
500 TABLET, FILM COATED ORAL
Status: COMPLETED | OUTPATIENT
Start: 2025-02-07 | End: 2025-02-07

## 2025-02-07 RX ORDER — OXYCODONE AND ACETAMINOPHEN 10; 325 MG/1; MG/1
1 TABLET ORAL
Status: COMPLETED | OUTPATIENT
Start: 2025-02-07 | End: 2025-02-07

## 2025-02-07 RX ADMIN — OXYCODONE AND ACETAMINOPHEN 1 TABLET: 10; 325 TABLET ORAL at 01:02

## 2025-02-07 RX ADMIN — KETOROLAC TROMETHAMINE 15 MG: 30 INJECTION, SOLUTION INTRAMUSCULAR at 12:02

## 2025-02-07 RX ADMIN — IOHEXOL 75 ML: 350 INJECTION, SOLUTION INTRAVENOUS at 12:02

## 2025-02-07 RX ADMIN — METHOCARBAMOL 500 MG: 500 TABLET ORAL at 01:02

## 2025-02-07 RX ADMIN — FENTANYL CITRATE 50 MCG: 50 INJECTION, SOLUTION INTRAMUSCULAR; INTRAVENOUS at 12:02

## 2025-02-07 RX ADMIN — SODIUM CHLORIDE, POTASSIUM CHLORIDE, SODIUM LACTATE AND CALCIUM CHLORIDE 1000 ML: 600; 310; 30; 20 INJECTION, SOLUTION INTRAVENOUS at 01:02

## 2025-02-07 NOTE — DISCHARGE INSTRUCTIONS
Take Robaxin and apply Voltaren gel as directed.  You can take a 1000 mg of Tylenol alternated with 400 mg of Motrin every 6-8 hours as needed.  Only do a short course of Motrin, no more than a week.  You can also place lidocaine patches to this area.  Follow up with the regular physician if pain does not improve in the next 1-2 weeks.  You can do activity as tolerated.

## 2025-02-07 NOTE — ED PROVIDER NOTES
"Encounter Date: 2/6/2025    SCRIBE #1 NOTE: I, Skyler Luna Do, am scribing for, and in the presence of,  Darren Sanches MD. I have scribed the following portions of the note - Other sections scribed: HPI, ROS, PE.       History     Chief Complaint   Patient presents with    Back Pain     Pt arrived to ED via EMS with CC right flank/lower back pain and generalized weakness since Sunday. Pt has hx kidney stones and thinks he has one again. Hx stroke with left sided deficits.     Se Virgil Mcgraw is a 59 y.o. male, with a pertinent PMHx of COPD, CAD, CHF, CVA with left-sided deficits, diabetes, DJD, gout, HTN, and MI, who presents to the ED with gradually worsening 10/10 right flank pain onset 5 days ago. Patient notes pain is exacerbated with turning, sitting, and pressure to the flank. He reports a history of kidney stones, endorses his last episode was a "couple" years ago. Patient also denies a previous lithotripsy, nephrolithotomy, or ureteroscopy. No other exacerbating or alleviating factors. Patient denies abdominal pain, hematuria, dysuria, urinary frequency, nausea, emesis, fever, chills, neck pain, or other associated symptoms.     The history is provided by the patient. No  was used.     Review of patient's allergies indicates:   Allergen Reactions    Cephalexin Other (See Comments)     vomitting    Darvocet a500 [propoxyphene n-acetaminophen] Itching    Morphine Itching           Tramadol Itching     Past Medical History:   Diagnosis Date    Cataract     Cellulitis of penis 07/06/2018    COPD (chronic obstructive pulmonary disease)     Coronary artery disease     Diabetes mellitus type II     DJD (degenerative joint disease)     Glaucoma     Gout     Hypertension     Kidney stone     MI (myocardial infarction) 2010    Obesity     JOSE (obstructive sleep apnea)     Uveitis      Past Surgical History:   Procedure Laterality Date    CARDIAC DEFIBRILLATOR PLACEMENT      CATARACT " EXTRACTION W/  INTRAOCULAR LENS IMPLANT Right 2020    OUTSIDE [facility] ()    CYSTOSCOPY N/A 10/01/2020    Procedure: CYSTOSCOPY;  Surgeon: Monica Lieberman MD;  Location: [facility] OR;  Service: Urology;  Laterality: N/A;  RN PRE OP Covid screen 9-  C A    DESTRUCTION, CILIARY BODY, USING LASER Right 7/13/2023    Procedure: DESTRUCTION, CILIARY BODY, USING LASER;  Surgeon: Shaina Gan MD;  [facility];  Service: Ophthalmology;  Laterality: Right;    DESTRUCTION, CILIARY BODY, USING LASER Right 8/10/2023    Procedure: DESTRUCTION, CILIARY BODY, USING LASER;  Surgeon: Shaina Gan MD;  [facility];  Service: Ophthalmology;  Laterality: Right;    DESTRUCTION, CILIARY BODY, USING LASER Right 12/21/2023    Procedure: DESTRUCTION, CILIARY BODY, USING LASER;  Surgeon: Shaina Gan MD;  [facility];  Service: Ophthalmology;  Laterality: Right;  g-probe    excisional biopsy left inguinal lymph node      FOOT SURGERY      right foot surgery     INSERTION OF INFLATABLE PENILE PROSTHESIS N/A 01/25/2022    Procedure: INSERTION, PENILE PROSTHESIS, INFLATABLE;  Surgeon: Monica Lieberman MD;  Location: [facility] OR;  Service: Urology;  Laterality: N/A;  [employer] JUAN LUIS ARNOLD [phone] TEXTED HIM @ 5:32PM ON 12-  RN PRE OP 12-28-21. Covid NEGATIVE 1-3-22. CA-----AICD-----HAS CARDS CLEARANCE    kidney stones      lithotripsy    REPAIR, SURGICAL WOUND, EYE, ANTERIOR SEGMENT Right 10/05/2021    Procedure: REPAIR, SURGICAL WOUND, EYE, ANTERIOR SEGMENT;  Surgeon: Adelaide Allen MD;  Location: [facility] OR [address] [place] [address];  Service: Ophthalmology;  Laterality: Right;  Anterior Chamber Wash Out Right Eye     Surgery for bulging disc at C7       Family History   Problem Relation Name Age of Onset    Diabetes Mother      Hypertension Mother      Heart disease Father      Diabetes Brother      Hypertension Brother      Cancer Maternal Uncle          prostate    Asthma  Daughter      Cancer Cousin      Kidney disease Cousin      Amblyopia Neg Hx      Blindness Neg Hx      Cataracts Neg Hx      Glaucoma Neg Hx      Macular degeneration Neg Hx      Retinal detachment Neg Hx      Strabismus Neg Hx       Social History     Tobacco Use    Smoking status: Never     Passive exposure: Past    Smokeless tobacco: Never   Substance Use Topics    Alcohol use: No    Drug use: No     Review of Systems   Constitutional:  Negative for chills and fever.   HENT:  Negative for congestion and sore throat.    Eyes:  Negative for visual disturbance.   Respiratory:  Negative for cough and shortness of breath.    Cardiovascular:  Negative for chest pain.   Gastrointestinal:  Negative for abdominal pain, nausea and vomiting.   Genitourinary:  Positive for flank pain. Negative for dysuria, frequency and hematuria.   Musculoskeletal:  Negative for neck pain.   Skin:  Negative for rash.   Neurological:  Negative for headaches.   Psychiatric/Behavioral:  Negative for confusion.        Physical Exam     Initial Vitals [02/06/25 2305]   BP Pulse Resp Temp SpO2   (!) 162/93 82 18 97.4 °F (36.3 °C) 97 %      MAP       --         Physical Exam    Nursing note and vitals reviewed.  Constitutional: He appears well-developed and well-nourished. He is not diaphoretic.  Non-toxic appearance. He does not have a sickly appearance. He does not appear ill. No distress.   HENT:   Head: Normocephalic and atraumatic.   Right Ear: External ear normal.   Left Ear: External ear normal.   Nose: Nose normal. Mouth/Throat: Oropharynx is clear and moist. No oropharyngeal exudate.   Eyes: EOM are normal.   Neck: Neck supple.   Cardiovascular:  Normal rate, regular rhythm and normal heart sounds.           Pulmonary/Chest: Breath sounds normal. No respiratory distress. He has no wheezes. He has no rhonchi.   Abdominal: Abdomen is soft. Bowel sounds are normal. He exhibits no distension. There is no abdominal tenderness.   No right CVA  tenderness.  No left CVA tenderness.   Musculoskeletal:         General: Normal range of motion.      Cervical back: Neck supple.      Comments: Right lower back tenderness to palpation extending to the right lateral abdomen.      Neurological: He is alert.   Skin: Skin is warm and dry. No rash noted.   Psychiatric: He has a normal mood and affect.         ED Course   Procedures  Labs Reviewed   COMPREHENSIVE METABOLIC PANEL - Abnormal       Result Value    Sodium 141      Potassium 4.0      Chloride 104      CO2 26      Glucose 242 (*)     BUN 25 (*)     Creatinine 1.5 (*)     Calcium 9.5      Total Protein 7.3      Albumin 3.7      Total Bilirubin 0.4      Alkaline Phosphatase 77      AST 18      ALT 15      eGFR 53 (*)     Anion Gap 11     LIPASE - Abnormal    Lipase 83 (*)    URINALYSIS, REFLEX TO URINE CULTURE - Abnormal    Specimen UA Urine, Clean Catch      Color, UA Colorless (*)     Appearance, UA Clear      pH, UA 6.0      Specific Gravity, UA 1.025      Protein, UA Negative      Glucose, UA 4+ (*)     Ketones, UA Negative      Bilirubin (UA) Negative      Occult Blood UA Negative      Nitrite, UA Negative      Urobilinogen, UA Negative      Leukocytes, UA Negative      Narrative:     Specimen Source->Urine   CK - Abnormal     (*)    CBC W/ AUTO DIFFERENTIAL    WBC 6.88      RBC 4.61      Hemoglobin 14.3      Hematocrit 43.3      MCV 94      MCH 31.0      MCHC 33.0      RDW 12.0      Platelets 172      MPV 11.1      Immature Granulocytes 0.3      Gran # (ANC) 3.7      Immature Grans (Abs) 0.02      Lymph # 2.0      Mono # 0.8      Eos # 0.4      Baso # 0.03      nRBC 0      Gran % 53.3      Lymph % 28.6      Mono % 12.2      Eosinophil % 5.2      Basophil % 0.4      Differential Method Automated     URINALYSIS MICROSCOPIC    Bacteria Rare      Yeast, UA None      Microscopic Comment SEE COMMENT      Narrative:     Specimen Source->Urine          Imaging Results              CT Abdomen Pelvis With IV  Contrast NO Oral Contrast (Final result)  Result time 02/07/25 01:07:23      Final result by Mich Rodriguez MD (02/07/25 01:07:23)                   Impression:      1. No acute intra-abdominal abnormalities identified.  2. Cholelithiasis.  3. Postsurgical changes and additional findings as detailed above.      Electronically signed by: Mich Rodriguez MD  Date:    02/07/2025  Time:    01:07               Narrative:    EXAMINATION:  CT ABDOMEN PELVIS WITH IV CONTRAST    CLINICAL HISTORY:  flank pain;    TECHNIQUE:  Low dose axial images, sagittal and coronal reformations were obtained from the lung bases to the pubic symphysis following the IV administration of 75 mL of Omnipaque 350 .  Oral contrast was not given.    COMPARISON:  CT abdomen and pelvis from August 2016.    FINDINGS:  The visualized portion of the heart is unremarkable.  Cardiac pacer wires are partially visualized.  Calcified right hilar and mediastinal lymph nodes are seen with small right lower lobe calcified granuloma which may relate to old granulomatous disease.  Visualized lung bases show no consolidation or pleural effusion.    No significant hepatic abnormalities are identified.  There is no intra-or extrahepatic biliary ductal dilatation.  Few small calcified gallstones are visualized.  The stomach, pancreas, spleen, and adrenal glands are unremarkable.    Kidneys enhance normally with no evidence of hydronephrosis.  Few small bilateral renal hypodensities, probable cysts are seen.  No abnormalities are seen along the ureteral courses.  Urinary bladder is unremarkable.  Prostate is mildly enlarged.  Penile pump prosthesis is visualized with left lower quadrant reservoir.    Appendix is visualized and is unremarkable.  The visualized loops of small and large bowel show no evidence of obstruction or inflammation.  No free air or free fluid.    Aorta tapers normally.    No acute osseous abnormality identified.  Postsurgical changes of right  total hip arthroplasty are seen.                                       Medications   lactated ringers bolus 1,000 mL (1,000 mLs Intravenous New Bag 2/7/25 0109)   ketorolac injection 15 mg (15 mg Intravenous Given 2/7/25 0006)   fentaNYL 50 mcg/mL injection 50 mcg (50 mcg Intravenous Given 2/7/25 0005)   iohexoL (OMNIPAQUE 350) injection 75 mL (75 mLs Intravenous Given 2/7/25 0055)   oxyCODONE-acetaminophen  mg per tablet 1 tablet (1 tablet Oral Given 2/7/25 0125)   methocarbamoL tablet 500 mg (500 mg Oral Given 2/7/25 0132)     Medical Decision Making  Amount and/or Complexity of Data Reviewed  Labs: ordered. Decision-making details documented in ED Course.  Radiology: ordered. Decision-making details documented in ED Course.    Risk  Prescription drug management.    Vitals unremarkable   He is tender in the right flank and kind of around right side of his abdomen  I would not say he has true CVA tenderness  Pain appears musculoskeletal on my initial impression  Differential also includes cholecystitis, nephrolithiasis, volvulus, bowel obstruction, appendicitis  Given his history of stones, CTA ordered and does not show any significant abnormality  I doubt cholecystitis; do not feel he would benefit from a right upper quadrant ultrasound   His pain appears more consistent spasm  Given 50 of fentanyl, 10 of oxycodone, 500 mg of Robaxin  UA without any evidence of infection   His glucose is 242; bicarb of 25, AG 15  I do not suspect this is DKA  Advised him to continue taking his diabetic medications like he is supposed to and follow up his regular physician for reassessment in 1-2 weeks if this does not improve  Advised to return for new or concerning symptoms   Discharged with the Robaxin and Voltaren gel      I discussed with the patient/family the diagnosis, treatment plan, indications for return to the emergency department, and for expected follow-up. The patient/family verbalized an understanding. The  patient/family is asked if there are any questions or concerns. We discuss the case, until all issues are addressed to the patient/familys satisfaction. Patient/family understands and is agreeable to the plan.   Darren Sanches    DISCLAIMER: This note was prepared with [employer] voice recognition transcription software. Garbled syntax, mangled pronouns, and other bizarre constructions may be attributed to that software system.          Scribe Attestation:   Scribe #1: I performed the above scribed service and the documentation accurately describes the services I performed. I attest to the accuracy of the note.                               Clinical Impression:  Final diagnoses:  [R10.9] Flank pain (Primary)       I, Darren Sanches MD, personally performed the services described in this documentation. All medical record entries made by the scribe were at my direction and in my presence. I have reviewed the chart and agree that the record reflects my personal performance and is accurate and complete.      DISCLAIMER: This note was prepared with [employer] voice recognition transcription software. Garbled syntax, mangled pronouns, and other bizarre constructions may be attributed to that software system.     ED Disposition Condition    Discharge Stable          ED Prescriptions       Medication Sig Dispense Start Date End Date Auth. Provider    methocarbamoL (ROBAXIN) 500 MG Tab Take 1 tablet (500 mg total) by mouth 3 (three) times daily. for 10 days 30 tablet 2/7/2025 2/17/2025 Darren Sanches MD    diclofenac sodium (VOLTAREN) 1 % Gel Apply 2 g topically 4 (four) times daily. 50 g 2/7/2025 -- Darren Sanches MD          Follow-up Information    None          Darren Sanches MD  02/07/25 0132

## 2025-03-21 ENCOUNTER — OFFICE VISIT (OUTPATIENT)
Dept: OPHTHALMOLOGY | Facility: CLINIC | Age: 60
End: 2025-03-21
Payer: MEDICARE

## 2025-03-21 DIAGNOSIS — H40.9 GLAUCOMA OF RIGHT EYE, UNSPECIFIED GLAUCOMA TYPE: ICD-10-CM

## 2025-03-21 DIAGNOSIS — H40.41X3 GLAUCOMA OF RIGHT EYE SECONDARY TO EYE INFLAMMATION, SEVERE STAGE: ICD-10-CM

## 2025-03-21 DIAGNOSIS — H17.9 CORNEAL SCAR, RIGHT EYE: Primary | ICD-10-CM

## 2025-03-21 DIAGNOSIS — H40.022 OPEN ANGLE WITH BORDERLINE FINDINGS AND HIGH GLAUCOMA RISK IN LEFT EYE: ICD-10-CM

## 2025-03-21 NOTE — PROGRESS NOTES
"HPI    Coulon/aditya    H/o cat sx OD with Dr Manrique 2020 - "complicated"  Glaucoma  H/o HSV (?) uveitis OD  K ulcer OD - serratia  1. Blind painful right eye   2. 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 Pseudophakia OD   7. Hx Infectious keratitis OD   8. Monocular       PF 4/0, Atropine 2/0   Cosopt 2/2, Brimonidine 2/0, Xalatan qhs/0       Pt here for [facility] follow up -2021  Last edited by Adelaide Allen MD on 3/21/2025  9:57 AM.            Assessment /Plan     For exam results, see Encounter Report.    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 Blind painful right eye   2. 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 Pseudophakia OD   7. Hx Infectious keratitis OD   8. Monocular     Nursing home sent patient for eval of "taking ulcer off of right eye"    Pt also complains about difficulty opening right eye in the AM.    Explained that there is nothing stuck to his eye, but the cornea is scarred from the severe infection he had in 2020/2021.    Poor surgical candidate - unknown Va potential but presumed to be poor - no documented view of nerve since his visits go back to 2019.    Extensive h/o glaucoma, HSV, and endophthalmitis.      Goal is IOP control and comfort.    Continue all drops as prescribed, can try " warm or cool compresses in the AM to help pt open the eyes, can also add artificial tears as needed    F/up dr. Ortega as scheduled.    Monocular precautions - full time polycarb lenses to be worn at all times.    Today's visit is associated with current and anticipated ongoing medical care related to this patient's single serious/complex condition (glc). Follow up is to be continued indefinitely to monitor the condition.

## 2025-03-21 NOTE — PATIENT INSTRUCTIONS
"  Nursing home sent patient for eval of "taking ulcer off of right eye"    Pt also complains about difficulty opening right eye in the AM.    Explained that there is nothing stuck to his eye, but the cornea is scarred from the severe infection he had in 2020/2021.    Poor surgical candidate    Goal is IOP control and comfort.    Continue all drops as prescribed, can try warm or cool compresses in the AM to help pt open the eyes, can also add artificial tears as needed    F/up dr. Ortega as scheduled.    "

## 2025-03-31 ENCOUNTER — HOSPITAL ENCOUNTER (INPATIENT)
Facility: HOSPITAL | Age: 60
LOS: 1 days | Discharge: ANOTHER HOSPITAL | DRG: 177 | End: 2025-04-02
Attending: EMERGENCY MEDICINE | Admitting: STUDENT IN AN ORGANIZED HEALTH CARE EDUCATION/TRAINING PROGRAM
Payer: MEDICARE

## 2025-03-31 DIAGNOSIS — J12.82 PNEUMONIA DUE TO COVID-19 VIRUS: Primary | ICD-10-CM

## 2025-03-31 DIAGNOSIS — E11.69 DIABETES MELLITUS TYPE 2 IN OBESE: ICD-10-CM

## 2025-03-31 DIAGNOSIS — D64.9 ANEMIA, UNSPECIFIED TYPE: ICD-10-CM

## 2025-03-31 DIAGNOSIS — I10 ESSENTIAL HYPERTENSION: ICD-10-CM

## 2025-03-31 DIAGNOSIS — E83.42 HYPOMAGNESEMIA: ICD-10-CM

## 2025-03-31 DIAGNOSIS — E11.9 TYPE 2 DIABETES MELLITUS WITHOUT COMPLICATION, WITH LONG-TERM CURRENT USE OF INSULIN: ICD-10-CM

## 2025-03-31 DIAGNOSIS — R53.1 WEAKNESS: ICD-10-CM

## 2025-03-31 DIAGNOSIS — R07.9 CHEST PAIN: ICD-10-CM

## 2025-03-31 DIAGNOSIS — E66.9 DIABETES MELLITUS TYPE 2 IN OBESE: ICD-10-CM

## 2025-03-31 DIAGNOSIS — H40.41X3 GLAUCOMA OF RIGHT EYE SECONDARY TO EYE INFLAMMATION, SEVERE STAGE: ICD-10-CM

## 2025-03-31 DIAGNOSIS — U07.1 COVID-19 VIRUS DETECTED: ICD-10-CM

## 2025-03-31 DIAGNOSIS — U07.1 PNEUMONIA DUE TO COVID-19 VIRUS: Primary | ICD-10-CM

## 2025-03-31 DIAGNOSIS — I50.20 HEART FAILURE WITH REDUCED EJECTION FRACTION: ICD-10-CM

## 2025-03-31 DIAGNOSIS — Z79.4 TYPE 2 DIABETES MELLITUS WITHOUT COMPLICATION, WITH LONG-TERM CURRENT USE OF INSULIN: ICD-10-CM

## 2025-03-31 DIAGNOSIS — H40.9 GLAUCOMA OF RIGHT EYE, UNSPECIFIED GLAUCOMA TYPE: ICD-10-CM

## 2025-03-31 DIAGNOSIS — U07.1 COVID: ICD-10-CM

## 2025-03-31 DIAGNOSIS — D69.6 THROMBOCYTOPENIA: ICD-10-CM

## 2025-03-31 LAB
ABSOLUTE EOSINOPHIL (OHS): 0.1 K/UL
ABSOLUTE MONOCYTE (OHS): 1.01 K/UL (ref 0.3–1)
ABSOLUTE NEUTROPHIL COUNT (OHS): 4.49 K/UL (ref 1.8–7.7)
ALBUMIN SERPL BCP-MCNC: 3.8 G/DL (ref 3.5–5.2)
ALP SERPL-CCNC: 71 UNIT/L (ref 40–150)
ALT SERPL W/O P-5'-P-CCNC: 19 UNIT/L (ref 10–44)
ANION GAP (OHS): 10 MMOL/L (ref 8–16)
AST SERPL-CCNC: 19 UNIT/L (ref 11–45)
BASOPHILS # BLD AUTO: 0.03 K/UL
BASOPHILS NFR BLD AUTO: 0.4 %
BILIRUB SERPL-MCNC: 0.4 MG/DL (ref 0.1–1)
BUN SERPL-MCNC: 18 MG/DL (ref 8–23)
CALCIUM SERPL-MCNC: 9.7 MG/DL (ref 8.7–10.5)
CHLORIDE SERPL-SCNC: 107 MMOL/L (ref 95–110)
CO2 SERPL-SCNC: 23 MMOL/L (ref 23–29)
CREAT SERPL-MCNC: 1.3 MG/DL (ref 0.5–1.4)
CTP QC/QA: YES
CTP QC/QA: YES
ERYTHROCYTE [DISTWIDTH] IN BLOOD BY AUTOMATED COUNT: 12.2 % (ref 11.5–14.5)
GFR SERPLBLD CREATININE-BSD FMLA CKD-EPI: >60 ML/MIN/1.73/M2
GLUCOSE SERPL-MCNC: 131 MG/DL (ref 70–110)
HCT VFR BLD AUTO: 43.3 % (ref 40–54)
HGB BLD-MCNC: 14.3 GM/DL (ref 14–18)
IMM GRANULOCYTES # BLD AUTO: 0.02 K/UL (ref 0–0.04)
IMM GRANULOCYTES NFR BLD AUTO: 0.3 % (ref 0–0.5)
LYMPHOCYTES # BLD AUTO: 1.67 K/UL (ref 1–4.8)
MAGNESIUM SERPL-MCNC: 1.7 MG/DL (ref 1.6–2.6)
MCH RBC QN AUTO: 31.1 PG (ref 27–31)
MCHC RBC AUTO-ENTMCNC: 33 G/DL (ref 32–36)
MCV RBC AUTO: 94 FL (ref 82–98)
NUCLEATED RBC (/100WBC) (OHS): 0 /100 WBC
PHOSPHATE SERPL-MCNC: 4.1 MG/DL (ref 2.7–4.5)
PLATELET # BLD AUTO: 142 K/UL (ref 150–450)
PMV BLD AUTO: 11.7 FL (ref 9.2–12.9)
POC MOLECULAR INFLUENZA A AGN: NEGATIVE
POC MOLECULAR INFLUENZA B AGN: NEGATIVE
POTASSIUM SERPL-SCNC: 3.8 MMOL/L (ref 3.5–5.1)
PROT SERPL-MCNC: 7.3 GM/DL (ref 6–8.4)
RBC # BLD AUTO: 4.6 M/UL (ref 4.6–6.2)
RELATIVE EOSINOPHIL (OHS): 1.4 %
RELATIVE LYMPHOCYTE (OHS): 22.8 % (ref 18–48)
RELATIVE MONOCYTE (OHS): 13.8 % (ref 4–15)
RELATIVE NEUTROPHIL (OHS): 61.3 % (ref 38–73)
SARS-COV-2 RDRP RESP QL NAA+PROBE: POSITIVE
SODIUM SERPL-SCNC: 140 MMOL/L (ref 136–145)
TROPONIN I SERPL DL<=0.01 NG/ML-MCNC: 0.11 NG/ML
WBC # BLD AUTO: 7.32 K/UL (ref 3.9–12.7)

## 2025-03-31 PROCEDURE — 87502 INFLUENZA DNA AMP PROBE: CPT

## 2025-03-31 PROCEDURE — 83735 ASSAY OF MAGNESIUM: CPT | Performed by: STUDENT IN AN ORGANIZED HEALTH CARE EDUCATION/TRAINING PROGRAM

## 2025-03-31 PROCEDURE — 96375 TX/PRO/DX INJ NEW DRUG ADDON: CPT

## 2025-03-31 PROCEDURE — 84100 ASSAY OF PHOSPHORUS: CPT | Performed by: STUDENT IN AN ORGANIZED HEALTH CARE EDUCATION/TRAINING PROGRAM

## 2025-03-31 PROCEDURE — 84484 ASSAY OF TROPONIN QUANT: CPT | Performed by: STUDENT IN AN ORGANIZED HEALTH CARE EDUCATION/TRAINING PROGRAM

## 2025-03-31 PROCEDURE — 85025 COMPLETE CBC W/AUTO DIFF WBC: CPT | Performed by: EMERGENCY MEDICINE

## 2025-03-31 PROCEDURE — 87635 SARS-COV-2 COVID-19 AMP PRB: CPT | Performed by: EMERGENCY MEDICINE

## 2025-03-31 PROCEDURE — G0378 HOSPITAL OBSERVATION PER HR: HCPCS

## 2025-03-31 PROCEDURE — 99285 EMERGENCY DEPT VISIT HI MDM: CPT | Mod: 25

## 2025-03-31 PROCEDURE — 63600175 PHARM REV CODE 636 W HCPCS: Performed by: STUDENT IN AN ORGANIZED HEALTH CARE EDUCATION/TRAINING PROGRAM

## 2025-03-31 PROCEDURE — 80053 COMPREHEN METABOLIC PANEL: CPT | Performed by: EMERGENCY MEDICINE

## 2025-03-31 PROCEDURE — 93010 ELECTROCARDIOGRAM REPORT: CPT | Mod: ,,, | Performed by: INTERNAL MEDICINE

## 2025-03-31 PROCEDURE — 96367 TX/PROPH/DG ADDL SEQ IV INF: CPT

## 2025-03-31 PROCEDURE — 25000003 PHARM REV CODE 250: Performed by: EMERGENCY MEDICINE

## 2025-03-31 PROCEDURE — 93005 ELECTROCARDIOGRAM TRACING: CPT

## 2025-03-31 RX ORDER — IBUPROFEN 200 MG
24 TABLET ORAL
Status: DISCONTINUED | OUTPATIENT
Start: 2025-03-31 | End: 2025-04-02 | Stop reason: HOSPADM

## 2025-03-31 RX ORDER — GUAIFENESIN 600 MG/1
1200 TABLET, EXTENDED RELEASE ORAL 2 TIMES DAILY
COMMUNITY

## 2025-03-31 RX ORDER — TAMSULOSIN HYDROCHLORIDE 0.4 MG/1
0.4 CAPSULE ORAL NIGHTLY
Status: DISCONTINUED | OUTPATIENT
Start: 2025-03-31 | End: 2025-04-02 | Stop reason: HOSPADM

## 2025-03-31 RX ORDER — CARVEDILOL 3.12 MG/1
3.12 TABLET ORAL 2 TIMES DAILY
Status: DISCONTINUED | OUTPATIENT
Start: 2025-03-31 | End: 2025-04-02 | Stop reason: HOSPADM

## 2025-03-31 RX ORDER — BISACODYL 10 MG/1
10 SUPPOSITORY RECTAL DAILY PRN
Status: DISCONTINUED | OUTPATIENT
Start: 2025-03-31 | End: 2025-04-02 | Stop reason: HOSPADM

## 2025-03-31 RX ORDER — AMOXICILLIN 250 MG
1 CAPSULE ORAL DAILY PRN
Status: DISCONTINUED | OUTPATIENT
Start: 2025-03-31 | End: 2025-04-02 | Stop reason: HOSPADM

## 2025-03-31 RX ORDER — IPRATROPIUM BROMIDE AND ALBUTEROL SULFATE 2.5; .5 MG/3ML; MG/3ML
3 SOLUTION RESPIRATORY (INHALATION) EVERY 6 HOURS PRN
COMMUNITY

## 2025-03-31 RX ORDER — IPRATROPIUM BROMIDE AND ALBUTEROL SULFATE 2.5; .5 MG/3ML; MG/3ML
3 SOLUTION RESPIRATORY (INHALATION) EVERY 6 HOURS PRN
Status: DISCONTINUED | OUTPATIENT
Start: 2025-03-31 | End: 2025-04-02 | Stop reason: HOSPADM

## 2025-03-31 RX ORDER — LATANOPROST 50 UG/ML
1 SOLUTION/ DROPS OPHTHALMIC NIGHTLY
COMMUNITY

## 2025-03-31 RX ORDER — ACETAMINOPHEN 325 MG/1
650 TABLET ORAL EVERY 8 HOURS PRN
Status: DISCONTINUED | OUTPATIENT
Start: 2025-03-31 | End: 2025-04-02 | Stop reason: HOSPADM

## 2025-03-31 RX ORDER — GLUCAGON 1 MG
1 KIT INJECTION
Status: DISCONTINUED | OUTPATIENT
Start: 2025-03-31 | End: 2025-04-02 | Stop reason: HOSPADM

## 2025-03-31 RX ORDER — INSULIN GLARGINE 100 [IU]/ML
20 INJECTION, SOLUTION SUBCUTANEOUS DAILY
Status: DISCONTINUED | OUTPATIENT
Start: 2025-04-01 | End: 2025-04-02 | Stop reason: HOSPADM

## 2025-03-31 RX ORDER — DEXTROMETHORPHAN HYDROBROMIDE, GUAIFENESIN 5; 100 MG/5ML; MG/5ML
650 LIQUID ORAL EVERY 6 HOURS PRN
Status: ON HOLD | COMMUNITY
End: 2025-04-02 | Stop reason: HOSPADM

## 2025-03-31 RX ORDER — LISINOPRIL 20 MG/1
20 TABLET ORAL DAILY
Status: DISCONTINUED | OUTPATIENT
Start: 2025-03-31 | End: 2025-04-01

## 2025-03-31 RX ORDER — HYDRALAZINE HYDROCHLORIDE 20 MG/ML
10 INJECTION INTRAMUSCULAR; INTRAVENOUS EVERY 6 HOURS PRN
Status: DISCONTINUED | OUTPATIENT
Start: 2025-03-31 | End: 2025-04-02 | Stop reason: HOSPADM

## 2025-03-31 RX ORDER — INSULIN GLARGINE 100 [IU]/ML
20 INJECTION, SOLUTION SUBCUTANEOUS DAILY
Status: DISCONTINUED | OUTPATIENT
Start: 2025-04-01 | End: 2025-03-31 | Stop reason: CLARIF

## 2025-03-31 RX ORDER — NALOXONE HCL 0.4 MG/ML
0.02 VIAL (ML) INJECTION
Status: DISCONTINUED | OUTPATIENT
Start: 2025-03-31 | End: 2025-04-02 | Stop reason: HOSPADM

## 2025-03-31 RX ORDER — SODIUM CHLORIDE 0.9 % (FLUSH) 0.9 %
10 SYRINGE (ML) INJECTION EVERY 12 HOURS PRN
Status: DISCONTINUED | OUTPATIENT
Start: 2025-03-31 | End: 2025-04-02 | Stop reason: HOSPADM

## 2025-03-31 RX ORDER — INSULIN ASPART 100 [IU]/ML
0-5 INJECTION, SOLUTION INTRAVENOUS; SUBCUTANEOUS
Status: DISCONTINUED | OUTPATIENT
Start: 2025-03-31 | End: 2025-04-02 | Stop reason: HOSPADM

## 2025-03-31 RX ORDER — SODIUM,POTASSIUM PHOSPHATES 280-250MG
2 POWDER IN PACKET (EA) ORAL
Status: DISCONTINUED | OUTPATIENT
Start: 2025-03-31 | End: 2025-04-02 | Stop reason: HOSPADM

## 2025-03-31 RX ORDER — IBUPROFEN 200 MG
16 TABLET ORAL
Status: DISCONTINUED | OUTPATIENT
Start: 2025-03-31 | End: 2025-04-02 | Stop reason: HOSPADM

## 2025-03-31 RX ORDER — LANOLIN ALCOHOL/MO/W.PET/CERES
800 CREAM (GRAM) TOPICAL
Status: DISCONTINUED | OUTPATIENT
Start: 2025-03-31 | End: 2025-04-02 | Stop reason: HOSPADM

## 2025-03-31 RX ORDER — TALC
6 POWDER (GRAM) TOPICAL NIGHTLY PRN
Status: DISCONTINUED | OUTPATIENT
Start: 2025-03-31 | End: 2025-04-02 | Stop reason: HOSPADM

## 2025-03-31 RX ORDER — AMITRIPTYLINE HYDROCHLORIDE 10 MG/1
10 TABLET, FILM COATED ORAL NIGHTLY PRN
COMMUNITY

## 2025-03-31 RX ORDER — ONDANSETRON HYDROCHLORIDE 2 MG/ML
4 INJECTION, SOLUTION INTRAVENOUS EVERY 8 HOURS PRN
Status: DISCONTINUED | OUTPATIENT
Start: 2025-03-31 | End: 2025-04-02 | Stop reason: HOSPADM

## 2025-03-31 RX ORDER — ACETAMINOPHEN 500 MG
1000 TABLET ORAL
Status: COMPLETED | OUTPATIENT
Start: 2025-03-31 | End: 2025-03-31

## 2025-03-31 RX ORDER — SIMETHICONE 80 MG
1 TABLET,CHEWABLE ORAL 4 TIMES DAILY PRN
Status: DISCONTINUED | OUTPATIENT
Start: 2025-03-31 | End: 2025-04-02 | Stop reason: HOSPADM

## 2025-03-31 RX ORDER — BRIMONIDINE TARTRATE 2 MG/ML
1 SOLUTION/ DROPS OPHTHALMIC 2 TIMES DAILY
Status: ON HOLD | COMMUNITY
End: 2025-04-02 | Stop reason: HOSPADM

## 2025-03-31 RX ORDER — ATORVASTATIN CALCIUM 40 MG/1
80 TABLET, FILM COATED ORAL DAILY
Status: DISCONTINUED | OUTPATIENT
Start: 2025-04-01 | End: 2025-04-02 | Stop reason: HOSPADM

## 2025-03-31 RX ORDER — SODIUM CHLORIDE 0.9 % (FLUSH) 0.9 %
10 SYRINGE (ML) INJECTION
Status: DISCONTINUED | OUTPATIENT
Start: 2025-03-31 | End: 2025-04-02 | Stop reason: HOSPADM

## 2025-03-31 RX ORDER — METHOCARBAMOL 500 MG/1
500 TABLET, FILM COATED ORAL 4 TIMES DAILY
COMMUNITY

## 2025-03-31 RX ORDER — ALUMINUM HYDROXIDE, MAGNESIUM HYDROXIDE, AND SIMETHICONE 1200; 120; 1200 MG/30ML; MG/30ML; MG/30ML
30 SUSPENSION ORAL 4 TIMES DAILY PRN
Status: DISCONTINUED | OUTPATIENT
Start: 2025-03-31 | End: 2025-04-02 | Stop reason: HOSPADM

## 2025-03-31 RX ORDER — GUAIFENESIN 100 MG/5ML
400 LIQUID ORAL EVERY 4 HOURS PRN
Status: DISCONTINUED | OUTPATIENT
Start: 2025-03-31 | End: 2025-04-02 | Stop reason: HOSPADM

## 2025-03-31 RX ORDER — GABAPENTIN 400 MG/1
800 CAPSULE ORAL 3 TIMES DAILY
Status: DISCONTINUED | OUTPATIENT
Start: 2025-04-01 | End: 2025-04-02 | Stop reason: HOSPADM

## 2025-03-31 RX ORDER — ACETAMINOPHEN 325 MG/1
650 TABLET ORAL EVERY 4 HOURS PRN
Status: DISCONTINUED | OUTPATIENT
Start: 2025-03-31 | End: 2025-04-02 | Stop reason: HOSPADM

## 2025-03-31 RX ORDER — ASPIRIN 81 MG/1
81 TABLET ORAL DAILY
Status: DISCONTINUED | OUTPATIENT
Start: 2025-04-01 | End: 2025-04-02 | Stop reason: HOSPADM

## 2025-03-31 RX ORDER — PREDNISOLONE SODIUM PHOSPHATE 10 MG/ML
1 SOLUTION/ DROPS OPHTHALMIC 4 TIMES DAILY
Status: ON HOLD | COMMUNITY
End: 2025-04-02 | Stop reason: HOSPADM

## 2025-03-31 RX ORDER — ASCORBIC ACID 500 MG
500 TABLET ORAL 2 TIMES DAILY
Status: DISCONTINUED | OUTPATIENT
Start: 2025-03-31 | End: 2025-04-02 | Stop reason: HOSPADM

## 2025-03-31 RX ADMIN — DEXAMETHASONE 6 MG: 2 TABLET ORAL at 10:03

## 2025-03-31 RX ADMIN — ACETAMINOPHEN 1000 MG: 500 TABLET ORAL at 06:03

## 2025-03-31 NOTE — ED NOTES
Patient BIB EMS, present to ED due to generalized weakness, lost of appetite and body ache, pt states symptoms began this morning.

## 2025-03-31 NOTE — ED PROVIDER NOTES
SCRIBE #1 NOTE: I, Marian Betancourt, am scribing for, and in the presence of,  Chadwick Massey MD. I have scribed the following portions of the note - Other sections scribed: HPI, ROS, PE.           EM PHYSICIAN NOTE       This patient presents with a complaint of cough since this morning.  Chief Complaint   Patient presents with    URI     Pt arrived via ems, pt chief complaint is Cold symptoms. Pt has a cough with congestion, as well as a fever. Pt also states now that chest began hurting due to cough.        Source of HPI & ROS: patient    HPI: Se Virgil Mcgraw is a 60 y.o. male, with a PMHx of HTN, DM Type 2, AICD present, non-ischemic cardiomyopathy, HF, CAD, CVA on Eliquis, COPD, who presents to the ED with dry cough since this morning. Patient reports associated symptoms of chills and fatigue.  He reports he felt so weak that he felt like he was going to fall or pass out prior to arrival.  States he is in a nursing home/rehab facility s/p CVA, but that he usually is able to ambulate at baseline. No other exacerbating or alleviating factors. Denies N/V/D, sore throat, or other associated symptoms.          Review of patient's allergies indicates:   Allergen Reactions    Cephalexin Other (See Comments)     vomitting    Darvocet a500 [propoxyphene n-acetaminophen] Itching    Morphine Itching           Tramadol Itching            Pertinent REVIEW of SYSTEMS    GENERAL/CONSTITUTIONAL:  See HPI.    CARDIOVASCULAR: There is not a report of chest pain   RESPIRATORY:  . See HPI.   GASTROINTESTINAL: There is not a report of  vomiting, diarrhea  HEMATOLOGIC/LYMPHATIC: There is a report of anticoagulant/antithrombotic use.       The nurse's notes and triage vital signs were reviewed.    PHYSICAL EXAMINATION    ED Triage Vitals [03/31/25 1731]   Encounter Vitals Group      BP (!) 159/89      Systolic BP Percentile       Diastolic BP Percentile       Pulse 92      Resp 18      Temp (!) 100.4 °F (38 °C)      Temp  "Source Oral      SpO2 96 %      Weight 250 lb      Height 5' 7"      Head Circumference       Peak Flow       Pain Score       Pain Loc       Pain Education       Exclude from Growth Chart      Vital signs and Pulse Ox reviewed in clinical context. Abnormalities noted:  Febrile and elevated blood pressure.  Body mass index is 39.16 kg/m².  Pt's level of consciousness is Awake and Alert, and the patient appears uncomfortable.  Skin: warm, pink and dry.  Capillary refill is less than 2 seconds.  Mucosa: normal  Head and Neck: no JVD, neck supple  Cardiac exam: RRR I did not appreciate a murmur.  Pulmonary exam: unlabored and clear  Abd Exam: soft nontender   Musculoskeletal: no joint tenderness, deformity. 1+ edema bilaterally.   Neurologic: GCS 15; moving all extremities equally, no facial droop       Medical decision making:   Nurses notes and Vital Signs reviewed.     Problems: Today's visit reveals cough and fatigue which is a/an Acute problem that is concerning for deterioration due to a differential diagnosis that includes pneumonia, COVID, influenza, dehydration, electrolyte imbalance.     Other problems today include COVID and mild hypoxia with pulse ox of 90% on standing     MDM Components integrated into this visit: Social determinants of health impacting care today: Access to PCP impaired because patient was unable to obtain appointment with PCP in a timely manner    Considerations: My decision to admit to hospital this patient is based on weakness and pulse ox dropped on standing.          See ER course below for lab test ordered, results reviewed, independent interpretation of images or EKG, discussion with consultants, data obtained from sources other than patient:  ED Course as of 03/31/25 2221   Mon Mar 31, 2025   1758 COVID positive [MH]   1758 Influenza negative []   2038 Chest x-ray does not reveal an infiltrate []   2039 Pulse ox drops to 90% on standing and respiratory increases to 22 [MH] "   2039 Patient reports he feels too weak to stand.  He is usually able to ambulate around the nursing home.  I will consult Hospital Medicine for observation []   2130 CBC is normal []   2157 CMP is normal [MH]      ED Course User Index  [MH] Chadwick Massey MD          CRITICAL CARE TIME:   Critical care services included the following: chart data review, reviewing nursing notes and researching old charts from internal and external sources, documentation time, consultant collaboration regarding findings and treatment options, medication orders and management, direct patient care, vital sign assessments, physical exam reassessments, and ordering, interpreting and reviewing diagnostic studies/lab tests.    Aggregate critical care time was approximately 35 minutes, which includes only time during which I was engaged in work directly related to the patient's care, as described above, whether at the bedside or elsewhere in the Emergency Department.  It did not include time spent performing other reported procedures or the services of residents, students, nurses or physician assistants.      Transfer of care:  Hospital medicine      Orders Placed This Encounter   Procedures    X-Ray Chest AP Portable    US Lower Extremity Veins Bilateral    Comprehensive Metabolic Panel    CBC Auto Differential    Urinalysis    CBC with Differential    Diet Heart Healthy    Orthostatic blood pressure Orthostatic blood pressure and pulse    Vital Signs    Cardiac Monitoring - Adult    If oxygen LPM is 6 liters or greater, limit activity to bedside commode and bedside chair with O2.  Do not remove O2 or use extension tubing for bedside activities.    Place appropriate labels/decals on patient door    Limit non essential personnel and visitor contact for patients with concern for suspected COVID-19    All visitors who enter room will adhere to PPE appropriate for patient condition and comply to facility rules for identification and  conduct    Please verify that the patient has styrofoam tray and plastic utensils    Please ensure dietary checks with nurse before bringing tray into room.    Educate Patient Regarding Self-Proning    Notify Physician    Recheck Blood Glucose:    Heparin to be ordered in Heparin infusion order set    Vital signs    Weigh patient    Strict intake and output (1) Document % meals taken (2) # of urinations (3) # and consistency of BMs    Bladder scan    Straight Cath    Notify Provider    Notify Physician - Potential Need of Opioid Reversal    Place sequential compression device    Warm compress - Apply heat to affected area    Recheck Blood Glucose:    Full code    Airborne and Contact and Droplet Isolation Status    Please use the respiratory therapy protocols to escalate and de-escalate therapy: Airway Clearance Protocol, Atelectasis/Hyperinflation Protocol, Bronchodilator Protocol    Pulse Oximetry Continuous    POCT COVID-19 Rapid Screening    POCT Influenza A/B Molecular    POCT glucose    EKG 12-lead    Saline lock IV    Saline lock IV    Possible Hospitalization    Place in Observation    Fall precautions     Medications   guaiFENesin 100 mg/5 ml syrup 400 mg (has no administration in time range)   sodium chloride 0.9% flush 10 mL (has no administration in time range)   remdesivir 200 mg in 0.9% NaCl 250 mL infusion (has no administration in time range)   remdesivir 100 mg in 0.9% NaCl 250 mL infusion (has no administration in time range)   glucose chewable tablet 16 g (has no administration in time range)   glucose chewable tablet 24 g (has no administration in time range)   dextrose 50% injection 12.5 g (has no administration in time range)   dextrose 50% injection 25 g (has no administration in time range)   glucagon (human recombinant) injection 1 mg (has no administration in time range)   ascorbic acid (vitamin C) tablet 500 mg (has no administration in time range)   multivitamin tablet (has no  administration in time range)   dexAMETHasone tablet 6 mg (has no administration in time range)   sodium chloride 0.9% flush 10 mL (has no administration in time range)   melatonin tablet 6 mg (has no administration in time range)   ondansetron injection 4 mg (has no administration in time range)   senna-docusate 8.6-50 mg per tablet 1 tablet (has no administration in time range)   bisacodyL suppository 10 mg (has no administration in time range)   acetaminophen tablet 650 mg (has no administration in time range)   simethicone chewable tablet 80 mg (has no administration in time range)   aluminum-magnesium hydroxide-simethicone 200-200-20 mg/5 mL suspension 30 mL (has no administration in time range)   acetaminophen tablet 650 mg (has no administration in time range)   naloxone 0.4 mg/mL injection 0.02 mg (has no administration in time range)   potassium bicarbonate disintegrating tablet 50 mEq (has no administration in time range)   potassium bicarbonate disintegrating tablet 35 mEq (has no administration in time range)   potassium bicarbonate disintegrating tablet 60 mEq (has no administration in time range)   magnesium oxide tablet 800 mg (has no administration in time range)   magnesium oxide tablet 800 mg (has no administration in time range)   potassium, sodium phosphates 280-160-250 mg packet 2 packet (has no administration in time range)   potassium, sodium phosphates 280-160-250 mg packet 2 packet (has no administration in time range)   potassium, sodium phosphates 280-160-250 mg packet 2 packet (has no administration in time range)   glucose chewable tablet 16 g (has no administration in time range)   glucose chewable tablet 24 g (has no administration in time range)   glucagon (human recombinant) injection 1 mg (has no administration in time range)   insulin aspart U-100 pen 0-5 Units (has no administration in time range)   apixaban tablet 5 mg (has no administration in time range)   aspirin EC tablet 81  mg (has no administration in time range)   carvediloL tablet 3.125 mg (has no administration in time range)   divalproex ER 24 hr tablet 750 mg (has no administration in time range)   gabapentin capsule 800 mg (has no administration in time range)   insulin glargine U-100 (Lantus) injection 20 Units (has no administration in time range)   lisinopriL tablet 20 mg (has no administration in time range)   atorvastatin tablet 80 mg (has no administration in time range)   tamsulosin 24 hr capsule 0.4 mg (has no administration in time range)   acetaminophen tablet 1,000 mg (1,000 mg Oral Given 3/31/25 1822)           Diagnoses that have been ruled out:   None   Diagnoses that are still under consideration:   None   Final diagnoses:   COVID   Weakness          Disposition:  Admit      Referral for follow-up  Follow-up Information       Follow up With Specialties Details Why Contact Info    Coreen Anderson MD Internal Medicine   [address] [facility] [id number]  [place] [address]  [phone]              Prescription management:  ED Prescriptions       Medication Sig Dispense Start Date End Date Auth. Provider    molnupiravir 200 mg capsule (EUA) Take 4 capsules (800 mg total) by mouth every 12 (twelve) hours. for 5 days 40 capsule 3/31/2025 4/5/2025 Chadwick Massey MD Micelle J Haydel      This note was created using [employer].  This program may occasionally misinterpret certain words and phrases.      SCRIBE ATTESTATION NOTE:  I attest that I personally performed the services documented by the scribe and acknowledged and confirm the content of the note.   Nurses notes were reviewed.  Chadwick Smith MD  03/31/25 2049       Chadwick Massey MD  03/31/25 2221

## 2025-03-31 NOTE — ED NOTES
Unable to fully complete orthostatic vital signs. Pt states pt uses walker at home, to assist with ambulating. When attempting to get pt up to obtain standing orthostatic vital signs, pt was not able to bare weight. Pt reports feeling week.

## 2025-03-31 NOTE — Clinical Note
Diagnosis: COVID [[id number]]   Future Attending Provider: JEREMÍAS PONCE [[id number]]   Special Needs:: Isolation [20]

## 2025-03-31 NOTE — DISCHARGE INSTRUCTIONS
Molnupiravir: Patients with partners of childbearing potential must use a reliable method of contraception during therapy and for 3 months after the last dose of molnupiravir. Molnupiravir may cause fetal harm.

## 2025-04-01 PROBLEM — D64.9 ANEMIA: Status: ACTIVE | Noted: 2025-04-01

## 2025-04-01 PROBLEM — E83.42 HYPOMAGNESEMIA: Status: ACTIVE | Noted: 2025-04-01

## 2025-04-01 PROBLEM — J12.82 PNEUMONIA DUE TO COVID-19 VIRUS: Status: ACTIVE | Noted: 2025-03-31

## 2025-04-01 PROBLEM — D69.6 THROMBOCYTOPENIA: Status: ACTIVE | Noted: 2025-04-01

## 2025-04-01 LAB
ABSOLUTE EOSINOPHIL (OHS): 0 K/UL
ABSOLUTE MONOCYTE (OHS): 0.27 K/UL (ref 0.3–1)
ABSOLUTE NEUTROPHIL COUNT (OHS): 6.48 K/UL (ref 1.8–7.7)
ALBUMIN SERPL BCP-MCNC: 3.6 G/DL (ref 3.5–5.2)
ALP SERPL-CCNC: 71 UNIT/L (ref 40–150)
ALT SERPL W/O P-5'-P-CCNC: 17 UNIT/L (ref 10–44)
ANION GAP (OHS): 15 MMOL/L (ref 8–16)
ASCENDING AORTA: 3.22 CM
AST SERPL-CCNC: 21 UNIT/L (ref 11–45)
AV INDEX (PROSTH): 0.61
AV MEAN GRADIENT: 6 MMHG
AV PEAK GRADIENT: 9 MMHG
AV VALVE AREA BY VELOCITY RATIO: 2.5 CM²
AV VALVE AREA: 2.5 CM²
AV VELOCITY RATIO: 0.6
BACTERIA #/AREA URNS AUTO: NORMAL /HPF
BASOPHILS # BLD AUTO: 0.02 K/UL
BASOPHILS NFR BLD AUTO: 0.3 %
BILIRUB SERPL-MCNC: 0.6 MG/DL (ref 0.1–1)
BILIRUB UR QL STRIP.AUTO: NEGATIVE
BNP SERPL-MCNC: 41 PG/ML (ref 0–99)
BSA FOR ECHO PROCEDURE: 2.32 M2
BUN SERPL-MCNC: 19 MG/DL (ref 8–23)
CALCIUM SERPL-MCNC: 9.4 MG/DL (ref 8.7–10.5)
CHLORIDE SERPL-SCNC: 105 MMOL/L (ref 95–110)
CLARITY UR: CLEAR
CO2 SERPL-SCNC: 19 MMOL/L (ref 23–29)
COLOR UR AUTO: COLORLESS
CREAT SERPL-MCNC: 1.1 MG/DL (ref 0.5–1.4)
CV ECHO LV RWT: 0.51 CM
DOP CALC AO PEAK VEL: 1.5 M/S
DOP CALC AO VTI: 40.1 CM
DOP CALC LVOT AREA: 4.2 CM2
DOP CALC LVOT DIAMETER: 2.3 CM
DOP CALC LVOT PEAK VEL: 0.9 M/S
DOP CALC LVOT STROKE VOLUME: 100.9 CM3
DOP CALCLVOT PEAK VEL VTI: 24.3 CM
E WAVE DECELERATION TIME: 155 MSEC
E/A RATIO: 0.77
E/E' RATIO: 10 M/S
ECHO LV POSTERIOR WALL: 1.2 CM (ref 0.6–1.1)
ERYTHROCYTE [DISTWIDTH] IN BLOOD BY AUTOMATED COUNT: 12.2 % (ref 11.5–14.5)
FRACTIONAL SHORTENING: 14.9 % (ref 28–44)
GFR SERPLBLD CREATININE-BSD FMLA CKD-EPI: >60 ML/MIN/1.73/M2
GLUCOSE SERPL-MCNC: 177 MG/DL (ref 70–110)
GLUCOSE UR QL STRIP: ABNORMAL
HCT VFR BLD AUTO: 41 % (ref 40–54)
HGB BLD-MCNC: 13.7 GM/DL (ref 14–18)
HGB UR QL STRIP: ABNORMAL
HYALINE CASTS UR QL AUTO: 0 /LPF (ref 0–1)
IMM GRANULOCYTES # BLD AUTO: 0.04 K/UL (ref 0–0.04)
IMM GRANULOCYTES NFR BLD AUTO: 0.5 % (ref 0–0.5)
INTERVENTRICULAR SEPTUM: 1.1 CM (ref 0.6–1.1)
IVC DIAMETER: 1.71 CM
IVRT: 140 MSEC
KETONES UR QL STRIP: ABNORMAL
LA MAJOR: 5.7 CM
LA MINOR: 5.5 CM
LA WIDTH: 4 CM
LEFT ATRIUM SIZE: 3.7 CM
LEFT ATRIUM VOLUME INDEX: 32 ML/M2
LEFT ATRIUM VOLUME: 70 CM3
LEFT INTERNAL DIMENSION IN SYSTOLE: 4 CM (ref 2.1–4)
LEFT VENTRICLE DIASTOLIC VOLUME INDEX: 46.4 ML/M2
LEFT VENTRICLE DIASTOLIC VOLUME: 103 ML
LEFT VENTRICLE MASS INDEX: 89.9 G/M2
LEFT VENTRICLE SYSTOLIC VOLUME INDEX: 32.4 ML/M2
LEFT VENTRICLE SYSTOLIC VOLUME: 72 ML
LEFT VENTRICULAR INTERNAL DIMENSION IN DIASTOLE: 4.7 CM (ref 3.5–6)
LEFT VENTRICULAR MASS: 199.6 G
LEUKOCYTE ESTERASE UR QL STRIP: NEGATIVE
LV LATERAL E/E' RATIO: 9.6 M/S
LV SEPTAL E/E' RATIO: 10.8 M/S
LVED V (TEICH): 103.5 ML
LVES V (TEICH): 71.56 ML
LVOT MG: 2.34 MMHG
LVOT MV: 0.74 CM/S
LYMPHOCYTES # BLD AUTO: 0.91 K/UL (ref 1–4.8)
MAGNESIUM SERPL-MCNC: 1.5 MG/DL (ref 1.6–2.6)
MCH RBC QN AUTO: 31.2 PG (ref 27–31)
MCHC RBC AUTO-ENTMCNC: 33.4 G/DL (ref 32–36)
MCV RBC AUTO: 93 FL (ref 82–98)
MICROSCOPIC COMMENT: NORMAL
MV PEAK A VEL: 1.12 M/S
MV PEAK E VEL: 0.86 M/S
MV STENOSIS PRESSURE HALF TIME: 44.96 MS
MV VALVE AREA P 1/2 METHOD: 4.89 CM2
NITRITE UR QL STRIP: NEGATIVE
NUCLEATED RBC (/100WBC) (OHS): 0 /100 WBC
OHS CV RV/LV RATIO: 0.64 CM
OHS QRS DURATION: 96 MS
OHS QTC CALCULATION: 421 MS
PH UR STRIP: 6 [PH]
PHOSPHATE SERPL-MCNC: 3.7 MG/DL (ref 2.7–4.5)
PISA TR MAX VEL: 2.7 M/S
PLATELET # BLD AUTO: 144 K/UL (ref 150–450)
PMV BLD AUTO: 12.4 FL (ref 9.2–12.9)
POCT GLUCOSE: 187 MG/DL (ref 70–110)
POCT GLUCOSE: 188 MG/DL (ref 70–110)
POCT GLUCOSE: 206 MG/DL (ref 70–110)
POCT GLUCOSE: 213 MG/DL (ref 70–110)
POTASSIUM SERPL-SCNC: 4 MMOL/L (ref 3.5–5.1)
PROT SERPL-MCNC: 7.2 GM/DL (ref 6–8.4)
PROT UR QL STRIP: ABNORMAL
PULM VEIN S/D RATIO: 1.23
PV PEAK D VEL: 0.35 M/S
PV PEAK GRADIENT: 5 MMHG
PV PEAK S VEL: 0.43 M/S
PV PEAK VELOCITY: 1.17 M/S
RA MAJOR: 4.77 CM
RA PRESSURE ESTIMATED: 3 MMHG
RA WIDTH: 3.1 CM
RBC # BLD AUTO: 4.39 M/UL (ref 4.6–6.2)
RBC #/AREA URNS AUTO: 4 /HPF (ref 0–4)
RELATIVE EOSINOPHIL (OHS): 0 %
RELATIVE LYMPHOCYTE (OHS): 11.8 % (ref 18–48)
RELATIVE MONOCYTE (OHS): 3.5 % (ref 4–15)
RELATIVE NEUTROPHIL (OHS): 83.9 % (ref 38–73)
RIGHT VENTRICLE DIASTOLIC BASEL DIMENSION: 3 CM
RIGHT VENTRICULAR END-DIASTOLIC DIMENSION: 3.03 CM
RV TB RVSP: 6 MMHG
RV TISSUE DOPPLER FREE WALL SYSTOLIC VELOCITY 1 (APICAL 4 CHAMBER VIEW): 20.05 CM/S
SINUS: 3.37 CM
SODIUM SERPL-SCNC: 139 MMOL/L (ref 136–145)
SP GR UR STRIP: 1.02
STJ: 2.67 CM
TDI LATERAL: 0.09 M/S
TDI SEPTAL: 0.08 M/S
TDI: 0.09 M/S
TR MAX PG: 30 MMHG
TRICUSPID ANNULAR PLANE SYSTOLIC EXCURSION: 3.24 CM
TROPONIN I SERPL DL<=0.01 NG/ML-MCNC: 0.12 NG/ML
TROPONIN I SERPL DL<=0.01 NG/ML-MCNC: 0.14 NG/ML
TV PEAK GRADIENT: 1 MMHG
TV REST PULMONARY ARTERY PRESSURE: 32 MMHG
UROBILINOGEN UR STRIP-ACNC: NEGATIVE EU/DL
WBC # BLD AUTO: 7.72 K/UL (ref 3.9–12.7)
WBC #/AREA URNS AUTO: 0 /HPF (ref 0–5)
YEAST UR QL AUTO: NORMAL /HPF
Z-SCORE OF LEFT VENTRICULAR DIMENSION IN END DIASTOLE: -5.01
Z-SCORE OF LEFT VENTRICULAR DIMENSION IN END SYSTOLE: -1.28

## 2025-04-01 PROCEDURE — 63600175 PHARM REV CODE 636 W HCPCS: Performed by: STUDENT IN AN ORGANIZED HEALTH CARE EDUCATION/TRAINING PROGRAM

## 2025-04-01 PROCEDURE — 82962 GLUCOSE BLOOD TEST: CPT

## 2025-04-01 PROCEDURE — 83880 ASSAY OF NATRIURETIC PEPTIDE: CPT | Performed by: STUDENT IN AN ORGANIZED HEALTH CARE EDUCATION/TRAINING PROGRAM

## 2025-04-01 PROCEDURE — 96365 THER/PROPH/DIAG IV INF INIT: CPT

## 2025-04-01 PROCEDURE — 82247 BILIRUBIN TOTAL: CPT | Performed by: STUDENT IN AN ORGANIZED HEALTH CARE EDUCATION/TRAINING PROGRAM

## 2025-04-01 PROCEDURE — 84100 ASSAY OF PHOSPHORUS: CPT | Performed by: STUDENT IN AN ORGANIZED HEALTH CARE EDUCATION/TRAINING PROGRAM

## 2025-04-01 PROCEDURE — 25000003 PHARM REV CODE 250: Performed by: STUDENT IN AN ORGANIZED HEALTH CARE EDUCATION/TRAINING PROGRAM

## 2025-04-01 PROCEDURE — XW033E5 INTRODUCTION OF REMDESIVIR ANTI-INFECTIVE INTO PERIPHERAL VEIN, PERCUTANEOUS APPROACH, NEW TECHNOLOGY GROUP 5: ICD-10-PCS | Performed by: STUDENT IN AN ORGANIZED HEALTH CARE EDUCATION/TRAINING PROGRAM

## 2025-04-01 PROCEDURE — 96366 THER/PROPH/DIAG IV INF ADDON: CPT

## 2025-04-01 PROCEDURE — 85025 COMPLETE CBC W/AUTO DIFF WBC: CPT | Performed by: STUDENT IN AN ORGANIZED HEALTH CARE EDUCATION/TRAINING PROGRAM

## 2025-04-01 PROCEDURE — 81001 URINALYSIS AUTO W/SCOPE: CPT | Performed by: EMERGENCY MEDICINE

## 2025-04-01 PROCEDURE — 83735 ASSAY OF MAGNESIUM: CPT | Performed by: STUDENT IN AN ORGANIZED HEALTH CARE EDUCATION/TRAINING PROGRAM

## 2025-04-01 PROCEDURE — 97166 OT EVAL MOD COMPLEX 45 MIN: CPT

## 2025-04-01 PROCEDURE — 96372 THER/PROPH/DIAG INJ SC/IM: CPT | Performed by: STUDENT IN AN ORGANIZED HEALTH CARE EDUCATION/TRAINING PROGRAM

## 2025-04-01 PROCEDURE — 97530 THERAPEUTIC ACTIVITIES: CPT

## 2025-04-01 PROCEDURE — 27000207 HC ISOLATION

## 2025-04-01 PROCEDURE — 21400001 HC TELEMETRY ROOM

## 2025-04-01 PROCEDURE — 84484 ASSAY OF TROPONIN QUANT: CPT | Mod: 91 | Performed by: STUDENT IN AN ORGANIZED HEALTH CARE EDUCATION/TRAINING PROGRAM

## 2025-04-01 PROCEDURE — 99223 1ST HOSP IP/OBS HIGH 75: CPT | Mod: 25,,, | Performed by: INTERNAL MEDICINE

## 2025-04-01 RX ORDER — LISINOPRIL 20 MG/1
20 TABLET ORAL ONCE
Status: COMPLETED | OUTPATIENT
Start: 2025-04-01 | End: 2025-04-01

## 2025-04-01 RX ORDER — DOXYCYCLINE HYCLATE 100 MG
100 TABLET ORAL EVERY 12 HOURS
Status: DISCONTINUED | OUTPATIENT
Start: 2025-04-01 | End: 2025-04-02 | Stop reason: HOSPADM

## 2025-04-01 RX ORDER — ASPIRIN 325 MG
325 TABLET ORAL
Status: COMPLETED | OUTPATIENT
Start: 2025-04-01 | End: 2025-04-01

## 2025-04-01 RX ORDER — LISINOPRIL 20 MG/1
40 TABLET ORAL DAILY
Status: DISCONTINUED | OUTPATIENT
Start: 2025-04-02 | End: 2025-04-02 | Stop reason: HOSPADM

## 2025-04-01 RX ADMIN — DOXYCYCLINE HYCLATE 100 MG: 100 TABLET, COATED ORAL at 09:04

## 2025-04-01 RX ADMIN — INSULIN GLARGINE 20 UNITS: 100 INJECTION, SOLUTION SUBCUTANEOUS at 10:04

## 2025-04-01 RX ADMIN — DEXAMETHASONE 6 MG: 2 TABLET ORAL at 10:04

## 2025-04-01 RX ADMIN — REMDESIVIR 200 MG: 100 INJECTION, POWDER, LYOPHILIZED, FOR SOLUTION INTRAVENOUS at 12:04

## 2025-04-01 RX ADMIN — DIVALPROEX SODIUM 750 MG: 500 TABLET, EXTENDED RELEASE ORAL at 12:04

## 2025-04-01 RX ADMIN — CARVEDILOL 3.12 MG: 3.12 TABLET, FILM COATED ORAL at 09:04

## 2025-04-01 RX ADMIN — DOXYCYCLINE HYCLATE 100 MG: 100 TABLET, COATED ORAL at 02:04

## 2025-04-01 RX ADMIN — OXYCODONE HYDROCHLORIDE AND ACETAMINOPHEN 500 MG: 500 TABLET ORAL at 09:04

## 2025-04-01 RX ADMIN — ASPIRIN 325 MG ORAL TABLET 325 MG: 325 PILL ORAL at 02:04

## 2025-04-01 RX ADMIN — HYDRALAZINE HYDROCHLORIDE 10 MG: 20 INJECTION INTRAMUSCULAR; INTRAVENOUS at 02:04

## 2025-04-01 RX ADMIN — INSULIN ASPART 1 UNITS: 100 INJECTION, SOLUTION INTRAVENOUS; SUBCUTANEOUS at 10:04

## 2025-04-01 RX ADMIN — ACETAMINOPHEN 650 MG: 325 TABLET ORAL at 02:04

## 2025-04-01 RX ADMIN — GUAIFENESIN 400 MG: 200 SOLUTION ORAL at 05:04

## 2025-04-01 RX ADMIN — OXYCODONE HYDROCHLORIDE AND ACETAMINOPHEN 500 MG: 500 TABLET ORAL at 12:04

## 2025-04-01 RX ADMIN — ASPIRIN 81 MG: 81 TABLET, COATED ORAL at 10:04

## 2025-04-01 RX ADMIN — AZITHROMYCIN MONOHYDRATE 500 MG: 500 INJECTION, POWDER, LYOPHILIZED, FOR SOLUTION INTRAVENOUS at 03:04

## 2025-04-01 RX ADMIN — CARVEDILOL 3.12 MG: 3.12 TABLET, FILM COATED ORAL at 10:04

## 2025-04-01 RX ADMIN — APIXABAN 5 MG: 5 TABLET, FILM COATED ORAL at 09:04

## 2025-04-01 RX ADMIN — THERA TABS 1 TABLET: TAB at 10:04

## 2025-04-01 RX ADMIN — ATORVASTATIN CALCIUM 80 MG: 40 TABLET, FILM COATED ORAL at 10:04

## 2025-04-01 RX ADMIN — DIVALPROEX SODIUM 750 MG: 500 TABLET, EXTENDED RELEASE ORAL at 09:04

## 2025-04-01 RX ADMIN — LISINOPRIL 20 MG: 20 TABLET ORAL at 12:04

## 2025-04-01 RX ADMIN — TAMSULOSIN HYDROCHLORIDE 0.4 MG: 0.4 CAPSULE ORAL at 12:04

## 2025-04-01 RX ADMIN — LISINOPRIL 20 MG: 20 TABLET ORAL at 10:04

## 2025-04-01 RX ADMIN — GABAPENTIN 800 MG: 400 CAPSULE ORAL at 09:04

## 2025-04-01 RX ADMIN — APIXABAN 5 MG: 5 TABLET, FILM COATED ORAL at 10:04

## 2025-04-01 RX ADMIN — APIXABAN 5 MG: 5 TABLET, FILM COATED ORAL at 12:04

## 2025-04-01 RX ADMIN — REMDESIVIR 100 MG: 100 INJECTION, POWDER, LYOPHILIZED, FOR SOLUTION INTRAVENOUS at 02:04

## 2025-04-01 RX ADMIN — TAMSULOSIN HYDROCHLORIDE 0.4 MG: 0.4 CAPSULE ORAL at 09:04

## 2025-04-01 RX ADMIN — GABAPENTIN 800 MG: 400 CAPSULE ORAL at 10:04

## 2025-04-01 RX ADMIN — CARVEDILOL 3.12 MG: 3.12 TABLET, FILM COATED ORAL at 01:04

## 2025-04-01 RX ADMIN — LISINOPRIL 20 MG: 20 TABLET ORAL at 02:04

## 2025-04-01 RX ADMIN — OXYCODONE HYDROCHLORIDE AND ACETAMINOPHEN 500 MG: 500 TABLET ORAL at 10:04

## 2025-04-01 RX ADMIN — GABAPENTIN 800 MG: 400 CAPSULE ORAL at 02:04

## 2025-04-01 NOTE — ASSESSMENT & PLAN NOTE
Lab Results   Component Value Date    HGBA1C 5.8 (H) 05/28/2024     Glycemic protocol.   LDSS  Continue lantus

## 2025-04-01 NOTE — HPI
This is a 60-year-old male with a past medical history of COPD, ICM (EF: 35%, GIDD, s/p AICD), CAD, hypertension, type 2 diabetes, hyperlipidemia, CKD 3, CVA (on Eliquis), depression, JOSE, gout, who presents with cough.      Patient presents for evaluation of cough and fatigue that started on the day of presentation.  He reports generalized weakness, being less active, associated with a persistent dry cough, and shortness of breath.  Additional symptoms include chest tightness.  He resides at [facility] but denies exposure to sick contacts.  He also reports worsening lower extremity swelling, left worse than right.    In the ED, the patient was hypertensive (150s-180s/80s-90s), febrile (T-max: 100.4° F), saturating as low as 90% on room air.  Labs showed positive COVID-19, but otherwise unremarkable.  Chest x-ray showed cardiomegaly, with mild underlying prominence of the interstitium (edema or infiltrate).  Patient was given Tylenol 1 g p.o..  He was admitted for further management.

## 2025-04-01 NOTE — SUBJECTIVE & OBJECTIVE
Past Medical History:   Diagnosis Date    Cataract     Cellulitis of penis 07/06/2018    COPD (chronic obstructive pulmonary disease)     Coronary artery disease     Diabetes mellitus type II     DJD (degenerative joint disease)     Glaucoma     Gout     Hypertension     Kidney stone     MI (myocardial infarction) 2010    Obesity     JOSE (obstructive sleep apnea)     Uveitis        Past Surgical History:   Procedure Laterality Date    CARDIAC DEFIBRILLATOR PLACEMENT      CATARACT EXTRACTION W/  INTRAOCULAR LENS IMPLANT Right 2020    OUTSIDE [facility] ()    CYSTOSCOPY N/A 10/01/2020    Procedure: CYSTOSCOPY;  Surgeon: Monica Lieberman MD;  Location: [facility] OR;  Service: Urology;  Laterality: N/A;  RN PRE OP Covid screen 9-  C A    DESTRUCTION, CILIARY BODY, USING LASER Right 7/13/2023    Procedure: DESTRUCTION, CILIARY BODY, USING LASER;  Surgeon: Shaina Gan MD;  Location: [facility] OR 1ST FLR;  Service: Ophthalmology;  Laterality: Right;    DESTRUCTION, CILIARY BODY, USING LASER Right 8/10/2023    Procedure: DESTRUCTION, CILIARY BODY, USING LASER;  Surgeon: Shaina Gan MD;  Location: [facility] OR 1ST FLR;  Service: Ophthalmology;  Laterality: Right;    DESTRUCTION, CILIARY BODY, USING LASER Right 12/21/2023    Procedure: DESTRUCTION, CILIARY BODY, USING LASER;  Surgeon: Shaina Gan MD;  Location: [facility] OR 1ST FLR;  Service: Ophthalmology;  Laterality: Right;  g-probe    excisional biopsy left inguinal lymph node      FOOT SURGERY      right foot surgery     INSERTION OF INFLATABLE PENILE PROSTHESIS N/A 01/25/2022    Procedure: INSERTION, PENILE PROSTHESIS, INFLATABLE;  Surgeon: Monica Lieberman MD;  Location: [facility] OR;  Service: Urology;  Laterality: N/A;  [employer] JUAN LUIS ARNOLD [phone] TEXTED HIM @ 5:32PM ON 12-  RN PRE OP 12-28-21. Covid NEGATIVE 1-3-22. CA-----AICD-----HAS CARDS CLEARANCE    kidney stones      lithotripsy    REPAIR, SURGICAL WOUND, EYE, ANTERIOR SEGMENT  Right 10/05/2021    Procedure: REPAIR, SURGICAL WOUND, EYE, ANTERIOR SEGMENT;  Surgeon: Adelaide Allen MD;  Location: [facility] OR [address] [place] [address];  Service: Ophthalmology;  Laterality: Right;  Anterior Chamber Wash Out Right Eye     Surgery for bulging disc at C7         Review of patient's allergies indicates:   Allergen Reactions    Cephalexin Other (See Comments)     vomitting    Darvocet a500 [propoxyphene n-acetaminophen] Itching    Morphine Itching           Tramadol Itching       No current facility-administered medications on file prior to encounter.     Current Outpatient Medications on File Prior to Encounter   Medication Sig    acetaminophen (TYLENOL) 650 MG TbSR Take 650 mg by mouth every 6 (six) hours as needed.    albuterol (PROVENTIL/VENTOLIN HFA) 90 mcg/actuation inhaler Inhale 2 puffs into the lungs every 4 (four) hours as needed for Wheezing or Shortness of Breath. Rescue    albuterol-ipratropium (DUO-NEB) 2.5 mg-0.5 mg/3 mL nebulizer solution Take 3 mLs by nebulization every 6 (six) hours as needed for Wheezing. Rescue    amitriptyline (ELAVIL) 10 MG tablet Take 10 mg by mouth nightly as needed for Insomnia.    apixaban (ELIQUIS) 2.5 mg Tab 2.5 mg by FEEDING TUBE route 2 (two) times daily.    aspirin (ECOTRIN) 81 MG EC tablet Take 81 mg by mouth once daily.    atropine 1% (ISOPTO ATROPINE) 1 % Drop INSTILL 1 DROP IN LEFT EYE TWICE DAILY (Patient taking differently: Place 1 drop into the right eye 2 (two) times daily.)    brimonidine 0.2% (ALPHAGAN) 0.2 % Drop Place 1 drop into the right eye 2 (two) times daily.    carvediloL (COREG) 3.125 MG tablet 3.125 mg by Nasogastric route 2 (two) times daily.    colchicine (COLCRYS) 0.6 mg tablet 2 po every day prn gout attack    dapagliflozin (FARXIGA) 10 mg tablet Take 10 mg by mouth once daily.    diclofenac sodium (VOLTAREN) 1 % Gel Apply 2 g topically 4 (four) times daily.    dicyclomine (BENTYL) 20 mg tablet Take 20 mg by mouth 2 (two) times daily.     divalproex ER (DEPAKOTE ER) 250 MG 24 hr tablet Take 750 mg by mouth nightly.    dorzolamide-timolol 2-0.5% (COSOPT) 22.3-6.8 mg/mL ophthalmic solution Place 1 drop into the left eye 2 (two) times daily. (Patient taking differently: Place 1 drop into both eyes 2 (two) times daily.)    ergocalciferol (ERGOCALCIFEROL) 50,000 unit Cap Take 1 capsule by mouth every 7 days.    gabapentin (NEURONTIN) 800 MG tablet Take 800 mg by mouth 3 (three) times daily.    guaiFENesin (MUCINEX) 600 mg 12 hr tablet Take 1,200 mg by mouth 2 (two) times daily.    insulin glargine U-100, Lantus, 100 unit/mL injection Inject 25 Units into the skin once daily.    insulin lispro (HUMALOG KWIKPEN INSULIN) 100 unit/mL pen Inject into the skin. 0-69=0 insulin give glucose;  = 0 insulin ; 181-200= 3 units; 201-250= 6 units; 251-300 = 9 units; 301-350= 12 units; 351-400= 15 units; 401-700= 18 units subcutaneously before meals and at bedtime for diabetes.    latanoprost 0.005 % ophthalmic solution Place 1 drop into the right eye every evening.    lisinopriL (PRINIVIL,ZESTRIL) 20 MG tablet Take 1 tablet by mouth once daily.    methocarbamoL (ROBAXIN) 500 MG Tab Take 500 mg by mouth 4 (four) times daily.    multivitamin with folic acid 400 mcg Tab Take 1 tablet by mouth once daily.    pilocarpine HCL 1% (PILOCAR) 1 % ophthalmic solution Place 1 drop into the right eye 4 (four) times daily.    prednisoLONE sodium phosphate (INFLAMASE FORTE) 1 % Drop Place 1 drop into the right eye 4 (four) times daily.    rosuvastatin (CRESTOR) 20 MG tablet Take 1 tablet by mouth once daily.    tamsulosin (FLOMAX) 0.4 mg Cap Take 0.4 mg by mouth every evening.    allopurinol (ZYLOPRIM) 100 MG tablet Take 200 mg by mouth Daily.     Family History       Problem Relation (Age of Onset)    Asthma Daughter    Cancer Maternal Uncle, Cousin    Diabetes Mother, Brother    Heart disease Father    Hypertension Mother, Brother    Kidney disease Cousin          Tobacco Use     Smoking status: Never     Passive exposure: Past    Smokeless tobacco: Never   Substance and Sexual Activity    Alcohol use: No    Drug use: No    Sexual activity: Not Currently     Partners: Female     Comment: divorce     Review of Systems   Constitutional: Positive for malaise/fatigue.   HENT: Negative.     Eyes: Negative.    Respiratory:  Positive for shortness of breath.    Endocrine: Negative.    Hematologic/Lymphatic: Negative.    Skin: Negative.    Musculoskeletal: Negative.    Gastrointestinal: Negative.    Genitourinary: Negative.    Neurological: Negative.    Psychiatric/Behavioral: Negative.     Allergic/Immunologic: Negative.      Objective:     Vital Signs (Most Recent):  Temp: 98.8 °F (37.1 °C) (03/31/25 1823)  Pulse: (!) 59 (04/01/25 1800)  Resp: (!) 25 (04/01/25 1416)  BP: (!) 145/84 (04/01/25 1416)  SpO2: (!) 91 % (04/01/25 1800) Vital Signs (24h Range):  Pulse:  [58-87] 59  Resp:  [18-26] 25  SpO2:  [90 %-95 %] 91 %  BP: (129-209)/() 145/84     Weight: 113.4 kg (250 lb)  Body mass index is 39.16 kg/m².    SpO2: (!) 91 %         Intake/Output Summary (Last 24 hours) at 4/1/2025 1855  Last data filed at 4/1/2025 1649  Gross per 24 hour   Intake 750 ml   Output --   Net 750 ml       Lines/Drains/Airways       Drain  Duration             Male External Urinary Catheter 04/01/25 1411 <1 day              Peripheral Intravenous Line  Duration                  Peripheral IV - Single Lumen 03/31/25 1732 18 G Left Antecubital 1 day                     Physical Exam  Vitals reviewed.   Constitutional:       Appearance: He is well-developed.   HENT:      Head: Normocephalic.   Eyes:      Conjunctiva/sclera: Conjunctivae normal.      Pupils: Pupils are equal, round, and reactive to light.   Cardiovascular:      Rate and Rhythm: Normal rate and regular rhythm.      Heart sounds: Normal heart sounds.   Pulmonary:      Effort: Pulmonary effort is normal.      Breath sounds: Normal breath sounds.    Abdominal:      General: Bowel sounds are normal.      Palpations: Abdomen is soft.   Musculoskeletal:      Cervical back: Normal range of motion and neck supple.   Skin:     General: Skin is warm.   Neurological:      Mental Status: He is alert and oriented to person, place, and time.          Significant Labs:     DATA:     Laboratory:  CBC:  Recent Labs   Lab 02/07/25 0002 03/31/25 2109 04/01/25  0655   WBC 6.88 7.32 7.72   Hemoglobin 14.3  --   --    HGB  --  14.3 13.7 L   Hematocrit 43.3  --   --    HCT  --  43.3 41.0   Platelet Count  --  142 L 144 L   Platelets 172  --   --        CHEMISTRIES:  Recent Labs   Lab 05/28/24 0432 05/29/24 0503 11/29/24 2354 02/01/25 2003 02/07/25 0002 03/31/25 2109 04/01/25  0655   Glucose 156 H 106 307 H  --  242 H  --   --    Sodium 141 138 137   < > 141 140 139   Potassium 4.0 3.9 4.7   < > 4.0 3.8 4.0   BUN 12 11 19   < > 25 H 18 19   Creatinine 1.1 0.9 1.5 H   < > 1.5 H 1.3 1.1   Calcium 9.8 9.6 9.7   < > 9.5 9.7 9.4   Magnesium   --   --   --   --   --  1.7 1.5 L   Magnesium 1.8  --   --   --   --   --   --     < > = values in this interval not displayed.       CARDIAC BIOMARKERS:  Recent Labs   Lab 03/29/24 2051 05/28/24 0432 02/07/25 0002 03/31/25 2109 04/01/25 0216 04/01/25  0655   CPK  --   --  401 H  --   --   --    CK 90  --   --   --   --   --    Troponin I  --    < >  --   --   --   --    Troponin-I  --   --   --  0.113 H 0.142 H 0.117 H    < > = values in this interval not displayed.       COAGS:  Recent Labs   Lab 03/25/24  1051 03/29/24 2051   INR 1.0 1.0       LIPIDS/LFTS:  Recent Labs   Lab 05/28/24 0432 05/29/24 0503 02/07/25  0002 03/31/25  2109 04/01/25  0655   Cholesterol 155  --   --   --   --    Triglycerides 155 H  --   --   --   --    HDL 37 L  --   --   --   --    LDL Cholesterol 87.0  --   --   --   --    Non-HDL Cholesterol 118  --   --   --   --    AST 13   < > 18 19 21   ALT 9 L   < > 15 19 17    < > = values in this interval not  displayed.       Hemoglobin A1C   Date Value Ref Range Status   05/28/2024 5.8 (H) 4.0 - 5.6 % Final     Comment:     ADA Screening Guidelines:  5.7-6.4%  Consistent with prediabetes  >or=6.5%  Consistent with diabetes    High levels of fetal hemoglobin interfere with the HbA1C  assay. Heterozygous hemoglobin variants (HbS, HgC, etc)do  not significantly interfere with this assay.   However, presence of multiple variants may affect accuracy.     02/06/2024 9.8 (H) <5.7 % Final   11/16/2023 7.5 (H) <5.7 % Final   10/24/2023 7.9 (H) <5.7 % Final   01/26/2022 7.5 (H) 4.0 - 5.6 % Final     Comment:     ADA Screening Guidelines:  5.7-6.4%  Consistent with prediabetes  >or=6.5%  Consistent with diabetes    High levels of fetal hemoglobin interfere with the HbA1C  assay. Heterozygous hemoglobin variants (HbS, HgC, etc)do  not significantly interfere with this assay.   However, presence of multiple variants may affect accuracy.     10/01/2021 7.5 (H) 4.0 - 5.6 % Final     Comment:     ADA Screening Guidelines:  5.7-6.4%  Consistent with prediabetes  >or=6.5%  Consistent with diabetes    High levels of fetal hemoglobin interfere with the HbA1C  assay. Heterozygous hemoglobin variants (HbS, HgC, etc)do  not significantly interfere with this assay.   However, presence of multiple variants may affect accuracy.         TSH  Recent Labs   Lab 05/28/24  0432   TSH 1.580       The ASCVD Risk score (Hair SANDOVAL, et al., 2019) failed to calculate for the following reasons:    Risk score cannot be calculated because patient has a medical history suggesting prior/existing ASCVD       BNP    Lab Results   Component Value Date/Time    BNP 41 04/01/2025 01:37 AM    BNP 26 05/28/2024 04:32 AM    BNP <10 08/05/2016 04:30 PM    BNP <10 08/29/2015 11:56 PM            ECHO    Results for orders placed during the hospital encounter of 03/31/25    Echo    Interpretation Summary    Left Ventricle: The left ventricle is normal in size. There is  moderately reduced systolic function with a visually estimated ejection fraction of  35%. Grade I diastolic dysfunction.    Right Ventricle: The right ventricle is normal in size. Systolic function is normal. Pacemaker lead present in the ventricle.    Mitral Valve: There is mild regurgitation.    Tricuspid Valve: There is mild regurgitation.    Pulmonary Artery: The estimated pulmonary artery systolic pressure is 32 mmHg.    IVC/SVC: Normal venous pressure at 3 mmHg.      STRESS TEST    Results for orders placed during the hospital encounter of 05/28/24    Nuclear Stress - Cardiology Interpreted    Interpretation Summary    Equivocal myocardial perfusion scan.    moderate to severe intensity, large sized, equivocal  in the inferoapical wall(s). This finding is equivocal due to diaphragm shadow.    There are no other significant perfusion abnormalities.    The gated perfusion images showed an ejection fraction of 47% at rest.    There is mild global hypokinesis at rest and stress.    The ECG portion of the study is negative for ischemia.    The patient reported no chest pain during the stress test.    There were no arrhythmias during stress.

## 2025-04-01 NOTE — ASSESSMENT & PLAN NOTE
Patient's blood pressure range in the last 24 hours was: BP  Min: 129/75  Max: 209/101.The patient's inpatient anti-hypertensive regimen is listed below:  Current Antihypertensives  carvediloL tablet 3.125 mg, 2 times daily, Oral  hydrALAZINE injection 10 mg, Every 6 hours PRN, Intravenous  lisinopriL tablet 40 mg, Daily, Oral    Plan  - BP is uncontrolled, will adjust as follows: increased lisinopril to 40 mg

## 2025-04-01 NOTE — ASSESSMENT & PLAN NOTE
Patient is identified as Severe COVID-19 based on hypoxemia with O2 saturations <94% on room air or on ambulation   Initiate standard COVID protocols; COVID-19 testing ,Infection Control notification  and isolation- respiratory, contact and droplet per protocol    Diagnostics: CBC, CMP, and Portable CXR    Management: Inhaled bronchodilators as needed for shortness of breath., decadron, remdesivir, AC.     Advance Care Planning  Full code per Josias.  Advance Care Planning     Date: 03/31/2025    A total of 2 min was spent reviewing pertinent documents.

## 2025-04-01 NOTE — ASSESSMENT & PLAN NOTE
No anginal sounding chest pains.  Likely type 2.  No further ischemic workup planned during this hospital stay    Results for orders placed during the hospital encounter of 05/28/24    Nuclear Stress - Cardiology Interpreted    Interpretation Summary    Equivocal myocardial perfusion scan.    moderate to severe intensity, large sized, equivocal  in the inferoapical wall(s). This finding is equivocal due to diaphragm shadow.    There are no other significant perfusion abnormalities.    The gated perfusion images showed an ejection fraction of 47% at rest.    There is mild global hypokinesis at rest and stress.    The ECG portion of the study is negative for ischemia.    The patient reported no chest pain during the stress test.    There were no arrhythmias during stress.

## 2025-04-01 NOTE — H&P
[facility] Medicine  History & Physical    Patient Name: Se Virgil Mcgraw  MRN: [id number]  Patient Class: OP- Observation  Admission Date: 3/31/2025  Attending Physician: Simeon Huerta MD   Primary Care Provider: Coreen Anderson MD         Patient information was obtained from patient and ER records.     Subjective:     Principal Problem:COVID    Chief Complaint:   Chief Complaint   Patient presents with    URI     Pt arrived via ems, pt chief complaint is Cold symptoms. Pt has a cough with congestion, as well as a fever. Pt also states now that chest began hurting due to cough.         HPI: This is a 60-year-old male with a past medical history of COPD, ICM (EF: 35%, GIDD, s/p AICD), CAD, hypertension, type 2 diabetes, hyperlipidemia, CKD 3, CVA (on Eliquis), depression, JOSE, gout, who presents with cough.      Patient presents for evaluation of cough and fatigue that started on the day of presentation.  He reports generalized weakness, being less active, associated with a persistent dry cough, and shortness of breath.  Additional symptoms include chest tightness.  He resides at [facility] but denies exposure to sick contacts.  He also reports worsening lower extremity swelling, left worse than right.    In the ED, the patient was hypertensive (150s-180s/80s-90s), febrile (T-max: 100.4° F), saturating as low as 90% on room air.  Labs showed positive COVID-19, but otherwise unremarkable.  Chest x-ray showed cardiomegaly, with mild underlying prominence of the interstitium (edema or infiltrate).  Patient was given Tylenol 1 g p.o..  He was admitted for further management.    Past Medical History:   Diagnosis Date    Cataract     Cellulitis of penis 07/06/2018    COPD (chronic obstructive pulmonary disease)     Coronary artery disease     Diabetes mellitus type II     DJD (degenerative joint disease)     Glaucoma     Gout     Hypertension     Kidney stone     MI (myocardial  infarction) 2010    Obesity     JOSE (obstructive sleep apnea)     Uveitis        Past Surgical History:   Procedure Laterality Date    CARDIAC DEFIBRILLATOR PLACEMENT      CATARACT EXTRACTION W/  INTRAOCULAR LENS IMPLANT Right 2020    OUTSIDE [facility] ()    CYSTOSCOPY N/A 10/01/2020    Procedure: CYSTOSCOPY;  Surgeon: Monica Lieberman MD;  Location: [facility] OR;  Service: Urology;  Laterality: N/A;  RN PRE OP Covid screen 9-  C A    DESTRUCTION, CILIARY BODY, USING LASER Right 7/13/2023    Procedure: DESTRUCTION, CILIARY BODY, USING LASER;  Surgeon: Shaina Gan MD;  Location: [facility] OR [address] [place] [address];  Service: Ophthalmology;  Laterality: Right;    DESTRUCTION, CILIARY BODY, USING LASER Right 8/10/2023    Procedure: DESTRUCTION, CILIARY BODY, USING LASER;  Surgeon: Shaina Gan MD;  Location: [facility] OR [address] [place] [address];  Service: Ophthalmology;  Laterality: Right;    DESTRUCTION, CILIARY BODY, USING LASER Right 12/21/2023    Procedure: DESTRUCTION, CILIARY BODY, USING LASER;  Surgeon: Shaina Gan MD;  Location: [facility] OR [address] [place] [address];  Service: Ophthalmology;  Laterality: Right;  g-probe    excisional biopsy left inguinal lymph node      FOOT SURGERY      right foot surgery     INSERTION OF INFLATABLE PENILE PROSTHESIS N/A 01/25/2022    Procedure: INSERTION, PENILE PROSTHESIS, INFLATABLE;  Surgeon: Monica Lieberman MD;  Location: [facility] OR;  Service: Urology;  Laterality: N/A;  Omicia JUAN LUIS ARNOLD [phone] TEXTED HIM @ 5:32PM ON 12-  RN PRE OP 12-28-21. Covid NEGATIVE 1-3-22. CA-----AICD-----HAS CARDS CLEARANCE    kidney stones      lithotripsy    REPAIR, SURGICAL WOUND, EYE, ANTERIOR SEGMENT Right 10/05/2021    Procedure: REPAIR, SURGICAL WOUND, EYE, ANTERIOR SEGMENT;  Surgeon: Adelaide Allen MD;  Location: [facility] OR [address] [place] [address];  Service: Ophthalmology;  Laterality: Right;  Anterior Chamber Wash Out Right Eye     Surgery for bulging disc at C7         Review of patient's allergies indicates:    Allergen Reactions    Cephalexin Other (See Comments)     vomitting    Darvocet a500 [propoxyphene n-acetaminophen] Itching    Morphine Itching           Tramadol Itching       No current facility-administered medications on file prior to encounter.     Current Outpatient Medications on File Prior to Encounter   Medication Sig    acetaminophen (TYLENOL) 650 MG TbSR Take 650 mg by mouth every 6 (six) hours as needed.    albuterol-ipratropium (DUO-NEB) 2.5 mg-0.5 mg/3 mL nebulizer solution Take 3 mLs by nebulization every 6 (six) hours as needed for Wheezing. Rescue    amitriptyline (ELAVIL) 10 MG tablet Take 10 mg by mouth nightly as needed for Insomnia.    brimonidine 0.2% (ALPHAGAN) 0.2 % Drop Place 1 drop into the right eye 2 (two) times daily.    guaiFENesin (MUCINEX) 600 mg 12 hr tablet Take 1,200 mg by mouth 2 (two) times daily.    latanoprost 0.005 % ophthalmic solution Place 1 drop into the right eye every evening.    methocarbamoL (ROBAXIN) 500 MG Tab Take 500 mg by mouth 4 (four) times daily.    prednisoLONE sodium phosphate (INFLAMASE FORTE) 1 % Drop Place 1 drop into the right eye 4 (four) times daily.    albuterol (PROVENTIL/VENTOLIN HFA) 90 mcg/actuation inhaler Inhale 2 puffs into the lungs every 4 (four) hours as needed for Wheezing or Shortness of Breath. Rescue (Patient taking differently: Inhale 2 puffs into the lungs every 3 (three) hours. Rescue)    allopurinol (ZYLOPRIM) 100 MG tablet Take 200 mg by mouth Daily.    apixaban (ELIQUIS) 2.5 mg Tab 2.5 mg by FEEDING TUBE route 2 (two) times daily.    aspirin (ECOTRIN) 81 MG EC tablet Take 81 mg by mouth once daily.    atropine 1% (ISOPTO ATROPINE) 1 % Drop INSTILL 1 DROP IN LEFT EYE TWICE DAILY (Patient taking differently: Place 1 drop into the right eye 2 (two) times daily.)    carvediloL (COREG) 3.125 MG tablet 3.125 mg by Nasogastric route 2 (two) times daily.    colchicine (COLCRYS) 0.6 mg tablet 2 po every day prn gout attack    dapagliflozin  (FARXIGA) 10 mg tablet Take 10 mg by mouth once daily.    diclofenac sodium (VOLTAREN) 1 % Gel Apply 2 g topically 4 (four) times daily.    dicyclomine (BENTYL) 20 mg tablet Take 20 mg by mouth 2 (two) times daily.    divalproex ER (DEPAKOTE ER) 250 MG 24 hr tablet Take 750 mg by mouth nightly.    dorzolamide-timolol 2-0.5% (COSOPT) 22.3-6.8 mg/mL ophthalmic solution Place 1 drop into the left eye 2 (two) times daily. (Patient taking differently: Place 1 drop into both eyes 2 (two) times daily.)    ergocalciferol (ERGOCALCIFEROL) 50,000 unit Cap Take 1 capsule by mouth every 7 days.    gabapentin (NEURONTIN) 800 MG tablet Take 800 mg by mouth 3 (three) times daily.    insulin glargine U-100, Lantus, 100 unit/mL injection Inject 25 Units into the skin once daily.    insulin lispro (HUMALOG KWIKPEN INSULIN) 100 unit/mL pen Inject into the skin. 0-69=0 insulin give glucose;  = 0 insulin ; 181-200= 3 units; 201-250= 6 units; 251-300 = 9 units; 301-350= 12 units; 351-400= 15 units; 401-700= 18 units subcutaneously before meals and at bedtime for diabetes.    lisinopriL (PRINIVIL,ZESTRIL) 20 MG tablet Take 1 tablet by mouth once daily.    multivitamin with folic acid 400 mcg Tab Take 1 tablet by mouth once daily.    pilocarpine HCL 1% (PILOCAR) 1 % ophthalmic solution Place 1 drop into the right eye 4 (four) times daily.    rosuvastatin (CRESTOR) 20 MG tablet Take 1 tablet by mouth once daily.    tamsulosin (FLOMAX) 0.4 mg Cap Take 0.4 mg by mouth every evening.    [DISCONTINUED] amitriptyline (ELAVIL) 10 MG tablet TAKE 1 TABLET(10 MG) BY MOUTH EVERY EVENING (Patient taking differently: Take 10 mg by mouth once daily.)    [DISCONTINUED] blood sugar diagnostic Strp ACCU CHEK KEVAN PLUS TEST STRIPS    [DISCONTINUED] blood-glucose meter Misc ACCU CHEK KEVAN PLUS METER: USE 4X/D    [DISCONTINUED] blood-glucose meter,continuous (DEXCOM G7 ) Misc CONTINUES GLUCOSE MONITORING    [DISCONTINUED] blood-glucose sensor  (DEXCOM G7 SENSOR) Kathy CONTINUOUS GLUCOSE MONITORING: CHANGE EVERY 10 DAYS.    [DISCONTINUED] ciclopirox (LOPROX) 0.77 % Crea Apply topically 2 (two) times daily.    [DISCONTINUED] ciclopirox 0.77 % Gel     [DISCONTINUED] nitroGLYCERIN (NITROSTAT) 0.4 MG SL tablet Place 0.4 mg under the tongue every 5 (five) minutes as needed for Chest pain.    [DISCONTINUED] omeprazole (PRILOSEC) 20 MG capsule TK 1 C PO QD FOR CONTROL OF STOMACH ACID BEFORE BREAKFAST    [DISCONTINUED] oxybutynin (DITROPAN-XL) 10 MG 24 hr tablet Take 1 tablet by mouth once daily.     Family History       Problem Relation (Age of Onset)    Asthma Daughter    Cancer Maternal Uncle, Cousin    Diabetes Mother, Brother    Heart disease Father    Hypertension Mother, Brother    Kidney disease Cousin          Tobacco Use    Smoking status: Never     Passive exposure: Past    Smokeless tobacco: Never   Substance and Sexual Activity    Alcohol use: No    Drug use: No    Sexual activity: Not Currently     Partners: Female     Comment: divorce     Review of Systems   Constitutional:  Positive for appetite change and fatigue.   Respiratory:  Positive for cough and shortness of breath.    Cardiovascular: Negative.    Gastrointestinal: Negative.    Genitourinary: Negative.    Musculoskeletal: Negative.    Neurological:  Positive for weakness (generalized).     Objective:     Vital Signs (Most Recent):  Temp: 98.8 °F (37.1 °C) (03/31/25 1823)  Pulse: 74 (03/31/25 2200)  Resp: 20 (03/31/25 2000)  BP: (!) 201/100 (03/31/25 2200)  SpO2: (!) 94 % (03/31/25 2200) Vital Signs (24h Range):  Temp:  [98.8 °F (37.1 °C)-100.4 °F (38 °C)] 98.8 °F (37.1 °C)  Pulse:  [74-92] 74  Resp:  [18-22] 20  SpO2:  [90 %-96 %] 94 %  BP: (159-201)/() 201/100     Weight: 113.4 kg (250 lb)  Body mass index is 39.16 kg/m².     Physical Exam  Vitals and nursing note reviewed.   Constitutional:       General: He is not in acute distress.     Appearance: He is obese. He is ill-appearing.    HENT:      Mouth/Throat:      Mouth: Mucous membranes are moist.   Cardiovascular:      Rate and Rhythm: Normal rate.   Pulmonary:      Effort: Pulmonary effort is normal.      Breath sounds: Decreased breath sounds present.   Abdominal:      General: Abdomen is flat.   Musculoskeletal:      Right lower leg: Edema present.      Left lower leg: Edema present.   Skin:     General: Skin is warm.   Neurological:      General: No focal deficit present.      Mental Status: He is alert.                Significant Labs: All pertinent labs within the past 24 hours have been reviewed.    Significant Imaging: I have reviewed all pertinent imaging results/findings within the past 24 hours.  Assessment/Plan:     Assessment & Plan  COVID  Patient is identified as Severe COVID-19 based on hypoxemia with O2 saturations <94% on room air or on ambulation   Initiate standard COVID protocols; COVID-19 testing ,Infection Control notification  and isolation- respiratory, contact and droplet per protocol    Diagnostics: CBC, CMP, and Portable CXR    Management: Inhaled bronchodilators as needed for shortness of breath., decadron, remdesivir, AC.     Advance Care Planning  Full code per Josias.  Advance Care Planning     Date: 03/31/2025    A total of 2 min was spent reviewing pertinent documents.   Essential hypertension  Patient's blood pressure range in the last 24 hours was: BP  Min: 159/89  Max: 201/100.The patient's inpatient anti-hypertensive regimen is listed below:  Current Antihypertensives  carvediloL tablet 3.125 mg, 2 times daily, Oral  lisinopriL tablet 20 mg, Daily, Oral    Plan  - BP is controlled, no changes needed to their regimen  HLD (hyperlipidemia)  Continue statin     AICD (automatic cardioverter/defibrillator) present  History noted.   Telemetry monitoring.     Chronic combined systolic and diastolic heart failure  Results for orders placed during the hospital encounter of 05/28/24    Echo    Interpretation  Summary    Left Ventricle: The left ventricle is normal in size. Mildly increased wall thickness. There is moderately reduced systolic function with a visually estimated ejection fraction of 35 %. Grade I diastolic dysfunction.    Right Ventricle: Normal right ventricular cavity size. Systolic function is normal. Pacemaker lead present in the ventricle.    Left Atrium: Left atrium is mildly dilated.    Aortic Valve: There is mild stenosis. Aortic valve area by VTI is 1.77 cm². Aortic valve peak velocity is 1.23 m/s. Mean gradient is 4 mmHg. The dimensionless index is 0.45.    Mitral Valve: There is mild regurgitation.    Pulmonary Artery: The estimated pulmonary artery systolic pressure is 34 mmHg.    IVC/SVC: Normal venous pressure at 3 mmHg.    Check BNP, repeat echocardiogram   Continue home medications   Coronary artery disease involving native coronary artery with angina pectoris  History noted. Continue home medications   Benign prostatic hyperplasia  Continue Flomax     Chronic obstructive pulmonary disease, unspecified  Patient's COPD is controlled currently.  Patient is currently off COPD Pathway. Duo-nebs PRN  Gout  Continue allopurinol     Type 2 diabetes mellitus with diabetic peripheral angiopathy without gangrene  Lab Results   Component Value Date    HGBA1C 5.8 (H) 05/28/2024     Glycemic protocol.   LDSS  Continue lantus     VTE Risk Mitigation (From admission, onward)           Ordered     apixaban tablet 5 mg  2 times daily         03/31/25 2220     IP VTE HIGH RISK PATIENT  Once         03/31/25 2213     Place sequential compression device  Until discontinued         03/31/25 2213                       On 03/31/2025, patient should be placed in hospital observation services under my care.             Paulie Jara MD  Department of Hospital Medicine  [facility] [place] [facility] [place] [facility]

## 2025-04-01 NOTE — HPI
Patient came in with fatigue and feeling ill.  Found to have COVID.  Also complains of some pleuritic chest pain when he coughs.  Otherwise denies any substernal chest pressure or pain orthopnea or PND.    Results for orders placed or performed during the hospital encounter of 11/29/24   EKG 12-lead    Collection Time: 11/29/24 11:41 PM   Result Value Ref Range    QRS Duration 108 ms    OHS QTC Calculation 460 ms    Narrative    Test Reason : R00.0,    Vent. Rate :  71 BPM     Atrial Rate :  71 BPM     P-R Int : 170 ms          QRS Dur : 108 ms      QT Int : 424 ms       P-R-T Axes :  74  61  74 degrees    QTcB Int : 460 ms    Normal sinus rhythm  Nonspecific T wave abnormality  Prolonged QT  Abnormal ECG  When compared with ECG of 29-May-2024 08:40,  Significant changes have occurred  Confirmed by Migel Bustillo ([id number]) on 12/1/2024 2:09:43 PM    Referred By: AAAREFERRAL SELF           Confirmed By: Migel Bustillo       Results for orders placed during the hospital encounter of 03/31/25    Echo    Interpretation Summary    Left Ventricle: The left ventricle is normal in size. There is moderately reduced systolic function with a visually estimated ejection fraction of  35%. Grade I diastolic dysfunction.    Right Ventricle: The right ventricle is normal in size. Systolic function is normal. Pacemaker lead present in the ventricle.    Mitral Valve: There is mild regurgitation.    Tricuspid Valve: There is mild regurgitation.    Pulmonary Artery: The estimated pulmonary artery systolic pressure is 32 mmHg.    IVC/SVC: Normal venous pressure at 3 mmHg.      Results for orders placed during the hospital encounter of 05/28/24    Nuclear Stress - Cardiology Interpreted    Interpretation Summary    Equivocal myocardial perfusion scan.    moderate to severe intensity, large sized, equivocal  in the inferoapical wall(s). This finding is equivocal due to diaphragm shadow.    There are no other significant perfusion  abnormalities.    The gated perfusion images showed an ejection fraction of 47% at rest.    There is mild global hypokinesis at rest and stress.    The ECG portion of the study is negative for ischemia.    The patient reported no chest pain during the stress test.    There were no arrhythmias during stress.      No results found for this or any previous visit.              HPI:  This is a 60-year-old male with a past medical history of COPD, ICM (EF: 35%, GIDD, s/p AICD), CAD, hypertension, type 2 diabetes, hyperlipidemia, CKD 3, CVA (on Eliquis), depression, JOSE, gout, who presents with cough.       Patient presents for evaluation of cough and fatigue that started on the day of presentation.  He reports generalized weakness, being less active, associated with a persistent dry cough, and shortness of breath.  Additional symptoms include chest tightness.  He resides at [facility] but denies exposure to sick contacts.  He also reports worsening lower extremity swelling, left worse than right.     In the ED, the patient was hypertensive (150s-180s/80s-90s), febrile (T-max: 100.4° F), saturating as low as 90% on room air.  Labs showed positive COVID-19, but otherwise unremarkable.  Chest x-ray showed cardiomegaly, with mild underlying prominence of the interstitium (edema or infiltrate).  Patient was given Tylenol 1 g p.o..  He was admitted for further management.     Overview/Hospital Course:  60-year-old male with a past medical history of COPD, ICM (EF: 35%, GIDD, s/p AICD), CAD, hypertension, type 2 diabetes, hyperlipidemia, CKD 3, CVA (on Eliquis), depression, JOSE, gout admitted on 03/31/25 for elevated troponin and COVID-19 infection.  Presented for evaluation of generalized weakness, fatigue, and cough with chest tightness.  Patient positive for COVID.  Initial troponin elevated 0.113 and peaked at 0.142.  Echo with EF 30-35%, G1DD. Similar to previous echo. CXR w/Cardiomegaly.  Mild underlying prominence  of the interstitium, which may be due to edema or infiltrates.  Patient is started on IV remdesivir.  Cardiology consulted-no plans for cardiology intervention.  Okay to discharge from cardiology standpoint.  Patient remained stable on room air.  PT/OT consulted.     Interval History: no acute overnight events. Pt admit feeling fatigue and still has cough, but feel better today than yesterday. No chest pain or SOB, on room air.

## 2025-04-01 NOTE — ASSESSMENT & PLAN NOTE
The likely etiology of thrombocytopenia is infection. The patients 3 most recent labs are listed below.  Recent Labs     03/31/25  2109 04/01/25  0655   * 144*     Plan  - Will transfuse if platelet count is <20k.  - monitor

## 2025-04-01 NOTE — PLAN OF CARE
04/01/25 0920   Discharge Planning   Assessment Type Discharge Planning Brief Assessment   Resource/Environmental Concerns none   Support Systems Family members;Other (Comment)  (staff at [facility])   Equipment Currently Used at Home other (see comments)  ([facility]   Current Living Arrangements other (see comments)  (resident at [facility])   Patient/Family Anticipates Transition to other (see comments)  ([facility])   Patient/Family Anticipated Services at Transition none   DME Needed Upon Discharge  none   Discharge Plan A Return to nursing home

## 2025-04-01 NOTE — ASSESSMENT & PLAN NOTE
Suspect in the setting of Covid-19  Recent Labs   Lab 04/01/25  0655   TROPONINI 0.117*     Continue to trend troponin   Cardiology consult: no plans for intervention  Echocardiogram as below. Similar to previous echo    Echo  Result Date: 4/1/2025    Left Ventricle: The left ventricle is normal in size. There is   moderately reduced systolic function with a visually estimated ejection   fraction of  35%. Grade I diastolic dysfunction.    Right Ventricle: The right ventricle is normal in size. Systolic   function is normal. Pacemaker lead present in the ventricle.    Mitral Valve: There is mild regurgitation.    Tricuspid Valve: There is mild regurgitation.    Pulmonary Artery: The estimated pulmonary artery systolic pressure is   32 mmHg.    IVC/SVC: Normal venous pressure at 3 mmHg.

## 2025-04-01 NOTE — SUBJECTIVE & OBJECTIVE
Past Medical History:   Diagnosis Date    Cataract     Cellulitis of penis 07/06/2018    COPD (chronic obstructive pulmonary disease)     Coronary artery disease     Diabetes mellitus type II     DJD (degenerative joint disease)     Glaucoma     Gout     Hypertension     Kidney stone     MI (myocardial infarction) 2010    Obesity     JOSE (obstructive sleep apnea)     Uveitis        Past Surgical History:   Procedure Laterality Date    CARDIAC DEFIBRILLATOR PLACEMENT      CATARACT EXTRACTION W/  INTRAOCULAR LENS IMPLANT Right 2020    OUTSIDE [facility] ()    CYSTOSCOPY N/A 10/01/2020    Procedure: CYSTOSCOPY;  Surgeon: Monica Lieberman MD;  Location: [facility] OR;  Service: Urology;  Laterality: N/A;  RN PRE OP Covid screen 9-  C A    DESTRUCTION, CILIARY BODY, USING LASER Right 7/13/2023    Procedure: DESTRUCTION, CILIARY BODY, USING LASER;  Surgeon: Shaina Gan MD;  Location: [facility] OR 1ST FLR;  Service: Ophthalmology;  Laterality: Right;    DESTRUCTION, CILIARY BODY, USING LASER Right 8/10/2023    Procedure: DESTRUCTION, CILIARY BODY, USING LASER;  Surgeon: Shaina Gan MD;  Location: [facility] OR 1ST FLR;  Service: Ophthalmology;  Laterality: Right;    DESTRUCTION, CILIARY BODY, USING LASER Right 12/21/2023    Procedure: DESTRUCTION, CILIARY BODY, USING LASER;  Surgeon: Shaina Gan MD;  Location: [facility] OR 1ST FLR;  Service: Ophthalmology;  Laterality: Right;  g-probe    excisional biopsy left inguinal lymph node      FOOT SURGERY      right foot surgery     INSERTION OF INFLATABLE PENILE PROSTHESIS N/A 01/25/2022    Procedure: INSERTION, PENILE PROSTHESIS, INFLATABLE;  Surgeon: Monica Lieberman MD;  Location: [facility] OR;  Service: Urology;  Laterality: N/A;  [employer] JUAN LUIS ARNOLD [phone] TEXTED HIM @ 5:32PM ON 12-  RN PRE OP 12-28-21. Covid NEGATIVE 1-3-22. CA-----AICD-----HAS CARDS CLEARANCE    kidney stones      lithotripsy    REPAIR, SURGICAL WOUND, EYE, ANTERIOR SEGMENT  Right 10/05/2021    Procedure: REPAIR, SURGICAL WOUND, EYE, ANTERIOR SEGMENT;  Surgeon: Adelaide Allen MD;  Location: [facility] OR [address] [place] [address];  Service: Ophthalmology;  Laterality: Right;  Anterior Chamber Wash Out Right Eye     Surgery for bulging disc at C7         Review of patient's allergies indicates:   Allergen Reactions    Cephalexin Other (See Comments)     vomitting    Darvocet a500 [propoxyphene n-acetaminophen] Itching    Morphine Itching           Tramadol Itching       No current facility-administered medications on file prior to encounter.     Current Outpatient Medications on File Prior to Encounter   Medication Sig    acetaminophen (TYLENOL) 650 MG TbSR Take 650 mg by mouth every 6 (six) hours as needed.    albuterol-ipratropium (DUO-NEB) 2.5 mg-0.5 mg/3 mL nebulizer solution Take 3 mLs by nebulization every 6 (six) hours as needed for Wheezing. Rescue    amitriptyline (ELAVIL) 10 MG tablet Take 10 mg by mouth nightly as needed for Insomnia.    brimonidine 0.2% (ALPHAGAN) 0.2 % Drop Place 1 drop into the right eye 2 (two) times daily.    guaiFENesin (MUCINEX) 600 mg 12 hr tablet Take 1,200 mg by mouth 2 (two) times daily.    latanoprost 0.005 % ophthalmic solution Place 1 drop into the right eye every evening.    methocarbamoL (ROBAXIN) 500 MG Tab Take 500 mg by mouth 4 (four) times daily.    prednisoLONE sodium phosphate (INFLAMASE FORTE) 1 % Drop Place 1 drop into the right eye 4 (four) times daily.    albuterol (PROVENTIL/VENTOLIN HFA) 90 mcg/actuation inhaler Inhale 2 puffs into the lungs every 4 (four) hours as needed for Wheezing or Shortness of Breath. Rescue (Patient taking differently: Inhale 2 puffs into the lungs every 3 (three) hours. Rescue)    allopurinol (ZYLOPRIM) 100 MG tablet Take 200 mg by mouth Daily.    apixaban (ELIQUIS) 2.5 mg Tab 2.5 mg by FEEDING TUBE route 2 (two) times daily.    aspirin (ECOTRIN) 81 MG EC tablet Take 81 mg by mouth once daily.    atropine 1% (ISOPTO ATROPINE) 1  % Drop INSTILL 1 DROP IN LEFT EYE TWICE DAILY (Patient taking differently: Place 1 drop into the right eye 2 (two) times daily.)    carvediloL (COREG) 3.125 MG tablet 3.125 mg by Nasogastric route 2 (two) times daily.    colchicine (COLCRYS) 0.6 mg tablet 2 po every day prn gout attack    dapagliflozin (FARXIGA) 10 mg tablet Take 10 mg by mouth once daily.    diclofenac sodium (VOLTAREN) 1 % Gel Apply 2 g topically 4 (four) times daily.    dicyclomine (BENTYL) 20 mg tablet Take 20 mg by mouth 2 (two) times daily.    divalproex ER (DEPAKOTE ER) 250 MG 24 hr tablet Take 750 mg by mouth nightly.    dorzolamide-timolol 2-0.5% (COSOPT) 22.3-6.8 mg/mL ophthalmic solution Place 1 drop into the left eye 2 (two) times daily. (Patient taking differently: Place 1 drop into both eyes 2 (two) times daily.)    ergocalciferol (ERGOCALCIFEROL) 50,000 unit Cap Take 1 capsule by mouth every 7 days.    gabapentin (NEURONTIN) 800 MG tablet Take 800 mg by mouth 3 (three) times daily.    insulin glargine U-100, Lantus, 100 unit/mL injection Inject 25 Units into the skin once daily.    insulin lispro (HUMALOG KWIKPEN INSULIN) 100 unit/mL pen Inject into the skin. 0-69=0 insulin give glucose;  = 0 insulin ; 181-200= 3 units; 201-250= 6 units; 251-300 = 9 units; 301-350= 12 units; 351-400= 15 units; 401-700= 18 units subcutaneously before meals and at bedtime for diabetes.    lisinopriL (PRINIVIL,ZESTRIL) 20 MG tablet Take 1 tablet by mouth once daily.    multivitamin with folic acid 400 mcg Tab Take 1 tablet by mouth once daily.    pilocarpine HCL 1% (PILOCAR) 1 % ophthalmic solution Place 1 drop into the right eye 4 (four) times daily.    rosuvastatin (CRESTOR) 20 MG tablet Take 1 tablet by mouth once daily.    tamsulosin (FLOMAX) 0.4 mg Cap Take 0.4 mg by mouth every evening.    [DISCONTINUED] amitriptyline (ELAVIL) 10 MG tablet TAKE 1 TABLET(10 MG) BY MOUTH EVERY EVENING (Patient taking differently: Take 10 mg by mouth once  daily.)    [DISCONTINUED] blood sugar diagnostic Strp ACCU CHEK KEVAN PLUS TEST STRIPS    [DISCONTINUED] blood-glucose meter Misc ACCU CHEK KEVAN PLUS METER: USE 4X/D    [DISCONTINUED] blood-glucose meter,continuous (DEXCOM G7 ) Misc CONTINUES GLUCOSE MONITORING    [DISCONTINUED] blood-glucose sensor (DEXCOM G7 SENSOR) Kathy CONTINUOUS GLUCOSE MONITORING: CHANGE EVERY 10 DAYS.    [DISCONTINUED] ciclopirox (LOPROX) 0.77 % Crea Apply topically 2 (two) times daily.    [DISCONTINUED] ciclopirox 0.77 % Gel     [DISCONTINUED] nitroGLYCERIN (NITROSTAT) 0.4 MG SL tablet Place 0.4 mg under the tongue every 5 (five) minutes as needed for Chest pain.    [DISCONTINUED] omeprazole (PRILOSEC) 20 MG capsule TK 1 C PO QD FOR CONTROL OF STOMACH ACID BEFORE BREAKFAST    [DISCONTINUED] oxybutynin (DITROPAN-XL) 10 MG 24 hr tablet Take 1 tablet by mouth once daily.     Family History       Problem Relation (Age of Onset)    Asthma Daughter    Cancer Maternal Uncle, Cousin    Diabetes Mother, Brother    Heart disease Father    Hypertension Mother, Brother    Kidney disease Cousin          Tobacco Use    Smoking status: Never     Passive exposure: Past    Smokeless tobacco: Never   Substance and Sexual Activity    Alcohol use: No    Drug use: No    Sexual activity: Not Currently     Partners: Female     Comment: divorce     Review of Systems   Constitutional:  Positive for appetite change and fatigue.   Respiratory:  Positive for cough and shortness of breath.    Cardiovascular: Negative.    Gastrointestinal: Negative.    Genitourinary: Negative.    Musculoskeletal: Negative.    Neurological:  Positive for weakness (generalized).     Objective:     Vital Signs (Most Recent):  Temp: 98.8 °F (37.1 °C) (03/31/25 1823)  Pulse: 74 (03/31/25 2200)  Resp: 20 (03/31/25 2000)  BP: (!) 201/100 (03/31/25 2200)  SpO2: (!) 94 % (03/31/25 2200) Vital Signs (24h Range):  Temp:  [98.8 °F (37.1 °C)-100.4 °F (38 °C)] 98.8 °F (37.1 °C)  Pulse:   [74-92] 74  Resp:  [18-22] 20  SpO2:  [90 %-96 %] 94 %  BP: (159-201)/() 201/100     Weight: 113.4 kg (250 lb)  Body mass index is 39.16 kg/m².     Physical Exam  Vitals and nursing note reviewed.   Constitutional:       General: He is not in acute distress.     Appearance: He is obese. He is ill-appearing.   HENT:      Mouth/Throat:      Mouth: Mucous membranes are moist.   Cardiovascular:      Rate and Rhythm: Normal rate.   Pulmonary:      Effort: Pulmonary effort is normal.      Breath sounds: Decreased breath sounds present.   Abdominal:      General: Abdomen is flat.   Musculoskeletal:      Right lower leg: Edema present.      Left lower leg: Edema present.   Skin:     General: Skin is warm.   Neurological:      General: No focal deficit present.      Mental Status: He is alert.                Significant Labs: All pertinent labs within the past 24 hours have been reviewed.    Significant Imaging: I have reviewed all pertinent imaging results/findings within the past 24 hours.

## 2025-04-01 NOTE — ASSESSMENT & PLAN NOTE
Patient's blood pressure range in the last 24 hours was: BP  Min: 159/89  Max: 201/100.The patient's inpatient anti-hypertensive regimen is listed below:  Current Antihypertensives  carvediloL tablet 3.125 mg, 2 times daily, Oral  lisinopriL tablet 20 mg, Daily, Oral    Plan  - BP is controlled, no changes needed to their regimen

## 2025-04-01 NOTE — PHARMACY MED REC
"Admission Medication History     The home medication history was taken by Hafsa Vicente.    You may go to "Admission" then "Reconcile Home Medications" tabs to review and/or act upon these items.     The home medication list has been updated by the Pharmacy department.   Please read ALL comments highlighted in yellow.   Please address this information as you see fit.    Feel free to contact us if you have any questions or require assistance.      Medications listed below were obtained from: [employer] Nursing home  (Not in a hospital admission)          Hafsa Vicente  [phone]                 .          "

## 2025-04-01 NOTE — PLAN OF CARE
Inpatient Upgrade Note    Se Virgil Mcgraw has warranted treatment spanning two or more midnights of hospital level care for the management of  covid . He continues to require IV antibiotics, supplemental oxygen, further testing/imaging, monitoring of vital signs, and iv remdisiver . His condition is also complicated by the following comorbidities: Hypertension, Diabetes, Chronic respiratory disease, Chronic kidney disease, Heart failure, and CAD ICM .          Mercedes Manrique DO  Department of Hospital Medicine   [facility]

## 2025-04-01 NOTE — PROGRESS NOTES
[facility]  [facility] Medicine  Progress Note    Patient Name: Se Virgil Mcgraw  MRN: [id number]  Patient Class: IP- Inpatient   Admission Date: 3/31/2025  Length of Stay: 0 days  Attending Physician: Mercedes Manrique DO  Primary Care Provider: Coreen Anderson MD        Subjective     Principal Problem:Pneumonia due to COVID-19 virus        HPI:  This is a 60-year-old male with a past medical history of COPD, ICM (EF: 35%, GIDD, s/p AICD), CAD, hypertension, type 2 diabetes, hyperlipidemia, CKD 3, CVA (on Eliquis), depression, JOSE, gout, who presents with cough.      Patient presents for evaluation of cough and fatigue that started on the day of presentation.  He reports generalized weakness, being less active, associated with a persistent dry cough, and shortness of breath.  Additional symptoms include chest tightness.  He resides at [facility] but denies exposure to sick contacts.  He also reports worsening lower extremity swelling, left worse than right.    In the ED, the patient was hypertensive (150s-180s/80s-90s), febrile (T-max: 100.4° F), saturating as low as 90% on room air.  Labs showed positive COVID-19, but otherwise unremarkable.  Chest x-ray showed cardiomegaly, with mild underlying prominence of the interstitium (edema or infiltrate).  Patient was given Tylenol 1 g p.o..  He was admitted for further management.    Overview/Hospital Course:  60-year-old male with a past medical history of COPD, ICM (EF: 35%, GIDD, s/p AICD), CAD, hypertension, type 2 diabetes, hyperlipidemia, CKD 3, CVA (on Eliquis), depression, JOSE, gout admitted on 03/31/25 for elevated troponin and COVID-19 infection.  Presented for evaluation of generalized weakness, fatigue, and cough with chest tightness.  Patient positive for COVID.  Initial troponin elevated 0.113 and peaked at 0.142.  Echo with EF 30-35%, G1DD. Similar to previous echo. CXR w/Cardiomegaly.  Mild underlying prominence of the interstitium,  which may be due to edema or infiltrates.  Patient is started on IV remdesivir.  Cardiology consulted-no plans for cardiology intervention.  Okay to discharge from cardiology standpoint.  Patient remained stable on room air.  PT/OT consulted.    Interval History: no acute overnight events. Pt admit feeling fatigue and still has cough, but feel better today than yesterday. No chest pain or SOB, on room air.     Review of Systems   Constitutional:  Positive for activity change and fatigue. Negative for chills and fever.   Respiratory:  Positive for cough. Negative for shortness of breath and wheezing.    Cardiovascular:  Negative for chest pain and palpitations.   Gastrointestinal:  Negative for abdominal pain, diarrhea, nausea and vomiting.   Neurological:  Positive for weakness. Negative for dizziness and headaches.   Psychiatric/Behavioral:  Negative for confusion.      Objective:     Vital Signs (Most Recent):  Temp: 98.8 °F (37.1 °C) (03/31/25 1823)  Pulse: (!) 59 (04/01/25 1800)  Resp: (!) 25 (04/01/25 1416)  BP: (!) 145/84 (04/01/25 1416)  SpO2: (!) 91 % (04/01/25 1800) Vital Signs (24h Range):  Temp:  [98.8 °F (37.1 °C)] 98.8 °F (37.1 °C)  Pulse:  [58-87] 59  Resp:  [18-26] 25  SpO2:  [90 %-95 %] 91 %  BP: (129-209)/() 145/84     Weight: 113.4 kg (250 lb)  Body mass index is 39.16 kg/m².    Intake/Output Summary (Last 24 hours) at 4/1/2025 1812  Last data filed at 4/1/2025 1649  Gross per 24 hour   Intake 750 ml   Output --   Net 750 ml         Physical Exam  Vitals and nursing note reviewed.   Constitutional:       General: He is not in acute distress.     Appearance: He is obese. He is ill-appearing.   HENT:      Mouth/Throat:      Mouth: Mucous membranes are moist.   Cardiovascular:      Rate and Rhythm: Regular rhythm. Bradycardia present.   Pulmonary:      Effort: Pulmonary effort is normal. No respiratory distress.      Breath sounds: Decreased breath sounds present. No wheezing.      Comments: On  RA, diminished breath sounds in lower lung fields.  Abdominal:      General: There is no distension.      Palpations: Abdomen is soft.      Tenderness: There is no abdominal tenderness.   Musculoskeletal:      Right lower leg: Edema present.      Left lower leg: Edema present.   Skin:     General: Skin is warm.   Neurological:      General: No focal deficit present.      Mental Status: He is alert and oriented to person, place, and time.   Psychiatric:         Mood and Affect: Mood normal.         Thought Content: Thought content normal.               Significant Labs: All pertinent labs within the past 24 hours have been reviewed.    Significant Imaging: I have reviewed all pertinent imaging results/findings within the past 24 hours.      Assessment & Plan  Pneumonia due to COVID-19 virus  Patient is identified as Severe COVID-19 based on hypoxemia with O2 saturations <94% on room air or on ambulation   Initiate standard COVID protocols; COVID-19 testing ,Infection Control notification  and isolation- respiratory, contact and droplet per protocol    Diagnostics: CBC, CMP, and Portable CXR    Management: Inhaled bronchodilators as needed for shortness of breath., decadron, remdesivir, AC.     Advance Care Planning  Full code per Josias.  Advance Care Planning     Date: 03/31/2025    A total of 2 min was spent reviewing pertinent documents.   Essential hypertension  Patient's blood pressure range in the last 24 hours was: BP  Min: 129/75  Max: 209/101.The patient's inpatient anti-hypertensive regimen is listed below:  Current Antihypertensives  carvediloL tablet 3.125 mg, 2 times daily, Oral  hydrALAZINE injection 10 mg, Every 6 hours PRN, Intravenous  lisinopriL tablet 40 mg, Daily, Oral    Plan  - BP is uncontrolled, will adjust as follows: increased lisinopril to 40 mg  HLD (hyperlipidemia)  Continue statin     AICD (automatic cardioverter/defibrillator) present  History noted.   Telemetry monitoring.     Chronic  combined systolic and diastolic heart failure  Results for orders placed during the hospital encounter of 05/28/24    Echo    Interpretation Summary    Left Ventricle: The left ventricle is normal in size. Mildly increased wall thickness. There is moderately reduced systolic function with a visually estimated ejection fraction of 35 %. Grade I diastolic dysfunction.    Right Ventricle: Normal right ventricular cavity size. Systolic function is normal. Pacemaker lead present in the ventricle.    Left Atrium: Left atrium is mildly dilated.    Aortic Valve: There is mild stenosis. Aortic valve area by VTI is 1.77 cm². Aortic valve peak velocity is 1.23 m/s. Mean gradient is 4 mmHg. The dimensionless index is 0.45.    Mitral Valve: There is mild regurgitation.    Pulmonary Artery: The estimated pulmonary artery systolic pressure is 34 mmHg.    IVC/SVC: Normal venous pressure at 3 mmHg.    Check BNP, repeat echocardiogram   Continue home medications   Coronary artery disease involving native coronary artery with angina pectoris  History noted. Continue home medications   Benign prostatic hyperplasia  Continue Flomax     Chronic obstructive pulmonary disease, unspecified  Patient's COPD is controlled currently.  Patient is currently off COPD Pathway. Duo-nebs PRN  Gout  Continue allopurinol     Type 2 diabetes mellitus with diabetic peripheral angiopathy without gangrene  Lab Results   Component Value Date    HGBA1C 5.8 (H) 05/28/2024     Glycemic protocol.   LDSS  Continue lantus     Elevated troponin  Suspect in the setting of Covid-19  Recent Labs   Lab 04/01/25  0655   TROPONINI 0.117*     Continue to trend troponin   Cardiology consult: no plans for intervention  Echocardiogram as below. Similar to previous echo    Echo  Result Date: 4/1/2025    Left Ventricle: The left ventricle is normal in size. There is   moderately reduced systolic function with a visually estimated ejection   fraction of  35%. Grade I diastolic  dysfunction.    Right Ventricle: The right ventricle is normal in size. Systolic   function is normal. Pacemaker lead present in the ventricle.    Mitral Valve: There is mild regurgitation.    Tricuspid Valve: There is mild regurgitation.    Pulmonary Artery: The estimated pulmonary artery systolic pressure is   32 mmHg.    IVC/SVC: Normal venous pressure at 3 mmHg.            Obesity with serious comorbidity  Body mass index is 39.16 kg/m². Morbid obesity complicates all aspects of disease management from diagnostic modalities to treatment. Weight loss encouraged and health benefits explained to patient.       Anemia  Anemia is likely due to Iron deficiency. Most recent hemoglobin and hematocrit are listed below.  Recent Labs     03/31/25 2109 04/01/25  0655   HGB 14.3 13.7*   HCT 43.3 41.0     Plan  - Monitor serial CBC: Daily  - Transfuse PRBC if patient becomes hemodynamically unstable, symptomatic or H/H drops below 7/21.  - Patient has not received any PRBC transfusions to date  - Patient's anemia is currently stable  - monitor  Hypomagnesemia  Patient has Abnormal Magnesium: hypomagnesemia. Will continue to monitor electrolytes closely. Will replace the affected electrolytes and repeat labs to be done after interventions completed. The patient's magnesium results have been reviewed and are listed below.  Recent Labs   Lab 04/01/25  0655   MG 1.5*        Replace as needed  Thrombocytopenia  The likely etiology of thrombocytopenia is infection. The patients 3 most recent labs are listed below.  Recent Labs     03/31/25 2109 04/01/25  0655   * 144*     Plan  - Will transfuse if platelet count is <20k.  - monitor     VTE Risk Mitigation (From admission, onward)           Ordered     apixaban tablet 5 mg  2 times daily         03/31/25 2220     IP VTE HIGH RISK PATIENT  Once         03/31/25 2213     Place sequential compression device  Until discontinued         03/31/25 2213                    Discharge  Planning   JANEEN:      Code Status: Full Code   Medical Readiness for Discharge Date:   Discharge Plan A: Return to nursing home                Please place Justification for DME        Mercedes Manrique DO  Department of Hospital Medicine   [facility] - Emergency Dept

## 2025-04-01 NOTE — ASSESSMENT & PLAN NOTE
Gastroenterology Follow Up Note    Efrem Ramos MRN#  [id number]   Age:  78 year old YOB: 1944    Date of Admission:  9/5/2022     Subjective   Patient with depressed mood today that he attributes to receiving bad news from cardiology that he is not a candidate for any aggressive therapy.  From GI standpoint, he remains stable.  Reports one episode of fecal incontinence overnight, but otherwise overall bowel movement frequency has been decreasing.  Total of 4 bowel movements in past 24 hours.  Afebrile.     MEDICATIONS & ALLERGIES   Current medication list reviewed.      Allergies   Allergen Reactions     Sulfa Drugs Difficulty breathing, Swelling and Hives     Adhesive Tape Blisters     Amiodarone Other (See Comments)     Developed pleural effusion     Latex Unknown     Cephalosporins      Tolerated ceftriaxone      Penicillins Hives     Reaction occurred as a child  Tolerated ceftriaxone in past   Other reaction(s): Hives          OBJECTIVE   Vitals BP (!) 85/60 (BP Location: Right arm)   Pulse 97   Temp 97.6  F (36.4  C) (Oral)   Resp 20   Wt 48.4 kg (106 lb 9.6 oz)   SpO2 93%   BMI 16.70 kg/m          General:   Thin frail elderly CM in NAD.   HEENT:   NC/AT. MMM.   Lungs:  No respiratory distress.   Heart:  Irregular.   Abdomen:   Soft. NT/ND. G tube in place with TF at 45 mL/hr.   Ext/MS:   No cyanosis or erythema.    Neuro:   No focal deficits noted.    Psych:  Depressed mood noted today.   Skin:  No obvious rashes or lesions noted.         LABORATORY AND IMAGING    ELECTROLYTE PANEL   Recent Labs   Lab Test 09/07/22  1150 09/07/22  0601 09/05/22 2038 08/31/22  0710   POTASSIUM 3.7 3.3* 4.5 4.1   BUN  --  28 31* 25      HEMATOLOGY PANEL   Recent Labs   Lab Test 09/07/22  0601 09/05/22 2038 08/31/22  0710 05/20/20  0415 05/18/20 2025 05/18/20  1935 05/18/20  0831 03/23/20  0847   WBC 11.3* 18.3* 16.1*   < > 21.4*   < > 11.9* 12.9*    99 100   < > 97   < > 101* 99   INR  --  Patient's blood pressure range in the last 24 hours was: BP  Min: 129/75  Max: 209/101.The patient's inpatient anti-hypertensive regimen is listed below:  Current Antihypertensives  carvediloL tablet 3.125 mg, 2 times daily, Oral  hydrALAZINE injection 10 mg, Every 6 hours PRN, Intravenous  lisinopriL tablet 40 mg, Daily, Oral      Titrate antihypertensives as needed for appropriate blood pressure control     --   --   --  1.51*  --  1.18* 1.07    < > = values in this interval not displayed.      LIVER AND PANCREAS PANEL   Recent Labs   Lab Test 09/07/22  0601 09/05/22 2038 07/25/22  0830 09/06/18  0602 09/05/18  1628   ALBUMIN 1.8* 2.0* 2.2*   < > 3.2*   LIPASE  --  47*  --   --  294    < > = values in this interval not displayed.      I have reviewed the current diagnostic and laboratory tests.     Assessment / Recommendations:     Assessment:  1. Chronic diarrhea.  Remote history of microscopic colitis based on biopsies in 2013.  Prior treatment with course of Budesonide with appreciable relief in 2018.  Symptoms well managed until acute worsening after placement of gastrostomy tube and starting enteric tube feeds in 07/2022.  Patient empirically started on Budesonide over the weekend by primary team with moderate but not complete clinical relief.  Symptoms may be secondary to recurrent microscopic colitis, medication (digoxin) induced diarrhea, irritable bowel syndrome, constipation with overflow diarrhea, or small intestinal bacterial overgrowth.  Inflammatory bowel disease or neoplastic process considered but absence of worrisome features is reassuring.  Given patient has had improvement of symptoms with empirical trial of Budesonide, would continue current therapy.  If symptoms fail to improve, then can consider repeat lower endoscopic evaluation with colonoscopy vs flexible sigmoidoscopy. However with recent recommendations from cardiology recommending palliative care, would need to have detailed discussion regarding goals of care prior to any endoscopic procedure.  2. 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  Placement of gastrostomy tube and initiation of enteric tube feeding in 07/2022.     Recommendations:  1. Continue and complete 8-week course of budesonide.   2. Agree with and appreciate dietician input regarding changes in tube feeds as  indicated.   3. Continue fiber supplements with tube feeds.  4. If diarrhea persists, can consider addition of Banatrol.   5. If diarrhea persists, can consider repeat lower endoscopic evaluation with colonoscopy vs flexible sigmoidoscopy. However with recent recommendations from cardiology recommending palliative care, would need to have detailed discussion regarding goals of care prior to any endoscopic procedure.       Total time spent in chart review, direct medical discussion, examination, and documentation was 25 minutes.    Krishan Flores MD  [facility]  [phone]  I appreciate the opportunity to participate in the care of this patient.   Please feel free to call me with any questions or concerns.

## 2025-04-01 NOTE — CONSULTS
[facility] [place] Emergency Dept  Cardiology  Consult Note    Patient Name: Se Virgil Mcgraw  MRN: [id number]  Admission Date: 3/31/2025  Hospital Length of Stay: 0 days  Code Status: Full Code   Attending Provider: Mercedes Manrique DO   Consulting Provider: Cristine Chang MD  Primary Care Physician: Coreen Anderson MD  Principal Problem:Pneumonia due to COVID-19 virus    Patient information was obtained from patient and ER records.     Inpatient consult to Cardiology  Consult performed by: Cristine Chang MD  Consult ordered by: Paulie Jara MD        Subjective:     Chief Complaint:  covid, trop elevation     HPI:    Patient came in with fatigue and feeling ill.  Found to have COVID.  Also complains of some pleuritic chest pain when he coughs.  Otherwise denies any substernal chest pressure or pain orthopnea or PND.    Results for orders placed or performed during the hospital encounter of 11/29/24   EKG 12-lead    Collection Time: 11/29/24 11:41 PM   Result Value Ref Range    QRS Duration 108 ms    OHS QTC Calculation 460 ms    Narrative    Test Reason : R00.0,    Vent. Rate :  71 BPM     Atrial Rate :  71 BPM     P-R Int : 170 ms          QRS Dur : 108 ms      QT Int : 424 ms       P-R-T Axes :  74  61  74 degrees    QTcB Int : 460 ms    Normal sinus rhythm  Nonspecific T wave abnormality  Prolonged QT  Abnormal ECG  When compared with ECG of 29-May-2024 08:40,  Significant changes have occurred  Confirmed by Migel Bustillo ([id number]) on 12/1/2024 2:09:43 PM    Referred By: AAAREFERRAL SELF           Confirmed By: Migel Bustillo       Results for orders placed during the hospital encounter of 03/31/25    Echo    Interpretation Summary    Left Ventricle: The left ventricle is normal in size. There is moderately reduced systolic function with a visually estimated ejection fraction of  35%. Grade I diastolic dysfunction.    Right Ventricle: The right ventricle is normal in size. Systolic function is normal. Pacemaker lead  present in the ventricle.    Mitral Valve: There is mild regurgitation.    Tricuspid Valve: There is mild regurgitation.    Pulmonary Artery: The estimated pulmonary artery systolic pressure is 32 mmHg.    IVC/SVC: Normal venous pressure at 3 mmHg.      Results for orders placed during the hospital encounter of 05/28/24    Nuclear Stress - Cardiology Interpreted    Interpretation Summary    Equivocal myocardial perfusion scan.    moderate to severe intensity, large sized, equivocal  in the inferoapical wall(s). This finding is equivocal due to diaphragm shadow.    There are no other significant perfusion abnormalities.    The gated perfusion images showed an ejection fraction of 47% at rest.    There is mild global hypokinesis at rest and stress.    The ECG portion of the study is negative for ischemia.    The patient reported no chest pain during the stress test.    There were no arrhythmias during stress.      No results found for this or any previous visit.              HPI:  This is a 60-year-old male with a past medical history of COPD, ICM (EF: 35%, GIDD, s/p AICD), CAD, hypertension, type 2 diabetes, hyperlipidemia, CKD 3, CVA (on Eliquis), depression, JOSE, gout, who presents with cough.       Patient presents for evaluation of cough and fatigue that started on the day of presentation.  He reports generalized weakness, being less active, associated with a persistent dry cough, and shortness of breath.  Additional symptoms include chest tightness.  He resides at [facility] but denies exposure to sick contacts.  He also reports worsening lower extremity swelling, left worse than right.     In the ED, the patient was hypertensive (150s-180s/80s-90s), febrile (T-max: 100.4° F), saturating as low as 90% on room air.  Labs showed positive COVID-19, but otherwise unremarkable.  Chest x-ray showed cardiomegaly, with mild underlying prominence of the interstitium (edema or infiltrate).  Patient was given  Tylenol 1 g p.o..  He was admitted for further management.     Overview/Hospital Course:  60-year-old male with a past medical history of COPD, ICM (EF: 35%, GIDD, s/p AICD), CAD, hypertension, type 2 diabetes, hyperlipidemia, CKD 3, CVA (on Eliquis), depression, JOSE, gout admitted on 03/31/25 for elevated troponin and COVID-19 infection.  Presented for evaluation of generalized weakness, fatigue, and cough with chest tightness.  Patient positive for COVID.  Initial troponin elevated 0.113 and peaked at 0.142.  Echo with EF 30-35%, G1DD. Similar to previous echo. CXR w/Cardiomegaly.  Mild underlying prominence of the interstitium, which may be due to edema or infiltrates.  Patient is started on IV remdesivir.  Cardiology consulted-no plans for cardiology intervention.  Okay to discharge from cardiology standpoint.  Patient remained stable on room air.  PT/OT consulted.     Interval History: no acute overnight events. Pt admit feeling fatigue and still has cough, but feel better today than yesterday. No chest pain or SOB, on room air.     Past Medical History:   Diagnosis Date    Cataract     Cellulitis of penis 07/06/2018    COPD (chronic obstructive pulmonary disease)     Coronary artery disease     Diabetes mellitus type II     DJD (degenerative joint disease)     Glaucoma     Gout     Hypertension     Kidney stone     MI (myocardial infarction) 2010    Obesity     JOSE (obstructive sleep apnea)     Uveitis        Past Surgical History:   Procedure Laterality Date    CARDIAC DEFIBRILLATOR PLACEMENT      CATARACT EXTRACTION W/  INTRAOCULAR LENS IMPLANT Right 2020    OUTSIDE [facility] ()    CYSTOSCOPY N/A 10/01/2020    Procedure: CYSTOSCOPY;  Surgeon: Monica Lieberman MD;  Location: [facility];  Service: Urology;  Laterality: N/A;  RN PRE OP Covid screen 9-  C A    DESTRUCTION, CILIARY BODY, USING LASER Right 7/13/2023    Procedure: DESTRUCTION, CILIARY BODY, USING LASER;  Surgeon: Shaina Gan MD;   [facility];  Service: Ophthalmology;  Laterality: Right;    DESTRUCTION, CILIARY BODY, USING LASER Right 8/10/2023    Procedure: DESTRUCTION, CILIARY BODY, USING LASER;  Surgeon: Shaina Gan MD;  [facility];  Service: Ophthalmology;  Laterality: Right;    DESTRUCTION, CILIARY BODY, USING LASER Right 12/21/2023    Procedure: DESTRUCTION, CILIARY BODY, USING LASER;  Surgeon: Shaina Gan MD;  [facility];  Service: Ophthalmology;  Laterality: Right;  g-probe    excisional biopsy left inguinal lymph node      FOOT SURGERY      right foot surgery     INSERTION OF INFLATABLE PENILE PROSTHESIS N/A 01/25/2022    Procedure: INSERTION, PENILE PROSTHESIS, INFLATABLE;  Surgeon: Monica Lieberman MD;  Location: [facility] OR;  Service: Urology;  Laterality: N/A;  [employer] JUAN LUIS ARNOLD [phone] TEXTED HIM @ 5:32PM ON 12-  RN PRE OP 12-28-21. Covid NEGATIVE 1-3-22. CA-----AICD-----HAS CARDS CLEARANCE    kidney stones      lithotripsy    REPAIR, SURGICAL WOUND, EYE, ANTERIOR SEGMENT Right 10/05/2021    Procedure: REPAIR, SURGICAL WOUND, EYE, ANTERIOR SEGMENT;  Surgeon: Adelaide Allen MD;  Location: [facility] OR [address] [place] [address];  Service: Ophthalmology;  Laterality: Right;  Anterior Chamber Wash Out Right Eye     Surgery for bulging disc at C7         Review of patient's allergies indicates:   Allergen Reactions    Cephalexin Other (See Comments)     vomitting    Darvocet a500 [propoxyphene n-acetaminophen] Itching    Morphine Itching           Tramadol Itching       No current facility-administered medications on file prior to encounter.     Current Outpatient Medications on File Prior to Encounter   Medication Sig    acetaminophen (TYLENOL) 650 MG TbSR Take 650 mg by mouth every 6 (six) hours as needed.    albuterol (PROVENTIL/VENTOLIN HFA) 90 mcg/actuation inhaler Inhale 2 puffs into the lungs every 4 (four) hours as needed for Wheezing or Shortness of Breath. Rescue     albuterol-ipratropium (DUO-NEB) 2.5 mg-0.5 mg/3 mL nebulizer solution Take 3 mLs by nebulization every 6 (six) hours as needed for Wheezing. Rescue    amitriptyline (ELAVIL) 10 MG tablet Take 10 mg by mouth nightly as needed for Insomnia.    apixaban (ELIQUIS) 2.5 mg Tab 2.5 mg by FEEDING TUBE route 2 (two) times daily.    aspirin (ECOTRIN) 81 MG EC tablet Take 81 mg by mouth once daily.    atropine 1% (ISOPTO ATROPINE) 1 % Drop INSTILL 1 DROP IN LEFT EYE TWICE DAILY (Patient taking differently: Place 1 drop into the right eye 2 (two) times daily.)    brimonidine 0.2% (ALPHAGAN) 0.2 % Drop Place 1 drop into the right eye 2 (two) times daily.    carvediloL (COREG) 3.125 MG tablet 3.125 mg by Nasogastric route 2 (two) times daily.    colchicine (COLCRYS) 0.6 mg tablet 2 po every day prn gout attack    dapagliflozin (FARXIGA) 10 mg tablet Take 10 mg by mouth once daily.    diclofenac sodium (VOLTAREN) 1 % Gel Apply 2 g topically 4 (four) times daily.    dicyclomine (BENTYL) 20 mg tablet Take 20 mg by mouth 2 (two) times daily.    divalproex ER (DEPAKOTE ER) 250 MG 24 hr tablet Take 750 mg by mouth nightly.    dorzolamide-timolol 2-0.5% (COSOPT) 22.3-6.8 mg/mL ophthalmic solution Place 1 drop into the left eye 2 (two) times daily. (Patient taking differently: Place 1 drop into both eyes 2 (two) times daily.)    ergocalciferol (ERGOCALCIFEROL) 50,000 unit Cap Take 1 capsule by mouth every 7 days.    gabapentin (NEURONTIN) 800 MG tablet Take 800 mg by mouth 3 (three) times daily.    guaiFENesin (MUCINEX) 600 mg 12 hr tablet Take 1,200 mg by mouth 2 (two) times daily.    insulin glargine U-100, Lantus, 100 unit/mL injection Inject 25 Units into the skin once daily.    insulin lispro (HUMALOG KWIKPEN INSULIN) 100 unit/mL pen Inject into the skin. 0-69=0 insulin give glucose;  = 0 insulin ; 181-200= 3 units; 201-250= 6 units; 251-300 = 9 units; 301-350= 12 units; 351-400= 15 units; 401-700= 18 units subcutaneously  before meals and at bedtime for diabetes.    latanoprost 0.005 % ophthalmic solution Place 1 drop into the right eye every evening.    lisinopriL (PRINIVIL,ZESTRIL) 20 MG tablet Take 1 tablet by mouth once daily.    methocarbamoL (ROBAXIN) 500 MG Tab Take 500 mg by mouth 4 (four) times daily.    multivitamin with folic acid 400 mcg Tab Take 1 tablet by mouth once daily.    pilocarpine HCL 1% (PILOCAR) 1 % ophthalmic solution Place 1 drop into the right eye 4 (four) times daily.    prednisoLONE sodium phosphate (INFLAMASE FORTE) 1 % Drop Place 1 drop into the right eye 4 (four) times daily.    rosuvastatin (CRESTOR) 20 MG tablet Take 1 tablet by mouth once daily.    tamsulosin (FLOMAX) 0.4 mg Cap Take 0.4 mg by mouth every evening.    allopurinol (ZYLOPRIM) 100 MG tablet Take 200 mg by mouth Daily.     Family History       Problem Relation (Age of Onset)    Asthma Daughter    Cancer Maternal Uncle, Cousin    Diabetes Mother, Brother    Heart disease Father    Hypertension Mother, Brother    Kidney disease Cousin          Tobacco Use    Smoking status: Never     Passive exposure: Past    Smokeless tobacco: Never   Substance and Sexual Activity    Alcohol use: No    Drug use: No    Sexual activity: Not Currently     Partners: Female     Comment: divorce     Review of Systems   Constitutional: Positive for malaise/fatigue.   HENT: Negative.     Eyes: Negative.    Respiratory:  Positive for shortness of breath.    Endocrine: Negative.    Hematologic/Lymphatic: Negative.    Skin: Negative.    Musculoskeletal: Negative.    Gastrointestinal: Negative.    Genitourinary: Negative.    Neurological: Negative.    Psychiatric/Behavioral: Negative.     Allergic/Immunologic: Negative.      Objective:     Vital Signs (Most Recent):  Temp: 98.8 °F (37.1 °C) (03/31/25 1823)  Pulse: (!) 59 (04/01/25 1800)  Resp: (!) 25 (04/01/25 1416)  BP: (!) 145/84 (04/01/25 1416)  SpO2: (!) 91 % (04/01/25 1800) Vital Signs (24h Range):  Pulse:   [58-87] 59  Resp:  [18-26] 25  SpO2:  [90 %-95 %] 91 %  BP: (129-209)/() 145/84     Weight: 113.4 kg (250 lb)  Body mass index is 39.16 kg/m².    SpO2: (!) 91 %         Intake/Output Summary (Last 24 hours) at 4/1/2025 1855  Last data filed at 4/1/2025 1649  Gross per 24 hour   Intake 750 ml   Output --   Net 750 ml       Lines/Drains/Airways       Drain  Duration             Male External Urinary Catheter 04/01/25 1411 <1 day              Peripheral Intravenous Line  Duration                  Peripheral IV - Single Lumen 03/31/25 1732 18 G Left Antecubital 1 day                     Physical Exam  Vitals reviewed.   Constitutional:       Appearance: He is well-developed.   HENT:      Head: Normocephalic.   Eyes:      Conjunctiva/sclera: Conjunctivae normal.      Pupils: Pupils are equal, round, and reactive to light.   Cardiovascular:      Rate and Rhythm: Normal rate and regular rhythm.      Heart sounds: Normal heart sounds.   Pulmonary:      Effort: Pulmonary effort is normal.      Breath sounds: Normal breath sounds.   Abdominal:      General: Bowel sounds are normal.      Palpations: Abdomen is soft.   Musculoskeletal:      Cervical back: Normal range of motion and neck supple.   Skin:     General: Skin is warm.   Neurological:      Mental Status: He is alert and oriented to person, place, and time.          Significant Labs:     DATA:     Laboratory:  CBC:  Recent Labs   Lab 02/07/25 0002 03/31/25 2109 04/01/25  0655   WBC 6.88 7.32 7.72   Hemoglobin 14.3  --   --    HGB  --  14.3 13.7 L   Hematocrit 43.3  --   --    HCT  --  43.3 41.0   Platelet Count  --  142 L 144 L   Platelets 172  --   --        CHEMISTRIES:  Recent Labs   Lab 05/28/24  0432 05/29/24  0503 11/29/24  2354 02/01/25 2003 02/07/25 0002 03/31/25 2109 04/01/25  0655   Glucose 156 H 106 307 H  --  242 H  --   --    Sodium 141 138 137   < > 141 140 139   Potassium 4.0 3.9 4.7   < > 4.0 3.8 4.0   BUN 12 11 19   < > 25 H 18 19    Creatinine 1.1 0.9 1.5 H   < > 1.5 H 1.3 1.1   Calcium 9.8 9.6 9.7   < > 9.5 9.7 9.4   Magnesium   --   --   --   --   --  1.7 1.5 L   Magnesium 1.8  --   --   --   --   --   --     < > = values in this interval not displayed.       CARDIAC BIOMARKERS:  Recent Labs   Lab 03/29/24 2051 05/28/24 0432 02/07/25 0002 03/31/25 2109 04/01/25 0216 04/01/25  0655   CPK  --   --  401 H  --   --   --    CK 90  --   --   --   --   --    Troponin I  --    < >  --   --   --   --    Troponin-I  --   --   --  0.113 H 0.142 H 0.117 H    < > = values in this interval not displayed.       COAGS:  Recent Labs   Lab 03/25/24  1051 03/29/24 2051   INR 1.0 1.0       LIPIDS/LFTS:  Recent Labs   Lab 05/28/24 0432 05/29/24 0503 02/07/25 0002 03/31/25 2109 04/01/25  0655   Cholesterol 155  --   --   --   --    Triglycerides 155 H  --   --   --   --    HDL 37 L  --   --   --   --    LDL Cholesterol 87.0  --   --   --   --    Non-HDL Cholesterol 118  --   --   --   --    AST 13   < > 18 19 21   ALT 9 L   < > 15 19 17    < > = values in this interval not displayed.       Hemoglobin A1C   Date Value Ref Range Status   05/28/2024 5.8 (H) 4.0 - 5.6 % Final     Comment:     ADA Screening Guidelines:  5.7-6.4%  Consistent with prediabetes  >or=6.5%  Consistent with diabetes    High levels of fetal hemoglobin interfere with the HbA1C  assay. Heterozygous hemoglobin variants (HbS, HgC, etc)do  not significantly interfere with this assay.   However, presence of multiple variants may affect accuracy.     02/06/2024 9.8 (H) <5.7 % Final   11/16/2023 7.5 (H) <5.7 % Final   10/24/2023 7.9 (H) <5.7 % Final   01/26/2022 7.5 (H) 4.0 - 5.6 % Final     Comment:     ADA Screening Guidelines:  5.7-6.4%  Consistent with prediabetes  >or=6.5%  Consistent with diabetes    High levels of fetal hemoglobin interfere with the HbA1C  assay. Heterozygous hemoglobin variants (HbS, HgC, etc)do  not significantly interfere with this assay.   However, presence of  multiple variants may affect accuracy.     10/01/2021 7.5 (H) 4.0 - 5.6 % Final     Comment:     ADA Screening Guidelines:  5.7-6.4%  Consistent with prediabetes  >or=6.5%  Consistent with diabetes    High levels of fetal hemoglobin interfere with the HbA1C  assay. Heterozygous hemoglobin variants (HbS, HgC, etc)do  not significantly interfere with this assay.   However, presence of multiple variants may affect accuracy.         TSH  Recent Labs   Lab 05/28/24  0432   TSH 1.580       The ASCVD Risk score (Hair SANDOVAL, et al., 2019) failed to calculate for the following reasons:    Risk score cannot be calculated because patient has a medical history suggesting prior/existing ASCVD       BNP    Lab Results   Component Value Date/Time    BNP 41 04/01/2025 01:37 AM    BNP 26 05/28/2024 04:32 AM    BNP <10 08/05/2016 04:30 PM    BNP <10 08/29/2015 11:56 PM            ECHO    Results for orders placed during the hospital encounter of 03/31/25    Echo    Interpretation Summary    Left Ventricle: The left ventricle is normal in size. There is moderately reduced systolic function with a visually estimated ejection fraction of  35%. Grade I diastolic dysfunction.    Right Ventricle: The right ventricle is normal in size. Systolic function is normal. Pacemaker lead present in the ventricle.    Mitral Valve: There is mild regurgitation.    Tricuspid Valve: There is mild regurgitation.    Pulmonary Artery: The estimated pulmonary artery systolic pressure is 32 mmHg.    IVC/SVC: Normal venous pressure at 3 mmHg.      STRESS TEST    Results for orders placed during the hospital encounter of 05/28/24    Nuclear Stress - Cardiology Interpreted    Interpretation Summary    Equivocal myocardial perfusion scan.    moderate to severe intensity, large sized, equivocal  in the inferoapical wall(s). This finding is equivocal due to diaphragm shadow.    There are no other significant perfusion abnormalities.    The gated perfusion images  showed an ejection fraction of 47% at rest.    There is mild global hypokinesis at rest and stress.    The ECG portion of the study is negative for ischemia.    The patient reported no chest pain during the stress test.    There were no arrhythmias during stress.        Assessment and Plan:     Chronic combined systolic and diastolic heart failure   Currently euvolemic on exam.  Continue current guideline directed medical therapy    AICD (automatic cardioverter/defibrillator) present        Elevated troponin   No anginal sounding chest pains.  Likely type 2.  No further ischemic workup planned during this hospital stay    Results for orders placed during the hospital encounter of 05/28/24    Nuclear Stress - Cardiology Interpreted    Interpretation Summary    Equivocal myocardial perfusion scan.    moderate to severe intensity, large sized, equivocal  in the inferoapical wall(s). This finding is equivocal due to diaphragm shadow.    There are no other significant perfusion abnormalities.    The gated perfusion images showed an ejection fraction of 47% at rest.    There is mild global hypokinesis at rest and stress.    The ECG portion of the study is negative for ischemia.    The patient reported no chest pain during the stress test.    There were no arrhythmias during stress.      Essential hypertension  Patient's blood pressure range in the last 24 hours was: BP  Min: 129/75  Max: 209/101.The patient's inpatient anti-hypertensive regimen is listed below:  Current Antihypertensives  carvediloL tablet 3.125 mg, 2 times daily, Oral  hydrALAZINE injection 10 mg, Every 6 hours PRN, Intravenous  lisinopriL tablet 40 mg, Daily, Oral      Titrate antihypertensives as needed for appropriate blood pressure control        VTE Risk Mitigation (From admission, onward)           Ordered     apixaban tablet 5 mg  2 times daily         03/31/25 2220     IP VTE HIGH RISK PATIENT  Once         03/31/25 2213     Place sequential  compression device  Until discontinued         03/31/25 0445                    Thank you for your consult. I will follow-up with patient. Please contact us if you have any additional questions.    Cristine Chang MD  Cardiology   [facility] - Emergency Dept

## 2025-04-01 NOTE — ASSESSMENT & PLAN NOTE
Patient has Abnormal Magnesium: hypomagnesemia. Will continue to monitor electrolytes closely. Will replace the affected electrolytes and repeat labs to be done after interventions completed. The patient's magnesium results have been reviewed and are listed below.  Recent Labs   Lab 04/01/25  0655   MG 1.5*        Replace as needed

## 2025-04-01 NOTE — ASSESSMENT & PLAN NOTE
Results for orders placed during the hospital encounter of 05/28/24    Echo    Interpretation Summary    Left Ventricle: The left ventricle is normal in size. Mildly increased wall thickness. There is moderately reduced systolic function with a visually estimated ejection fraction of 35 %. Grade I diastolic dysfunction.    Right Ventricle: Normal right ventricular cavity size. Systolic function is normal. Pacemaker lead present in the ventricle.    Left Atrium: Left atrium is mildly dilated.    Aortic Valve: There is mild stenosis. Aortic valve area by VTI is 1.77 cm². Aortic valve peak velocity is 1.23 m/s. Mean gradient is 4 mmHg. The dimensionless index is 0.45.    Mitral Valve: There is mild regurgitation.    Pulmonary Artery: The estimated pulmonary artery systolic pressure is 34 mmHg.    IVC/SVC: Normal venous pressure at 3 mmHg.    Check BNP, repeat echocardiogram   Continue home medications

## 2025-04-01 NOTE — ASSESSMENT & PLAN NOTE
Anemia is likely due to Iron deficiency. Most recent hemoglobin and hematocrit are listed below.  Recent Labs     03/31/25  2109 04/01/25  0655   HGB 14.3 13.7*   HCT 43.3 41.0     Plan  - Monitor serial CBC: Daily  - Transfuse PRBC if patient becomes hemodynamically unstable, symptomatic or H/H drops below 7/21.  - Patient has not received any PRBC transfusions to date  - Patient's anemia is currently stable  - monitor

## 2025-04-01 NOTE — ASSESSMENT & PLAN NOTE
Body mass index is 39.16 kg/m². Morbid obesity complicates all aspects of disease management from diagnostic modalities to treatment. Weight loss encouraged and health benefits explained to patient.

## 2025-04-01 NOTE — PT/OT/SLP EVAL
Occupational Therapy   Evaluation    Name: Se Virgil Mcgraw  MRN: [id number]  Admitting Diagnosis: COVID  Recent Surgery: * No surgery found *      Recommendations:     Discharge Recommendations: Moderate Intensity Therapy (vs Low Intensity Therapy with increased assistance from NH staff)  Discharge Equipment Recommendations:   (possibly rental w/c if to discharge with low intensity therapy)  Barriers to discharge:  None    Assessment:     Se Virgil Mcgraw is a 60 y.o. male with a medical diagnosis of COVID.  He presents with deconditioning, reporting LLE instability in stance limiting tolerance for OOB activity. Performance deficits affecting function: weakness, impaired endurance, impaired self care skills, impaired functional mobility, impaired sensation, gait instability, impaired balance, decreased lower extremity function, decreased upper extremity function, decreased coordination, abnormal tone.      Rehab Prognosis: Good; patient would benefit from acute skilled OT services to address these deficits and reach maximum level of function.       Plan:     Patient to be seen 3 x/week to address the above listed problems via self-care/home management, therapeutic activities, therapeutic exercises  Plan of Care Expires: 05/01/25  Plan of Care Reviewed with: patient    Subjective     Chief Complaint: Pt reports feeling unsteady in stance, as if LLE were going to give out  Patient/Family Comments/goals: To return to PLOF    Occupational Profile:  Living Environment: Pt is group home NH resident   Previous level of function: Pt reports Mod I for ambulation, assistance with dressing and bathing from NH staff but pt states he self feeds and performs toileting Mod I with use of upright platform rollator  Roles and Routines: Caretaker to self  Equipment Used at Home:  (upright platform rolaltor)  Assistance upon Discharge: NH Staff    Pain/Comfort:  Pain Rating 1: 0/10    Patients cultural, spiritual, Muslim conflicts  given the current situation:      Objective:     Communicated with: dane prior to session.  Patient found HOB elevated with pulse ox (continuous), telemetry, blood pressure cuff, PureWick upon OT entry to room.    General Precautions: Standard, fall  Orthopedic Precautions: N/A  Braces: N/A  Respiratory Status: Room air    Occupational Performance:    Bed Mobility:    Patient completed Rolling/Turning to Left with  minimum assistance and moderate assistance  Patient completed Scooting/Bridging with minimum assistance  Patient completed Supine to Sit with moderate assistance  Patient completed Sit to Supine with minimum assistance and moderate assistance    Functional Mobility/Transfers:  Patient completed Sit <> Stand Transfer with minimum assistance and moderate assistance  with  rolling walker   Functional Mobility: Pt with fair to fair- dynamic seated and standing balance. Pt able to take 3-4 small side steps to HOB, min v/c for increased step length and able to perform after v/c    Activities of Daily Living:  Lower Body Dressing: maximal assistance to don/doff B socks supine in bed    Cognitive/Visual Perceptual:  Cognitive/Psychosocial Skills:     -       Oriented to: Person, Place, Time and Situation   -       Follows Commands/attention:Follows one and two step commands  -       Communication: clear/fluent  -       Memory: No Deficits noted  -       Safety awareness/insight to disability: intact   -       Mood/Affect/Coping skills/emotional control: Appropriate to situation    Physical Exam:  Postural examination/scapula alignment:    -       Rounded shoulders, slightly forward head  Skin integrity: Visible skin intact  Motor Planning:    -       WFL  Dominant hand:    -       R handed  Upper Extremity Range of Motion: RUE WFL  except R shoulder flexion ~0-90*; LUE shoulder flexion ~0-45* L elbow flexion ~0-75* AROM, WFL AAROM/PROM  Upper Extremity Strength:  RUE 2+ to 3+/5; LUE 1+ to 2/5   Strength:  R  hand 4-/5, L hand 2+/5  Fine Motor Coordination:    -       Impaired L hand  Gross motor coordination:   L hemiparesis at baseline from prior CVA    AMPAC 6 Click ADL:  AMPAC Total Score: 16    Treatment & Education:  Pt educated on role of OT and POC.   Pt performing skills as listed above.     Patient left HOB elevated with all lines intact, call button in reach, nsg notified, and nsg present    GOALS:   Multidisciplinary Problems       Occupational Therapy Goals          Problem: Occupational Therapy    Goal Priority Disciplines Outcome Interventions   Occupational Therapy Goal     OT, PT/OT Progressing    Description: Goals to be met by: 05/01/2025      Patient will increase functional independence with ADLs by performing:    UE Dressing with Stand-by Assistance.  LE Dressing with Minimal Assistance.  Grooming while standing with Stand-by Assistance.  Toileting from toilet or BSC with Supervision for hygiene and clothing management.   Toilet transfer to toilet or BSC with Supervision.  Increased functional strength to [facility] for self care.  Upper extremity exercise program x10 reps per handout, with assistance as needed.                          DME Justifications:  Se Virgil Mcgraw has a mobility limitation that significantly impairs his ability to participate in one or more mobility related activities of daily living (MRADLs) such as toileting, feeding, dressing, grooming, and bathing in customary locations in the home.  The mobility limitation cannot be sufficiently resolved by the use of a cane or walker.   The use of a manual wheelchair will significantly improve the patients ability to participate in MRADLS and the patient will use it on regular basis in the home.  Se Virgil Mcgraw has expressed his willingness to use a manual wheelchair in the home. Patients upper body strength is sufficient for propulsion.  He also has a caregiver who is available, willing, and able to provide assistance with the  wheelchair when needed.      History:     Past Medical History:   Diagnosis Date    Cataract     Cellulitis of penis 07/06/2018    COPD (chronic obstructive pulmonary disease)     Coronary artery disease     Diabetes mellitus type II     DJD (degenerative joint disease)     Glaucoma     Gout     Hypertension     Kidney stone     MI (myocardial infarction) 2010    Obesity     JOSE (obstructive sleep apnea)     Uveitis          Past Surgical History:   Procedure Laterality Date    CARDIAC DEFIBRILLATOR PLACEMENT      CATARACT EXTRACTION W/  INTRAOCULAR LENS IMPLANT Right 2020    OUTSIDE [facility] ()    CYSTOSCOPY N/A 10/01/2020    Procedure: CYSTOSCOPY;  Surgeon: Monica Lieberman MD;  Location: [facility] OR;  Service: Urology;  Laterality: N/A;  RN PRE OP Covid screen 9-  C A    DESTRUCTION, CILIARY BODY, USING LASER Right 7/13/2023    Procedure: DESTRUCTION, CILIARY BODY, USING LASER;  Surgeon: Shaina Gan MD;  Location: [facility] OR 1ST FLR;  Service: Ophthalmology;  Laterality: Right;    DESTRUCTION, CILIARY BODY, USING LASER Right 8/10/2023    Procedure: DESTRUCTION, CILIARY BODY, USING LASER;  Surgeon: Shaina Gan MD;  [facility];  Service: Ophthalmology;  Laterality: Right;    DESTRUCTION, CILIARY BODY, USING LASER Right 12/21/2023    Procedure: DESTRUCTION, CILIARY BODY, USING LASER;  Surgeon: Shaina Gan MD;  Location: [facility] OR 1ST FLR;  Service: Ophthalmology;  Laterality: Right;  g-probe    excisional biopsy left inguinal lymph node      FOOT SURGERY      right foot surgery     INSERTION OF INFLATABLE PENILE PROSTHESIS N/A 01/25/2022    Procedure: INSERTION, PENILE PROSTHESIS, INFLATABLE;  Surgeon: Monica Lieberman MD;  Location: [facility] OR;  Service: Urology;  Laterality: N/A;  Aureliant JUAN LUIS ARNOLD [phone] TEXTED HIM @ 5:32PM ON 12-  RN PRE OP 12-28-21. Covid NEGATIVE 1-3-22. CA-----AICD-----HAS CARDS CLEARANCE    kidney stones      lithotripsy     REPAIR, SURGICAL WOUND, EYE, ANTERIOR SEGMENT Right 10/05/2021    Procedure: REPAIR, SURGICAL WOUND, EYE, ANTERIOR SEGMENT;  Surgeon: Adelaide Allen MD;  Location: [facility] OR [address] [place] [address];  Service: Ophthalmology;  Laterality: Right;  Anterior Chamber Wash Out Right Eye     Surgery for bulging disc at C7         Time Tracking:     OT Date of Treatment: 04/01/25  OT Start Time: 1402  OT Stop Time: 1432  OT Total Time (min): 30 min    Billable Minutes:Evaluation 15  Therapeutic Activity 15    4/1/2025

## 2025-04-01 NOTE — SUBJECTIVE & OBJECTIVE
Interval History: no acute overnight events. Pt admit feeling fatigue and still has cough, but feel better today than yesterday. No chest pain or SOB, on room air.     Review of Systems   Constitutional:  Positive for activity change and fatigue. Negative for chills and fever.   Respiratory:  Positive for cough. Negative for shortness of breath and wheezing.    Cardiovascular:  Negative for chest pain and palpitations.   Gastrointestinal:  Negative for abdominal pain, diarrhea, nausea and vomiting.   Neurological:  Positive for weakness. Negative for dizziness and headaches.   Psychiatric/Behavioral:  Negative for confusion.      Objective:     Vital Signs (Most Recent):  Temp: 98.8 °F (37.1 °C) (03/31/25 1823)  Pulse: (!) 59 (04/01/25 1800)  Resp: (!) 25 (04/01/25 1416)  BP: (!) 145/84 (04/01/25 1416)  SpO2: (!) 91 % (04/01/25 1800) Vital Signs (24h Range):  Temp:  [98.8 °F (37.1 °C)] 98.8 °F (37.1 °C)  Pulse:  [58-87] 59  Resp:  [18-26] 25  SpO2:  [90 %-95 %] 91 %  BP: (129-209)/() 145/84     Weight: 113.4 kg (250 lb)  Body mass index is 39.16 kg/m².    Intake/Output Summary (Last 24 hours) at 4/1/2025 1812  Last data filed at 4/1/2025 1649  Gross per 24 hour   Intake 750 ml   Output --   Net 750 ml         Physical Exam  Vitals and nursing note reviewed.   Constitutional:       General: He is not in acute distress.     Appearance: He is obese. He is ill-appearing.   HENT:      Mouth/Throat:      Mouth: Mucous membranes are moist.   Cardiovascular:      Rate and Rhythm: Regular rhythm. Bradycardia present.   Pulmonary:      Effort: Pulmonary effort is normal. No respiratory distress.      Breath sounds: Decreased breath sounds present. No wheezing.      Comments: On RA, diminished breath sounds in lower lung fields.  Abdominal:      General: There is no distension.      Palpations: Abdomen is soft.      Tenderness: There is no abdominal tenderness.   Musculoskeletal:      Right lower leg: Edema present.       Left lower leg: Edema present.   Skin:     General: Skin is warm.   Neurological:      General: No focal deficit present.      Mental Status: He is alert and oriented to person, place, and time.   Psychiatric:         Mood and Affect: Mood normal.         Thought Content: Thought content normal.               Significant Labs: All pertinent labs within the past 24 hours have been reviewed.    Significant Imaging: I have reviewed all pertinent imaging results/findings within the past 24 hours.

## 2025-04-01 NOTE — PLAN OF CARE
Problem: Occupational Therapy  Goal: Occupational Therapy Goal  Description: Goals to be met by: 05/01/2025      Patient will increase functional independence with ADLs by performing:    UE Dressing with Stand-by Assistance.  LE Dressing with Minimal Assistance.  Grooming while standing with Stand-by Assistance.  Toileting from toilet or BSC with Supervision for hygiene and clothing management.   Toilet transfer to toilet or BSC with Supervision.  Increased functional strength to [facility] for self care.  Upper extremity exercise program x10 reps per handout, with assistance as needed.     Outcome: Progressing   Pt would benefit from continued OT to address deficits in self care and functional mobility. Recommending moderate intensity therapy vs low intensity therapy with increased assistance from NH staff as needed. If to return to NH with low intensity therapies, pt may benefit from rental w/c for short term use.    OT assisted to obtain orthostatic blood pressures. BP taken as follows:   BP MA MAP   Supine 205/88 59 126   Seated 189/68 58 120   Standing 145/84 181 109      No symptoms reported in session.

## 2025-04-02 VITALS
SYSTOLIC BLOOD PRESSURE: 174 MMHG | WEIGHT: 217.38 LBS | BODY MASS INDEX: 34.12 KG/M2 | DIASTOLIC BLOOD PRESSURE: 96 MMHG | TEMPERATURE: 98 F | RESPIRATION RATE: 19 BRPM | OXYGEN SATURATION: 94 % | HEART RATE: 64 BPM | HEIGHT: 67 IN

## 2025-04-02 LAB
ANION GAP (OHS): 13 MMOL/L (ref 8–16)
BUN SERPL-MCNC: 27 MG/DL (ref 8–23)
CALCIUM SERPL-MCNC: 9 MG/DL (ref 8.7–10.5)
CHLORIDE SERPL-SCNC: 108 MMOL/L (ref 95–110)
CO2 SERPL-SCNC: 20 MMOL/L (ref 23–29)
CREAT SERPL-MCNC: 1 MG/DL (ref 0.5–1.4)
GFR SERPLBLD CREATININE-BSD FMLA CKD-EPI: >60 ML/MIN/1.73/M2
GLUCOSE SERPL-MCNC: 171 MG/DL (ref 70–110)
MAGNESIUM SERPL-MCNC: 1.9 MG/DL (ref 1.6–2.6)
POCT GLUCOSE: 176 MG/DL (ref 70–110)
POCT GLUCOSE: 197 MG/DL (ref 70–110)
POCT GLUCOSE: 207 MG/DL (ref 70–110)
POTASSIUM SERPL-SCNC: 4 MMOL/L (ref 3.5–5.1)
SODIUM SERPL-SCNC: 141 MMOL/L (ref 136–145)

## 2025-04-02 PROCEDURE — 63600175 PHARM REV CODE 636 W HCPCS: Performed by: STUDENT IN AN ORGANIZED HEALTH CARE EDUCATION/TRAINING PROGRAM

## 2025-04-02 PROCEDURE — 25000003 PHARM REV CODE 250: Performed by: STUDENT IN AN ORGANIZED HEALTH CARE EDUCATION/TRAINING PROGRAM

## 2025-04-02 PROCEDURE — 80048 BASIC METABOLIC PNL TOTAL CA: CPT | Performed by: STUDENT IN AN ORGANIZED HEALTH CARE EDUCATION/TRAINING PROGRAM

## 2025-04-02 PROCEDURE — 36415 COLL VENOUS BLD VENIPUNCTURE: CPT | Performed by: STUDENT IN AN ORGANIZED HEALTH CARE EDUCATION/TRAINING PROGRAM

## 2025-04-02 PROCEDURE — 97530 THERAPEUTIC ACTIVITIES: CPT

## 2025-04-02 PROCEDURE — 97161 PT EVAL LOW COMPLEX 20 MIN: CPT

## 2025-04-02 PROCEDURE — 83735 ASSAY OF MAGNESIUM: CPT | Performed by: STUDENT IN AN ORGANIZED HEALTH CARE EDUCATION/TRAINING PROGRAM

## 2025-04-02 PROCEDURE — 99900035 HC TECH TIME PER 15 MIN (STAT)

## 2025-04-02 PROCEDURE — 94761 N-INVAS EAR/PLS OXIMETRY MLT: CPT

## 2025-04-02 PROCEDURE — 99233 SBSQ HOSP IP/OBS HIGH 50: CPT | Mod: ,,, | Performed by: INTERNAL MEDICINE

## 2025-04-02 RX ORDER — DOXYCYCLINE HYCLATE 100 MG
100 TABLET ORAL EVERY 12 HOURS
Start: 2025-04-02 | End: 2025-04-06

## 2025-04-02 RX ORDER — LISINOPRIL 40 MG/1
40 TABLET ORAL DAILY
Start: 2025-04-02 | End: 2025-05-02

## 2025-04-02 RX ADMIN — GABAPENTIN 800 MG: 400 CAPSULE ORAL at 09:04

## 2025-04-02 RX ADMIN — LISINOPRIL 40 MG: 20 TABLET ORAL at 09:04

## 2025-04-02 RX ADMIN — DEXAMETHASONE 6 MG: 2 TABLET ORAL at 09:04

## 2025-04-02 RX ADMIN — DOXYCYCLINE HYCLATE 100 MG: 100 TABLET, COATED ORAL at 09:04

## 2025-04-02 RX ADMIN — INSULIN ASPART 2 UNITS: 100 INJECTION, SOLUTION INTRAVENOUS; SUBCUTANEOUS at 09:04

## 2025-04-02 RX ADMIN — ATORVASTATIN CALCIUM 80 MG: 40 TABLET, FILM COATED ORAL at 09:04

## 2025-04-02 RX ADMIN — INSULIN GLARGINE 20 UNITS: 100 INJECTION, SOLUTION SUBCUTANEOUS at 09:04

## 2025-04-02 RX ADMIN — GABAPENTIN 800 MG: 400 CAPSULE ORAL at 02:04

## 2025-04-02 RX ADMIN — OXYCODONE HYDROCHLORIDE AND ACETAMINOPHEN 500 MG: 500 TABLET ORAL at 09:04

## 2025-04-02 RX ADMIN — ASPIRIN 81 MG: 81 TABLET, COATED ORAL at 09:04

## 2025-04-02 RX ADMIN — APIXABAN 5 MG: 5 TABLET, FILM COATED ORAL at 09:04

## 2025-04-02 RX ADMIN — REMDESIVIR 100 MG: 100 INJECTION, POWDER, LYOPHILIZED, FOR SOLUTION INTRAVENOUS at 09:04

## 2025-04-02 RX ADMIN — CARVEDILOL 3.12 MG: 3.12 TABLET, FILM COATED ORAL at 09:04

## 2025-04-02 RX ADMIN — THERA TABS 1 TABLET: TAB at 09:04

## 2025-04-02 NOTE — PLAN OF CARE
[facility]     Department of Hospital Medicine     [address] [place] [address]     [phone] after hours  [phone] fax       NURSING HOME ORDERS    04/02/2025    Admit to Nursing Home:  Regular Bed         Diagnoses:  Active Hospital Problems    Diagnosis  POA    *Pneumonia due to COVID-19 virus [U07.1, J12.82]  Yes     Priority: 1 - High    Elevated troponin [R79.89]  Yes     Priority: 2     Essential hypertension [I10]  Yes     Priority: 3     Chronic combined systolic and diastolic heart failure [I50.42]  Yes     Priority: 4     HLD (hyperlipidemia) [E78.5]  Yes     Priority: 5     Coronary artery disease involving native coronary artery with angina pectoris [I25.119]  Yes     Priority: 6      Noted by [employer]  last documented on 20230108  Formatting of this note might be different from the original.   Noted by [employer]  last documented on 20230108      AICD (automatic cardioverter/defibrillator) present [Z95.810]  Yes     Priority: 7     Chronic obstructive pulmonary disease, unspecified [J44.9]  Yes     Priority: 8      Noted by [employer]  last documented on 20230108  Formatting of this note might be different from the original.   Noted by [employer]  last documented on 20230108      Benign prostatic hyperplasia [N40.0]  Yes     Priority: 9     Type 2 diabetes mellitus with diabetic peripheral angiopathy without gangrene [E11.51]  Yes     Priority: 10      Noted by [employer]  last documented on 20230108  Formatting of this note might be different from the original.   Noted by [employer]  last documented on 20230108      Anemia [D64.9]  Yes     Priority: 11     Hypomagnesemia [E83.42]  Yes     Priority: 12     Thrombocytopenia [D69.6]  Yes     Priority: 13     Gout [M10.9]  Yes     Priority: 14     Obesity with serious comorbidity [E66.9]  Yes      Resolved Hospital Problems   No resolved problems to  display.           Allergies:  Review of patient's allergies indicates:   Allergen Reactions    Cephalexin Other (See Comments)     vomitting    Darvocet a500 [propoxyphene n-acetaminophen] Itching    Morphine Itching           Tramadol Itching       Vitals:      Per facility    Diet:  Cardiac    Acitivities:      - as tolerated    LABS:  Per facility protocol    Nursing Precautions:    - Fall precautions per nursing home protocol   - Seizure precaution per penitentiary protocol   - Decubitus precautions:        -  for positioning   - Pressure reducing foam mattress   - Turn patient every two hours. Use wedge pillows to anchor patient    CONSULTS:      Physical Therapy to evaluate and treat     Occupational Therapy to evaluate and treat         MISCELLANEOUS CARE:       Routine Skin for Bedridden Patients:  Apply moisture barrier cream to all    skin folds and wet areas in perineal area daily and after baths and                           all bowel movements.  DIABETES CARE:        Check blood sugar:      Fingerstick blood sugar a.m and p.m.      Report CBG < 60 or > 400 to physician.                                          Insulin Sliding Scale          Glucose  Novolog Insulin Subcutaneous        0 - 60   Orange juice or glucose tablet, hold insulin      No insulin   201-250  2 units   251-300  4 units   301-350  6 units   351-400  8 units   >400   10 units then call physician      Medications: Discontinue all previous medication orders, if any. See new list below.       Medication List        START taking these medications      doxycycline 100 MG tablet  Commonly known as: VIBRA-TABS  Take 1 tablet (100 mg total) by mouth every 12 (twelve) hours. for 7 doses     molnupiravir 200 mg capsule (EUA)  Take 4 capsules (800 mg total) by mouth every 12 (twelve) hours. for 5 days            CHANGE how you take these medications      albuterol 90 mcg/actuation inhaler  Commonly known as: PROVENTIL/VENTOLIN  HFA  Inhale 2 puffs into the lungs every 4 (four) hours as needed for Wheezing or Shortness of Breath. Rescue  What changed: when to take this     lisinopriL 40 MG tablet  Commonly known as: PRINIVILZESTRIL  Take 1 tablet (40 mg total) by mouth once daily.  What changed:   medication strength  how much to take            CONTINUE taking these medications      albuterol-ipratropium 2.5 mg-0.5 mg/3 mL nebulizer solution  Commonly known as: DUO-NEB  Take 3 mLs by nebulization every 6 (six) hours as needed for Wheezing. Rescue     allopurinoL 100 MG tablet  Commonly known as: ZYLOPRIM  Take 200 mg by mouth Daily.     amitriptyline 10 MG tablet  Commonly known as: ELAVIL  Take 10 mg by mouth nightly as needed for Insomnia.     apixaban 2.5 mg Tab  Commonly known as: ELIQUIS  2.5 mg by FEEDING TUBE route 2 (two) times daily.     aspirin 81 MG EC tablet  Commonly known as: ECOTRIN  Take 81 mg by mouth once daily.     atropine 1% 1 % Drop  Commonly known as: ISOPTO ATROPINE  INSTILL 1 DROP IN LEFT EYE TWICE DAILY     carvediloL 3.125 MG tablet  Commonly known as: COREG  3.125 mg by Nasogastric route 2 (two) times daily.     colchicine 0.6 mg tablet  Commonly known as: COLCRYS  2 po every day prn gout attack     divalproex  MG 24 hr tablet  Commonly known as: DEPAKOTE ER  Take 750 mg by mouth nightly.     dorzolamide-timolol 2-0.5% 22.3-6.8 mg/mL ophthalmic solution  Commonly known as: COSOPT  Place 1 drop into the left eye 2 (two) times daily.     ergocalciferol 50,000 unit Cap  Commonly known as: ERGOCALCIFEROL  Take 1 capsule by mouth every 7 days.     FLOMAX 0.4 mg Cap  Generic drug: tamsulosin  Take 0.4 mg by mouth every evening.     gabapentin 800 MG tablet  Commonly known as: NEURONTIN  Take 800 mg by mouth 3 (three) times daily.     guaiFENesin 600 mg 12 hr tablet  Commonly known as: MUCINEX  Take 1,200 mg by mouth 2 (two) times daily.     HumaLOG KwikPen Insulin 100 unit/mL pen  Generic drug: insulin  lispro  Inject into the skin. 0-69=0 insulin give glucose;  = 0 insulin ; 181-200= 3 units; 201-250= 6 units; 251-300 = 9 units; 301-350= 12 units; 351-400= 15 units; 401-700= 18 units subcutaneously before meals and at bedtime for diabetes.     insulin glargine U-100 (Lantus) 100 unit/mL injection  Inject 25 Units into the skin once daily.     latanoprost 0.005 % ophthalmic solution  Place 1 drop into the right eye every evening.     methocarbamoL 500 MG Tab  Commonly known as: Robaxin  Take 500 mg by mouth 4 (four) times daily.     multivitamin with folic acid 400 mcg Tab  Take 1 tablet by mouth once daily.     pilocarpine HCL 1% 1 % ophthalmic solution  Commonly known as: PILOCAR  Place 1 drop into the right eye 4 (four) times daily.     rosuvastatin 20 MG tablet  Commonly known as: CRESTOR  Take 1 tablet by mouth once daily.            STOP taking these medications      acetaminophen 650 MG Tbsr  Commonly known as: TYLENOL     brimonidine 0.2% 0.2 % Drop  Commonly known as: ALPHAGAN     diclofenac sodium 1 % Gel  Commonly known as: VOLTAREN     dicyclomine 20 mg tablet  Commonly known as: BENTYL     FARXIGA 10 mg tablet  Generic drug: dapagliflozin propanediol     prednisoLONE sodium phosphate 1 % Drop  Commonly known as: INFLAMASE FORTE                    _________________________________  Mercedes Manrique DO  04/02/2025

## 2025-04-02 NOTE — PLAN OF CARE
Patient AAOx4 and able to make needs known. Patient remains on room air and contact precautions for Covid. No acute distress noted, patient free from falls or injury this shift.  Bed in low position, wheels locked, call light in reach for assistance, plan of care continued.      Problem: Infection  Goal: Absence of Infection Signs and Symptoms  Outcome: Progressing     Problem: Diabetes Comorbidity  Goal: Blood Glucose Level Within Targeted Range  Outcome: Progressing     Problem: Skin Injury Risk Increased  Goal: Skin Health and Integrity  Outcome: Progressing

## 2025-04-02 NOTE — PLAN OF CARE
Problem: Physical Therapy  Goal: Physical Therapy Goal  Description: Goals to be met by: 25     Patient will increase functional independence with mobility by performin. Supine to sit with Modified [place]  2. Rolling to Left and Right with Modified [place]  3. Sit to stand transfer with Modified [place] using RW  4. Bed to chair transfer with Modified [place] using Rolling Walker  5. Gait >50 feet with Modified [place] using Rolling Walker   6. Lower extremity exercise program 2 sets x15 reps per handout, with independence    Outcome: Progressing     Pt ambulated ~15 ft within room with CGA using RW.  Gait limited by c/o L calf pain, no DVT on imaging from yesterday.

## 2025-04-02 NOTE — PLAN OF CARE
Problem: Adult Inpatient Plan of Care  Goal: Plan of Care Review  Outcome: Met  Goal: Patient-Specific Goal (Individualized)  Outcome: Met  Goal: Absence of Hospital-Acquired Illness or Injury  Outcome: Met  Goal: Optimal Comfort and Wellbeing  Outcome: Met  Goal: Readiness for Transition of Care  Outcome: Met     Problem: Infection  Goal: Absence of Infection Signs and Symptoms  Outcome: Met     Problem: Diabetes Comorbidity  Goal: Blood Glucose Level Within Targeted Range  Outcome: Met     Problem: Skin Injury Risk Increased  Goal: Skin Health and Integrity  Outcome: Met

## 2025-04-02 NOTE — PLAN OF CARE
Case Management Final Discharge Note    Call report to [phone], ask for nurse Kristine. Pt assigned to room 68.  Notified LUL Knight.    Discharge Disposition: Return to [facility]    New DME ordered / company name: None    Relevant SDOH / Transition of Care Barriers:  None identified    Person available to provide assistance at home when needed and their contact information: Facility staff, [phone]    Scheduled followup appointment: cardiology 4/15; PCP - pt will follow up with the provider at the nursing home    Referrals placed: None    Transportation: ADT 30 order placed for Van [employer].  Requested  time: 1600   If transportation does not arrive at ETA time nurse will be instructed to follow protocol for transportation below:  How can I get in touch directly with dispatch, if needed?                 Non-emergent dispatch: [phone] opt 6    +++NURSING:  If Van does not arrive at requested time please call the above Non Emergent Dispatcher.  If issue not resolved please escalate to your charge nurse for further instructions.    Transport to [facility] [address] [place] [address]    Patient and family educated on discharge services and updated on DC plan. Bedside RN notified, patient clear to discharge from Case Management Perspective.      04/02/25 1407   Final Note   Assessment Type Final Discharge Note   Anticipated Discharge Disposition [facility]   Hospital Resources/Appts/Education Provided Appointments scheduled and added to AVS;Provided patient/caregiver with written discharge plan information   Post-Acute Status   Coverage PHN   Discharge Delays None known at this time

## 2025-04-02 NOTE — ED NOTES
While nurse and paraemtic were in room changing Pt's diapaer and bedding, Pt's started to have intense shaking in his L arm. Pt was AOX4 will shaking occurred. VS were stable. After shaking stopped Pt explained this happened earlier in the day. MD notified; will continue to monitor

## 2025-04-02 NOTE — DISCHARGE SUMMARY
[facility] Medicine  Discharge Summary      Patient Name: Se Virgil Mcgraw  MRN: [id number]  [facility]: [id number]  Patient Class: IP- Inpatient  Admission Date: 3/31/2025  Hospital Length of Stay: 1 days  Discharge Date and Time: 04/02/2025 4:03 PM  Attending Physician: Mercedes Manrique DO   Discharging Provider: Mercedes Manrique DO  Primary Care Provider: Coreen Anderson MD    Primary Care Team: Networked reference to record PCT     HPI:   This is a 60-year-old male with a past medical history of COPD, ICM (EF: 35%, GIDD, s/p AICD), CAD, hypertension, type 2 diabetes, hyperlipidemia, CKD 3, CVA (on Eliquis), depression, JOSE, gout, who presents with cough.      Patient presents for evaluation of cough and fatigue that started on the day of presentation.  He reports generalized weakness, being less active, associated with a persistent dry cough, and shortness of breath.  Additional symptoms include chest tightness.  He resides at [facility] but denies exposure to sick contacts.  He also reports worsening lower extremity swelling, left worse than right.    In the ED, the patient was hypertensive (150s-180s/80s-90s), febrile (T-max: 100.4° F), saturating as low as 90% on room air.  Labs showed positive COVID-19, but otherwise unremarkable.  Chest x-ray showed cardiomegaly, with mild underlying prominence of the interstitium (edema or infiltrate).  Patient was given Tylenol 1 g p.o..  He was admitted for further management.    * No surgery found *      Hospital Course:   60-year-old male with a past medical history of COPD, ICM (EF: 35%, GIDD, s/p AICD), CAD, hypertension, type 2 diabetes, hyperlipidemia, CKD 3, CVA (on Eliquis), depression, JOSE, gout admitted on 03/31/25 for elevated troponin and COVID-19 infection.  Presented for evaluation of generalized weakness, fatigue, and cough with chest tightness.  Patient positive for COVID.  Initial troponin elevated 0.113 and peaked at 0.142.  Echo  with EF 30-35%, G1DD. Similar to previous echo. CXR w/Cardiomegaly.  Mild underlying prominence of the interstitium, which may be due to edema or infiltrates.  Patient is started on IV remdesivir.  Cardiology consulted-no plans for cardiology intervention.  Okay to discharge from cardiology standpoint.  Patient remained stable on room air.  PT/OT consulted-recommend low intensity therapy.    Remained stable on room air.  Admits feeling better overall, still fatigued.  Continues to have cough, but denies any shortness a breath or chest pain. Pt denies any fever, headaches, vision changes, chest pain,  palpitations, abdominal pain, nausea, vomiting, or any new weaknesses. Feels ready to go home. Patient's exam on discharge was as follow: Patient is alert and oriented, appears in no acute distress, heart with regular rate and rhythm, lungs without any wheezing, diminished breath sounds in lower lung fields, on room air, abdomen soft, and no new weaknesses or focal deficits seen.     Patient was counseled regarding any abnormal labs, differential diagnosis, treatment options, risk-benefit, lifestyle changes, prognosis, current condition, and medications. Patient was interactive and attentive.  Patient's questions were answered in a respectful and timely manner. Patient was instructed to follow-up with PCP within 1 week and to continue taking medications as prescribed.   Also, extensively discussed the risks, benefits, and side effects of patient's medications. Discussed with patient about any medication changes. Patient verbalized understanding and agrees to treatment plan.  Patient is stable for discharge.  Patient has no other questions or concerns at this time.  ED precautions discussed with the patient.    Vital signs are stable. Tolerating p.o. intake without any nausea or vomiting. Afebrile for over 24 hours. Patient is in stable condition and has no questions or concerns. Patient will be discharge back to nursing  home facility once transportation secured and facility is prepared for patient's arrival.. Prescriptions sent to pharmacy.  CM/SW to assist with discharge planning.     Vitals:    04/02/25 0917 04/02/25 1109 04/02/25 1152 04/02/25 1335   BP: (!) 171/87  (!) 163/85    BP Location:   Right arm    Patient Position:   Lying    Pulse:  75 66 71   Resp:   19    Temp:   97.7 °F (36.5 °C)    TempSrc:   Oral    SpO2:   97% 97%   Weight:       Height:                Goals of Care Treatment Preferences:  Code Status: Full Code      SDOH Screening:  The patient was screened for utility difficulties, food insecurity, transport difficulties, housing insecurity, and interpersonal safety and there were no concerns identified this admission.     Consults:   Consults (From admission, onward)          Status Ordering Provider     Inpatient consult to Cardiology  Once        Provider:  Cristine Chang MD    Completed DOMINGO FITZGERALD            Assessment & Plan    Final Active Diagnoses:    Diagnosis Date Noted POA    PRINCIPAL PROBLEM:  Pneumonia due to COVID-19 virus [U07.1, J12.82] 03/31/2025 Yes    Elevated troponin [R79.89] 11/17/2015 Yes    Essential hypertension [I10] 08/30/2015 Yes    Chronic combined systolic and diastolic heart failure [I50.42] 09/30/2021 Yes    HLD (hyperlipidemia) [E78.5] 11/17/2015 Yes    Coronary artery disease involving native coronary artery with angina pectoris [I25.119] 03/07/2024 Yes    AICD (automatic cardioverter/defibrillator) present [Z95.810] 11/17/2015 Yes    Chronic obstructive pulmonary disease, unspecified [J44.9] 03/07/2024 Yes    Benign prostatic hyperplasia [N40.0] 06/15/2021 Yes    Type 2 diabetes mellitus with diabetic peripheral angiopathy without gangrene [E11.51] 03/07/2024 Yes    Anemia [D64.9] 04/01/2025 Yes    Hypomagnesemia [E83.42] 04/01/2025 Yes    Thrombocytopenia [D69.6] 04/01/2025 Yes    Gout [M10.9] 06/15/2021 Yes    Obesity with serious comorbidity [E66.9] 03/16/2024 Yes       Problems Resolved During this Admission:       Discharged Condition: stable    Disposition: Another Health Care Inst*    Follow Up:   Follow-up Information       Omer Garcia MD. Go on 4/15/2025.    Specialty: Cardiology  Why: for your regularly scheduled appointment  Contact information:  [address] [place], [place] [address]  [id number]  [facility]  [place] [id number]  [phone]                           Patient Instructions:      Diet Cardiac     Diet diabetic     Notify your health care provider if you experience any of the following:  temperature >100.4     Notify your health care provider if you experience any of the following:  difficulty breathing or increased cough     Notify your health care provider if you experience any of the following:  increased confusion or weakness     Notify your health care provider if you experience any of the following:  persistent nausea and vomiting or diarrhea     Notify your health care provider if you experience any of the following:  persistent dizziness, light-headedness, or visual disturbances     Activity as tolerated       Significant Diagnostic Studies: Labs: All labs within the past 24 hours have been reviewed      Recent Results (from the past 100 hours)   POCT COVID-19 Rapid Screening    Collection Time: 03/31/25  5:51 PM   Result Value Ref Range    POC Rapid COVID Positive (A) Negative     Acceptable Yes    POCT Influenza A/B Molecular    Collection Time: 03/31/25  5:55 PM   Result Value Ref Range    POC Molecular Influenza A Ag Negative Negative    POC Molecular Influenza B Ag Negative Negative     Acceptable Yes    Comprehensive Metabolic Panel    Collection Time: 03/31/25  9:09 PM   Result Value Ref Range    Sodium 140 136 - 145 mmol/L    Potassium 3.8 3.5 - 5.1 mmol/L    Chloride 107 95 - 110 mmol/L    CO2 23 23 - 29 mmol/L    Glucose 131 (H) 70 - 110 mg/dL    BUN 18 8 - 23 mg/dL    Creatinine 1.3 0.5 - 1.4 mg/dL    Calcium  9.7 8.7 - 10.5 mg/dL    Protein Total 7.3 6.0 - 8.4 gm/dL    Albumin 3.8 3.5 - 5.2 g/dL    Bilirubin Total 0.4 0.1 - 1.0 mg/dL    ALP 71 40 - 150 unit/L    AST 19 11 - 45 unit/L    ALT 19 10 - 44 unit/L    Anion Gap 10 8 - 16 mmol/L    eGFR >60 >60 mL/min/1.73/m2   CBC with Differential    Collection Time: 03/31/25  9:09 PM   Result Value Ref Range    WBC 7.32 3.90 - 12.70 K/uL    RBC 4.60 4.60 - 6.20 M/uL    HGB 14.3 14.0 - 18.0 gm/dL    HCT 43.3 40.0 - 54.0 %    MCV 94 82 - 98 fL    MCH 31.1 (H) 27.0 - 31.0 pg    MCHC 33.0 32.0 - 36.0 g/dL    RDW 12.2 11.5 - 14.5 %    Platelet Count 142 (L) 150 - 450 K/uL    MPV 11.7 9.2 - 12.9 fL    Nucleated RBC 0 <=0 /100 WBC    Neut % 61.3 38 - 73 %    Lymph % 22.8 18 - 48 %    Mono % 13.8 4 - 15 %    Eos % 1.4 <=8 %    Basophil % 0.4 <=1.9 %    Imm Grans % 0.3 0.0 - 0.5 %    Neut # 4.49 1.8 - 7.7 K/uL    Lymph # 1.67 1 - 4.8 K/uL    Mono # 1.01 (H) 0.3 - 1 K/uL    Eos # 0.10 <=0.5 K/uL    Baso # 0.03 <=0.2 K/uL    Imm Grans # 0.02 0.00 - 0.04 K/uL   Troponin I    Collection Time: 03/31/25  9:09 PM   Result Value Ref Range    Troponin-I 0.113 (H) <=0.026 ng/mL   Magnesium    Collection Time: 03/31/25  9:09 PM   Result Value Ref Range    Magnesium  1.7 1.6 - 2.6 mg/dL   Phosphorus    Collection Time: 03/31/25  9:09 PM   Result Value Ref Range    Phosphorus Level 4.1 2.7 - 4.5 mg/dL   EKG 12-lead    Collection Time: 03/31/25 10:55 PM   Result Value Ref Range    QRS Duration 96 ms    OHS QTC Calculation 421 ms   Urinalysis    Collection Time: 04/01/25  1:37 AM   Result Value Ref Range    Color, UA Colorless (A) Straw, Aminata, Yellow, Light-Orange    Appearance, UA Clear Clear    pH, UA 6.0 5.0 - 8.0    Spec Grav UA 1.020 1.005 - 1.030    Protein, UA 1+ (A) Negative    Glucose, UA 4+ (A) Negative    Ketones, UA 1+ (A) Negative    Bilirubin, UA Negative Negative    Blood, UA 1+ (A) Negative    Nitrites, UA Negative Negative    Urobilinogen, UA Negative <2.0 EU/dL    Leukocyte  Esterase, UA Negative Negative   Brain natriuretic peptide    Collection Time: 04/01/25  1:37 AM   Result Value Ref Range    BNP 41 0 - 99 pg/mL   Urinalysis Microscopic    Collection Time: 04/01/25  1:37 AM   Result Value Ref Range    RBC, UA 4 0 - 4 /HPF    WBC, UA 0 0 - 5 /HPF    Bacteria, UA Rare None, Rare, Occasional /HPF    Yeast, UA None None /HPF    Hyaline Casts, UA 0 0 - 1 /LPF    Microscopic Comment     Troponin I    Collection Time: 04/01/25  2:16 AM   Result Value Ref Range    Troponin-I 0.142 (H) <=0.026 ng/mL   POCT glucose    Collection Time: 04/01/25  5:07 AM   Result Value Ref Range    POCT Glucose 188 (H) 70 - 110 mg/dL   POCT glucose    Collection Time: 04/01/25  6:53 AM   Result Value Ref Range    POCT Glucose 187 (H) 70 - 110 mg/dL   Phosphorus    Collection Time: 04/01/25  6:55 AM   Result Value Ref Range    Phosphorus Level 3.7 2.7 - 4.5 mg/dL   Magnesium    Collection Time: 04/01/25  6:55 AM   Result Value Ref Range    Magnesium  1.5 (L) 1.6 - 2.6 mg/dL   Comprehensive Metabolic Panel    Collection Time: 04/01/25  6:55 AM   Result Value Ref Range    Sodium 139 136 - 145 mmol/L    Potassium 4.0 3.5 - 5.1 mmol/L    Chloride 105 95 - 110 mmol/L    CO2 19 (L) 23 - 29 mmol/L    Glucose 177 (H) 70 - 110 mg/dL    BUN 19 8 - 23 mg/dL    Creatinine 1.1 0.5 - 1.4 mg/dL    Calcium 9.4 8.7 - 10.5 mg/dL    Protein Total 7.2 6.0 - 8.4 gm/dL    Albumin 3.6 3.5 - 5.2 g/dL    Bilirubin Total 0.6 0.1 - 1.0 mg/dL    ALP 71 40 - 150 unit/L    AST 21 11 - 45 unit/L    ALT 17 10 - 44 unit/L    Anion Gap 15 8 - 16 mmol/L    eGFR >60 >60 mL/min/1.73/m2   CBC with Differential    Collection Time: 04/01/25  6:55 AM   Result Value Ref Range    WBC 7.72 3.90 - 12.70 K/uL    RBC 4.39 (L) 4.60 - 6.20 M/uL    HGB 13.7 (L) 14.0 - 18.0 gm/dL    HCT 41.0 40.0 - 54.0 %    MCV 93 82 - 98 fL    MCH 31.2 (H) 27.0 - 31.0 pg    MCHC 33.4 32.0 - 36.0 g/dL    RDW 12.2 11.5 - 14.5 %    Platelet Count 144 (L) 150 - 450 K/uL    MPV  12.4 9.2 - 12.9 fL    Nucleated RBC 0 <=0 /100 WBC    Neut % 83.9 (H) 38 - 73 %    Lymph % 11.8 (L) 18 - 48 %    Mono % 3.5 (L) 4 - 15 %    Eos % 0.0 <=8 %    Basophil % 0.3 <=1.9 %    Imm Grans % 0.5 0.0 - 0.5 %    Neut # 6.48 1.8 - 7.7 K/uL    Lymph # 0.91 (L) 1 - 4.8 K/uL    Mono # 0.27 (L) 0.3 - 1 K/uL    Eos # 0.00 <=0.5 K/uL    Baso # 0.02 <=0.2 K/uL    Imm Grans # 0.04 0.00 - 0.04 K/uL   Troponin I    Collection Time: 04/01/25  6:55 AM   Result Value Ref Range    Troponin-I 0.117 (H) <=0.026 ng/mL   Echo    Collection Time: 04/01/25  9:12 AM   Result Value Ref Range    BSA 2.32 m2    LVOT stroke volume 100.9 cm3    LVIDd 4.7 3.5 - 6.0 cm    LV Systolic Volume 72 mL    LV Systolic Volume Index 32.4 mL/m2    LVIDs 4.0 2.1 - 4.0 cm    LV Diastolic Volume 103 mL    LV Diastolic Volume Index 46.40 mL/m2    Left Ventricular End Systolic Volume by Teichholz Method 71.56 mL    Left Ventricular End Diastolic Volume by Teichholz Method 103.50 mL    IVS 1.1 0.6 - 1.1 cm    LVOT diameter 2.3 cm    LVOT area 4.2 cm2    FS 14.9 (A) 28 - 44 %    Left Ventricle Relative Wall Thickness 0.51 cm    PW 1.2 (A) 0.6 - 1.1 cm    LV mass 199.6 g    LV Mass Index 89.9 g/m2    MV Peak E Gallo 0.86 m/s    TDI LATERAL 0.09 m/s    TDI SEPTAL 0.08 m/s    E/E' ratio 10 m/s    MV Peak A Gallo 1.12 m/s    TR Max Gallo 2.7 m/s    E/A ratio 0.77     IVRT 140 msec    E wave deceleration time 155 msec    LV SEPTAL E/E' RATIO 10.8 m/s    LV LATERAL E/E' RATIO 9.6 m/s    PV Peak S Gallo 0.43 m/s    PV Peak D Gallo 0.35 m/s    Pulm vein S/D ratio 1.23     LVOT peak gallo 0.9 m/s    Left Ventricular Outflow Tract Mean Velocity 0.74 cm/s    Left Ventricular Outflow Tract Mean Gradient 2.34 mmHg    RV- dwyer basal diam 3.0 cm    RV S' 20.05 cm/s    TAPSE 3.24 cm    RV/LV Ratio 0.64 cm    LA size 3.7 cm    Left Atrium Minor Axis 5.5 cm    Left Atrium Major Axis 5.7 cm    RA Major Axis 4.77 cm    AV mean gradient 6 mmHg    AV peak gradient 9 mmHg    Ao peak gallo 1.5  m/s    Ao VTI 40.1 cm    LVOT peak VTI 24.3 cm    AV valve area 2.5 cm²    AV Velocity Ratio 0.60     AV index (prosthetic) 0.61     ADELINE by Velocity Ratio 2.5 cm²    MV stenosis pressure 1/2 time 44.96 ms    MV valve area p 1/2 method 4.89 cm2    TV peak gradient 1 mmHg    Triscuspid Valve Regurgitation Peak Gradient 30 mmHg    PV PEAK VELOCITY 1.17 m/s    PV peak gradient 5 mmHg    Sinus 3.37 cm    STJ 2.67 cm    Ascending aorta 3.22 cm    IVC diameter 1.71 cm    Mean e' 0.09 m/s    ZLVIDS -1.28     ZLVIDD -5.01     RVDD 3.03 cm    ANALISA 32 mL/m2    LA Vol 70 cm3    LA WIDTH 4.0 cm    RA Width 3.1 cm    TV resting pulmonary artery pressure 32 mmHg    RV TB RVSP 6 mmHg    Est. RA pres 3 mmHg   POCT glucose    Collection Time: 04/01/25  8:51 PM   Result Value Ref Range    POCT Glucose 213 (H) 70 - 110 mg/dL   POCT glucose    Collection Time: 04/01/25 10:12 PM   Result Value Ref Range    POCT Glucose 206 (H) 70 - 110 mg/dL   Basic metabolic panel    Collection Time: 04/02/25  7:00 AM   Result Value Ref Range    Sodium 141 136 - 145 mmol/L    Potassium 4.0 3.5 - 5.1 mmol/L    Chloride 108 95 - 110 mmol/L    CO2 20 (L) 23 - 29 mmol/L    Glucose 171 (H) 70 - 110 mg/dL    BUN 27 (H) 8 - 23 mg/dL    Creatinine 1.0 0.5 - 1.4 mg/dL    Calcium 9.0 8.7 - 10.5 mg/dL    Anion Gap 13 8 - 16 mmol/L    eGFR >60 >60 mL/min/1.73/m2   Magnesium    Collection Time: 04/02/25  7:00 AM   Result Value Ref Range    Magnesium  1.9 1.6 - 2.6 mg/dL   POCT glucose    Collection Time: 04/02/25  9:48 AM   Result Value Ref Range    POCT Glucose 207 (H) 70 - 110 mg/dL   POCT glucose    Collection Time: 04/02/25 11:49 AM   Result Value Ref Range    POCT Glucose 176 (H) 70 - 110 mg/dL       Microbiology Results (last 7 days)       ** No results found for the last 168 hours. **            Imaging Results              US Lower Extremity Veins Bilateral (Final result)  Result time 04/01/25 00:24:53      Final result by Marina Pagan MD (04/01/25  00:24:53)                   Impression:      No evidence of deep venous thrombosis in either lower extremity.      Electronically signed by: Marina Pagan  Date:    04/01/2025  Time:    00:24               Narrative:    EXAMINATION:  ULTRASOUND LOWER EXTREMITY VEINS BILATERAL    CLINICAL HISTORY:  R/o DVT;    TECHNIQUE:  Duplex and color flow Doppler and dynamic compression was performed of the bilateral lower extremity veins was performed.    COMPARISON:  None    FINDINGS:  Right thigh veins: The common femoral, femoral, popliteal, upper greater saphenous, and deep femoral veins are patent and free of thrombus. The veins are normally compressible and have normal phasic flow and augmentation response.    Right calf veins: The visualized calf veins are patent.    Left thigh veins: The common femoral, femoral, popliteal, upper greater saphenous, and deep femoral veins are patent and free of thrombus. The veins are normally compressible and have normal phasic flow and augmentation response.    Left calf veins: The visualized calf veins are patent.    Miscellaneous: None                                       X-Ray Chest AP Portable (Final result)  Result time 03/31/25 19:22:59      Final result by Marina Pagan MD (03/31/25 19:22:59)                   Impression:      Cardiomegaly.  Mild underlying prominence of the interstitium, which may be due to edema or infiltrates.      Electronically signed by: Marina Pagan  Date:    03/31/2025  Time:    19:22               Narrative:    EXAMINATION:  AP PORTABLE CHEST    CLINICAL HISTORY:  covid;    TECHNIQUE:  AP portable chest radiograph was submitted.    COMPARISON:  02/09/2025    FINDINGS:  There is a left subclavian pacer.  AP portable chest radiograph demonstrates enlargement of the cardiac silhouette.  There is no focal consolidation, pneumothorax, or pleural effusion.  There is mild underlying prominence of the interstitium.  A calcified granuloma seen in the  right lower lobe.                                          Pending Diagnostic Studies:       None           Medications:  Reconciled Home Medications:      Medication List        START taking these medications      doxycycline 100 MG tablet  Commonly known as: VIBRA-TABS  Take 1 tablet (100 mg total) by mouth every 12 (twelve) hours. for 7 doses     molnupiravir 200 mg capsule (EUA)  Take 4 capsules (800 mg total) by mouth every 12 (twelve) hours. for 5 days            CHANGE how you take these medications      albuterol 90 mcg/actuation inhaler  Commonly known as: PROVENTIL/VENTOLIN HFA  Inhale 2 puffs into the lungs every 4 (four) hours as needed for Wheezing or Shortness of Breath. Rescue  What changed: when to take this     lisinopriL 40 MG tablet  Commonly known as: PRINIVIL,ZESTRIL  Take 1 tablet (40 mg total) by mouth once daily.  What changed:   medication strength  how much to take            CONTINUE taking these medications      albuterol-ipratropium 2.5 mg-0.5 mg/3 mL nebulizer solution  Commonly known as: DUO-NEB  Take 3 mLs by nebulization every 6 (six) hours as needed for Wheezing. Rescue     allopurinoL 100 MG tablet  Commonly known as: ZYLOPRIM  Take 200 mg by mouth Daily.     amitriptyline 10 MG tablet  Commonly known as: ELAVIL  Take 10 mg by mouth nightly as needed for Insomnia.     apixaban 2.5 mg Tab  Commonly known as: ELIQUIS  2.5 mg by FEEDING TUBE route 2 (two) times daily.     aspirin 81 MG EC tablet  Commonly known as: ECOTRIN  Take 81 mg by mouth once daily.     atropine 1% 1 % Drop  Commonly known as: ISOPTO ATROPINE  INSTILL 1 DROP IN LEFT EYE TWICE DAILY     carvediloL 3.125 MG tablet  Commonly known as: COREG  3.125 mg by Nasogastric route 2 (two) times daily.     colchicine 0.6 mg tablet  Commonly known as: COLCRYS  2 po every day prn gout attack     divalproex  MG 24 hr tablet  Commonly known as: DEPAKOTE ER  Take 750 mg by mouth nightly.     dorzolamide-timolol 2-0.5%  22.3-6.8 mg/mL ophthalmic solution  Commonly known as: COSOPT  Place 1 drop into the left eye 2 (two) times daily.     ergocalciferol 50,000 unit Cap  Commonly known as: ERGOCALCIFEROL  Take 1 capsule by mouth every 7 days.     FLOMAX 0.4 mg Cap  Generic drug: tamsulosin  Take 0.4 mg by mouth every evening.     gabapentin 800 MG tablet  Commonly known as: NEURONTIN  Take 800 mg by mouth 3 (three) times daily.     guaiFENesin 600 mg 12 hr tablet  Commonly known as: MUCINEX  Take 1,200 mg by mouth 2 (two) times daily.     HumaLOG KwikPen Insulin 100 unit/mL pen  Generic drug: insulin lispro  Inject into the skin. 0-69=0 insulin give glucose;  = 0 insulin ; 181-200= 3 units; 201-250= 6 units; 251-300 = 9 units; 301-350= 12 units; 351-400= 15 units; 401-700= 18 units subcutaneously before meals and at bedtime for diabetes.     insulin glargine U-100 (Lantus) 100 unit/mL injection  Inject 25 Units into the skin once daily.     latanoprost 0.005 % ophthalmic solution  Place 1 drop into the right eye every evening.     methocarbamoL 500 MG Tab  Commonly known as: Robaxin  Take 500 mg by mouth 4 (four) times daily.     multivitamin with folic acid 400 mcg Tab  Take 1 tablet by mouth once daily.     pilocarpine HCL 1% 1 % ophthalmic solution  Commonly known as: PILOCAR  Place 1 drop into the right eye 4 (four) times daily.     rosuvastatin 20 MG tablet  Commonly known as: CRESTOR  Take 1 tablet by mouth once daily.            STOP taking these medications      acetaminophen 650 MG Tbsr  Commonly known as: TYLENOL     brimonidine 0.2% 0.2 % Drop  Commonly known as: ALPHAGAN     diclofenac sodium 1 % Gel  Commonly known as: VOLTAREN     dicyclomine 20 mg tablet  Commonly known as: BENTYL     FARXIGA 10 mg tablet  Generic drug: dapagliflozin propanediol     prednisoLONE sodium phosphate 1 % Drop  Commonly known as: INFLAMASE FORTE              Indwelling Lines/Drains at time of discharge:   Lines/Drains/Airways        Drain  Duration             Male External Urinary Catheter 04/01/25 1411 1 day                    Time spent on the discharge of patient: Greater than 35 minutes         Mercedes Manrique DO  Department of Hospital Medicine  [facility] Surg   independent

## 2025-04-02 NOTE — NURSING
Denies any questions. IV discontinued and catheter intact. Vital signs stable, NADN, and afebrile. Cardiac monitoring dc and tele monitor notified. Discharged to facility via wheelchair surgical mask in place.

## 2025-04-02 NOTE — SUBJECTIVE & OBJECTIVE
Interval History:  no chest pains.  Doing fine.  Euvolemic    Review of Systems   Constitutional: Positive for malaise/fatigue.   HENT: Negative.     Eyes: Negative.    Endocrine: Negative.    Hematologic/Lymphatic: Negative.    Skin: Negative.    Musculoskeletal: Negative.    Gastrointestinal: Negative.    Genitourinary: Negative.    Neurological: Negative.    Psychiatric/Behavioral: Negative.     Allergic/Immunologic: Negative.      Objective:     Vital Signs (Most Recent):  Temp: 97.5 °F (36.4 °C) (04/02/25 0758)  Pulse: 75 (04/02/25 1109)  Resp: 18 (04/02/25 0758)  BP: (!) 171/87 (04/02/25 0917)  SpO2: (!) 94 % (04/02/25 0758) Vital Signs (24h Range):  Temp:  [97.5 °F (36.4 °C)-98.4 °F (36.9 °C)] 97.5 °F (36.4 °C)  Pulse:  [58-90] 75  Resp:  [18-26] 18  SpO2:  [90 %-100 %] 94 %  BP: (145-205)/(68-91) 171/87     Weight: 98.6 kg (217 lb 6 oz)  Body mass index is 34.05 kg/m².     SpO2: (!) 94 %         Intake/Output Summary (Last 24 hours) at 4/2/2025 1135  Last data filed at 4/2/2025 1000  Gross per 24 hour   Intake 1230.28 ml   Output 650 ml   Net 580.28 ml       Lines/Drains/Airways       Drain  Duration             Male External Urinary Catheter 04/01/25 1411 <1 day              Peripheral Intravenous Line  Duration                  Peripheral IV - Single Lumen 03/31/25 1732 18 G Left Antecubital 1 day                       Physical Exam  Vitals reviewed.   Constitutional:       Appearance: He is well-developed.   HENT:      Head: Normocephalic.   Eyes:      Conjunctiva/sclera: Conjunctivae normal.      Pupils: Pupils are equal, round, and reactive to light.   Cardiovascular:      Rate and Rhythm: Normal rate and regular rhythm.      Heart sounds: Normal heart sounds.   Pulmonary:      Effort: Pulmonary effort is normal.      Breath sounds: Normal breath sounds.   Abdominal:      General: Bowel sounds are normal.      Palpations: Abdomen is soft.   Musculoskeletal:      Cervical back: Normal range of motion and  neck supple.   Skin:     General: Skin is warm.   Neurological:      Mental Status: He is alert and oriented to person, place, and time.            Significant Labs:     DATA:     Laboratory:  CBC:  Recent Labs   Lab 02/07/25 0002 03/31/25 2109 04/01/25 0655   WBC 6.88 7.32 7.72   Hemoglobin 14.3  --   --    HGB  --  14.3 13.7 L   Hematocrit 43.3  --   --    HCT  --  43.3 41.0   Platelet Count  --  142 L 144 L   Platelets 172  --   --        CHEMISTRIES:  Recent Labs   Lab 05/29/24 0503 11/29/24  2354 02/01/25 2003 02/07/25 0002 03/31/25 2109 04/01/25 0655 04/02/25  0700   Glucose 106 307 H  --  242 H  --   --   --    Sodium 138 137   < > 141 140 139 141   Potassium 3.9 4.7   < > 4.0 3.8 4.0 4.0   BUN 11 19   < > 25 H 18 19 27 H   Creatinine 0.9 1.5 H   < > 1.5 H 1.3 1.1 1.0   Calcium 9.6 9.7   < > 9.5 9.7 9.4 9.0   Magnesium   --   --   --   --  1.7 1.5 L 1.9    < > = values in this interval not displayed.       CARDIAC BIOMARKERS:  Recent Labs   Lab 03/29/24 2051 05/28/24 0432 02/07/25 0002 03/31/25 2109 04/01/25 0216 04/01/25  0655   CPK  --   --  401 H  --   --   --    CK 90  --   --   --   --   --    Troponin I  --    < >  --   --   --   --    Troponin-I  --   --   --  0.113 H 0.142 H 0.117 H    < > = values in this interval not displayed.       COAGS:  Recent Labs   Lab 03/25/24  1051 03/29/24 2051   INR 1.0 1.0       LIPIDS/LFTS:  Recent Labs   Lab 05/28/24 0432 05/29/24 0503 02/07/25 0002 03/31/25 2109 04/01/25  0655   Cholesterol 155  --   --   --   --    Triglycerides 155 H  --   --   --   --    HDL 37 L  --   --   --   --    LDL Cholesterol 87.0  --   --   --   --    Non-HDL Cholesterol 118  --   --   --   --    AST 13   < > 18 19 21   ALT 9 L   < > 15 19 17    < > = values in this interval not displayed.       Hemoglobin A1C   Date Value Ref Range Status   05/28/2024 5.8 (H) 4.0 - 5.6 % Final     Comment:     ADA Screening Guidelines:  5.7-6.4%  Consistent with prediabetes  >or=6.5%   Consistent with diabetes    High levels of fetal hemoglobin interfere with the HbA1C  assay. Heterozygous hemoglobin variants (HbS, HgC, etc)do  not significantly interfere with this assay.   However, presence of multiple variants may affect accuracy.     02/06/2024 9.8 (H) <5.7 % Final   11/16/2023 7.5 (H) <5.7 % Final   10/24/2023 7.9 (H) <5.7 % Final   01/26/2022 7.5 (H) 4.0 - 5.6 % Final     Comment:     ADA Screening Guidelines:  5.7-6.4%  Consistent with prediabetes  >or=6.5%  Consistent with diabetes    High levels of fetal hemoglobin interfere with the HbA1C  assay. Heterozygous hemoglobin variants (HbS, HgC, etc)do  not significantly interfere with this assay.   However, presence of multiple variants may affect accuracy.     10/01/2021 7.5 (H) 4.0 - 5.6 % Final     Comment:     ADA Screening Guidelines:  5.7-6.4%  Consistent with prediabetes  >or=6.5%  Consistent with diabetes    High levels of fetal hemoglobin interfere with the HbA1C  assay. Heterozygous hemoglobin variants (HbS, HgC, etc)do  not significantly interfere with this assay.   However, presence of multiple variants may affect accuracy.         TSH  Recent Labs   Lab 05/28/24  0432   TSH 1.580       The ASCVD Risk score (Hair SANDOVAL, et al., 2019) failed to calculate for the following reasons:    Risk score cannot be calculated because patient has a medical history suggesting prior/existing ASCVD       BNP    Lab Results   Component Value Date/Time    BNP 41 04/01/2025 01:37 AM    BNP 26 05/28/2024 04:32 AM    BNP <10 08/05/2016 04:30 PM    BNP <10 08/29/2015 11:56 PM            ECHO    Results for orders placed during the hospital encounter of 03/31/25    Echo    Interpretation Summary    Left Ventricle: The left ventricle is normal in size. There is moderately reduced systolic function with a visually estimated ejection fraction of  35%. Grade I diastolic dysfunction.    Right Ventricle: The right ventricle is normal in size. Systolic function  is normal. Pacemaker lead present in the ventricle.    Mitral Valve: There is mild regurgitation.    Tricuspid Valve: There is mild regurgitation.    Pulmonary Artery: The estimated pulmonary artery systolic pressure is 32 mmHg.    IVC/SVC: Normal venous pressure at 3 mmHg.      STRESS TEST    Results for orders placed during the hospital encounter of 05/28/24    Nuclear Stress - Cardiology Interpreted    Interpretation Summary    Equivocal myocardial perfusion scan.    moderate to severe intensity, large sized, equivocal  in the inferoapical wall(s). This finding is equivocal due to diaphragm shadow.    There are no other significant perfusion abnormalities.    The gated perfusion images showed an ejection fraction of 47% at rest.    There is mild global hypokinesis at rest and stress.    The ECG portion of the study is negative for ischemia.    The patient reported no chest pain during the stress test.    There were no arrhythmias during stress.

## 2025-04-02 NOTE — PT/OT/SLP EVAL
Physical Therapy Evaluation    Patient Name:  Se Virgil Mcgraw   MRN:  [id number]    Recommendations:     Discharge Recommendations: Low Intensity Therapy (back to [facility])   Discharge Equipment Recommendations: none   Barriers to discharge: None    Assessment:     Se Virgil Mcgraw is a 60 y.o. male admitted with a medical diagnosis of Pneumonia due to COVID-19 virus.  He presents with the following impairments/functional limitations: weakness, impaired endurance, impaired self care skills, impaired functional mobility, gait instability, impaired balance, decreased coordination, decreased upper extremity function, decreased lower extremity function, pain, abnormal tone, decreased ROM, impaired fine motor, edema, impaired joint extensibility.    Rehab Prognosis: Good; patient would benefit from acute skilled PT services to address these deficits and reach maximum level of function.    Recent Surgery: * No surgery found *      Plan:     During this hospitalization, patient to be seen 5 x/week to address the identified rehab impairments via gait training, therapeutic activities, therapeutic exercises and progress toward the following goals:    Plan of Care Expires:  04/16/25    Subjective     Chief Complaint: LLE pain  Patient/Family Comments/goals: Pt agreeable to therapy.   Pain/Comfort:  Pain Rating 1: 7/10  Location - Side 1: Left  Location 1: calf  Pain Addressed 1: Reposition, Distraction, Cessation of Activity (pharmacist notified)      Living Environment:  Pt is a resident at [facility].  Pt has been a resident at NH for ~1 year.   Prior to admission, patients level of function was mod I with ambulation using B platform rollator that was donated to pt.  Pt enjoys being active in the NH gym.  Equipment used at home:  (B platform rollator).  Upon discharge, patient will have assistance from NH staff.    Objective:     Patient found HOB elevated with peripheral IV, telemetry, PureWick   upon PT entry to room.    General Precautions: Standard, fall, diabetic, h/o L chest AICD, h/o CVA with L sided weakness, COVID+ (contact, droplet, airborne isolation)  Orthopedic Precautions:N/A   Braces: N/A  Respiratory Status: Room air    Exams:  Cognitive Exam:  Patient was able to follow multiple commands.   Gross Motor Coordination:  WFL  Postural Exam:  Patient presented with the following abnormalities:    -       No postural abnormalities identified  Sensation:    -       Intact  light/touch BLE  Skin Integrity/Edema:      -       Skin integrity: Visible skin intact  -       Edema: Mild LLE  BLE ROM: WFL except minimal decreased L ankle DF  BLE Strength: WFL    Functional Mobility:    Bed Mobility:     Scooting: minimum assistance  Supine to Sit: minimum assistance  Transfers:     Sit to Stand: contact guard assistance with rolling walker; Pt required min A with LUE placement onto RW.  Pt with minimal R hand tremor upon reaching for the RW, however did subsided.    Bed to Chair: contact guard assistance with  rolling walker  using  Step Transfer  Gait: Pt ambulated ~15 ft within room with CGA using RW.  Gait limited 2* c/o L calf pain, (-)DVT on imaging yesterday.  Pt reported mild dizziness, no CP.  Pt with decreased step length and berenice.   Balance: Pt with fair dynamic standing balance.       AM-PAC 6 CLICK MOBILITY  Total Score:17       Treatment & Education:  BLE seated therex as tolerated: hip flex, LAQ, and AP    Pt educated on acute skilled PT services and goals.  Pt to call for nursing assistance with OOB activities while in the hospital.  Pt verbalized good understanding.     Patient left up in chair on pillow reclined with BLE elevated on pillow with all lines intact, call button in reach, nurse Antonia notified, and PCT present.  External catheter reattached.  Tray table close by.     GOALS:   Multidisciplinary Problems       Physical Therapy Goals          Problem: Physical Therapy    Goal  Priority Disciplines Outcome Interventions   Physical Therapy Goal     PT, PT/OT Progressing    Description: Goals to be met by: 25     Patient will increase functional independence with mobility by performin. Supine to sit with Modified Waller  2. Rolling to Left and Right with Modified Waller  3. Sit to stand transfer with Modified Waller using RW  4. Bed to chair transfer with Modified Waller using Rolling Walker  5. Gait >50 feet with Modified Waller using Rolling Walker   6. Lower extremity exercise program 2 sets x15 reps per handout, with independence                           History:     Past Medical History:   Diagnosis Date    Cataract     Cellulitis of penis 2018    COPD (chronic obstructive pulmonary disease)     Coronary artery disease     Diabetes mellitus type II     DJD (degenerative joint disease)     Glaucoma     Gout     Hypertension     Kidney stone     MI (myocardial infarction)     Obesity     JOSE (obstructive sleep apnea)     Uveitis        Past Surgical History:   Procedure Laterality Date    CARDIAC DEFIBRILLATOR PLACEMENT      CATARACT EXTRACTION W/  INTRAOCULAR LENS IMPLANT Right     OUTSIDE [facility] ()    CYSTOSCOPY N/A 10/01/2020    Procedure: CYSTOSCOPY;  Surgeon: Monica Lieberman MD;  Location: [facility];  Service: Urology;  Laterality: N/A;  RN PRE OP Covid screen 2020  C A    DESTRUCTION, CILIARY BODY, USING LASER Right 2023    Procedure: DESTRUCTION, CILIARY BODY, USING LASER;  Surgeon: Shaina Gan MD;  Location: [address];  Service: Ophthalmology;  Laterality: Right;    DESTRUCTION, CILIARY BODY, USING LASER Right 8/10/2023    Procedure: DESTRUCTION, CILIARY BODY, USING LASER;  Surgeon: Shaina Gan MD;  Location: [facility] OR [address] [place] [address];  Service: Ophthalmology;  Laterality: Right;    DESTRUCTION, CILIARY BODY, USING LASER Right 2023    Procedure: DESTRUCTION, CILIARY BODY, USING LASER;  Surgeon:  Shaina Gan MD;  Location: [facility] OR [address] [place] [address];  Service: Ophthalmology;  Laterality: Right;  g-probe    excisional biopsy left inguinal lymph node      FOOT SURGERY      right foot surgery     INSERTION OF INFLATABLE PENILE PROSTHESIS N/A 01/25/2022    Procedure: INSERTION, PENILE PROSTHESIS, INFLATABLE;  Surgeon: Monica Lieberman MD;  Location: [facility];  Service: Urology;  Laterality: N/A;  VIOSO JUAN LUIS ARNOLD [phone] TEXTED HIM @ 5:32PM ON 12-  RN PRE OP 12-28-21. Covid NEGATIVE 1-3-22. CA-----AICD-----HAS CARDS CLEARANCE    kidney stones      lithotripsy    REPAIR, SURGICAL WOUND, EYE, ANTERIOR SEGMENT Right 10/05/2021    Procedure: REPAIR, SURGICAL WOUND, EYE, ANTERIOR SEGMENT;  Surgeon: Adelaide Allen MD;  Location: [facility] OR [address] [place] [address];  Service: Ophthalmology;  Laterality: Right;  Anterior Chamber Wash Out Right Eye     Surgery for bulging disc at C7         Time Tracking:     PT Received On: 04/02/25  PT Start Time: 0925     PT Stop Time: 0952  PT Total Time (min): 27 min     Billable Minutes: Evaluation 14 min and Therapeutic Activity 13 min      04/02/2025

## 2025-04-02 NOTE — ASSESSMENT & PLAN NOTE
No anginal sounding chest pains.  Likely type 2.  No further ischemic workup planned during this hospital stay    Results for orders placed during the hospital encounter of 05/28/24    Nuclear Stress - Cardiology Interpreted    Interpretation Summary    Equivocal myocardial perfusion scan.    moderate to severe intensity, large sized, equivocal  in the inferoapical wall(s). This finding is equivocal due to diaphragm shadow.    There are no other significant perfusion abnormalities.    The gated perfusion images showed an ejection fraction of 47% at rest.    There is mild global hypokinesis at rest and stress.    The ECG portion of the study is negative for ischemia.    The patient reported no chest pain during the stress test.    There were no arrhythmias during stress.      4/2:  doing fine.  Chest pain-free.  Euvolemic.  Will sign off.  Follow-up in Cardiology Clinic

## 2025-04-02 NOTE — PLAN OF CARE
Case Management Assessment     PCP: Coreen Anderson MD    Pharmacy:   [id number] - [place] [address]  [address] [address] AT [facility] [address]  [address]  [place] [address]  Phone: [phone] Fax: [phone]      Patient Arrived From: [facility]  Existing Help at Home: Facility Staff    Barriers to Discharge: None identified    Discharge Plan:    A. Return to [facility]   B. Return to [facility]    CM spoke to facility staff and pt for dc planning assessment. Pt lives at [facility]. He's able to ambulate using a rollator. Facility staff assists with bathing & dressing. CM will arrange wheelchair van transportation on discharge. Plan of care orders and updated clinicals sent to [facility]. Will continue to follow.     04/02/25 1248   Discharge Assessment   Assessment Type Discharge Planning Assessment   Confirmed/corrected address, phone number and insurance Yes   Confirmed Demographics Correct on Facesheet   Source of Information patient;facility verbal report   Communicated JANEEN with patient/caregiver Yes   People in Home facility resident   Do you expect to return to your current living situation? Yes   Do you have help at home or someone to help you manage your care at home? Yes   Who are your caregiver(s) and their phone number(s)? Facility staff   Current cognitive status: Alert/Oriented   Walking or Climbing Stairs Difficulty yes   Walking or Climbing Stairs ambulation difficulty, requires equipment   Mobility Management rollator   Dressing/Bathing Difficulty yes   Dressing/Bathing dressing difficulty, assistance 1 person;bathing difficulty, assistance 1 person   Dressing/Bathing Management facility staff assists   Equipment Currently Used at Home rollator   Readmission within 30 days? No   Patient currently being followed by outpatient case management? No   Do you currently have service(s) that help you manage your care at  home? No   Do you take prescription medications? Yes   Do you have prescription coverage? Yes   Coverage PHN   Do you have any problems affording any of your prescribed medications? No   Is the patient taking medications as prescribed? yes   Who is going to help you get home at discharge? wheelchair van   Are you on dialysis? No   Do you take coumadin? No   Discharge Plan A Return to nursing home   Discharge Plan B Return to Nursing Home   DME Needed Upon Discharge  none   Discharge Plan discussed with: Patient   Transition of Care Barriers None

## 2025-04-02 NOTE — NURSING
Patient arrived to floor via stretcher from ED. Patient oriented to floor and room at this time. Basic setup placed at bedside. Vital signs are stable. Admit assessment completed. Skin assessment completed. Patient AAOx4. Respirations even and unlabored. No distress noted. Skin warm and dry. IV saline locked. Patient's diaper soiled. Patient cleaned external catheter placed at this time. Bed in lowest position. Call bell within reach. Bed alarms audible. Instructed to call for any needs.     [facility]  Nurses Note -- 4 Eyes      4/1/2025       Skin assessed on: Admit      [x] No Pressure Injuries Present    [x]Prevention Measures Documented    [] Yes LDA  for Pressure Injury Previously documented     [] Yes New Pressure Injury Discovered   [] LDA for New Pressure Injury Added      Attending RN:  Janet Lyons LPN     Second RN:  Linda lUloa, RN

## 2025-04-02 NOTE — PROGRESS NOTES
[facility] Surg  Cardiology  Progress Note    Patient Name: Se Virgil Mcgraw  MRN: [id number]  Admission Date: 3/31/2025  Hospital Length of Stay: 1 days  Code Status: Full Code   Attending Physician: Mercedes Manrique DO   Primary Care Physician: Coreen Anderson MD  Expected Discharge Date: 4/2/2025  Principal Problem:Pneumonia due to COVID-19 virus    Subjective:       Interval History:  no chest pains.  Doing fine.  Euvolemic    Review of Systems   Constitutional: Positive for malaise/fatigue.   HENT: Negative.     Eyes: Negative.    Endocrine: Negative.    Hematologic/Lymphatic: Negative.    Skin: Negative.    Musculoskeletal: Negative.    Gastrointestinal: Negative.    Genitourinary: Negative.    Neurological: Negative.    Psychiatric/Behavioral: Negative.     Allergic/Immunologic: Negative.      Objective:     Vital Signs (Most Recent):  Temp: 97.5 °F (36.4 °C) (04/02/25 0758)  Pulse: 75 (04/02/25 1109)  Resp: 18 (04/02/25 0758)  BP: (!) 171/87 (04/02/25 0917)  SpO2: (!) 94 % (04/02/25 0758) Vital Signs (24h Range):  Temp:  [97.5 °F (36.4 °C)-98.4 °F (36.9 °C)] 97.5 °F (36.4 °C)  Pulse:  [58-90] 75  Resp:  [18-26] 18  SpO2:  [90 %-100 %] 94 %  BP: (145-205)/(68-91) 171/87     Weight: 98.6 kg (217 lb 6 oz)  Body mass index is 34.05 kg/m².     SpO2: (!) 94 %         Intake/Output Summary (Last 24 hours) at 4/2/2025 1135  Last data filed at 4/2/2025 1000  Gross per 24 hour   Intake 1230.28 ml   Output 650 ml   Net 580.28 ml       Lines/Drains/Airways       Drain  Duration             Male External Urinary Catheter 04/01/25 1411 <1 day              Peripheral Intravenous Line  Duration                  Peripheral IV - Single Lumen 03/31/25 1732 18 G Left Antecubital 1 day                       Physical Exam  Vitals reviewed.   Constitutional:       Appearance: He is well-developed.   HENT:      Head: Normocephalic.   Eyes:      Conjunctiva/sclera: Conjunctivae normal.      Pupils: Pupils are equal, round, and  reactive to light.   Cardiovascular:      Rate and Rhythm: Normal rate and regular rhythm.      Heart sounds: Normal heart sounds.   Pulmonary:      Effort: Pulmonary effort is normal.      Breath sounds: Normal breath sounds.   Abdominal:      General: Bowel sounds are normal.      Palpations: Abdomen is soft.   Musculoskeletal:      Cervical back: Normal range of motion and neck supple.   Skin:     General: Skin is warm.   Neurological:      Mental Status: He is alert and oriented to person, place, and time.            Significant Labs:     DATA:     Laboratory:  CBC:  Recent Labs   Lab 02/07/25 0002 03/31/25 2109 04/01/25  0655   WBC 6.88 7.32 7.72   Hemoglobin 14.3  --   --    HGB  --  14.3 13.7 L   Hematocrit 43.3  --   --    HCT  --  43.3 41.0   Platelet Count  --  142 L 144 L   Platelets 172  --   --        CHEMISTRIES:  Recent Labs   Lab 05/29/24  0503 11/29/24  2354 02/01/25 2003 02/07/25 0002 03/31/25 2109 04/01/25  0655 04/02/25  0700   Glucose 106 307 H  --  242 H  --   --   --    Sodium 138 137   < > 141 140 139 141   Potassium 3.9 4.7   < > 4.0 3.8 4.0 4.0   BUN 11 19   < > 25 H 18 19 27 H   Creatinine 0.9 1.5 H   < > 1.5 H 1.3 1.1 1.0   Calcium 9.6 9.7   < > 9.5 9.7 9.4 9.0   Magnesium   --   --   --   --  1.7 1.5 L 1.9    < > = values in this interval not displayed.       CARDIAC BIOMARKERS:  Recent Labs   Lab 03/29/24 2051 05/28/24  0432 02/07/25 0002 03/31/25 2109 04/01/25  0216 04/01/25  0655   CPK  --   --  401 H  --   --   --    CK 90  --   --   --   --   --    Troponin I  --    < >  --   --   --   --    Troponin-I  --   --   --  0.113 H 0.142 H 0.117 H    < > = values in this interval not displayed.       COAGS:  Recent Labs   Lab 03/25/24  1051 03/29/24 2051   INR 1.0 1.0       LIPIDS/LFTS:  Recent Labs   Lab 05/28/24  0432 05/29/24  0503 02/07/25  0002 03/31/25 2109 04/01/25  0655   Cholesterol 155  --   --   --   --    Triglycerides 155 H  --   --   --   --    HDL 37 L  --   --    --   --    LDL Cholesterol 87.0  --   --   --   --    Non-HDL Cholesterol 118  --   --   --   --    AST 13   < > 18 19 21   ALT 9 L   < > 15 19 17    < > = values in this interval not displayed.       Hemoglobin A1C   Date Value Ref Range Status   05/28/2024 5.8 (H) 4.0 - 5.6 % Final     Comment:     ADA Screening Guidelines:  5.7-6.4%  Consistent with prediabetes  >or=6.5%  Consistent with diabetes    High levels of fetal hemoglobin interfere with the HbA1C  assay. Heterozygous hemoglobin variants (HbS, HgC, etc)do  not significantly interfere with this assay.   However, presence of multiple variants may affect accuracy.     02/06/2024 9.8 (H) <5.7 % Final   11/16/2023 7.5 (H) <5.7 % Final   10/24/2023 7.9 (H) <5.7 % Final   01/26/2022 7.5 (H) 4.0 - 5.6 % Final     Comment:     ADA Screening Guidelines:  5.7-6.4%  Consistent with prediabetes  >or=6.5%  Consistent with diabetes    High levels of fetal hemoglobin interfere with the HbA1C  assay. Heterozygous hemoglobin variants (HbS, HgC, etc)do  not significantly interfere with this assay.   However, presence of multiple variants may affect accuracy.     10/01/2021 7.5 (H) 4.0 - 5.6 % Final     Comment:     ADA Screening Guidelines:  5.7-6.4%  Consistent with prediabetes  >or=6.5%  Consistent with diabetes    High levels of fetal hemoglobin interfere with the HbA1C  assay. Heterozygous hemoglobin variants (HbS, HgC, etc)do  not significantly interfere with this assay.   However, presence of multiple variants may affect accuracy.         TSH  Recent Labs   Lab 05/28/24  0432   TSH 1.580       The ASCVD Risk score (Hair DK, et al., 2019) failed to calculate for the following reasons:    Risk score cannot be calculated because patient has a medical history suggesting prior/existing ASCVD       BNP    Lab Results   Component Value Date/Time    BNP 41 04/01/2025 01:37 AM    BNP 26 05/28/2024 04:32 AM    BNP <10 08/05/2016 04:30 PM    BNP <10 08/29/2015 11:56 PM             ECHO    Results for orders placed during the hospital encounter of 03/31/25    Echo    Interpretation Summary    Left Ventricle: The left ventricle is normal in size. There is moderately reduced systolic function with a visually estimated ejection fraction of  35%. Grade I diastolic dysfunction.    Right Ventricle: The right ventricle is normal in size. Systolic function is normal. Pacemaker lead present in the ventricle.    Mitral Valve: There is mild regurgitation.    Tricuspid Valve: There is mild regurgitation.    Pulmonary Artery: The estimated pulmonary artery systolic pressure is 32 mmHg.    IVC/SVC: Normal venous pressure at 3 mmHg.      STRESS TEST    Results for orders placed during the hospital encounter of 05/28/24    Nuclear Stress - Cardiology Interpreted    Interpretation Summary    Equivocal myocardial perfusion scan.    moderate to severe intensity, large sized, equivocal  in the inferoapical wall(s). This finding is equivocal due to diaphragm shadow.    There are no other significant perfusion abnormalities.    The gated perfusion images showed an ejection fraction of 47% at rest.    There is mild global hypokinesis at rest and stress.    The ECG portion of the study is negative for ischemia.    The patient reported no chest pain during the stress test.    There were no arrhythmias during stress.        Assessment and Plan:     Brief HPI:     Chronic combined systolic and diastolic heart failure   Currently euvolemic on exam.  Continue current guideline directed medical therapy    AICD (automatic cardioverter/defibrillator) present        Elevated troponin   No anginal sounding chest pains.  Likely type 2.  No further ischemic workup planned during this hospital stay    Results for orders placed during the hospital encounter of 05/28/24    Nuclear Stress - Cardiology Interpreted    Interpretation Summary    Equivocal myocardial perfusion scan.    moderate to severe intensity, large  sized, equivocal  in the inferoapical wall(s). This finding is equivocal due to diaphragm shadow.    There are no other significant perfusion abnormalities.    The gated perfusion images showed an ejection fraction of 47% at rest.    There is mild global hypokinesis at rest and stress.    The ECG portion of the study is negative for ischemia.    The patient reported no chest pain during the stress test.    There were no arrhythmias during stress.      4/2:  doing fine.  Chest pain-free.  Euvolemic.  Will sign off.  Follow-up in Cardiology Clinic    Essential hypertension  Patient's blood pressure range in the last 24 hours was: BP  Min: 129/75  Max: 209/101.The patient's inpatient anti-hypertensive regimen is listed below:  Current Antihypertensives  carvediloL tablet 3.125 mg, 2 times daily, Oral  hydrALAZINE injection 10 mg, Every 6 hours PRN, Intravenous  lisinopriL tablet 40 mg, Daily, Oral      Titrate antihypertensives as needed for appropriate blood pressure control        VTE Risk Mitigation (From admission, onward)           Ordered     apixaban tablet 5 mg  2 times daily         03/31/25 2220     IP VTE HIGH RISK PATIENT  Once         03/31/25 2213     Place sequential compression device  Until discontinued         03/31/25 2213                    Cristine Chang MD  Cardiology  [facility] Surg

## 2025-04-02 NOTE — NURSING
[facility] [place]  Nurses Note -- 4 Eyes        Date: 04/02/2025        Skin assessed on: Discharge        [x] No Pressure Injuries Present                 []Prevention Measures Documented     [] Yes LDA Previously documented      [] Yes New Pressure Injury Discovered              [] LDA Added        Attending RN: LUL Knight     Second RN:  MAK Tyson

## 2025-06-05 NOTE — PROGRESS NOTES
"Subjective:       Patient ID: Lianet Mcgraw is a 55 y.o. male.    Vitals:  height is 5' 7" (1.702 m) and weight is 99.8 kg (220 lb). His temperature is 98 °F (36.7 °C). His blood pressure is 115/63 and his pulse is 88. His respiration is 15 and oxygen saturation is 98%.     Chief Complaint: URI    Pt c/o coughing(dry)  and body aches x4 days. States he's had fever but did not measure his temp. Denies CP, SOB, dyspnea, abd pain, n/v/d. Hx of asthma. He has an albuterol inhaler at home that he hasn't needed much.      Cough   This is a new problem. The current episode started in the past 7 days. The problem has been unchanged. Associated symptoms include chills and shortness of breath. Pertinent negatives include no chest pain, fever, headaches, myalgias, rash or sore throat. The treatment provided mild relief.       Constitution: Positive for chills. Negative for fatigue and fever.   HENT: Negative for congestion and sore throat.    Neck: Negative for painful lymph nodes.   Cardiovascular: Negative for chest pain and leg swelling.   Eyes: Negative for double vision and blurred vision.   Respiratory: Positive for cough and shortness of breath.    Gastrointestinal: Negative for nausea, vomiting and diarrhea.   Genitourinary: Negative for dysuria, frequency and urgency.   Musculoskeletal: Negative for joint pain, joint swelling, muscle cramps and muscle ache.   Skin: Negative for color change, pale and rash.   Allergic/Immunologic: Negative for seasonal allergies.   Neurological: Negative for dizziness, history of vertigo, light-headedness, passing out and headaches.   Hematologic/Lymphatic: Negative for swollen lymph nodes, easy bruising/bleeding and history of blood clots. Does not bruise/bleed easily.   Psychiatric/Behavioral: Negative for nervous/anxious, sleep disturbance and depression. The patient is not nervous/anxious.        Objective:      Physical Exam   Constitutional: He is oriented to person, place, and " Detail Level: Detailed time. He appears well-developed and well-nourished. He is cooperative.  Non-toxic appearance. He does not have a sickly appearance. He does not appear ill. No distress.   HENT:   Head: Normocephalic and atraumatic.   Right Ear: Hearing, tympanic membrane, external ear and ear canal normal.   Left Ear: Hearing, tympanic membrane, external ear and ear canal normal.   Nose: Rhinorrhea present. No mucosal edema or nasal deformity. No epistaxis. Right sinus exhibits no maxillary sinus tenderness and no frontal sinus tenderness. Left sinus exhibits no maxillary sinus tenderness and no frontal sinus tenderness.   Mouth/Throat: Uvula is midline, oropharynx is clear and moist and mucous membranes are normal. No trismus in the jaw. Normal dentition. No uvula swelling. No oropharyngeal exudate, posterior oropharyngeal edema, posterior oropharyngeal erythema or cobblestoning. No tonsillar exudate.   Eyes: Pupils are equal, round, and reactive to light. Conjunctivae and lids are normal. No scleral icterus.   Neck: Trachea normal, normal range of motion, full passive range of motion without pain and phonation normal. Neck supple. No neck rigidity. No edema and no erythema present.   Cardiovascular: Normal rate, regular rhythm, normal heart sounds, intact distal pulses and normal pulses.   Pulmonary/Chest: Effort normal. No respiratory distress. He has no decreased breath sounds. He has no rhonchi. He has rales in the left lower field.   Abdominal: Normal appearance.   Musculoskeletal: Normal range of motion. He exhibits no edema or deformity.   Lymphadenopathy:     He has no cervical adenopathy.   Neurological: He is alert and oriented to person, place, and time. He exhibits normal muscle tone. Coordination normal.   Skin: Skin is warm, dry, intact, not diaphoretic and not pale.   Psychiatric: He has a normal mood and affect. His speech is normal and behavior is normal. Judgment and thought content normal. Cognition and memory  are normal.   Nursing note and vitals reviewed.  X-ray Chest Pa And Lateral    Result Date: 3/18/2020  EXAMINATION: XR CHEST PA AND LATERAL CLINICAL HISTORY: Cough TECHNIQUE: PA and lateral views of the chest were performed. COMPARISON: 01/11/2020 FINDINGS: There is improper exposure on the lateral view.  Left chest wall pacer noted.  The cardiomediastinal silhouette is prominent, similar to the previous exam noting magnification by technique.  There is no pleural effusion.  The trachea is midline.  The lungs are symmetrically expanded bilaterally without evidence of acute parenchymal process. No large focal consolidation seen.  There is no pneumothorax.  The osseous structures are remarkable for degenerative change..     1. No convincing acute cardiopulmonary process, please see above. Electronically signed by: Loc Hartman MD Date:    03/18/2020 Time:    14:10        Assessment:       1. Viral URI    2. Cough    3. Rales        Plan:         Viral URI  -     promethazine-dextromethorphan (PROMETHAZINE-DM) 6.25-15 mg/5 mL Syrp; Take 5 mLs by mouth every 4 to 6 hours as needed. 5 mL every 4 to 6 hours; maximum: 30 mL in 24 hours  Dispense: 118 mL; Refill: 0    Cough  -     POCT Influenza A/B  -     X-Ray Chest PA And Lateral; Future; Expected date: 03/18/2020    Rales  -     X-Ray Chest PA And Lateral; Future; Expected date: 03/18/2020         Reviewed previous pertinent office visits, PMH, PSH, fam hx  We discussed that pt doesn't meet testing criteria for COVID-19 at this point. We still recommend self isolation for the duration of this illness.   We discussed that this is likely a viral illness and will not benefit from antibiotics. Symptomatic care recommended.   Recommended otc motrin or tylenol as needed for fever/aches unless contraindicated  Advised on return/follow-up precautions. Advised on ER precautions. Answered all patient questions. Patient verbalized understanding and voiced agreement with current  treatment plan.    Patient Instructions     Viral Upper Respiratory Illness (Adult)  You have a viral upper respiratory illness (URI), which is another term for the common cold. This illness is contagious during the first few days. It is spread through the air by coughing and sneezing. It may also be spread by direct contact (touching the sick person and then touching your own eyes, nose, or mouth). Frequent handwashing will decrease risk of spread. Most viral illnesses go away within 7 to 10 days with rest and simple home remedies. Sometimes the illness may last for several weeks. Antibiotics will not kill a virus, and they are generally not prescribed for this condition.    Home care  · If symptoms are severe, rest at home for the first 2 to 3 days. When you resume activity, don't let yourself get too tired.  · Avoid being exposed to cigarette smoke (yours or others).  · You may use acetaminophen or ibuprofen to control pain and fever, unless another medicine was prescribed. (Note: If you have chronic liver or kidney disease, have ever had a stomach ulcer or gastrointestinal bleeding, or are taking blood-thinning medicines, talk with your healthcare provider before using these medicines.) Aspirin should never be given to anyone under 18 years of age who is ill with a viral infection or fever. It may cause severe liver or brain damage.  · Your appetite may be poor, so a light diet is fine. Avoid dehydration by drinking 6 to 8 glasses of fluids per day (water, soft drinks, juices, tea, or soup). Extra fluids will help loosen secretions in the nose and lungs.  · Over-the-counter cold medicines will not shorten the length of time youre sick, but they may be helpful for the following symptoms: cough, sore throat, and nasal and sinus congestion. (Note: Do not use decongestants if you have high blood pressure.)  Follow-up care  Follow up with your healthcare provider, or as advised.  When to seek medical advice  Call  your healthcare provider right away if any of these occur:  · Cough with lots of colored sputum (mucus)  · Severe headache; face, neck, or ear pain  · Difficulty swallowing due to throat pain  · Fever of 100.4°F (38°C)  Call 911, or get immediate medical care  Call emergency services right away if any of these occur:  · Chest pain, shortness of breath, wheezing, or difficulty breathing  · Coughing up blood  · Inability to swallow due to throat pain  Date Last Reviewed: 9/13/2015  © 3825-4827 [employer]. [address] [place] [id number]. All rights reserved. This information is not intended as a substitute for professional medical care. Always follow your healthcare professional's instructions.

## (undated) DEVICE — SEE MEDLINE ITEM 146417

## (undated) DEVICE — APPLICATOR NS COTTON-TIP 6IN

## (undated) DEVICE — SYS CLSR DERMABOND PRINEO 22CM

## (undated) DEVICE — DRESSING TRANS 4X4 TEGADERM

## (undated) DEVICE — CONTAINER SPECIMEN STRL 4OZ

## (undated) DEVICE — SOL NS 1000CC

## (undated) DEVICE — SUT VICRYL 2-0 SH VCP317H

## (undated) DEVICE — SYS LABLNG CORECT MED 4 FLG

## (undated) DEVICE — SUTURE STRATAFIX PGAPCL 4 UNI

## (undated) DEVICE — TIP YANKAUERS BULB NO VENT

## (undated) DEVICE — SUT CTD VICRYL 3-0 CR/SH

## (undated) DEVICE — NDL HYPO REG 25G X 1 1/2

## (undated) DEVICE — NDL 18GA X1 1/2 REG BEVEL

## (undated) DEVICE — PROBE LASER G ILLUMINATE

## (undated) DEVICE — SOL 9P NACL IRR PIC IL

## (undated) DEVICE — TOWELS STERILE 18 X 25.5

## (undated) DEVICE — SUT CTD VICRYL 0 UND BR CT

## (undated) DEVICE — Device

## (undated) DEVICE — SUPPORT ULNA NERVE PROTECTOR

## (undated) DEVICE — SEE MEDLINE ITEM 157110

## (undated) DEVICE — GOWN SURGICAL X-LARGE

## (undated) DEVICE — SPONGE DERMACEA GAUZE 4X4

## (undated) DEVICE — SPONGE LAP 18X18 PREWASHED

## (undated) DEVICE — COVER SNAP KAP 26IN

## (undated) DEVICE — TOWEL OR DISP STRL BLUE 4/PK

## (undated) DEVICE — GOWN B1 X-LG X-LONG

## (undated) DEVICE — POSITIONER HEAD ADULT

## (undated) DEVICE — UNDERPANT STRTCHBL MESH GRN XL

## (undated) DEVICE — DRAIN PENROSE XRAY 18 X 1/4 ST

## (undated) DEVICE — SUT PDS PLUS 3-0 SH 27IN

## (undated) DEVICE — ELECTRODE REM PLYHSV RETURN 9

## (undated) DEVICE — PROBE MICROPULSE P3

## (undated) DEVICE — DRAPE INCISE IOBAN 2 23X17IN

## (undated) DEVICE — BLANKET UPPER BODY 78.7X29.9IN

## (undated) DEVICE — SHIELD EYE METAL FOX 50/BX

## (undated) DEVICE — TIPS REAR EXTENDER

## (undated) DEVICE — DRAIN SIL FLAT 10MM FULL PERF

## (undated) DEVICE — ELECTRODE NEEDLE 1IN

## (undated) DEVICE — CANNULA ANTERIOR 20G

## (undated) DEVICE — CANISTER SUCTION 2 LTR

## (undated) DEVICE — UNDERGLOVE BIOGEL PI SZ 6.5 LF

## (undated) DEVICE — PACK ENDOSCOPY GENERAL

## (undated) DEVICE — SEE MEDLINE ITEM 146292

## (undated) DEVICE — BETADINE OPTHALMIC SOL 5% 30ML

## (undated) DEVICE — DRESSING SPONGE 8PLY 4X4 STRL

## (undated) DEVICE — BNDG COFLEX FOAM LF2 ST 4X5YD

## (undated) DEVICE — DRAPE THREE-QTR REINF 53X77IN

## (undated) DEVICE — ADHESIVE DERMABOND MINI HV

## (undated) DEVICE — SOL WATER STRL IRR 1000ML

## (undated) DEVICE — GLOVE SURG BIOGEL LATEX SZ 7.5

## (undated) DEVICE — PAD EYE OVAL CNTOUR 1.62X2.62

## (undated) DEVICE — SOL BETADINE 5%

## (undated) DEVICE — SOL IRR NACL .9% 3000ML

## (undated) DEVICE — SEE MEDLINE ITEM 157131

## (undated) DEVICE — DRAIN CHANNEL ROUND 15FR

## (undated) DEVICE — SUT ETHILON 3-0 PS2 18 BLK

## (undated) DEVICE — BLADE SURG STAINLESS STEEL #15

## (undated) DEVICE — SET DECANTER MEDICHOICE

## (undated) DEVICE — GOWN SURGICAL XX LARGE X LONG

## (undated) DEVICE — SYR 50CC LL

## (undated) DEVICE — NDL FLTR 5MCRN BLNT TIP 18GX1

## (undated) DEVICE — SYR 10CC LUER LOCK

## (undated) DEVICE — APPLICATOR CHLORAPREP ORN 26ML

## (undated) DEVICE — SUT PDS 2-0 CT1 27IN CLEAR

## (undated) DEVICE — MAT QUICK 40X30 FLOOR FLUID LF

## (undated) DEVICE — SOL BSS BALANCED SALT

## (undated) DEVICE — RESERVOIR FLAT I2 100ML
Type: IMPLANTABLE DEVICE | Site: GROIN | Status: NON-FUNCTIONAL
Removed: 2022-01-25

## (undated) DEVICE — DRAPE OPHTHALMIC 48X62 FEN

## (undated) DEVICE — GLOVE BIOGEL 7.5

## (undated) DEVICE — RETRACTOR WAVEGUIDE NAR/FLAT

## (undated) DEVICE — GLOVE SURGICAL LATEX SZ 8

## (undated) DEVICE — EVACUATOR WOUND BULB 100CC

## (undated) DEVICE — BLADE CLIPPER SENICLIP SURGICA

## (undated) DEVICE — COVER OVERHEAD SURG LT BLUE

## (undated) DEVICE — URINARY DRAINAGE BAG

## (undated) DEVICE — LINER GLOVE POWDERFREE 8

## (undated) DEVICE — SYR BULB EAR/ULCER STER 3OZ

## (undated) DEVICE — KIT GREY EYE

## (undated) DEVICE — GLOVE SURGICAL LATEX SZ 6.5

## (undated) DEVICE — SUT MONOCRYL 4.0 PS2 CP496G

## (undated) DEVICE — GLOVE BIOGEL ECLIPSE SZ 6.5

## (undated) DEVICE — GLOVE BIOGEL PI MICRO SZ 6.5

## (undated) DEVICE — GLOVE BIOGEL ECLIPSE SZ 8

## (undated) DEVICE — TRAY FOLEY 16FR INFECTION CONT